# Patient Record
Sex: FEMALE | Race: WHITE | NOT HISPANIC OR LATINO | Employment: OTHER | URBAN - METROPOLITAN AREA
[De-identification: names, ages, dates, MRNs, and addresses within clinical notes are randomized per-mention and may not be internally consistent; named-entity substitution may affect disease eponyms.]

---

## 2017-01-03 ENCOUNTER — APPOINTMENT (OUTPATIENT)
Dept: PHYSICAL THERAPY | Facility: CLINIC | Age: 52
End: 2017-01-03
Payer: MEDICARE

## 2017-01-03 PROCEDURE — 97140 MANUAL THERAPY 1/> REGIONS: CPT

## 2017-01-03 PROCEDURE — 97110 THERAPEUTIC EXERCISES: CPT

## 2017-01-11 ENCOUNTER — APPOINTMENT (OUTPATIENT)
Dept: PHYSICAL THERAPY | Facility: CLINIC | Age: 52
End: 2017-01-11
Payer: MEDICARE

## 2017-01-19 ENCOUNTER — HOSPITAL ENCOUNTER (OUTPATIENT)
Dept: CT IMAGING | Facility: HOSPITAL | Age: 52
Discharge: HOME | End: 2017-01-19
Attending: INTERNAL MEDICINE
Payer: MEDICARE

## 2017-01-19 DIAGNOSIS — C50.919 MALIGNANT NEOPLASM OF FEMALE BREAST (HCC): ICD-10-CM

## 2017-01-19 PROCEDURE — 74177 CT ABD & PELVIS W/CONTRAST: CPT

## 2017-01-19 PROCEDURE — 71260 CT THORAX DX C+: CPT

## 2017-01-19 RX ADMIN — IOHEXOL 100 ML: 350 INJECTION, SOLUTION INTRAVENOUS at 11:30

## 2017-01-20 ENCOUNTER — APPOINTMENT (OUTPATIENT)
Dept: PHYSICAL THERAPY | Facility: CLINIC | Age: 52
End: 2017-01-20
Payer: MEDICARE

## 2017-01-20 PROCEDURE — 97110 THERAPEUTIC EXERCISES: CPT

## 2017-01-20 PROCEDURE — 97140 MANUAL THERAPY 1/> REGIONS: CPT

## 2017-01-24 ENCOUNTER — TRANSCRIBE ORDERS (OUTPATIENT)
Dept: ADMINISTRATIVE | Facility: HOSPITAL | Age: 52
End: 2017-01-24

## 2017-01-24 ENCOUNTER — APPOINTMENT (OUTPATIENT)
Dept: PHYSICAL THERAPY | Facility: CLINIC | Age: 52
End: 2017-01-24
Payer: MEDICARE

## 2017-01-24 DIAGNOSIS — E04.2 NONTOXIC MULTINODULAR GOITER: Primary | ICD-10-CM

## 2017-01-24 PROCEDURE — 97140 MANUAL THERAPY 1/> REGIONS: CPT

## 2017-01-24 PROCEDURE — 97110 THERAPEUTIC EXERCISES: CPT

## 2017-01-30 ENCOUNTER — APPOINTMENT (OUTPATIENT)
Dept: PHYSICAL THERAPY | Facility: CLINIC | Age: 52
End: 2017-01-30
Payer: MEDICARE

## 2017-01-30 PROCEDURE — G8991 OTHER PT/OT GOAL STATUS: HCPCS

## 2017-01-30 PROCEDURE — G8992 OTHER PT/OT  D/C STATUS: HCPCS

## 2017-01-30 PROCEDURE — G8990 OTHER PT/OT CURRENT STATUS: HCPCS

## 2017-01-30 PROCEDURE — 97140 MANUAL THERAPY 1/> REGIONS: CPT

## 2017-01-30 PROCEDURE — 97110 THERAPEUTIC EXERCISES: CPT

## 2017-01-31 ENCOUNTER — GENERIC CONVERSION - ENCOUNTER (OUTPATIENT)
Dept: OTHER | Facility: OTHER | Age: 52
End: 2017-01-31

## 2017-02-01 ENCOUNTER — HOSPITAL ENCOUNTER (OUTPATIENT)
Dept: ULTRASOUND IMAGING | Facility: HOSPITAL | Age: 52
Discharge: HOME | End: 2017-02-01
Attending: INTERNAL MEDICINE
Payer: MEDICARE

## 2017-02-01 DIAGNOSIS — E04.2 NONTOXIC MULTINODULAR GOITER: ICD-10-CM

## 2017-02-01 PROCEDURE — 76536 US EXAM OF HEAD AND NECK: CPT

## 2017-02-05 ENCOUNTER — GENERIC CONVERSION - ENCOUNTER (OUTPATIENT)
Dept: OTHER | Facility: OTHER | Age: 52
End: 2017-02-05

## 2017-02-06 ENCOUNTER — ALLSCRIPTS OFFICE VISIT (OUTPATIENT)
Dept: OTHER | Facility: OTHER | Age: 52
End: 2017-02-06

## 2017-02-14 ENCOUNTER — ALLSCRIPTS OFFICE VISIT (OUTPATIENT)
Dept: OTHER | Facility: OTHER | Age: 52
End: 2017-02-14

## 2017-02-15 ENCOUNTER — GENERIC CONVERSION - ENCOUNTER (OUTPATIENT)
Dept: OTHER | Facility: OTHER | Age: 52
End: 2017-02-15

## 2017-03-23 ENCOUNTER — GENERIC CONVERSION - ENCOUNTER (OUTPATIENT)
Dept: OTHER | Facility: OTHER | Age: 52
End: 2017-03-23

## 2017-05-18 ENCOUNTER — GENERIC CONVERSION - ENCOUNTER (OUTPATIENT)
Dept: OTHER | Facility: OTHER | Age: 52
End: 2017-05-18

## 2017-05-24 ENCOUNTER — ALLSCRIPTS OFFICE VISIT (OUTPATIENT)
Dept: OTHER | Facility: OTHER | Age: 52
End: 2017-05-24

## 2017-05-24 ENCOUNTER — TRANSCRIBE ORDERS (OUTPATIENT)
Dept: ADMINISTRATIVE | Facility: HOSPITAL | Age: 52
End: 2017-05-24

## 2017-05-24 DIAGNOSIS — C50.011 MALIGNANT NEOPLASM INVOLVING BOTH NIPPLE AND AREOLA OF RIGHT BREAST IN FEMALE (HCC): Primary | ICD-10-CM

## 2017-05-25 ENCOUNTER — ALLSCRIPTS OFFICE VISIT (OUTPATIENT)
Dept: OTHER | Facility: OTHER | Age: 52
End: 2017-05-25

## 2017-05-25 LAB
ADDITIONAL INFORMATION (HISTORICAL): NORMAL
ADEQUACY: (HISTORICAL): NORMAL
COMMENT (HISTORICAL): NORMAL
CYTOTECHNOLOGIST: (HISTORICAL): NORMAL
GENERAL CATEGORIZATION (HISTORICAL): NORMAL
INFECTION (HISTORICAL): NORMAL
INTERPRETATION (HISTORICAL): NORMAL
LMP (HISTORICAL): NORMAL
PATHOLOGIST (HISTORICAL): NORMAL
PREV. BX: (HISTORICAL): NORMAL
PREV. PAP (HISTORICAL): NORMAL
REVIEWED BY (HISTORICAL): NORMAL
SOURCE (HISTORICAL): NORMAL
SPECIMEN STATUS REPORT (HISTORICAL): NORMAL

## 2017-06-02 DIAGNOSIS — C50.919 MALIGNANT NEOPLASM OF FEMALE BREAST (HCC): ICD-10-CM

## 2017-06-12 ENCOUNTER — ALLSCRIPTS OFFICE VISIT (OUTPATIENT)
Dept: OTHER | Facility: OTHER | Age: 52
End: 2017-06-12

## 2017-06-23 ENCOUNTER — GENERIC CONVERSION - ENCOUNTER (OUTPATIENT)
Dept: OTHER | Facility: OTHER | Age: 52
End: 2017-06-23

## 2017-08-02 ENCOUNTER — HOSPITAL ENCOUNTER (OUTPATIENT)
Dept: ULTRASOUND IMAGING | Facility: HOSPITAL | Age: 52
Discharge: HOME | End: 2017-08-02
Attending: INTERNAL MEDICINE
Payer: MEDICARE

## 2017-08-02 DIAGNOSIS — E04.2 NONTOXIC MULTINODULAR GOITER: ICD-10-CM

## 2017-08-02 PROCEDURE — 76536 US EXAM OF HEAD AND NECK: CPT

## 2017-08-31 ENCOUNTER — ALLSCRIPTS OFFICE VISIT (OUTPATIENT)
Dept: OTHER | Facility: OTHER | Age: 52
End: 2017-08-31

## 2017-08-31 DIAGNOSIS — E04.2 NONTOXIC MULTINODULAR GOITER: ICD-10-CM

## 2017-10-02 ENCOUNTER — GENERIC CONVERSION - ENCOUNTER (OUTPATIENT)
Dept: OTHER | Facility: OTHER | Age: 52
End: 2017-10-02

## 2017-10-03 ENCOUNTER — HOSPITAL ENCOUNTER (OUTPATIENT)
Dept: CT IMAGING | Facility: HOSPITAL | Age: 52
Discharge: HOME | End: 2017-10-03
Attending: INTERNAL MEDICINE
Payer: MEDICARE

## 2017-10-03 DIAGNOSIS — C50.919 MALIGNANT NEOPLASM OF FEMALE BREAST (HCC): ICD-10-CM

## 2017-10-03 PROCEDURE — 74177 CT ABD & PELVIS W/CONTRAST: CPT

## 2017-10-03 PROCEDURE — 71260 CT THORAX DX C+: CPT

## 2017-10-03 RX ADMIN — IOHEXOL 100 ML: 350 INJECTION, SOLUTION INTRAVENOUS at 09:33

## 2017-10-06 DIAGNOSIS — C50.919 MALIGNANT NEOPLASM OF FEMALE BREAST (HCC): ICD-10-CM

## 2017-10-11 ENCOUNTER — ALLSCRIPTS OFFICE VISIT (OUTPATIENT)
Dept: OTHER | Facility: OTHER | Age: 52
End: 2017-10-11
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
## 2017-10-26 ENCOUNTER — APPOINTMENT (OUTPATIENT)
Dept: PHYSICAL THERAPY | Facility: CLINIC | Age: 52
End: 2017-10-26
Payer: MEDICARE

## 2017-10-26 PROCEDURE — G8978 MOBILITY CURRENT STATUS: HCPCS

## 2017-10-26 PROCEDURE — 97161 PT EVAL LOW COMPLEX 20 MIN: CPT

## 2017-10-26 PROCEDURE — G8979 MOBILITY GOAL STATUS: HCPCS

## 2017-11-02 ENCOUNTER — APPOINTMENT (OUTPATIENT)
Dept: PHYSICAL THERAPY | Facility: CLINIC | Age: 52
End: 2017-11-02
Payer: MEDICARE

## 2017-11-02 PROCEDURE — 97110 THERAPEUTIC EXERCISES: CPT

## 2017-11-02 PROCEDURE — 97140 MANUAL THERAPY 1/> REGIONS: CPT

## 2017-11-07 ENCOUNTER — APPOINTMENT (OUTPATIENT)
Dept: PHYSICAL THERAPY | Facility: CLINIC | Age: 52
End: 2017-11-07
Payer: MEDICARE

## 2017-11-07 PROCEDURE — 97140 MANUAL THERAPY 1/> REGIONS: CPT

## 2017-11-09 ENCOUNTER — APPOINTMENT (OUTPATIENT)
Dept: PHYSICAL THERAPY | Facility: CLINIC | Age: 52
End: 2017-11-09
Payer: MEDICARE

## 2017-11-09 PROCEDURE — 97110 THERAPEUTIC EXERCISES: CPT

## 2017-11-14 ENCOUNTER — APPOINTMENT (OUTPATIENT)
Dept: PHYSICAL THERAPY | Facility: CLINIC | Age: 52
End: 2017-11-14
Payer: MEDICARE

## 2017-11-14 PROCEDURE — 97140 MANUAL THERAPY 1/> REGIONS: CPT

## 2017-11-14 PROCEDURE — 97110 THERAPEUTIC EXERCISES: CPT

## 2017-11-16 ENCOUNTER — APPOINTMENT (OUTPATIENT)
Dept: PHYSICAL THERAPY | Facility: CLINIC | Age: 52
End: 2017-11-16
Payer: MEDICARE

## 2017-11-16 PROCEDURE — 97110 THERAPEUTIC EXERCISES: CPT

## 2017-11-16 PROCEDURE — 97140 MANUAL THERAPY 1/> REGIONS: CPT

## 2017-11-21 ENCOUNTER — HOSPITAL ENCOUNTER (OUTPATIENT)
Dept: MAMMOGRAPHY | Facility: CLINIC | Age: 52
Discharge: HOME | End: 2017-11-21
Payer: MEDICARE

## 2017-11-21 ENCOUNTER — HOSPITAL ENCOUNTER (OUTPATIENT)
Dept: ULTRASOUND IMAGING | Facility: CLINIC | Age: 52
Discharge: HOME | End: 2017-11-21
Payer: MEDICARE

## 2017-11-21 DIAGNOSIS — R92.2 INCONCLUSIVE MAMMOGRAPHY: ICD-10-CM

## 2017-11-21 DIAGNOSIS — C50.919 MALIGNANT NEOPLASM OF FEMALE BREAST (HCC): ICD-10-CM

## 2017-11-21 PROCEDURE — G0279 TOMOSYNTHESIS, MAMMO: HCPCS

## 2017-11-21 PROCEDURE — 76642 ULTRASOUND BREAST LIMITED: CPT

## 2017-11-21 PROCEDURE — G0206 DX MAMMO INCL CAD UNI: HCPCS

## 2017-11-28 ENCOUNTER — GENERIC CONVERSION - ENCOUNTER (OUTPATIENT)
Dept: OTHER | Facility: OTHER | Age: 52
End: 2017-11-28

## 2017-11-28 ENCOUNTER — ALLSCRIPTS OFFICE VISIT (OUTPATIENT)
Dept: OTHER | Facility: OTHER | Age: 52
End: 2017-11-28

## 2017-11-30 ENCOUNTER — GENERIC CONVERSION - ENCOUNTER (OUTPATIENT)
Dept: OTHER | Facility: OTHER | Age: 52
End: 2017-11-30

## 2017-11-30 ENCOUNTER — TRANSCRIBE ORDERS (OUTPATIENT)
Dept: ADMINISTRATIVE | Facility: HOSPITAL | Age: 52
End: 2017-11-30

## 2017-11-30 ENCOUNTER — APPOINTMENT (OUTPATIENT)
Dept: PHYSICAL THERAPY | Facility: CLINIC | Age: 52
End: 2017-11-30
Payer: MEDICARE

## 2017-11-30 DIAGNOSIS — R92.8 ABNORMAL MAMMOGRAM: Primary | ICD-10-CM

## 2017-11-30 DIAGNOSIS — C50.919 MALIGNANT NEOPLASM OF FEMALE BREAST, UNSPECIFIED ESTROGEN RECEPTOR STATUS, UNSPECIFIED LATERALITY, UNSPECIFIED SITE OF BREAST (HCC): ICD-10-CM

## 2017-11-30 PROCEDURE — G8979 MOBILITY GOAL STATUS: HCPCS

## 2017-11-30 PROCEDURE — 97110 THERAPEUTIC EXERCISES: CPT

## 2017-11-30 PROCEDURE — G8980 MOBILITY D/C STATUS: HCPCS

## 2017-12-12 ENCOUNTER — GENERIC CONVERSION - ENCOUNTER (OUTPATIENT)
Dept: FAMILY MEDICINE CLINIC | Facility: HOSPITAL | Age: 52
End: 2017-12-12

## 2018-01-03 ENCOUNTER — GENERIC CONVERSION - ENCOUNTER (OUTPATIENT)
Dept: FAMILY MEDICINE CLINIC | Facility: HOSPITAL | Age: 53
End: 2018-01-03

## 2018-01-11 NOTE — RESULT NOTES
Message   stable      Verified Results  US THYROID [id number] 11:49AM Reza Damon Order Number: [id number]    - Patient Instructions: To schedule this appointment, please contact Central Scheduling at [phone]  Test Name Result Flag Reference   US THYROID (Report)     THYROID ULTRASOUND     INDICATION: Nontoxic multinodular thyroid goiter  COMPARISON: Multiple prior thyroid ultrasound examinations, most recently July 29, 2016 and most remotely September 18 20,007  TECHNIQUE:  Ultrasound of the thyroid was performed with a high frequency linear transducer in transverse and sagittal planes including volumetric imaging sweeps as well as traditional still imaging technique  FINDINGS:   Somewhat heterogeneous echotexture with multiple intermixed nodules as described below  Right gland: 4 8 x 2 0 x 2 2 cm  Heterogeneous isoechoic solid nodule in the mid to lower pole the right thyroid lobe measures 3 3 x 1 9 x 2 1 cm and is not significantly changed from previous examination  This was previously biopsied  Left gland: 6 0 x 1 4 x 1 9 cm  Mid to upper pole isoechoic nodule containing one coarse focus of central calcification measures 1 2 x 0 8 x 0 8 cm in overall size dimensions on the current examination, slightly decreased from 1 6 x 0 7 x 1 0 cm on the most recent prior examination  A   nodule in the anterior mid to upper pole of the left thyroid lobe measuring 1 9 x 0 7 x 1 2 cm is not significant changed in size or sonographic character from the previous examination  A nodule in the medial midportion of the left thyroid lobe,    predominantly solid with some cystic areas measuring 1 2 x 0 8 x 1 0 cm is unchanged in slightly decreased in size but unchanged and sonographic character when compared to previous examination  Finally a dominant nodule in the lower pole the left    thyroid lobe measuring 2 9 x 1 7 x 2 1 cm is stable from previous examination   This nodule is previous biopsy  Isthmus: The isthmus is 0 2 cm in AP dimension  IMPRESSION:      Stable examination demonstrating heterogeneous echotexture with multiple intermixed thyroid nodules, unchanged from previous examination               Workstation performed: [id number]     Signed by:   Eladio Maria MD   2/2/17

## 2018-01-12 VITALS
HEART RATE: 66 BPM | RESPIRATION RATE: 16 BRPM | DIASTOLIC BLOOD PRESSURE: 68 MMHG | HEIGHT: 57 IN | BODY MASS INDEX: 26.54 KG/M2 | TEMPERATURE: 98.7 F | SYSTOLIC BLOOD PRESSURE: 90 MMHG | WEIGHT: 123 LBS

## 2018-01-12 VITALS
RESPIRATION RATE: 16 BRPM | WEIGHT: 124.5 LBS | DIASTOLIC BLOOD PRESSURE: 70 MMHG | HEART RATE: 79 BPM | SYSTOLIC BLOOD PRESSURE: 110 MMHG | OXYGEN SATURATION: 98 % | BODY MASS INDEX: 26.86 KG/M2 | TEMPERATURE: 97.7 F | HEIGHT: 57 IN

## 2018-01-12 VITALS
TEMPERATURE: 98.2 F | HEIGHT: 57 IN | BODY MASS INDEX: 26.54 KG/M2 | SYSTOLIC BLOOD PRESSURE: 122 MMHG | RESPIRATION RATE: 16 BRPM | WEIGHT: 123 LBS | DIASTOLIC BLOOD PRESSURE: 84 MMHG | HEART RATE: 70 BPM | OXYGEN SATURATION: 96 %

## 2018-01-14 VITALS
DIASTOLIC BLOOD PRESSURE: 80 MMHG | BODY MASS INDEX: 26.32 KG/M2 | SYSTOLIC BLOOD PRESSURE: 118 MMHG | WEIGHT: 122 LBS | HEIGHT: 57 IN | HEART RATE: 72 BPM

## 2018-01-14 VITALS
TEMPERATURE: 99 F | BODY MASS INDEX: 26.1 KG/M2 | WEIGHT: 121 LBS | HEART RATE: 71 BPM | DIASTOLIC BLOOD PRESSURE: 84 MMHG | SYSTOLIC BLOOD PRESSURE: 132 MMHG | HEIGHT: 57 IN | OXYGEN SATURATION: 99 % | RESPIRATION RATE: 16 BRPM

## 2018-01-14 VITALS
HEART RATE: 80 BPM | WEIGHT: 127.06 LBS | SYSTOLIC BLOOD PRESSURE: 112 MMHG | DIASTOLIC BLOOD PRESSURE: 84 MMHG | BODY MASS INDEX: 27.41 KG/M2 | HEIGHT: 57 IN

## 2018-01-15 NOTE — RESULT NOTES
Discussion/Summary   stable thyroid nodule - an area on left lobe difficult to differentiate between nodule or area of patchiness - will continue to monitor for now      Verified Results  US THYROID [id number] 10:19AM Aracelis Filter Order Number: [id number]    - Patient Instructions: To schedule this appointment, please contact Central Scheduling at [phone]  Test Name Result Flag Reference   US THYROID (Report)     THYROID ULTRASOUND     INDICATION: Follow-up nodules     COMPARISON: 2/1/2017     TECHNIQUE:  Ultrasound of the thyroid was performed with a high frequency linear transducer in transverse and sagittal planes including volumetric imaging sweeps as well as traditional still imaging technique  FINDINGS:   The thyroid is heterogeneous  Right gland: 1 8 x 1 9 x 4 6 cm  Right midpole nodule measuring 1 8 x 2 2 x 3 3 cm  Solid isoechoic nodule  Unchanged from most recent prior  Note that measurements obtained prior to biopsy on the ultrasound from 11/19/2015 are likely under measured  When measured in a similar    fashion on cine images, this nodule is stable  LOW SUSPICION PATTERN (Estimated risk of malignancy 5-10%)  FNA recommended at >/= 1 5 cm            Left gland: 1 6 x 1 9 x 6 0 cm  Left lower pole nodule measuring 1 6 x 2 1 x 2 9 cm  Solid isoechoic nodule  Unchanged from most recent prior  Note that measurements obtained prior to biopsy on the ultrasound from 11/19/2015 are likely under measured  When measured in a similar    fashion on cine images, this nodule is stable  Left upper pole nodule measuring 0 9 x 0 8 x 1 2 cm  Solid isoechoic nodule  Unchanged from prior  LOW SUSPICION PATTERN (Estimated risk of malignancy 5-10%)  FNA recommended at >/= 1 5 cm     Left midpole nodule measuring 0 7 x 0 9 x 1 3 cm  Partially cystic with eccentric solid nodule  Unchanged from prior  LOW SUSPICION PATTERN (Estimated risk of malignancy 5-10%)   FNA recommended at >/= 1 5 cm     There is an ill-defined focus in the left anterior mid pole measuring 0 8 x 1 2 x 1 8 cm  As it is ill-defined, it is uncertain whether this represents a true nodule or area of heterogeneity  This is stable from at least 7/29/2016, not well seen    previously this  Isthmus: The isthmus is 0 4 cm in AP dimension  IMPRESSION:      Stable bilateral thyroid nodules, the largest of which were previously biopsied  An 1 8 cm ill-defined isoechoic focus in the left anterior midpole may represent heterogeneity versus a true nodule  This is stable from at least 7/29/2016 though    difficult to visualize previous to this               Workstation performed: [id number]     Signed by:   Delfino Pacheco MD   8/7/17
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
## 2018-01-22 VITALS
SYSTOLIC BLOOD PRESSURE: 118 MMHG | BODY MASS INDEX: 26.75 KG/M2 | TEMPERATURE: 99 F | DIASTOLIC BLOOD PRESSURE: 80 MMHG | HEART RATE: 72 BPM | HEIGHT: 57 IN | WEIGHT: 124 LBS

## 2018-01-22 VITALS
RESPIRATION RATE: 14 BRPM | HEART RATE: 72 BPM | WEIGHT: 125 LBS | DIASTOLIC BLOOD PRESSURE: 70 MMHG | SYSTOLIC BLOOD PRESSURE: 138 MMHG | HEIGHT: 57 IN | BODY MASS INDEX: 26.97 KG/M2

## 2018-01-22 VITALS
HEIGHT: 57 IN | TEMPERATURE: 98.8 F | WEIGHT: 123 LBS | DIASTOLIC BLOOD PRESSURE: 80 MMHG | SYSTOLIC BLOOD PRESSURE: 110 MMHG | BODY MASS INDEX: 26.54 KG/M2 | HEART RATE: 70 BPM

## 2018-02-09 DIAGNOSIS — C50.919 MALIGNANT NEOPLASM OF FEMALE BREAST (HCC): ICD-10-CM

## 2018-02-22 ENCOUNTER — HOSPITAL ENCOUNTER (OUTPATIENT)
Dept: NON INVASIVE DIAGNOSTICS | Facility: HOSPITAL | Age: 53
Discharge: HOME | End: 2018-02-22
Payer: MEDICARE

## 2018-02-22 ENCOUNTER — TRANSCRIBE ORDERS (OUTPATIENT)
Dept: ADMINISTRATIVE | Facility: HOSPITAL | Age: 53
End: 2018-02-22

## 2018-02-22 DIAGNOSIS — I51.9 HEART DISEASE: Primary | ICD-10-CM

## 2018-02-22 DIAGNOSIS — I51.9 HEART DISEASE: ICD-10-CM

## 2018-02-22 PROCEDURE — 93005 ELECTROCARDIOGRAM TRACING: CPT

## 2018-02-22 PROCEDURE — 93010 ELECTROCARDIOGRAM REPORT: CPT | Performed by: INTERNAL MEDICINE

## 2018-02-23 LAB
ATRIAL RATE: 62 BPM
P AXIS: 44 DEGREES
PR INTERVAL: 116 MS
QRS AXIS: 38 DEGREES
QRSD INTERVAL: 84 MS
QT INTERVAL: 410 MS
QTC INTERVAL: 416 MS
T WAVE AXIS: 49 DEGREES
VENTRICULAR RATE: 62 BPM

## 2018-02-28 ENCOUNTER — OFFICE VISIT (OUTPATIENT)
Dept: HEMATOLOGY ONCOLOGY | Facility: HOSPITAL | Age: 53
End: 2018-02-28
Payer: MEDICARE

## 2018-02-28 VITALS
SYSTOLIC BLOOD PRESSURE: 130 MMHG | HEIGHT: 56 IN | RESPIRATION RATE: 16 BRPM | BODY MASS INDEX: 26.54 KG/M2 | HEART RATE: 65 BPM | DIASTOLIC BLOOD PRESSURE: 78 MMHG | WEIGHT: 118 LBS | TEMPERATURE: 97.9 F

## 2018-02-28 DIAGNOSIS — Z17.0 MALIGNANT NEOPLASM OF BREAST IN FEMALE, ESTROGEN RECEPTOR POSITIVE, UNSPECIFIED LATERALITY, UNSPECIFIED SITE OF BREAST (HCC): Primary | ICD-10-CM

## 2018-02-28 DIAGNOSIS — C50.919 MALIGNANT NEOPLASM OF BREAST IN FEMALE, ESTROGEN RECEPTOR POSITIVE, UNSPECIFIED LATERALITY, UNSPECIFIED SITE OF BREAST (HCC): Primary | ICD-10-CM

## 2018-02-28 DIAGNOSIS — C50.911 MALIGNANT NEOPLASM OF RIGHT FEMALE BREAST, UNSPECIFIED ESTROGEN RECEPTOR STATUS, UNSPECIFIED SITE OF BREAST (HCC): Primary | ICD-10-CM

## 2018-02-28 PROCEDURE — 99214 OFFICE O/P EST MOD 30 MIN: CPT | Performed by: INTERNAL MEDICINE

## 2018-02-28 RX ORDER — LEVOCETIRIZINE DIHYDROCHLORIDE 5 MG/1
1 TABLET, FILM COATED ORAL DAILY PRN
COMMUNITY
End: 2022-06-05

## 2018-02-28 RX ORDER — ANASTROZOLE 1 MG/1
1 TABLET ORAL DAILY
Qty: 30 TABLET | Refills: 11 | Status: SHIPPED | OUTPATIENT
Start: 2018-02-28 | End: 2019-05-17 | Stop reason: SDUPTHER

## 2018-02-28 RX ORDER — LATANOPROST 50 UG/ML
1 SOLUTION/ DROPS OPHTHALMIC
COMMUNITY

## 2018-02-28 RX ORDER — FERROUS SULFATE 325(65) MG
1 TABLET ORAL 2 TIMES DAILY
COMMUNITY
Start: 2012-01-20 | End: 2019-12-12

## 2018-02-28 NOTE — PROGRESS NOTES
Hematology/Oncology Outpatient Follow- up Note  Napolean Boast 47 y o  female MRN: @ Encounter: [id number]        Date:  2/28/2018    Presenting Complaint/Diagnosis : Multifocal ER positive NE positive HER-2 positive breast cancer  Final pathologic stage IA i e  pathologic T1 C  pathologic N0 grade 2 tumor  All sentinel lymph nodes were negative for malignancy  Previous Hematologic/ Oncologic History:    Patient is status post a mastectomy on the right side  Chemotherapy with TCH  She had 6 cycles  Her treatment started in February of 2014  Then got one year of Herceptin which ended in February of 2015    Current Hematologic/ Oncologic Treatment:    Tamoxifen Which will now be changed to Arimidex  Interval History:      The patient returns for followup visit  She states she's doing well  She is currently on tamoxifen  At her last visit I had ordered bloodwork disease she was menopausal and all her numbers are running within the menopausal range  The patient herself has not had a period for more than a year  As far symptoms are concerned she is at baseline  Denies any nausea denies any vomiting any diarrhea denies any constipation denies abdominal pain and the rest of her 14 point review of systems today was negative  Her most recent CAT scan showed no evidence of metastatic disease or progression  [facility] October 2017  I will repeat that when I see her back in 6 months  Again her tumor marker is very slightly elevated by 1  Where normal is 38 and hers was 39  I do not know if this is significant but we will do imaging in 6 months  Test Results:    Most recent blood work shows a creatinine of 0 55  Estrone was 34  Liver function tests were normal  White count 7 5  Hemoglobin was 13 6 with a platelet count of 855  FSH was 30, LH was 19 8 with an estradiol less than 15  CA-27-29 was 39 normal 38  Imaging: No results found      Labs:   Lab Results   Component Value Date    WBC 11 47 (H) 12/18/2013 HGB 12 1 12/18/2013    HCT 37 5 12/18/2013    MCV 92 12/18/2013     12/18/2013     Lab Results   Component Value Date     (L) 12/18/2013    K 3 5 (L) 12/18/2013     (L) 12/18/2013    CO2 29 12/18/2013    ANIONGAP 5 12/18/2013    BUN 13 12/18/2013    CREATININE 0 59 (L) 12/18/2013    GLUCOSE 127 12/18/2013    CALCIUM 8 1 (L) 12/18/2013             ROS: As stated in the history of present illness otherwise his 14 point review of systems today was negative  Active Problems: There is no problem list on file for this patient  Past Medical History: No past medical history on file  Surgical History: No past surgical history on file  Family History:  No family history on file  Cancer-related family history is not on file  Social History:   Social History     Social History    Marital status: Single     Spouse name: N/A    Number of children: N/A    Years of education: N/A     Occupational History    Not on file  Social History Main Topics    Smoking status: Not on file    Smokeless tobacco: Not on file    Alcohol use Not on file    Drug use: Unknown    Sexual activity: Not on file     Other Topics Concern    Not on file     Social History Narrative    No narrative on file       Current Medications:   Current Outpatient Prescriptions   Medication Sig Dispense Refill    carBAMazepine (TEGretol) 200 mg tablet Take 200 mg by mouth 2 (two) times a day   citalopram (CeleXA) 10 mg tablet Take 10 mg by mouth daily   haloperidol (HALDOL) 1 mg tablet Take 2 mg by mouth daily   haloperidol (HALDOL) 1 mg tablet Take 1 mg by mouth daily   ibuprofen (MOTRIN) 200 mg tablet Take 400 mg by mouth every 6 (six) hours as needed for mild pain   LORazepam (ATIVAN) 0 5 mg tablet Take 0 5 mg by mouth every 6 (six) hours as needed for anxiety   tamoxifen (NOLVADEX) 10 mg tablet Take 10 mg by mouth daily        ziprasidone (GEODON) 60 mg capsule Take 60 mg by mouth 2 (two) times a day with meals  No current facility-administered medications for this visit  Allergies: Allergies   Allergen Reactions    Bactrim [Sulfamethoxazole-Trimethoprim]     Fexofenadine Hives     Reaction Date: 63SPS0104;    Shaheen Woodward Methylphenidate Hyperactivity     Reaction Date: 82IUY8015;     Sulfa Antibiotics     Thioridazine     Trimethoprim     Medroxyprogesterone Rash and Hives     Reaction Date: 18BGA2945;        Physical Exam:    There is no height or weight on file to calculate BSA  Wt Readings from Last 3 Encounters:   11/30/17 56 7 kg (125 lb)   11/28/17 56 2 kg (124 lb)   10/11/17 55 8 kg (123 lb)        Temp Readings from Last 3 Encounters:   11/28/17 99 °F (37 2 °C)   10/11/17 98 2 °F (36 8 °C)   06/12/17 97 7 °F (36 5 °C)        BP Readings from Last 3 Encounters:   11/30/17 138/70   11/28/17 118/80   10/11/17 122/84         Pulse Readings from Last 3 Encounters:   11/30/17 72   11/28/17 72   10/11/17 70       Physical Exam     Constitutional   General appearance: No acute distress, well appearing and well nourished  Eyes   Conjunctiva and lids: No swelling, erythema or discharge  Pupils and irises: Equal, round and reactive to light  Ears, Nose, Mouth, and Throat   External inspection of ears and nose: Normal     Nasal mucosa, septum, and turbinates: Normal without edema or erythema  Oropharynx: Normal with no erythema, edema, exudate or lesions  Pulmonary   Respiratory effort: No increased work of breathing or signs of respiratory distress  Auscultation of lungs: Clear to auscultation  Cardiovascular   Palpation of heart: Normal PMI, no thrills  Auscultation of heart: Normal rate and rhythm, normal S1 and S2, without murmurs  Examination of extremities for edema and/or varicosities: Normal     Carotid pulses: Normal     Abdomen   Abdomen: Non-tender, no masses  Liver and spleen: No hepatomegaly or splenomegaly  Lymphatic   Palpation of lymph nodes in neck: No lymphadenopathy  Musculoskeletal   Gait and station: Normal     Digits and nails: Normal without clubbing or cyanosis  Inspection/palpation of joints, bones, and muscles: Normal     Skin   Skin and subcutaneous tissue: Normal without rashes or lesions  Assessment / Plan: This is a pleasant 60-year-old female with autism  She currently lives in a group home situation but does work  She had a stage I breast cancer which was multifocal and was HER-2 positive  Based on this I recommended chemotherapy  She got 6 cycles of TCH  This was followed by a year of Herceptin  She is now on tamoxifen  Blood work shows she is menopausal  She has not had her period more than a year  I will now put her on Arimidex daily  This will be to complete a total of 5 years  Her tumor marker was slightly elevated at 39 where normal is 38  I will do a CT scan before I see her in 6 months  We will obviously repeat her tumor marker also before I see her  Her Arimidex will be 1 mg daily  Goals and Barriers:  Current Goal:  Prolong Survival from Breast cancer  Barriers: None  Patient's Capacity to Self Care:  Patient able to self care

## 2018-05-09 ENCOUNTER — HOSPITAL ENCOUNTER (OUTPATIENT)
Dept: NON INVASIVE DIAGNOSTICS | Facility: HOSPITAL | Age: 53
Discharge: HOME | End: 2018-05-09
Payer: MEDICARE

## 2018-05-09 ENCOUNTER — TRANSCRIBE ORDERS (OUTPATIENT)
Dept: ADMINISTRATIVE | Facility: HOSPITAL | Age: 53
End: 2018-05-09

## 2018-05-09 DIAGNOSIS — Z79.899 MEDICATION THERAPY CONTINUED: ICD-10-CM

## 2018-05-09 DIAGNOSIS — Z79.899 MEDICATION THERAPY CONTINUED: Primary | ICD-10-CM

## 2018-05-09 LAB
ATRIAL RATE: 69 BPM
P AXIS: 30 DEGREES
PR INTERVAL: 118 MS
QRS AXIS: 20 DEGREES
QRSD INTERVAL: 82 MS
QT INTERVAL: 408 MS
QTC INTERVAL: 437 MS
T WAVE AXIS: 46 DEGREES
VENTRICULAR RATE: 69 BPM

## 2018-05-09 PROCEDURE — 93005 ELECTROCARDIOGRAM TRACING: CPT

## 2018-05-09 PROCEDURE — 93010 ELECTROCARDIOGRAM REPORT: CPT | Performed by: INTERNAL MEDICINE

## 2018-05-15 ENCOUNTER — OFFICE VISIT (OUTPATIENT)
Dept: FAMILY MEDICINE CLINIC | Facility: HOSPITAL | Age: 53
End: 2018-05-15
Payer: MEDICARE

## 2018-05-15 VITALS
HEART RATE: 66 BPM | SYSTOLIC BLOOD PRESSURE: 128 MMHG | WEIGHT: 122 LBS | TEMPERATURE: 98.4 F | HEIGHT: 56 IN | DIASTOLIC BLOOD PRESSURE: 72 MMHG | BODY MASS INDEX: 27.44 KG/M2

## 2018-05-15 DIAGNOSIS — K64.9 HEMORRHOIDS, UNSPECIFIED HEMORRHOID TYPE: ICD-10-CM

## 2018-05-15 DIAGNOSIS — F32.A DEPRESSION, UNSPECIFIED DEPRESSION TYPE: Primary | ICD-10-CM

## 2018-05-15 DIAGNOSIS — D22.5 MELANOCYTIC NEVUS OF TRUNK: Primary | ICD-10-CM

## 2018-05-15 PROBLEM — K92.2 GASTROINTESTINAL HEMORRHAGE: Status: RESOLVED | Noted: 2018-05-15 | Resolved: 2018-05-15

## 2018-05-15 PROBLEM — N60.02 BREAST CYST, LEFT: Status: ACTIVE | Noted: 2018-05-15

## 2018-05-15 PROBLEM — N60.19 FIBROCYSTIC BREAST DISEASE: Status: ACTIVE | Noted: 2018-05-15

## 2018-05-15 PROCEDURE — 99213 OFFICE O/P EST LOW 20 MIN: CPT | Performed by: INTERNAL MEDICINE

## 2018-05-15 RX ORDER — TIMOLOL MALEATE 5 MG/ML
1 SOLUTION OPHTHALMIC DAILY
COMMUNITY
End: 2019-02-25 | Stop reason: SDUPTHER

## 2018-05-15 RX ORDER — CITALOPRAM 20 MG/1
TABLET ORAL
Qty: 30 TABLET | Refills: 0 | Status: SHIPPED | OUTPATIENT
Start: 2018-05-15 | End: 2020-07-30

## 2018-05-15 RX ORDER — SILICONE ADHESIVE 1.5" X 3"
1 SHEET (EA) TOPICAL 2 TIMES DAILY
COMMUNITY

## 2018-05-15 RX ORDER — CHOLECALCIFEROL (VITAMIN D3) 125 MCG
2 CAPSULE ORAL DAILY
COMMUNITY
Start: 2012-07-10 | End: 2018-12-26 | Stop reason: SDUPTHER

## 2018-05-15 RX ORDER — LOPERAMIDE HYDROCHLORIDE 2 MG/1
CAPSULE ORAL
COMMUNITY
Start: 2011-06-02

## 2018-05-15 RX ORDER — CALCIUM CARBONATE/VITAMIN D3 500 MG-10
1 TABLET ORAL 2 TIMES DAILY
COMMUNITY
Start: 2016-11-15 | End: 2022-03-18

## 2018-05-15 RX ORDER — PANTOPRAZOLE SODIUM 40 MG/1
1 TABLET, DELAYED RELEASE ORAL 2 TIMES DAILY
COMMUNITY
End: 2019-07-12 | Stop reason: SDUPTHER

## 2018-05-15 RX ORDER — ONDANSETRON 4 MG/1
TABLET, FILM COATED ORAL AS NEEDED
COMMUNITY

## 2018-05-15 RX ORDER — CALCIUM CARB/VITAMIN D3/VIT K1 500-100-40
TABLET,CHEWABLE ORAL
COMMUNITY

## 2018-05-15 NOTE — PROGRESS NOTES
Assessment/Plan:       Diagnoses and all orders for this visit:    Melanocytic nevus of trunk  Comments:  ABCD's reviewed - difficult to eval lesion as it is scabbed over and slightly inflammed, likely to reoccur again d/t location - would remove d/t this - Derm number given  Orders:  -     Ambulatory referral to Dermatology; Future        Subjective:      Patient ID: Km Brandon is a 47 y o  female  HPI Pt here with [facility] staff and mother    [facility] staff noted a mole at the waist band  She has long fingernails and mom thinks that she must have scratched the mole off  Staff noted some bleeding and irritation at the area and called and made the appt  Mom does not think the mole has changed and states it has been there for some time      Review of Systems   Constitutional: Negative for chills and fever  Skin:        See HPI   Psychiatric/Behavioral:        Staff notes some recent behavior/acting out issues         Objective:    /72   Pulse 66   Temp 98 4 °F (36 9 °C)   Ht 4' 8" (1 422 m)   Wt 55 3 kg (122 lb)   BMI 27 35 kg/m²      Physical Exam   Constitutional: She appears well-developed and well-nourished  No distress  Cardiovascular: Normal rate, regular rhythm and normal heart sounds  No murmur heard  Pulmonary/Chest: Effort normal and breath sounds normal  She has no wheezes  Skin:   R low back with scabbed over oval lesion approx 6 mm   Nursing note and vitals reviewed

## 2018-05-18 PROBLEM — N60.02 BREAST CYST, LEFT: Status: RESOLVED | Noted: 2018-05-15 | Resolved: 2018-05-18

## 2018-05-18 PROBLEM — N60.19 FIBROCYSTIC BREAST DISEASE: Status: RESOLVED | Noted: 2018-05-15 | Resolved: 2018-05-18

## 2018-05-22 ENCOUNTER — TRANSCRIBE ORDERS (OUTPATIENT)
Dept: ADMINISTRATIVE | Facility: HOSPITAL | Age: 53
End: 2018-05-22

## 2018-05-22 ENCOUNTER — HOSPITAL ENCOUNTER (OUTPATIENT)
Dept: MAMMOGRAPHY | Facility: CLINIC | Age: 53
Discharge: HOME | End: 2018-05-22
Payer: MEDICARE

## 2018-05-22 ENCOUNTER — HOSPITAL ENCOUNTER (OUTPATIENT)
Dept: ULTRASOUND IMAGING | Facility: CLINIC | Age: 53
Discharge: HOME | End: 2018-05-22
Payer: MEDICARE

## 2018-05-22 DIAGNOSIS — R92.2 INCONCLUSIVE MAMMOGRAPHY: ICD-10-CM

## 2018-05-22 DIAGNOSIS — R92.8 ABNORMAL MAMMOGRAM: ICD-10-CM

## 2018-05-22 DIAGNOSIS — R92.8 ABNORMAL MAMMOGRAM: Primary | ICD-10-CM

## 2018-05-22 PROCEDURE — 76642 ULTRASOUND BREAST LIMITED: CPT

## 2018-05-22 PROCEDURE — G0279 TOMOSYNTHESIS, MAMMO: HCPCS

## 2018-05-22 PROCEDURE — 77065 DX MAMMO INCL CAD UNI: CPT

## 2018-05-24 DIAGNOSIS — I51.7 LVH (LEFT VENTRICULAR HYPERTROPHY): Primary | ICD-10-CM

## 2018-05-29 ENCOUNTER — OFFICE VISIT (OUTPATIENT)
Dept: FAMILY MEDICINE CLINIC | Facility: HOSPITAL | Age: 53
End: 2018-05-29
Payer: MEDICARE

## 2018-05-29 VITALS
DIASTOLIC BLOOD PRESSURE: 78 MMHG | HEIGHT: 56 IN | TEMPERATURE: 98.9 F | SYSTOLIC BLOOD PRESSURE: 128 MMHG | BODY MASS INDEX: 26.99 KG/M2 | WEIGHT: 120 LBS | HEART RATE: 64 BPM

## 2018-05-29 DIAGNOSIS — Z00.00 MEDICARE ANNUAL WELLNESS VISIT, SUBSEQUENT: Primary | ICD-10-CM

## 2018-05-29 DIAGNOSIS — M85.80 OSTEOPENIA, UNSPECIFIED LOCATION: ICD-10-CM

## 2018-05-29 DIAGNOSIS — Z23 NEED FOR 23-POLYVALENT PNEUMOCOCCAL POLYSACCHARIDE VACCINE: ICD-10-CM

## 2018-05-29 PROCEDURE — G0009 ADMIN PNEUMOCOCCAL VACCINE: HCPCS | Performed by: INTERNAL MEDICINE

## 2018-05-29 PROCEDURE — G0439 PPPS, SUBSEQ VISIT: HCPCS | Performed by: INTERNAL MEDICINE

## 2018-05-29 PROCEDURE — 90732 PPSV23 VACC 2 YRS+ SUBQ/IM: CPT | Performed by: INTERNAL MEDICINE

## 2018-05-29 NOTE — PROGRESS NOTES
HPI:  Destiney Alas is a 47 y o  female here for her Subsequent Wellness Visit  Pt had some vaginal spotting over the weekend  She follows with Dr Tony Dwyer and [facility] staff called GYN and they were told to call back and needs to be sooner then her Nov routine appt  Pts mom noted she did c/o some cramping  Has Echo appt for June 11th    [facility] staff is still going to make appt for Derm    Just had mammo May 2018    Dexa June 2016    [facility] 2016 - every 6 yrs    Patient Active Problem List   Diagnosis    Vitamin D deficiency    Scoliosis    Retinal detachment    Osteopenia    Obsessive compulsive disorder    Multiple thyroid nodules    Mild intellectual disability    Malignant neoplasm of breast ([address] )    Legal blindness [place]    Irregular menstrual cycle    Iron deficiency anemia    Internal hemorrhoids    Impulse control disorder    Glaucoma    Dyslipidemia    Depression    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HCC)    Chronic gingivitis    Cerebral palsy (HCC)    Benign neoplasm of large intestine    Autistic disorder    Anxiety     Past Medical History:   Diagnosis Date    Breast cyst, left     last assessed 12/30/2013    Fibrocystic breast disease     last assessed 06/29/2012    Gastrointestinal hemorrhage     Glaucoma     Ovarian disorder     last assessed 06/29/2012     Past Surgical History:   Procedure Laterality Date    COLONOSCOPY      Description 04/13/2016-5 yrs  Description 4 yr f/u d/t polyp and family history, onset 07/20/2012      ESOPHAGOGASTRODUODENOSCOPY      description-04/13/2016 gastritis with antral nodule    EYE SURGERY      for relief of intraocular pressure    INCISIONAL BREAST BIOPSY      description 2008    MASTECTOMY Left     SENTINEL LYMPH NODE BIOPSY      STRABISMUS SURGERY      description 1973, 66     Family History   Problem Relation Age of Onset    Osteopenia Mother     Heart attack Father 59     Acute MI    Coronary artery disease Father     Thyroid nodules Father     Osteopenia Maternal Grandmother     Prostate cancer Maternal Grandfather     Leukemia Paternal Grandfather     Heart attack Maternal Uncle 48     acute MI, stent    Cancer Maternal Uncle      adenocarcinoma of oral cavity    Colon cancer Maternal Uncle 62    Hyperlipidemia Maternal Uncle     Pancreatic cancer Paternal Uncle     Prostate cancer Paternal Uncle     Colon cancer Family     Coronary artery disease Family     Thyroid disease Family      History   Smoking Status    Never Smoker   Smokeless Tobacco    Not on file     History   Alcohol Use No      History   Drug Use No     /78   Pulse 64   Temp 98 9 °F (37 2 °C)   Ht 4' 8" (1 422 m)   Wt 54 4 kg (120 lb)   BMI 26 90 kg/m²       Current Outpatient Prescriptions   Medication Sig Dispense Refill    anastrozole (ARIMIDEX) 1 mg tablet Take 1 tablet (1 mg total) by mouth daily 30 tablet 11    Calcium Carb-Cholecalciferol (OYSTER SHELL CALCIUM) 500-400 MG-UNIT TABS Take 1 tablet by mouth 2 (two) times a day      carBAMazepine (TEGretol) 200 mg tablet Take 400 mg by mouth 2 (two) times a day       Cholecalciferol (VITAMIN D3) 2000 units TABS Take 2 tablets by mouth daily      citalopram (CeleXA) 20 mg tablet 20 mg + 10 mg for total of 30 mg daily 30 tablet 0    ferrous sulfate 325 (65 Fe) mg tablet Take 1 tablet by mouth Twice daily      haloperidol (HALDOL) 1 mg tablet Take 1 mg by mouth 2 (two) times a day       hydrocortisone (ANUSOL-HC, PROCTOSOL HC,) 2 5 % rectal cream Insert into the rectum 2 (two) times a day 30 g 0    Hypromellose 0 3 % SOLN Apply to eye      ibuprofen (MOTRIN) 200 mg tablet Take 400 mg by mouth every 6 (six) hours as needed for mild pain        latanoprost (XALATAN) 0 005 % ophthalmic solution Apply 1 drop to eye      levocetirizine (XYZAL) 5 MG tablet Take 1 tablet by mouth daily as needed      loperamide (IMODIUM) 2 mg capsule Take by mouth      LORazepam (ATIVAN) 0 5 mg tablet Take 0 5 mg by mouth every 6 (six) hours as needed for anxiety   ondansetron (ZOFRAN) 4 mg tablet Take by mouth      pantoprazole (PROTONIX) 40 mg tablet Take 1 tablet by mouth daily      sodium chloride (SOCHLOR) 5 % hypertonic ophthalmic solution Apply 1 drop to eye every 4 (four) hours      timolol (TIMOPTIC) 0 5 % ophthalmic solution Apply to eye      tolnaftate (TINACTIN) 1 % spray Apply topically      ziprasidone (GEODON) 60 mg capsule Take 60 mg by mouth 2 (two) times a day with meals  No current facility-administered medications for this visit        Allergies   Allergen Reactions    Bactrim [Sulfamethoxazole-Trimethoprim]     Fexofenadine Hives     Reaction Date: [id number];    Lou Miu Methylphenidate Hyperactivity     Reaction Date: [id number];     Sulfa Antibiotics     Thioridazine     Trimethoprim     Medroxyprogesterone Rash and Hives     Reaction Date: [id number];      Immunization History   Administered Date(s) Administered    IPV 1965, 1965, 09/06/1968    Influenza 11/07/2014, 09/29/2015, 09/07/2016, 09/29/2017    Influenza Quadrivalent Preservative Free 3 years and older IM 11/07/2014, 09/29/2015, 09/07/2016    Influenza Quadrivalent, 6-35 Months IM 09/29/2017    Influenza TIV (IM) 09/25/2009, 09/23/2010, 10/02/2012, 09/16/2013    TD (adult) Preservative Free 02/18/2015    Td (adult), adsorbed 04/13/1993, 02/18/2005, 02/18/2015    Tetanus, adsorbed 02/18/2015    Tuberculin Skin Test-PPD Intradermal 08/01/1993, 07/01/1995, 10/01/2007, 08/16/2011, 09/16/2013, 09/15/2015, 09/19/2017       Patient Care Team:  Trinidad Gayle, DO as PCP - General  MD Sharifa Giles MD Emelie Aid, CRNP Alecia Prose, DO    Medicare Screening Tests and Risk Assessments:  AWV Clinical     ISAR:   Previous hospitalizations?:  No       Once in a Lifetime Medicare Screening:   EKG performed?:  Yes Results:  poss LVH   AAA screening performed? (if performed, please add date to Health Maintenance):  No       Medicare Screening Tests and Risk Assessment:   AAA Risk Assessment    None Indicated:  Yes    Osteoporosis Risk Assessment     Female:  Yes   :  Yes    Age over 48:  Yes Low body weight (<127lbs):  No   Tobacco use:  No Alcohol use:  No   HIV Risk Assessment    None indicated:  Yes        Drug and Alcohol Use:   Tobacco use    Cigarettes:  never smoker    Smokeless:  former smokeless tobacco user    Tobacco use duration    Tobacco Cessation Readiness    Alcohol use    Alcohol use:  never    Alcohol Treatment Readiness   Illicit Drug Use    Drug use:  never        Diet & Exercise:   Diet   What is your diet?:  Regular   How many servings a day of the following:   Fruits and Vegetables:  3-4 Meat:  1-2   Whole Grains:  2 Simple Carbs:  1   Dairy:  2 Soda:  1   Coffee:  4 Tea:  1   Exercise    Do you currently exercise?:  yes    Frequency:  occasional    Minutes per day:  33   Times per week:  7     Type of exercise:  walking       Cognitive Impairment Screening:   Depression screening preformed:  Yes Depression screen score:  0   Cognitive Impairment Screening    Do you have difficulty learning or retaining new information?:  Yes Do you have difficulty handling new tasks?:  Yes   Do you have difficulty with reasoning?:  Yes Do you have difficulty with spatial ability and orientation?:  Yes   Do you have difficulty with language?:  No Do you have difficulty with behavior?:  No       Functional Ability/Level of Safety:   Hearing    Hearing difficulties:  Yes Bilateral:  normal   Left:  normal Right:  normal   Hearing aid:  No    Hearing Impairment Assessment    Current Activities    Status:  no driving, limited social activities   Help needed with the folllowing:    Using the phone:  Yes Transportation:  Yes   Shopping:  Yes Preparing Meals:  Yes   Doing Housework:  Yes Doing Laundry:  Yes   Managing Medications:  Yes Managing Money:  Yes   ADL    Fall Risk   Have you fallen in the last 12 months?:  Yes Are you unsteady on your feet?:  Yes   How many times?:  2 Are you taking any medications that may cause fatigue or dizziness?:  Yes    Do you rush to the bathroom potentially risking a fall?:  No   Injury History       Home Safety:   Are there hazards in your environment?:  No   If you fell, would you need help to get back up from the ground?:  Yes Do you have problems or concerns getting in/out of a bed, chair, tub, or toilet?:  No   Do you feel unsteady when walking?:  Yes Is your activity limited by pain?:  No   Do you have handrails and grab-bars in the home?:  Yes Are emergency numbers kept by the phone and regularly updated?:  Yes   Are you and/or family members aware of the dangers of smoking in bed?:  Yes    Do you have working smoke alarms and fire extinguisher?:  Yes    Have you left the stove on unsupervised?:  No    Home Safety Risk Factors   Unfamilar with surroundings:  Yes Uneven floors:  No   Stairs or handrail saftey risk:  Yes Loose rugs:  No   Household clutter:  No Poor household lighting:  No   No grab bars in bathroom:  Yes Further evaluation needed:  No       Advanced Directives:   Advanced Directives    Living Will:  No Durable POA for healthcare:  No   Advanced directive:  No    Patient's End of Life Decisions        Urinary Incontinence:       Glaucoma:            Provider Screening     Preventative Screening/Counseling:   Cardiovascular Screening/Counseling:   (Labs Q5 years, EKG optional one-time)   General:  Screening Current           Diabetes Screening/Counseling:   (2 tests/year if Pre-Diabetes or 1 test/year if no Diabetes)   General:  Screening Current           Colorectal Cancer Screening/Counseling:   (FOBT Q1 yr; Flex Sig Q4 yrs or Q10 yrs after Screening Colonoscopy; Screening Colonoscpy Q2 yrs High Risk or Q10 yrs Low Risk; Barium Enema Q2 yrs High Risk or Q4 yrs Low Risk)   General:  Screening Current           Prostate Cancer Screening/Counseling:   (Annual)          Breast Cancer Screening/Counseling:   (Baseline Age 28 - 43; Annual Age 36+)   General:  Screening Current          Cervical Cancer Screening/Counseling:   (Annual for High Risk or Childbearing Age with Abnormal Pap in Last 3 yrs; Every 2 all others)   General:  Screening Current           Osteoporosis Screening/Counseling:   (Every 2 Yrs if at risk or more if medically necessary)   General:  Screening Current           AAA Screening/Counseling:   (Once per Lifetime with risk factors)    General:  Screening Not Indicated           Glaucoma Screening/Counseling:   (Annual)   General:  Screening Current          HIV Screening/Counseling:   (Voluntary; Once annually for high risk OR 3 times for Pregnancy at diagnosis of IUP; 3rd trimester; and at Labor   General:  Screening Not Indicated           Hepatitis C Screening:   Hepatitis C Counseling Provided: Yes               Immunizations:   Influenza (annual): Influenza UTD This Year   Pneumococcal (Once in a Lifetime):  Pneumococcal Due Today   Hepatitis B Series (medium to high risk patients scheduled series):  Hep B Vaccine Series UTD   Zostavax (Medicare D Coverage, Pt >70 yo):  Vaccine Status Unknown   TD (Non-Medicare Wellness  Visit required): Td Vaccine UTD       Other Preventative Couseling (Non-Medicare Wellness Visit Required):   car/seat belt/driving safety reviewed, sunscreen education provided       Referrals (Non-Medicare Wellness Visit Required):       Medical Equipment/Suppliers:         Review of Systems   Constitutional: Negative for chills and fever  HENT: Negative for congestion  Eyes: Positive for visual disturbance  Followed closely by ophtho for glaucoma   Respiratory: Negative for cough and shortness of breath  Cardiovascular: Negative for chest pain and leg swelling  Gastrointestinal: Positive for constipation  Negative for diarrhea and nausea     Genitourinary: Positive for vaginal bleeding  Negative for dysuria  Musculoskeletal: Negative for back pain and neck pain  Skin: Negative for rash  Neurological: Negative for seizures and syncope  Psychiatric/Behavioral: Negative for behavioral problems  The patient is not nervous/anxious  Visual Acuity Screening    Right eye Left eye Both eyes   Without correction:      With correction: 20/70 20/70 20/70     Physical Exam :  Physical Exam   Constitutional: She appears well-developed and well-nourished  No distress  HENT:   Head: Atraumatic  Right Ear: External ear normal    Left Ear: External ear normal    Mouth/Throat: Oropharynx is clear and moist    Eyes: Conjunctivae are normal  Right eye exhibits no discharge  Left eye exhibits no discharge  Neck: Neck supple  No tracheal deviation present  Cardiovascular: Normal rate, regular rhythm and normal heart sounds  No murmur heard  Pulmonary/Chest: Effort normal and breath sounds normal  She has no wheezes  Abdominal: Soft  There is no tenderness  There is no guarding  Musculoskeletal: She exhibits no edema or deformity  Lymphadenopathy:     She has no cervical adenopathy  Neurological: She is alert  She exhibits normal muscle tone  Skin: Skin is warm and dry  No rash noted  Psychiatric:   Pleasant and cooperative   Nursing note and vitals reviewed  Reviewed Updated [facility] Prior Wellness Visits:   Last Medicare wellness visit information was reviewed, patient interviewed , no change since last AWVyes  Last Medicare wellness visit information was reviewed, patient interviewed and updates made to the record today yes    Assessment and Plan:  1  Medicare annual wellness visit, subsequent     2   Osteopenia, unspecified location  DXA bone density spine hip and pelvis       Health Maintenance Due   Topic Date Due    HIV SCREENING  1966    Depression Screening PHQ-9  1966    COLONOSCOPY  1966    PNEUMOCOCCAL POLYSACCHARIDE VACCINE X 2 AGE 11-64 YEARS IMMUNOCOMPROMISED (1) 04/02/1977    DTaP,Tdap,and Td Vaccines (1 - Tdap) 02/19/2015

## 2018-05-30 ENCOUNTER — OFFICE VISIT (OUTPATIENT)
Dept: SURGICAL ONCOLOGY | Facility: CLINIC | Age: 53
End: 2018-05-30
Payer: MEDICARE

## 2018-05-30 VITALS
HEIGHT: 56 IN | RESPIRATION RATE: 16 BRPM | WEIGHT: 120.5 LBS | SYSTOLIC BLOOD PRESSURE: 124 MMHG | TEMPERATURE: 98.2 F | BODY MASS INDEX: 27.11 KG/M2 | DIASTOLIC BLOOD PRESSURE: 82 MMHG | HEART RATE: 60 BPM

## 2018-05-30 DIAGNOSIS — C50.811 MALIGNANT NEOPLASM OF OVERLAPPING SITES OF RIGHT BREAST IN FEMALE, ESTROGEN RECEPTOR POSITIVE (HCC): Primary | ICD-10-CM

## 2018-05-30 DIAGNOSIS — Z17.0 MALIGNANT NEOPLASM OF OVERLAPPING SITES OF RIGHT BREAST IN FEMALE, ESTROGEN RECEPTOR POSITIVE (HCC): Primary | ICD-10-CM

## 2018-05-30 PROCEDURE — 99214 OFFICE O/P EST MOD 30 MIN: CPT | Performed by: SURGERY

## 2018-05-30 NOTE — PROGRESS NOTES
Surgical Oncology Follow Up       [address] [facility]  [facility] [address]  1965  [id number]  [address] [facility] [facility]  [facility]COL[facility] [phone] [id number]    Chief Complaint   Patient presents with    Breast Cancer     6 month follow up          Assessment & Plan:   Patient presents for a six-month follow-up visit  She is now approximately 4 and half years out from her surgery  She has no complaints referable to her breast   She is having some vaginal bleeding and is following up with her gynecologist for this  She has a normal clinical breast exam on the left side on the right side there is no evidence of local regional recurrence disease  We will see her back in 6 months and then on an annual basis  She was follow-up imaging and blood work with Dr blas  Cancer History:      No history exists  Interval History:   See above    Review of Systems:   Review of Systems   Genitourinary: Positive for vaginal bleeding  All other systems reviewed and are negative        Past Medical History     Patient Active Problem List   Diagnosis    Vitamin D deficiency    Scoliosis    Retinal detachment    Osteopenia    Obsessive compulsive disorder    Multiple thyroid nodules    Mild intellectual disability    Malignant neoplasm of breast ([address] )    Legal blindness [place]    Irregular menstrual cycle    Iron deficiency anemia    Internal hemorrhoids    Impulse control disorder    Glaucoma    Dyslipidemia    Depression    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HCC)    Chronic gingivitis    Cerebral palsy (HCC)    Benign neoplasm of large intestine    Autistic disorder    Anxiety     Past Medical History:   Diagnosis Date    Breast cyst, left     last assessed 12/30/2013    Fibrocystic breast disease     last assessed 06/29/2012    Gastrointestinal hemorrhage     Glaucoma     Ovarian disorder     last assessed 06/29/2012     Past Surgical History:   Procedure Laterality Date    COLONOSCOPY      Description 04/13/2016-5 yrs  Description 4 yr f/u d/t polyp and family history, onset 07/20/2012   ESOPHAGOGASTRODUODENOSCOPY      description-04/13/2016 gastritis with antral nodule    EYE SURGERY      for relief of intraocular pressure    INCISIONAL BREAST BIOPSY      description 2008    MASTECTOMY Left     SENTINEL LYMPH NODE BIOPSY      STRABISMUS SURGERY      description 1973, 66     Family History   Problem Relation Age of Onset    Osteopenia Mother     Heart attack Father 59     Acute MI    Coronary artery disease Father     Thyroid nodules Father     Osteopenia Maternal Grandmother     Prostate cancer Maternal Grandfather     Leukemia Paternal Grandfather     Heart attack Maternal Uncle 48     acute MI, stent    Cancer Maternal Uncle      adenocarcinoma of oral cavity    Colon cancer Maternal Uncle 62    Hyperlipidemia Maternal Uncle     Pancreatic cancer Paternal Uncle     Prostate cancer Paternal Uncle     Colon cancer Family     Coronary artery disease Family     Thyroid disease Family      Social History     Social History    Marital status: Single     Spouse name: N/A    Number of children: N/A    Years of education: N/A     Occupational History    Not on file       Social History Main Topics    Smoking status: Never Smoker    Smokeless tobacco: Never Used    Alcohol use No    Drug use: No    Sexual activity: Not on file     Other Topics Concern    Not on file     Social History Narrative    Person living in a residential institution    Uses safety equipment-seat belts       Current Outpatient Prescriptions:     anastrozole (ARIMIDEX) 1 mg tablet, Take 1 tablet (1 mg total) by mouth daily, Disp: 30 tablet, Rfl: 11    Calcium Carb-Cholecalciferol (OYSTER SHELL CALCIUM) 500-400 MG-UNIT TABS, Take 1 tablet by mouth 2 (two) times a day, Disp: , Rfl:     carBAMazepine (TEGretol) 200 mg tablet, Take 400 mg by mouth 2 (two) times a day , Disp: , Rfl:     Cholecalciferol (VITAMIN D3) 2000 units TABS, Take 2 tablets by mouth daily, Disp: , Rfl:     citalopram (CeleXA) 20 mg tablet, 20 mg + 10 mg for total of 30 mg daily, Disp: 30 tablet, Rfl: 0    ferrous sulfate 325 (65 Fe) mg tablet, Take 1 tablet by mouth Twice daily, Disp: , Rfl:     haloperidol (HALDOL) 1 mg tablet, Take 1 mg by mouth 2 (two) times a day , Disp: , Rfl:     hydrocortisone (ANUSOL-HC, PROCTOSOL HC,) 2 5 % rectal cream, Insert into the rectum 2 (two) times a day, Disp: 30 g, Rfl: 0    Hypromellose 0 3 % SOLN, Apply to eye, Disp: , Rfl:     ibuprofen (MOTRIN) 200 mg tablet, Take 400 mg by mouth every 6 (six) hours as needed for mild pain , Disp: , Rfl:     latanoprost (XALATAN) 0 005 % ophthalmic solution, Apply 1 drop to eye, Disp: , Rfl:     levocetirizine (XYZAL) 5 MG tablet, Take 1 tablet by mouth daily as needed, Disp: , Rfl:     loperamide (IMODIUM) 2 mg capsule, Take by mouth, Disp: , Rfl:     LORazepam (ATIVAN) 0 5 mg tablet, Take 0 5 mg by mouth every 6 (six) hours as needed for anxiety  , Disp: , Rfl:     ondansetron (ZOFRAN) 4 mg tablet, Take by mouth, Disp: , Rfl:     pantoprazole (PROTONIX) 40 mg tablet, Take 1 tablet by mouth daily, Disp: , Rfl:     sodium chloride (SOCHLOR) 5 % hypertonic ophthalmic solution, Apply 1 drop to eye every 4 (four) hours, Disp: , Rfl:     timolol (TIMOPTIC) 0 5 % ophthalmic solution, Apply to eye, Disp: , Rfl:     tolnaftate (TINACTIN) 1 % spray, Apply topically, Disp: , Rfl:     ziprasidone (GEODON) 60 mg capsule, Take 60 mg by mouth 2 (two) times a day with meals  , Disp: , Rfl:   Allergies   Allergen Reactions    Bactrim [Sulfamethoxazole-Trimethoprim]     Methylphenidate Hyperactivity     Reaction Date: [id number];     Sulfa Antibiotics     Thioridazine     Fexofenadine Hives     Reaction Date: [id number];    Cristal Melinda Medrogestone     Medroxyprogesterone Rash and Hives     Reaction Date: [id number];     Trimethoprim        Physical Exam:     Vitals:    05/30/18 1103   BP: 124/82   Pulse: 60   Resp: 16   Temp: 98 2 °F (36 8 °C)     Physical Exam   Constitutional: She is oriented to person, place, and time  She appears well-developed and well-nourished  HENT:   Head: Normocephalic and atraumatic  Mouth/Throat: Oropharynx is clear and moist    Eyes: EOM are normal  Pupils are equal, round, and reactive to light  Neck: Normal range of motion  Neck supple  No JVD present  No tracheal deviation present  No thyromegaly present  Cardiovascular: Normal rate, regular rhythm, normal heart sounds and intact distal pulses  Exam reveals no gallop and no friction rub  No murmur heard  Pulmonary/Chest: Effort normal and breath sounds normal  No respiratory distress  She has no wheezes  She has no rales  Examination of the right mastectomy site shows no evidence of a dominant mass skin changes or axillary adenopathy  Examination of the left breast demonstrates no nipple discharge skin changes dominant masses or axillary adenopathy  Abdominal: Soft  She exhibits no distension and no mass  There is no hepatomegaly  There is no tenderness  There is no rebound and no guarding  Musculoskeletal: Normal range of motion  She exhibits no edema or tenderness  Lymphadenopathy:     She has no cervical adenopathy  Neurological: She is alert and oriented to person, place, and time  No cranial nerve deficit  Skin: Skin is warm and dry  No rash noted  No erythema  Psychiatric: She has a normal mood and affect  Her behavior is normal    Vitals reviewed  Results:   I reviewed her mammogram on the right side I concur with result  Advance Care Planning/Advance Directives:  I discussed the disease status, treatment plans and follow-up with the patient

## 2018-05-31 ENCOUNTER — TRANSCRIBE ORDERS (OUTPATIENT)
Dept: ADMINISTRATIVE | Facility: HOSPITAL | Age: 53
End: 2018-05-31

## 2018-05-31 DIAGNOSIS — N95.0 POSTMENOPAUSAL BLEEDING: Primary | ICD-10-CM

## 2018-06-05 ENCOUNTER — OFFICE VISIT (OUTPATIENT)
Dept: FAMILY MEDICINE CLINIC | Facility: HOSPITAL | Age: 53
End: 2018-06-05
Payer: MEDICARE

## 2018-06-05 VITALS
HEIGHT: 56 IN | SYSTOLIC BLOOD PRESSURE: 120 MMHG | TEMPERATURE: 98.3 F | BODY MASS INDEX: 27.22 KG/M2 | DIASTOLIC BLOOD PRESSURE: 78 MMHG | WEIGHT: 121 LBS | HEART RATE: 70 BPM

## 2018-06-05 DIAGNOSIS — H10.32 ACUTE BACTERIAL CONJUNCTIVITIS OF LEFT EYE: ICD-10-CM

## 2018-06-05 DIAGNOSIS — H10.32 ACUTE BACTERIAL CONJUNCTIVITIS OF LEFT EYE: Primary | ICD-10-CM

## 2018-06-05 DIAGNOSIS — J06.9 UPPER RESPIRATORY TRACT INFECTION, UNSPECIFIED TYPE: Primary | ICD-10-CM

## 2018-06-05 PROCEDURE — 99214 OFFICE O/P EST MOD 30 MIN: CPT | Performed by: INTERNAL MEDICINE

## 2018-06-05 RX ORDER — OFLOXACIN 3 MG/ML
SOLUTION AURICULAR (OTIC)
Qty: 10 ML | Refills: 0 | Status: SHIPPED | OUTPATIENT
Start: 2018-06-05 | End: 2018-11-30

## 2018-06-05 RX ORDER — AMOXICILLIN AND CLAVULANATE POTASSIUM 875; 125 MG/1; MG/1
1 TABLET, FILM COATED ORAL EVERY 12 HOURS SCHEDULED
Qty: 14 TABLET | Refills: 0 | Status: SHIPPED | OUTPATIENT
Start: 2018-06-05 | End: 2018-06-12

## 2018-06-05 RX ORDER — CIPROFLOXACIN HYDROCHLORIDE 3.5 MG/ML
1 SOLUTION/ DROPS TOPICAL
Qty: 5 ML | Refills: 0 | Status: SHIPPED | OUTPATIENT
Start: 2018-06-05 | End: 2018-06-12

## 2018-06-05 NOTE — PROGRESS NOTES
Assessment/Plan:     Diagnoses and all orders for this visit:    Upper respiratory tract infection, unspecified type  Comments:  D/t worsening symptoms and sick contacts will start Augmentin bid x 7 days, con't symptomatic tx with Tylenol and Tussin, call with new/worse symptoms  Orders:  -     amoxicillin-clavulanate (AUGMENTIN) 875-125 mg per tablet; Take 1 tablet by mouth every 12 (twelve) hours for 7 days    Acute bacterial conjunctivitis of left eye  Comments:  Concerning with mult eye gtt and glaucoma but has exposure to pink eye and ongoing URI and exam more c/w acute bacterial conjunctivitis - start abx eye gtt, advised staff to ensure frequent hand washing, may use warm compresses as needed, needs to see Ophtho again with any new/worse symptoms  Orders:  -     ciprofloxacin (CILOXAN) 0 3 % ophthalmic solution; Administer 1 drop into the left eye every 4 (four) hours while awake for 7 days    Other orders  -     Carboxymethylcell-Hypromellose (GENTEAL) 0 25-0 3 % GEL; Apply to eye      [facility] forms filled out and copy made for chart - no workshop x 48 hrs    Subjective:      Patient ID: Terrell Marte is a 47 y o  female  HPI Pt here with [facility] staff with c/o cold symptoms x 1 wk  Symptoms started with congestion and cough  She has had some ST  She seems to have worsened over the weekend  She has had sick contacts at home and workshop  Staff has noted some redness at L eye on Sunday  Pt was taken to [facility] [place] [id number] eye for emergent eval d/t her underlying glaucoma  Pt had her Genteal increased to 7 x's a day  She has had increase in redness to L eye as well as crusting  She is c/o not feeling well  She is eating and drinking well and feels hungry  Staff reports no known F/C/N/V/D  Pt has had sick contacts with house mate with URI and conjunctivitis  Review of Systems   Constitutional: Negative for chills and fever  HENT: Positive for congestion and sore throat      Eyes: Positive for discharge and redness  Respiratory: Positive for cough and wheezing  Negative for shortness of breath  Gastrointestinal: Negative for diarrhea, nausea and vomiting  Genitourinary: Negative for dysuria  Musculoskeletal: Negative for arthralgias and myalgias  Skin: Negative for rash  Neurological: Negative for dizziness and headaches  Hematological: Negative for adenopathy  Psychiatric/Behavioral: Negative for behavioral problems and confusion  Objective:    /78   Pulse 70   Temp 98 3 °F (36 8 °C)   Ht 4' 8" (1 422 m)   Wt 54 9 kg (121 lb)   BMI 27 13 kg/m²      Physical Exam   Constitutional: She appears well-developed and well-nourished  No distress  HENT:   Head: Normocephalic and atraumatic  Right Ear: External ear normal    Left Ear: External ear normal    Mouth/Throat: No oropharyngeal exudate  Eyes: Right eye exhibits no discharge  Left eye exhibits discharge  L conjunctival erythema, crusting to L lashes and some pus L corner of eye, no tenderness to palp of orbit   Cardiovascular: Normal rate, regular rhythm and normal heart sounds  Exam reveals no friction rub  No murmur heard  Pulmonary/Chest: Effort normal and breath sounds normal  No respiratory distress  She has no wheezes  She has no rales  Abdominal: Soft  She exhibits no distension  There is no tenderness  There is no guarding  Musculoskeletal: She exhibits no edema  Lymphadenopathy:     She has no cervical adenopathy  Neurological: She is alert  She exhibits normal muscle tone  Skin: Skin is warm  No rash noted  Psychiatric: Her behavior is normal    Nursing note and vitals reviewed

## 2018-06-06 ENCOUNTER — HOSPITAL ENCOUNTER (OUTPATIENT)
Dept: ULTRASOUND IMAGING | Facility: HOSPITAL | Age: 53
Discharge: HOME | End: 2018-06-06
Payer: MEDICARE

## 2018-06-06 DIAGNOSIS — N95.0 POSTMENOPAUSAL BLEEDING: ICD-10-CM

## 2018-06-06 PROCEDURE — 76856 US EXAM PELVIC COMPLETE: CPT

## 2018-06-11 ENCOUNTER — HOSPITAL ENCOUNTER (OUTPATIENT)
Dept: NON INVASIVE DIAGNOSTICS | Facility: HOSPITAL | Age: 53
Discharge: HOME | End: 2018-06-11
Payer: MEDICARE

## 2018-06-11 DIAGNOSIS — I51.7 LVH (LEFT VENTRICULAR HYPERTROPHY): ICD-10-CM

## 2018-06-11 PROCEDURE — 93306 TTE W/DOPPLER COMPLETE: CPT

## 2018-06-12 PROCEDURE — 93306 TTE W/DOPPLER COMPLETE: CPT | Performed by: INTERNAL MEDICINE

## 2018-07-02 ENCOUNTER — HOSPITAL ENCOUNTER (OUTPATIENT)
Dept: MAMMOGRAPHY | Facility: CLINIC | Age: 53
Discharge: HOME | End: 2018-07-02
Payer: MEDICARE

## 2018-07-02 DIAGNOSIS — M85.80 OSTEOPENIA, UNSPECIFIED LOCATION: ICD-10-CM

## 2018-07-02 PROCEDURE — 77080 DXA BONE DENSITY AXIAL: CPT

## 2018-08-01 DIAGNOSIS — E04.2 NONTOXIC MULTINODULAR GOITER: ICD-10-CM

## 2018-08-03 ENCOUNTER — HOSPITAL ENCOUNTER (OUTPATIENT)
Dept: ULTRASOUND IMAGING | Facility: HOSPITAL | Age: 53
Discharge: HOME | End: 2018-08-03
Attending: INTERNAL MEDICINE
Payer: MEDICARE

## 2018-08-03 DIAGNOSIS — E04.2 NONTOXIC MULTINODULAR GOITER: ICD-10-CM

## 2018-08-03 PROCEDURE — 76536 US EXAM OF HEAD AND NECK: CPT

## 2018-08-06 ENCOUNTER — TELEPHONE (OUTPATIENT)
Dept: ENDOCRINOLOGY | Facility: CLINIC | Age: 53
End: 2018-08-06

## 2018-08-06 NOTE — TELEPHONE ENCOUNTER
----- Message from Cherelle Hugo MD sent at 8/5/2018  8:29 PM EDT -----  Please call the patient regarding her abnormal result   Thyroid nodules stable

## 2018-09-05 ENCOUNTER — HOSPITAL ENCOUNTER (OUTPATIENT)
Dept: CT IMAGING | Facility: HOSPITAL | Age: 53
Discharge: HOME | End: 2018-09-05
Attending: INTERNAL MEDICINE
Payer: MEDICARE

## 2018-09-05 DIAGNOSIS — Z17.0 MALIGNANT NEOPLASM OF BREAST IN FEMALE, ESTROGEN RECEPTOR POSITIVE, UNSPECIFIED LATERALITY, UNSPECIFIED SITE OF BREAST (HCC): ICD-10-CM

## 2018-09-05 DIAGNOSIS — C50.919 MALIGNANT NEOPLASM OF BREAST IN FEMALE, ESTROGEN RECEPTOR POSITIVE, UNSPECIFIED LATERALITY, UNSPECIFIED SITE OF BREAST (HCC): ICD-10-CM

## 2018-09-05 PROCEDURE — 71260 CT THORAX DX C+: CPT

## 2018-09-05 PROCEDURE — 74177 CT ABD & PELVIS W/CONTRAST: CPT

## 2018-09-05 RX ADMIN — IOHEXOL 100 ML: 350 INJECTION, SOLUTION INTRAVENOUS at 10:14

## 2018-09-12 ENCOUNTER — OFFICE VISIT (OUTPATIENT)
Dept: HEMATOLOGY ONCOLOGY | Facility: HOSPITAL | Age: 53
End: 2018-09-12
Payer: MEDICARE

## 2018-09-12 VITALS
TEMPERATURE: 99.1 F | DIASTOLIC BLOOD PRESSURE: 82 MMHG | SYSTOLIC BLOOD PRESSURE: 128 MMHG | BODY MASS INDEX: 26.99 KG/M2 | RESPIRATION RATE: 16 BRPM | OXYGEN SATURATION: 98 % | HEART RATE: 79 BPM | HEIGHT: 56 IN | WEIGHT: 120 LBS

## 2018-09-12 DIAGNOSIS — C50.811 MALIGNANT NEOPLASM OF OVERLAPPING SITES OF RIGHT BREAST IN FEMALE, ESTROGEN RECEPTOR POSITIVE (HCC): ICD-10-CM

## 2018-09-12 DIAGNOSIS — D50.9 IRON DEFICIENCY ANEMIA: Primary | ICD-10-CM

## 2018-09-12 DIAGNOSIS — Z17.0 MALIGNANT NEOPLASM OF OVERLAPPING SITES OF RIGHT BREAST IN FEMALE, ESTROGEN RECEPTOR POSITIVE (HCC): ICD-10-CM

## 2018-09-12 PROCEDURE — 99214 OFFICE O/P EST MOD 30 MIN: CPT | Performed by: INTERNAL MEDICINE

## 2018-09-12 NOTE — PROGRESS NOTES
Hematology/Oncology Outpatient Follow- up Note  Jody Zimmer 47 y o  female MRN: @ Encounter: [id number]        Date:  9/12/2018    Presenting Complaint/Diagnosis : Multifocal ER positive TX positive HER-2 positive breast cancer  Final pathologic stage IA i e  pathologic T1 C  pathologic N0 grade 2 tumor  All sentinel lymph nodes were negative for malignancy  Previous Hematologic/ Oncologic History:    Patient is status post a mastectomy on the right side  Chemotherapy with TCH  She had 6 cycles  Her treatment started in February of 2014  Then got one year of Herceptin which ended in February of 2015  Tamoxifen which was then converted to Arimidex    Current Hematologic/ Oncologic Treatment:     Arimidex    Interval History:    The patient returns for follow-up visit  She is currently on Arimidex  She is doing well  Her tumor markers within normal limits  CT scan doesn't show any evidence of recurrence or metastatic disease  Her iron levels are low and her platelet count is slightly elevated which is probably reactive from iron deficiency  Denies any nausea denies any vomiting  She was on iron pills  These because of GI upset  Otherwise she is at baseline and her 14 point review of systems today was negative  Test Results:    Imaging: Ct Chest Abdomen Pelvis W Contrast    Result Date: 9/7/2018  Narrative: CT CHEST, ABDOMEN AND PELVIS WITH IV CONTRAST INDICATION:   C50 919: Malignant neoplasm of unspecified site of unspecified female breast Z17 0: Estrogen receptor positive status (ER+)  COMPARISON:  CT chest, abdomen and pelvis 10/3/2017  TECHNIQUE: CT examination of the chest, abdomen and pelvis was performed  Reformatted images were created in axial, sagittal, and coronal planes  Radiation dose length product (DLP) for this visit:  277 94 mGy-cm     This examination, like all CT scans performed in the [facility], was performed utilizing techniques to minimize radiation dose exposure, including the use of iterative  reconstruction and automated exposure control  IV Contrast:  100 mL of iohexol (OMNIPAQUE) Enteric Contrast: Enteric contrast was administered  FINDINGS: CHEST LUNGS:  Stable scattered calcified granulomata  Lungs are otherwise clear  No new or suspicious pulmonary nodule  There is no tracheal or endobronchial lesion  PLEURA:  Unremarkable  HEART/GREAT VESSELS:  Unremarkable for patient's age  MEDIASTINUM AND CHRISTY:  Unremarkable  CHEST WALL AND LOWER NECK:  Status post right mastectomy  No axillary adenopathy is noted  Stable enlargement of the thyroid gland  Resolved left breast nodule  ABDOMEN LIVER/BILIARY TREE:  Unremarkable  GALLBLADDER:  No calcified gallstones  No pericholecystic inflammatory change  SPLEEN:  Unremarkable  PANCREAS:  Unremarkable  ADRENAL GLANDS: Unremarkable  KIDNEYS/URETERS:  Stable 11 mm cortical cyst at the anterior mid to lower pole of the left kidney  Kidneys are otherwise unremarkable  STOMACH AND BOWEL:  Few descending and sigmoid colon diverticula are noted without evidence of acute diverticulitis  Stable hiatal hernia  No bowel obstruction  APPENDIX:  A normal appendix was visualized  ABDOMINOPELVIC CAVITY:  Increased free pelvic fluid is noted in the posterior cul-de-sac probably related to ventriculoperitoneal shunt catheter  No mesenteric or peritoneal mass  No free intraperitoneal air  No lymphadenopathy  VESSELS:  Unremarkable for patient's age  PELVIS REPRODUCTIVE ORGANS: Unremarkable  URINARY BLADDER:  Unremarkable  ABDOMINAL WALL/INGUINAL REGIONS:  Ventriculoperitoneal shunt catheter is noted  OSSEOUS STRUCTURES:  No acute fracture or destructive osseous lesion  Stable degenerative changes noted at L2-L3  Impression: No CT evidence of metastatic disease in the chest, abdomen or pelvis  Stable thyromegaly  Resolved left breast nodule   Workstation performed: [id number]       Labs:   Lab Results   Component Value Date WBC 11 47 (H) 12/18/2013    HGB 12 1 12/18/2013    HCT 37 5 12/18/2013    MCV 92 12/18/2013     12/18/2013     Lab Results   Component Value Date     (L) 12/18/2013    K 3 5 (L) 12/18/2013     (L) 12/18/2013    CO2 29 12/18/2013    ANIONGAP 5 12/18/2013    BUN 13 12/18/2013    CREATININE 0 59 (L) 12/18/2013    GLUCOSE 127 12/18/2013    CALCIUM 8 1 (L) 12/18/2013       ROS: As stated in the history of present illness otherwise his 14 point review of systems today was negative  Active Problems:   Patient Active Problem List   Diagnosis    Vitamin D deficiency    Scoliosis    Retinal detachment    Osteopenia    Obsessive compulsive disorder    Multiple thyroid nodules    Mild intellectual disability    Malignant neoplasm of breast ([address] )    Legal blindness [place]    Irregular menstrual cycle    Iron deficiency anemia    Internal hemorrhoids    Impulse control disorder    Glaucoma    Dyslipidemia    Depression    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HCC)    Chronic gingivitis    Cerebral palsy (HCC)    Benign neoplasm of large intestine    Autistic disorder    Anxiety       Past Medical History:   Past Medical History:   Diagnosis Date    Breast cyst, left     last assessed 12/30/2013    Fibrocystic breast disease     last assessed 06/29/2012    Gastrointestinal hemorrhage     Glaucoma     Ovarian disorder     last assessed 06/29/2012       Surgical History:   Past Surgical History:   Procedure Laterality Date    COLONOSCOPY      Description 04/13/2016-5 yrs  Description 4 yr f/u d/t polyp and family history, onset 07/20/2012      ESOPHAGOGASTRODUODENOSCOPY      description-04/13/2016 gastritis with antral nodule    EYE SURGERY      for relief of intraocular pressure    INCISIONAL BREAST BIOPSY      description 2008    MASTECTOMY Left     SENTINEL LYMPH NODE BIOPSY      STRABISMUS SURGERY      description 81, 66       Family History:    Family History   Problem Relation Age of Onset    Osteopenia Mother     Heart attack Father 59        Acute MI    Coronary artery disease Father     Thyroid nodules Father     Osteopenia Maternal Grandmother     Prostate cancer Maternal Grandfather     Leukemia Paternal Grandfather     Heart attack Maternal Uncle 48        acute MI, stent    Cancer Maternal Uncle         adenocarcinoma of oral cavity    Colon cancer Maternal Uncle 62    Hyperlipidemia Maternal Uncle     Pancreatic cancer Paternal Uncle     Prostate cancer Paternal Uncle     Colon cancer Family     Coronary artery disease Family     Thyroid disease Family        Cancer-related family history includes Cancer in her maternal uncle; Colon cancer in her family; Colon cancer (age of onset: 62) in her maternal uncle; Pancreatic cancer in her paternal uncle; Prostate cancer in her maternal grandfather and paternal uncle  Social History:   Social History     Social History    Marital status: Single     Spouse name: N/A    Number of children: N/A    Years of education: N/A     Occupational History    Not on file       Social History Main Topics    Smoking status: Never Smoker    Smokeless tobacco: Never Used    Alcohol use No    Drug use: No    Sexual activity: Not on file     Other Topics Concern    Not on file     Social History Narrative    Person living in a residential institution    Uses safety equipment-seat belts       Current Medications:   Current Outpatient Prescriptions   Medication Sig Dispense Refill    anastrozole (ARIMIDEX) 1 mg tablet Take 1 tablet (1 mg total) by mouth daily 30 tablet 11    Calcium Carb-Cholecalciferol (OYSTER SHELL CALCIUM) 500-400 MG-UNIT TABS Take 1 tablet by mouth 2 (two) times a day      carBAMazepine (TEGretol) 200 mg tablet Take 400 mg by mouth 2 (two) times a day       Carboxymethylcell-Hypromellose (GENTEAL) 0 25-0 3 % GEL Apply to eye      Cholecalciferol (VITAMIN D3) 2000 units TABS Take 2 tablets by mouth daily      citalopram (CeleXA) 20 mg tablet 20 mg + 10 mg for total of 30 mg daily 30 tablet 0    ferrous sulfate 325 (65 Fe) mg tablet Take 1 tablet by mouth Twice daily      haloperidol (HALDOL) 1 mg tablet Take 1 mg by mouth 2 (two) times a day       hydrocortisone (ANUSOL-HC, PROCTOSOL HC,) 2 5 % rectal cream Insert into the rectum 2 (two) times a day 30 g 0    Hypromellose 0 3 % SOLN Apply to eye      ibuprofen (MOTRIN) 200 mg tablet Take 400 mg by mouth every 6 (six) hours as needed for mild pain   latanoprost (XALATAN) 0 005 % ophthalmic solution Apply 1 drop to eye      levocetirizine (XYZAL) 5 MG tablet Take 1 tablet by mouth daily as needed      loperamide (IMODIUM) 2 mg capsule Take by mouth      LORazepam (ATIVAN) 0 5 mg tablet Take 0 5 mg by mouth every 6 (six) hours as needed for anxiety   ofloxacin (FLOXIN) 0 3 % otic solution 1 drop in left eye every 4 hours while awake x 2 days then 1 drop in L eye qid x 5 more days for total of 7 days 10 mL 0    ondansetron (ZOFRAN) 4 mg tablet Take by mouth      pantoprazole (PROTONIX) 40 mg tablet Take 1 tablet by mouth daily      sodium chloride (SOCHLOR) 5 % hypertonic ophthalmic solution Apply 1 drop to eye every 4 (four) hours      timolol (TIMOPTIC) 0 5 % ophthalmic solution Apply to eye      tolnaftate (TINACTIN) 1 % spray Apply topically      ziprasidone (GEODON) 60 mg capsule Take 60 mg by mouth 2 (two) times a day with meals  No current facility-administered medications for this visit  Allergies: Allergies   Allergen Reactions    Bactrim [Sulfamethoxazole-Trimethoprim]     Methylphenidate Hyperactivity     Reaction Date: [id number];     Sulfa Antibiotics     Thioridazine     Other Hives    Fexofenadine Hives     Reaction Date: [id number];    [place] Medroxyprogesterone Rash and Hives     Reaction Date: [id number];     Trimethoprim        Physical Exam:    Body surface area is 1 43 meters squared      Wt Readings from Last 3 Encounters:   09/12/18 54 4 kg (120 lb)   06/05/18 54 9 kg (121 lb)   05/30/18 54 7 kg (120 lb 8 oz)        Temp Readings from Last 3 Encounters:   09/12/18 99 1 °F (37 3 °C) (Tympanic)   06/05/18 98 3 °F (36 8 °C)   05/30/18 98 2 °F (36 8 °C) (Oral)        BP Readings from Last 3 Encounters:   09/12/18 128/82   06/05/18 120/78   05/30/18 124/82         Pulse Readings from Last 3 Encounters:   09/12/18 79   06/05/18 70   05/30/18 60        Physical Exam     Constitutional   General appearance: No acute distress, well appearing and well nourished  Eyes   Conjunctiva and lids: No swelling, erythema or discharge  Pupils and irises: Equal, round and reactive to light  Ears, Nose, Mouth, and Throat   External inspection of ears and nose: Normal     Nasal mucosa, septum, and turbinates: Normal without edema or erythema  Oropharynx: Normal with no erythema, edema, exudate or lesions  Pulmonary   Respiratory effort: No increased work of breathing or signs of respiratory distress  Auscultation of lungs: Clear to auscultation  Cardiovascular   Palpation of heart: Normal PMI, no thrills  Auscultation of heart: Normal rate and rhythm, normal S1 and S2, without murmurs  Examination of extremities for edema and/or varicosities: Normal     Carotid pulses: Normal     Abdomen   Abdomen: Non-tender, no masses  Liver and spleen: No hepatomegaly or splenomegaly  Lymphatic   Palpation of lymph nodes in neck: No lymphadenopathy  Musculoskeletal   Gait and station: Normal     Digits and nails: Normal without clubbing or cyanosis  Inspection/palpation of joints, bones, and muscles: Normal     Skin   Skin and subcutaneous tissue: Normal without rashes or lesions  Neurologic   Does have the sequale of cerebral palsy  Psychiatric   Orientation to person, place, and time: Normal     Mood and affect: Normal         Assessment / Plan: This is a pleasant 52-year-old female with autism   She currently lives in a group home situation but does work  She had a stage I breast cancer which was multifocal and was HER-2 positive  Based on this I recommended chemotherapy  She got 6 cycles of TCH  This was followed by a year of Herceptin  She is now on tamoxifen  Blood work shows she is menopausal  She has not had her period more than a year  I  put her on Arimidex daily  This will be to complete a total of 5 years  Her most recent CT scan is within normal limits  Her tumor marker is normal  Platelets were slightly elevated but he does have iron deficiency so we will give her Venofer 200 mg IV once a week for 6 doses  I'll see her back in 6 months  She will stay on her Arimidex until then  If she has any questions she will call our office  Goals and Barriers:  Current Goal:  Prolong Survival from Breast cancer and iron deficiency  Barriers: None  Patient's Capacity to Self Care:  Patient able to self care  Portions of the record may have been created with voice recognition software   Occasional wrong word or "sound a like" substitutions may have occurred due to the inherent limitations of voice recognition software   Read the chart carefully and recognize, using context, where substitutions have occurred

## 2018-09-13 ENCOUNTER — TRANSCRIBE ORDERS (OUTPATIENT)
Dept: ADMINISTRATIVE | Facility: HOSPITAL | Age: 53
End: 2018-09-13

## 2018-09-13 DIAGNOSIS — Q03.1 DANDY-WALKER SYNDROME (HCC): Primary | ICD-10-CM

## 2018-09-14 ENCOUNTER — HOSPITAL ENCOUNTER (OUTPATIENT)
Dept: CT IMAGING | Facility: CLINIC | Age: 53
Discharge: HOME | End: 2018-09-14
Payer: MEDICARE

## 2018-09-14 ENCOUNTER — TELEPHONE (OUTPATIENT)
Dept: HEMATOLOGY ONCOLOGY | Facility: CLINIC | Age: 53
End: 2018-09-14

## 2018-09-14 DIAGNOSIS — Q03.1 DANDY-WALKER SYNDROME (HCC): ICD-10-CM

## 2018-09-14 PROCEDURE — 70450 CT HEAD/BRAIN W/O DYE: CPT

## 2018-09-14 NOTE — TELEPHONE ENCOUNTER
Pt's caretaker called and stated pt will be getting dental work on 9/17 and will be staying with her mother overnight so will not be able to make the appt with infusion on 9/18/18

## 2018-09-18 ENCOUNTER — HOSPITAL ENCOUNTER (OUTPATIENT)
Dept: INFUSION CENTER | Facility: HOSPITAL | Age: 53
Discharge: HOME | End: 2018-09-18

## 2018-09-23 RX ORDER — SODIUM CHLORIDE 9 MG/ML
40 INJECTION, SOLUTION INTRAVENOUS ONCE
Status: COMPLETED | OUTPATIENT
Start: 2018-09-25 | End: 2018-09-25

## 2018-09-25 ENCOUNTER — HOSPITAL ENCOUNTER (OUTPATIENT)
Dept: INFUSION CENTER | Facility: HOSPITAL | Age: 53
Discharge: HOME | End: 2018-09-25
Payer: MEDICARE

## 2018-09-25 VITALS
SYSTOLIC BLOOD PRESSURE: 136 MMHG | OXYGEN SATURATION: 97 % | HEART RATE: 83 BPM | TEMPERATURE: 97.1 F | RESPIRATION RATE: 18 BRPM | DIASTOLIC BLOOD PRESSURE: 54 MMHG

## 2018-09-25 PROCEDURE — 96365 THER/PROPH/DIAG IV INF INIT: CPT

## 2018-09-25 RX ADMIN — IRON SUCROSE 200 MG: 20 INJECTION, SOLUTION INTRAVENOUS at 12:50

## 2018-09-25 RX ADMIN — SODIUM CHLORIDE 40 ML/HR: 9 INJECTION, SOLUTION INTRAVENOUS at 12:50

## 2018-09-30 RX ORDER — SODIUM CHLORIDE 9 MG/ML
20 INJECTION, SOLUTION INTRAVENOUS CONTINUOUS
Status: DISPENSED | OUTPATIENT
Start: 2018-10-02 | End: 2018-10-03

## 2018-10-02 ENCOUNTER — HOSPITAL ENCOUNTER (OUTPATIENT)
Dept: INFUSION CENTER | Facility: HOSPITAL | Age: 53
Discharge: HOME | End: 2018-10-02
Payer: MEDICARE

## 2018-10-02 VITALS
DIASTOLIC BLOOD PRESSURE: 85 MMHG | HEART RATE: 69 BPM | OXYGEN SATURATION: 98 % | RESPIRATION RATE: 18 BRPM | TEMPERATURE: 99.2 F | SYSTOLIC BLOOD PRESSURE: 143 MMHG

## 2018-10-02 PROCEDURE — 96365 THER/PROPH/DIAG IV INF INIT: CPT

## 2018-10-02 RX ADMIN — IRON SUCROSE 200 MG: 20 INJECTION, SOLUTION INTRAVENOUS at 11:46

## 2018-10-02 RX ADMIN — SODIUM CHLORIDE 20 ML/HR: 9 INJECTION, SOLUTION INTRAVENOUS at 12:47

## 2018-10-11 ENCOUNTER — OFFICE VISIT (OUTPATIENT)
Dept: ENDOCRINOLOGY | Facility: CLINIC | Age: 53
End: 2018-10-11
Payer: MEDICARE

## 2018-10-11 VITALS
WEIGHT: 123.2 LBS | SYSTOLIC BLOOD PRESSURE: 132 MMHG | HEIGHT: 56 IN | DIASTOLIC BLOOD PRESSURE: 82 MMHG | BODY MASS INDEX: 27.71 KG/M2

## 2018-10-11 DIAGNOSIS — E04.2 MULTIPLE THYROID NODULES: Primary | ICD-10-CM

## 2018-10-11 PROCEDURE — 99213 OFFICE O/P EST LOW 20 MIN: CPT | Performed by: NURSE PRACTITIONER

## 2018-10-11 RX ORDER — SODIUM CHLORIDE 9 MG/ML
20 INJECTION, SOLUTION INTRAVENOUS CONTINUOUS
Status: DISCONTINUED | OUTPATIENT
Start: 2018-10-12 | End: 2018-10-15 | Stop reason: HOSPADM

## 2018-10-11 NOTE — PROGRESS NOTES
Established Patient Progress Note       Chief Complaint   Patient presents with    Thyroid Problem        History of Present Illness:     Elvira Rios is a 47 y o  female with a history of autism, breast cancer, and thyroid nodules  She presents to the office today with her mother and   For the the thyroid nodules she had a FNA of her bl nodules performed in January 2016 which were negative for malignancy  Her most recent US showed stable bl thyroid nodules  Her thyroid function test have been normal  She denies neck pain, dysphonia, dysphagia, or dyspnea  She denies symptoms of hypo or hyperthyroidism  Patient Active Problem List   Diagnosis    Vitamin D deficiency    Scoliosis    Retinal detachment    Osteopenia    Obsessive compulsive disorder    Multiple thyroid nodules    Mild intellectual disability    Malignant neoplasm of breast ([address] )    Legal blindness [place]    Irregular menstrual cycle    Iron deficiency anemia    Internal hemorrhoids    Impulse control disorder    Glaucoma    Dyslipidemia    Depression    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HCC)    Chronic gingivitis    Cerebral palsy (HCC)    Benign neoplasm of large intestine    Autistic disorder    Anxiety      Past Medical History:   Diagnosis Date    Anemia     Breast cyst, left     last assessed 12/30/2013    Chronic GERD     Depression     Fibrocystic breast disease     last assessed 06/29/2012    Gastrointestinal hemorrhage     Glaucoma     Glaucoma     Hydrocephalus     Ovarian disorder     last assessed 06/29/2012    Scoliosis       Past Surgical History:   Procedure Laterality Date    COLONOSCOPY      Description 04/13/2016-5 yrs  Description 4 yr f/u d/t polyp and family history, onset 07/20/2012      ESOPHAGOGASTRODUODENOSCOPY      description-04/13/2016 gastritis with antral nodule    EYE SURGERY      for relief of intraocular pressure    INCISIONAL BREAST BIOPSY      description 2008    MASTECTOMY Left     SENTINEL LYMPH NODE BIOPSY      STRABISMUS SURGERY      description 81, 66      Family History   Problem Relation Age of Onset    Osteopenia Mother     Heart attack Father 59        Acute MI    Coronary artery disease Father     Thyroid nodules Father     Osteopenia Maternal Grandmother     Prostate cancer Maternal Grandfather     Leukemia Paternal Grandfather     Heart attack Maternal Uncle 48        acute MI, stent    Cancer Maternal Uncle         adenocarcinoma of oral cavity    Colon cancer Maternal Uncle 62    Hyperlipidemia Maternal Uncle     Pancreatic cancer Paternal Uncle     Prostate cancer Paternal Uncle     Colon cancer Family     Coronary artery disease Family     Thyroid disease Family      Social History   Substance Use Topics    Smoking status: Never Smoker    Smokeless tobacco: Never Used    Alcohol use No     Allergies   Allergen Reactions    Bactrim [Sulfamethoxazole-Trimethoprim]     Methylphenidate Hyperactivity     Reaction Date: [id number];     Sulfa Antibiotics     Thioridazine     Other Hives    Fexofenadine Hives     Reaction Date: [id number];     Medrogestone     Medroxyprogesterone Rash and Hives     Reaction Date: [id number];     Trimethoprim        Current Outpatient Prescriptions:     anastrozole (ARIMIDEX) 1 mg tablet, Take 1 tablet (1 mg total) by mouth daily, Disp: 30 tablet, Rfl: 11    Calcium Carb-Cholecalciferol (OYSTER SHELL CALCIUM) 500-400 MG-UNIT TABS, Take 1 tablet by mouth 2 (two) times a day, Disp: , Rfl:     carBAMazepine (TEGretol) 200 mg tablet, Take 400 mg by mouth 2 (two) times a day , Disp: , Rfl:     Carboxymethylcell-Hypromellose (GENTEAL) 0 25-0 3 % GEL, Apply to eye, Disp: , Rfl:     Cholecalciferol (VITAMIN D3) 2000 units TABS, Take 2 tablets by mouth daily, Disp: , Rfl:     citalopram (CeleXA) 20 mg tablet, 20 mg + 10 mg for total of 30 mg daily, Disp: 30 tablet, Rfl: 0    haloperidol (HALDOL) 1 mg tablet, Take 1 mg by mouth 2 (two) times a day , Disp: , Rfl:     hydrocortisone (ANUSOL-HC, PROCTOSOL HC,) 2 5 % rectal cream, Insert into the rectum 2 (two) times a day, Disp: 30 g, Rfl: 0    Hypromellose 0 3 % SOLN, Apply to eye, Disp: , Rfl:     ibuprofen (MOTRIN) 200 mg tablet, Take 400 mg by mouth every 6 (six) hours as needed for mild pain , Disp: , Rfl:     latanoprost (XALATAN) 0 005 % ophthalmic solution, Apply 1 drop to eye, Disp: , Rfl:     levocetirizine (XYZAL) 5 MG tablet, Take 1 tablet by mouth daily as needed, Disp: , Rfl:     loperamide (IMODIUM) 2 mg capsule, Take by mouth, Disp: , Rfl:     LORazepam (ATIVAN) 0 5 mg tablet, Take 0 5 mg by mouth every 6 (six) hours as needed for anxiety  , Disp: , Rfl:     ofloxacin (FLOXIN) 0 3 % otic solution, 1 drop in left eye every 4 hours while awake x 2 days then 1 drop in L eye qid x 5 more days for total of 7 days, Disp: 10 mL, Rfl: 0    ondansetron (ZOFRAN) 4 mg tablet, Take by mouth, Disp: , Rfl:     pantoprazole (PROTONIX) 40 mg tablet, Take 1 tablet by mouth daily, Disp: , Rfl:     sodium chloride (SOCHLOR) 5 % hypertonic ophthalmic solution, Apply 1 drop to eye every 4 (four) hours, Disp: , Rfl:     timolol (TIMOPTIC) 0 5 % ophthalmic solution, Apply to eye, Disp: , Rfl:     tolnaftate (TINACTIN) 1 % spray, Apply topically, Disp: , Rfl:     ziprasidone (GEODON) 60 mg capsule, Take 60 mg by mouth 2 (two) times a day with meals  , Disp: , Rfl:     ferrous sulfate 325 (65 Fe) mg tablet, Take 1 tablet by mouth Twice daily, Disp: , Rfl:   No current facility-administered medications for this visit       Facility-Administered Medications Ordered in Other Visits:     [START ON 10/12/2018] iron sucrose (VENOFER) 200 mg in sodium chloride 0 9% 100 ml IVPB 200 mg, 200 mg, Intravenous, Once, MD Liset Leal  [START ON 10/12/2018] sodium chloride 0 9 % infusion, 20 mL/hr, Intravenous, Continuous, Allyn Bean MD    Review of Systems   Constitutional: Negative for chills and fever  HENT: Negative for sore throat and trouble swallowing  Eyes: Negative for visual disturbance  Respiratory: Negative for shortness of breath  Cardiovascular: Negative for chest pain and palpitations  Gastrointestinal: Negative for abdominal pain, constipation and diarrhea  Endocrine: Negative for cold intolerance, heat intolerance, polydipsia, polyphagia and polyuria  Genitourinary: Negative for frequency  Musculoskeletal: Negative for arthralgias and myalgias  Skin: Negative for rash  Neurological: Negative for dizziness and tremors  Hematological: Negative for adenopathy  Psychiatric/Behavioral: Negative for sleep disturbance  All other systems reviewed and are negative  Physical Exam:  Body mass index is 27 62 kg/m²  /82   Ht 4' 8" (1 422 m)   Wt 55 9 kg (123 lb 3 2 oz)   BMI 27 62 kg/m²    Wt Readings from Last 3 Encounters:   10/11/18 55 9 kg (123 lb 3 2 oz)   09/12/18 54 4 kg (120 lb)   06/05/18 54 9 kg (121 lb)       Physical Exam   Constitutional: She is oriented to person, place, and time  She appears well-developed and well-nourished  No distress  HENT:   Head: Normocephalic and atraumatic  Eyes: Pupils are equal, round, and reactive to light  Conjunctivae are normal    Neck: Normal range of motion  Neck supple  No thyromegaly present  Cardiovascular: Normal rate, regular rhythm and normal heart sounds  Pulmonary/Chest: Effort normal and breath sounds normal  No respiratory distress  She has no wheezes  She has no rales  Abdominal: Soft  Bowel sounds are normal  She exhibits no distension  There is no tenderness  Musculoskeletal: Normal range of motion  She exhibits no edema  Neurological: She is alert and oriented to person, place, and time  Skin: Skin is warm and dry  Psychiatric: She has a normal mood and affect  Vitals reviewed        Labs:     9/07/18    TSH 1 14    THYROID ULTRASOUND     INDICATION:    E04 2: Nontoxic multinodular goiter      COMPARISON:  Thyroid sonogram 8/2/2017 and 7/29/2016     TECHNIQUE:   Ultrasound of the thyroid was performed with a high frequency linear transducer in transverse and sagittal planes including volumetric imaging sweeps as well as traditional still imaging technique      FINDINGS:  Thyroid parenchyma is diffusely heterogeneous in echotexture with focal nodule(s) as described below      Right lobe:  5 1 x 2 3 x 2 3 cm  Left lobe:  5 7 x 2 2 x 2 cm  Isthmus:  0 3 cm      Nodule #1  RIGHT midgland nodule measuring 2 5 x 2 6 x 2 1 cm and previously measured as 2 2 x 3 3 x 1 8 cm  Given differences in measuring technique, no significant change from prior  COMPOSITION:  2 points, solid or almost completely solid   ECHOGENICITY:  1 point, hyperechoic or isoechoic  SHAPE:  0 points, wider-than-tall  MARGIN: 0 points, ill-defined  ECHOGENIC FOCI:  0 points, none or large comet-tail artifacts  TI-RADS Classification: TR 3 (3 points), Mildly suspicious  FNA if >2 5 cm  Follow if >1 5 cm  This nodule has had a previous benign biopsy  As it is stable, no further sampling recommended at this time  Further surveillance at 2 year intervals could   be considered to confirm continued stability for a period of greater than 5 years        Nodule #2  LEFT upper pole nodule measuring 1 2 x 0 9 x 0 8 cm and previously 1 2 x 0 9 x 0 8 cm  Unchanged from prior  COMPOSITION:  2 points, solid or almost completely solid   ECHOGENICITY:  1 point, hyperechoic or isoechoic  SHAPE:  0 points, wider-than-tall; as evaluated on the transverse image  MARGIN: 0 points, ill-defined  ECHOGENIC FOCI:  3 points, punctate echogenic foci  TI-RADS Classification: TR 3 (3 points), Mildly suspicious  FNA if >2 5 cm  Follow if >1 5 cm        Nodule #3  LEFT lower pole nodule measuring 2 x 2 4 x 1 9 cm and previously 2 1 x 2 9 x 1 6 cm  Given differences in measuring technique, no significant change from prior  COMPOSITION:  2 points, solid or almost completely solid   ECHOGENICITY:  1 point, hyperechoic or isoechoic  SHAPE:  0 points, wider-than-tall  MARGIN: 0 points, ill-defined  ECHOGENIC FOCI:  0 points, none or large comet-tail artifacts  TI-RADS Classification: TR 3 (3 points), Mildly suspicious  FNA if >2 5 cm  Follow if >1 5 cm  This nodule has had a previous benign biopsy  As it is stable, no further sampling recommended at this time  Further surveillance at 2 year intervals could   be considered to confirm continued stability for a period of greater than 5 years              IMPRESSION:     No nodule meets current ACR criteria for requiring biopsy but followup ultrasound is recommended in 2 years  Impression & Plan:    Problem List Items Addressed This Visit     Multiple thyroid nodules - Primary     Stable, repeat US in one tear for continued surveillance  Will continue to monitor thyroid function tests  Relevant Orders    US thyroid    T4, free    TSH, 3rd generation          Orders Placed This Encounter   Procedures    US thyroid     Standing Status:   Future     Standing Expiration Date:   4/11/2020     Scheduling Instructions:      No prep required  Please bring your physician order, insurance cards, a form of photo ID and a list of your medications with you  Arrive 15 minutes prior to your appointment time in order to register  To schedule appointment please call Central Scheduling at [phone]  Order Specific Question:   Reason for Exam:     Answer:   thyroid nodules     Order Specific Question:   Is the patient pregnant? Answer:   Unknown    T4, free     Standing Status:   Future     Number of Occurrences:   1     Standing Expiration Date:   10/11/2019    TSH, 3rd generation     This is a patient instruction: This test is non-fasting  Please drink two glasses of water morning of bloodwork          Standing Status:   Future     Number of Occurrences:   1 Standing Expiration Date:   10/11/2019       Discussed with the patient and all questioned fully answered  She will call me if any problems arise        Follow-up appointment in 1 year     Counseled patient on diagnostic results, prognosis, risk and benefit of treatment options, instruction for management, importance of treatment compliance, Risk  factor reduction and impressions      Karl Chambers [address] [facility] [place]

## 2018-10-11 NOTE — ASSESSMENT & PLAN NOTE
Stable, repeat US in one tear for continued surveillance  Will continue to monitor thyroid function tests

## 2018-10-12 ENCOUNTER — HOSPITAL ENCOUNTER (OUTPATIENT)
Dept: INFUSION CENTER | Facility: HOSPITAL | Age: 53
Discharge: HOME | End: 2018-10-12
Payer: MEDICARE

## 2018-10-12 ENCOUNTER — OFFICE VISIT (OUTPATIENT)
Dept: URGENT CARE | Facility: CLINIC | Age: 53
End: 2018-10-12
Payer: MEDICARE

## 2018-10-12 VITALS
RESPIRATION RATE: 16 BRPM | OXYGEN SATURATION: 96 % | SYSTOLIC BLOOD PRESSURE: 140 MMHG | HEART RATE: 69 BPM | DIASTOLIC BLOOD PRESSURE: 77 MMHG | TEMPERATURE: 97.8 F

## 2018-10-12 VITALS
WEIGHT: 117 LBS | DIASTOLIC BLOOD PRESSURE: 100 MMHG | RESPIRATION RATE: 18 BRPM | OXYGEN SATURATION: 98 % | SYSTOLIC BLOOD PRESSURE: 178 MMHG | TEMPERATURE: 98.3 F | BODY MASS INDEX: 26.23 KG/M2 | HEART RATE: 61 BPM
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
## 2018-10-12 PROCEDURE — G0463 HOSPITAL OUTPT CLINIC VISIT: HCPCS | Performed by: EMERGENCY MEDICINE

## 2018-10-12 PROCEDURE — 96365 THER/PROPH/DIAG IV INF INIT: CPT

## 2018-10-12 PROCEDURE — 99203 OFFICE O/P NEW LOW 30 MIN: CPT | Performed by: EMERGENCY MEDICINE

## 2018-10-12 RX ADMIN — SODIUM CHLORIDE 20 ML/HR: 9 INJECTION, SOLUTION INTRAVENOUS at 13:06

## 2018-10-12 RX ADMIN — IRON SUCROSE 200 MG: 20 INJECTION, SOLUTION INTRAVENOUS at 13:05

## 2018-10-12 NOTE — PROGRESS NOTES
PIV obtained, Venofer infused and tolerated well  PIV then dc'd and pt d/c'd to home via wc accompanied by mother and caretaker
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
## 2018-10-14 RX ORDER — SODIUM CHLORIDE 9 MG/ML
40 INJECTION, SOLUTION INTRAVENOUS ONCE
Status: COMPLETED | OUTPATIENT
Start: 2018-10-16 | End: 2018-10-16

## 2018-10-16 ENCOUNTER — HOSPITAL ENCOUNTER (OUTPATIENT)
Dept: INFUSION CENTER | Facility: HOSPITAL | Age: 53
Discharge: HOME | End: 2018-10-16
Payer: MEDICARE

## 2018-10-16 VITALS
SYSTOLIC BLOOD PRESSURE: 139 MMHG | RESPIRATION RATE: 18 BRPM | HEART RATE: 75 BPM | DIASTOLIC BLOOD PRESSURE: 92 MMHG | OXYGEN SATURATION: 98 %

## 2018-10-16 PROCEDURE — 96365 THER/PROPH/DIAG IV INF INIT: CPT

## 2018-10-16 RX ORDER — BUSPIRONE HYDROCHLORIDE 5 MG/1
10 TABLET ORAL 2 TIMES DAILY
COMMUNITY
End: 2019-06-03

## 2018-10-16 RX ADMIN — IRON SUCROSE 200 MG: 20 INJECTION, SOLUTION INTRAVENOUS at 12:52

## 2018-10-16 RX ADMIN — SODIUM CHLORIDE 40 ML/HR: 9 INJECTION, SOLUTION INTRAVENOUS at 12:52

## 2018-10-22 RX ORDER — SODIUM CHLORIDE 9 MG/ML
40 INJECTION, SOLUTION INTRAVENOUS ONCE
Status: COMPLETED | OUTPATIENT
Start: 2018-10-23 | End: 2018-10-23

## 2018-10-23 ENCOUNTER — HOSPITAL ENCOUNTER (OUTPATIENT)
Dept: INFUSION CENTER | Facility: HOSPITAL | Age: 53
Discharge: HOME | End: 2018-10-23
Payer: MEDICARE

## 2018-10-23 VITALS
RESPIRATION RATE: 18 BRPM | TEMPERATURE: 99.8 F | SYSTOLIC BLOOD PRESSURE: 116 MMHG | DIASTOLIC BLOOD PRESSURE: 71 MMHG | OXYGEN SATURATION: 98 %

## 2018-10-23 PROCEDURE — 96365 THER/PROPH/DIAG IV INF INIT: CPT

## 2018-10-23 RX ADMIN — IRON SUCROSE 200 MG: 20 INJECTION, SOLUTION INTRAVENOUS at 14:24

## 2018-10-23 RX ADMIN — SODIUM CHLORIDE 40 ML/HR: 9 INJECTION, SOLUTION INTRAVENOUS at 14:24

## 2018-10-29 RX ORDER — SODIUM CHLORIDE 9 MG/ML
40 INJECTION, SOLUTION INTRAVENOUS ONCE
Status: COMPLETED | OUTPATIENT
Start: 2018-10-31 | End: 2018-10-31

## 2018-10-31 ENCOUNTER — HOSPITAL ENCOUNTER (OUTPATIENT)
Dept: INFUSION CENTER | Facility: HOSPITAL | Age: 53
Discharge: HOME | End: 2018-10-31
Payer: MEDICARE

## 2018-10-31 VITALS
OXYGEN SATURATION: 96 % | HEART RATE: 68 BPM | TEMPERATURE: 97.8 F | RESPIRATION RATE: 18 BRPM | DIASTOLIC BLOOD PRESSURE: 95 MMHG | SYSTOLIC BLOOD PRESSURE: 146 MMHG

## 2018-10-31 PROCEDURE — 96365 THER/PROPH/DIAG IV INF INIT: CPT

## 2018-10-31 RX ADMIN — IRON SUCROSE 200 MG: 20 INJECTION, SOLUTION INTRAVENOUS at 10:07

## 2018-10-31 RX ADMIN — SODIUM CHLORIDE 40 ML/HR: 9 INJECTION, SOLUTION INTRAVENOUS at 10:07

## 2018-11-28 NOTE — ASSESSMENT & PLAN NOTE
Diagnosis and Stagin: Right breast invasive cancer, 2 sites (T1 C , N0, MX) grade 2 tumor #1 %, NM 60%, HER-2/wilfred negative  #2 %, NM 90%, HER-2 positive Stage IA  Treatment History: 2013: Right breast mastectomy  Chemotherapy with [address]  She had 6 cycles  Her treatment started in 2014  Then got one year of Herceptin which ended in 2015  Current Hematologic/ Oncologic Treatment:    Tamoxifen Which will now be changed to Arimidex

## 2018-11-30 ENCOUNTER — OFFICE VISIT (OUTPATIENT)
Dept: FAMILY MEDICINE CLINIC | Facility: HOSPITAL | Age: 53
End: 2018-11-30
Payer: MEDICARE

## 2018-11-30 VITALS
WEIGHT: 121 LBS | TEMPERATURE: 98.5 F | HEIGHT: 57 IN | HEART RATE: 79 BPM | BODY MASS INDEX: 26.1 KG/M2 | SYSTOLIC BLOOD PRESSURE: 120 MMHG | DIASTOLIC BLOOD PRESSURE: 82 MMHG

## 2018-11-30 DIAGNOSIS — F41.9 ANXIETY: ICD-10-CM

## 2018-11-30 DIAGNOSIS — H40.9 GLAUCOMA OF LEFT EYE, UNSPECIFIED GLAUCOMA TYPE: ICD-10-CM

## 2018-11-30 DIAGNOSIS — F42.9 OBSESSIVE-COMPULSIVE DISORDER, UNSPECIFIED TYPE: ICD-10-CM

## 2018-11-30 DIAGNOSIS — H33.22 LEFT RETINAL DETACHMENT: Primary | ICD-10-CM

## 2018-11-30 DIAGNOSIS — R05.9 COUGH: ICD-10-CM

## 2018-11-30 PROBLEM — G91.9 HYDROCEPHALUS (HCC): Status: RESOLVED | Noted: 2018-11-30 | Resolved: 2018-11-30

## 2018-11-30 PROCEDURE — 99214 OFFICE O/P EST MOD 30 MIN: CPT | Performed by: INTERNAL MEDICINE

## 2018-11-30 RX ORDER — HALOPERIDOL 2 MG/ML
0.5 SOLUTION ORAL 2 TIMES DAILY
COMMUNITY
End: 2019-12-12

## 2018-11-30 RX ORDER — UREA 10 %
5 LOTION (ML) TOPICAL
COMMUNITY

## 2018-11-30 NOTE — PROGRESS NOTES
Assessment/Plan:    Obsessive compulsive disorder  Hand washing a lot - order for hand cream written upon request    Anxiety  Con't to have some anxiety issues and psych has changed meds - med list updated, con't f/u and meds as per psych    Glaucoma  Eye pressure still up and down - con't eye gtt and f/u as per ophtho       Diagnoses and all orders for this visit:    Left retinal detachment    Glaucoma of left eye, unspecified glaucoma type    Anxiety    Obsessive-compulsive disorder, unspecified type    Cough  Comments:  Reassured pt and [facility] staff exam was normal and no fever present, hot tea/fluids encouraged - call with new/worse symptoms or if no improvement    Other orders  -     timolol (BETIMOL) 0 5 % ophthalmic solution; every 24 hours  -     haloperidol (HALDOL) 1 mg/0 5 mL oral concentrated solution; Take 0 5 mL by mouth 2 (two) times a day  -     melatonin 1 mg; Take 1 mg by mouth daily at bedtime      Mammo 5/18    Dexa 7/18 - osteopenia    [place] 2016 - 6 yr follow up    GYN 5/18    BW 9/18    [facility] form filled out    Subjective:      Patient ID: Duke Moreno is a 47 y o  female  HPI Pt here with [facility] staff for f/u appt    Staff requesting order for hand lotion  Pt has been having some issues with anxiety and OCD activities including washing hands a lot  She has been following with psych and has had some meds changed - med list reviewed and updated today  Pt con't to follow with optho regularly for her retinal detachment and glaucoma  She appears to have completely lost all vision in L eye and is still having eye pressures up and down       Mammo 5/18    Dexa 7/18 - osteopenia    [place] 2016 - 6 yr follow up    GYN 5/18    BW 9/18    Review of Systems      Objective:    /82   Pulse 79   Temp 98 5 °F (36 9 °C)   Ht 4' 9" (1 448 m)   Wt 54 9 kg (121 lb)   BMI 26 18 kg/m²      Physical Exam

## 2018-11-30 NOTE — ASSESSMENT & PLAN NOTE
Con't to have some anxiety issues and psych has changed meds - med list updated, con't f/u and meds as per psych

## 2018-12-03 ENCOUNTER — OFFICE VISIT (OUTPATIENT)
Dept: SURGICAL ONCOLOGY | Facility: CLINIC | Age: 53
End: 2018-12-03
Payer: MEDICARE

## 2018-12-03 VITALS
RESPIRATION RATE: 16 BRPM | WEIGHT: 122.5 LBS | HEART RATE: 78 BPM | SYSTOLIC BLOOD PRESSURE: 144 MMHG | BODY MASS INDEX: 26.43 KG/M2 | DIASTOLIC BLOOD PRESSURE: 80 MMHG | HEIGHT: 57 IN | TEMPERATURE: 98.7 F

## 2018-12-03 DIAGNOSIS — C50.811 MALIGNANT NEOPLASM OF OVERLAPPING SITES OF RIGHT BREAST IN FEMALE, ESTROGEN RECEPTOR POSITIVE (HCC): Primary | ICD-10-CM

## 2018-12-03 DIAGNOSIS — Z17.0 MALIGNANT NEOPLASM OF OVERLAPPING SITES OF RIGHT BREAST IN FEMALE, ESTROGEN RECEPTOR POSITIVE (HCC): Primary | ICD-10-CM

## 2018-12-03 DIAGNOSIS — Z12.31 SCREENING MAMMOGRAM, ENCOUNTER FOR: ICD-10-CM

## 2018-12-03 PROCEDURE — 99214 OFFICE O/P EST MOD 30 MIN: CPT | Performed by: SURGERY

## 2018-12-03 RX ORDER — MINERAL OIL AND WHITE PETROLATUM 150; 830 MG/G; MG/G
0.5 OINTMENT OPHTHALMIC
COMMUNITY

## 2018-12-03 RX ORDER — DOCUSATE SODIUM 100 MG/1
100 CAPSULE, LIQUID FILLED ORAL 3 TIMES WEEKLY
COMMUNITY

## 2018-12-03 NOTE — PROGRESS NOTES
Surgical Oncology Follow Up       [address]  [facility] [address]    Toro Aguilar  1965  [id number]  [address] [facility] [facility]  [facility] [place] [id number] [facility] [address] [address]    Chief Complaint   Patient presents with    Breast Cancer     Pt is here for a six month follow up          Assessment & Plan:   Eric Beltran is now 5 years out from a right breast cancer  She has no complaints referable to her mastectomy site  Clinical exam shows no evidence of local regional recurrence disease  We will see her back in 1 year's time  She is due for mammogram in May  Her mammogram and ultrasound of the left breast from last May showed no worrisome findings  Cancer History:      No history exists  Interval History:   See above    Review of Systems:   Review of Systems   All other systems reviewed and are negative        Past Medical History     Patient Active Problem List   Diagnosis    Vitamin D deficiency    Scoliosis    Retinal detachment    Osteopenia    Obsessive compulsive disorder    Multiple thyroid nodules    Mild intellectual disability    Malignant neoplasm of breast ([address] )    Legal blindness [place]    Irregular menstrual cycle    Iron deficiency anemia    Internal hemorrhoids    Impulse control disorder    Glaucoma    Dyslipidemia    Depression    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HCC)    Chronic gingivitis    Cerebral palsy (HCC)    Benign neoplasm of large intestine    Autistic disorder    Anxiety     Past Medical History:   Diagnosis Date    Breast cyst, left     last assessed 12/30/2013    Chronic GERD     Depression     Fibrocystic breast disease     last assessed 06/29/2012    Gastrointestinal hemorrhage     Glaucoma     Hydrocephalus     Ovarian disorder     last assessed 06/29/2012    Scoliosis      Past Surgical History:   Procedure Laterality Date    COLONOSCOPY      Description 04/13/2016-5 yrs  Description 4 yr f/u d/t polyp and family history, onset 07/20/2012   ESOPHAGOGASTRODUODENOSCOPY      description-04/13/2016 gastritis with antral nodule    EYE SURGERY      for relief of intraocular pressure    INCISIONAL BREAST BIOPSY      description 2008    MASTECTOMY Left     SENTINEL LYMPH NODE BIOPSY      STRABISMUS SURGERY      description 1973, 66     Family History   Problem Relation Age of Onset    Osteopenia Mother     Heart attack Father 59        Acute MI    Coronary artery disease Father     Thyroid nodules Father     Osteopenia Maternal Grandmother     Prostate cancer Maternal Grandfather     Leukemia Paternal Grandfather     Heart attack Maternal Uncle 48        acute MI, stent    Cancer Maternal Uncle         adenocarcinoma of oral cavity    Colon cancer Maternal Uncle 62    Hyperlipidemia Maternal Uncle     Pancreatic cancer Paternal Uncle     Prostate cancer Paternal Uncle     Colon cancer Family     Coronary artery disease Family     Thyroid disease Family      Social History     Social History    Marital status: Single     Spouse name: N/A    Number of children: N/A    Years of education: N/A     Occupational History    Not on file       Social History Main Topics    Smoking status: Never Smoker    Smokeless tobacco: Never Used    Alcohol use No    Drug use: No    Sexual activity: Not on file     Other Topics Concern    Not on file     Social History Narrative    Person living in a residential institution    Uses safety equipment-seat belts       Current Outpatient Prescriptions:     anastrozole (ARIMIDEX) 1 mg tablet, Take 1 tablet (1 mg total) by mouth daily, Disp: 30 tablet, Rfl: 11    busPIRone (BUSPAR) 5 mg tablet, Take 10 mg by mouth 2 (two) times a day , Disp: , Rfl:     calcium-vitamin D (OSCAL) 250-125 MG-UNIT per tablet, Take 1 tablet by mouth 2 (two) times a day, Disp: , Rfl:     carBAMazepine (TEGretol) 200 mg tablet, Take 400 mg by mouth 2 (two) times a day , Disp: , Rfl:     Cholecalciferol (VITAMIN D3) 2000 units TABS, Take 2 tablets by mouth daily, Disp: , Rfl:     citalopram (CeleXA) 20 mg tablet, 20 mg + 10 mg for total of 30 mg daily, Disp: 30 tablet, Rfl: 0    Dentifrices (SENSODYNE PRONAMEL DT), Apply to teeth, Disp: , Rfl:     docusate sodium (COLACE) 100 mg capsule, Take 100 mg by mouth 2 (two) times a day, Disp: , Rfl:     ferrous sulfate 325 (65 Fe) mg tablet, Take 1 tablet by mouth Twice daily, Disp: , Rfl:     haloperidol (HALDOL) 1 mg tablet, Take 1 mg by mouth 2 (two) times a day , Disp: , Rfl:     haloperidol (HALDOL) 1 mg/0 5 mL oral concentrated solution, Take 0 5 mL by mouth 2 (two) times a day, Disp: , Rfl:     ibuprofen (MOTRIN) 200 mg tablet, Take 400 mg by mouth every 6 (six) hours as needed for mild pain , Disp: , Rfl:     latanoprost (XALATAN) 0 005 % ophthalmic solution, Apply 1 drop to eye, Disp: , Rfl:     levocetirizine (XYZAL) 5 MG tablet, Take 1 tablet by mouth daily as needed, Disp: , Rfl:     loperamide (IMODIUM) 2 mg capsule, Take by mouth, Disp: , Rfl:     LORazepam (ATIVAN) 0 5 mg tablet, Take 1 mg by mouth every 6 (six) hours as needed for anxiety  , Disp: , Rfl:     melatonin 1 mg, Take 1 mg by mouth daily at bedtime, Disp: , Rfl:     mineral oil-white petrolatum (LUBRIFRESH P M ) ophthalmic ointment, 0 5 inches, Disp: , Rfl:     ondansetron (ZOFRAN) 4 mg tablet, Take by mouth, Disp: , Rfl:     pantoprazole (PROTONIX) 40 mg tablet, Take 1 tablet by mouth daily, Disp: , Rfl:     sodium chloride (SOCHLOR) 5 % hypertonic ophthalmic solution, Apply 1 drop to eye every 4 (four) hours, Disp: , Rfl:     sodium fluoride 0 275 (0 125 F) MG/DROP solution, Take 275 mcg by mouth daily, Disp: , Rfl:     timolol (BETIMOL) 0 5 % ophthalmic solution, every 24 hours, Disp: , Rfl:     timolol (TIMOPTIC) 0 5 % ophthalmic solution, Apply to eye, Disp: , Rfl:    tolnaftate (TINACTIN) 1 % spray, Apply topically, Disp: , Rfl:     ziprasidone (GEODON) 40 mg capsule, Take 60 mg by mouth 2 (two) times a day with meals  , Disp: , Rfl:     Calcium Carb-Cholecalciferol (OYSTER SHELL CALCIUM) 500-400 MG-UNIT TABS, Take 1 tablet by mouth 2 (two) times a day, Disp: , Rfl:     Carboxymethylcell-Hypromellose (GENTEAL) 0 25-0 3 % GEL, Apply to eye, Disp: , Rfl:     hydrocortisone (ANUSOL-HC, PROCTOSOL HC,) 2 5 % rectal cream, Insert into the rectum 2 (two) times a day (Patient not taking: Reported on 12/3/2018 ), Disp: 30 g, Rfl: 0    Hypromellose 0 3 % SOLN, Apply to eye, Disp: , Rfl:   Allergies   Allergen Reactions    Bactrim [Sulfamethoxazole-Trimethoprim]     Methylphenidate Hyperactivity     Reaction Date: [id number];     Sulfa Antibiotics     Thioridazine     Fexofenadine Hives     Reaction Date: [id number];     Medrogestone     Medroxyprogesterone Rash and Hives     Reaction Date: [id number];     Other Hives    Trimethoprim        Physical Exam:     Vitals:    12/03/18 1100   BP: 144/80   Pulse: 78   Resp: 16   Temp: 98 7 °F (37 1 °C)     Physical Exam   Constitutional: She is oriented to person, place, and time  She appears well-developed and well-nourished  HENT:   Head: Normocephalic and atraumatic  Mouth/Throat: Oropharynx is clear and moist    Eyes: Pupils are equal, round, and reactive to light  EOM are normal    Neck: Normal range of motion  Neck supple  No JVD present  No tracheal deviation present  No thyromegaly present  Cardiovascular: Normal rate, regular rhythm, normal heart sounds and intact distal pulses  Exam reveals no gallop and no friction rub  No murmur heard  Pulmonary/Chest: Effort normal and breath sounds normal  No respiratory distress  She has no wheezes  She has no rales  Examination of the right mastectomy site shows no worrisome skin findings dominant masses or axillary adenopathy      Examination of the left breast demonstrates no nipple discharge skin changes dominant masses or axillary adenopathy  Abdominal: Soft  She exhibits no distension and no mass  There is no hepatomegaly  There is no tenderness  There is no rebound and no guarding  Musculoskeletal: Normal range of motion  She exhibits no edema or tenderness  Lymphadenopathy:     She has no cervical adenopathy  Neurological: She is alert and oriented to person, place, and time  No cranial nerve deficit  Skin: Skin is warm and dry  No rash noted  No erythema  Psychiatric: She has a normal mood and affect  Her behavior is normal    Vitals reviewed  Results:   Mammogram/ultrasound from May of the left breast showed no worrisome findings, B2      Advance Care Planning/Advance Directives:  I discussed the disease status, treatment plans and follow-up with the patient

## 2018-12-03 NOTE — LETTER
December 3, 2018     [facility], [address]  [address] [facility]  [address] [facility] [address]    Patient: Festus Guzman   YOB: 1965   Date of Visit: 12/3/2018       Dear Dr Faizan Steinberg: Thank you for referring Festus Guzman to me for evaluation  Below are my notes for this consultation  If you have questions, please do not hesitate to call me  I look forward to following your patient along with you  Sincerely,        Celeste Stanley MD        CC: No Recipients  Celeste Stanley MD  12/3/2018 11:42 AM  Sign at close encounter     Surgical Oncology Follow Up       [address] [place] [address] [address] [facility]  1965  [id number]  [address] [facility],[address]  [facility] [address] [address]    Chief Complaint   Patient presents with    Breast Cancer     Pt is here for a six month follow up          Assessment & Plan:   Alycia Garcia is now 5 years out from a right breast cancer  She has no complaints referable to her mastectomy site  Clinical exam shows no evidence of local regional recurrence disease  We will see her back in 1 year's time  She is due for mammogram in May  Her mammogram and ultrasound of the left breast from last May showed no worrisome findings  Cancer History:      No history exists  Interval History:   See above    Review of Systems:   Review of Systems   All other systems reviewed and are negative        Past Medical History     Patient Active Problem List   Diagnosis    Vitamin D deficiency    Scoliosis    Retinal detachment    Osteopenia    Obsessive compulsive disorder    Multiple thyroid nodules    Mild intellectual disability    Malignant neoplasm of breast ([address] )    Legal blindness [place]    Irregular menstrual cycle    Iron deficiency anemia    Internal hemorrhoids    Impulse control disorder    Glaucoma    Dyslipidemia    Depression    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HCC)    Chronic gingivitis    Cerebral palsy (HCC)    Benign neoplasm of large intestine    Autistic disorder    Anxiety     Past Medical History:   Diagnosis Date    Breast cyst, left     last assessed 12/30/2013    Chronic GERD     Depression     Fibrocystic breast disease     last assessed 06/29/2012    Gastrointestinal hemorrhage     Glaucoma     Hydrocephalus     Ovarian disorder     last assessed 06/29/2012    Scoliosis      Past Surgical History:   Procedure Laterality Date    COLONOSCOPY      Description 04/13/2016-5 yrs  Description 4 yr f/u d/t polyp and family history, onset 07/20/2012   ESOPHAGOGASTRODUODENOSCOPY      description-04/13/2016 gastritis with antral nodule    EYE SURGERY      for relief of intraocular pressure    INCISIONAL BREAST BIOPSY      description 2008    MASTECTOMY Left     SENTINEL LYMPH NODE BIOPSY      STRABISMUS SURGERY      description 1973, 66     Family History   Problem Relation Age of Onset    Osteopenia Mother     Heart attack Father 59        Acute MI    Coronary artery disease Father     Thyroid nodules Father     Osteopenia Maternal Grandmother     Prostate cancer Maternal Grandfather     Leukemia Paternal Grandfather     Heart attack Maternal Uncle 48        acute MI, stent    Cancer Maternal Uncle         adenocarcinoma of oral cavity    Colon cancer Maternal Uncle 62    Hyperlipidemia Maternal Uncle     Pancreatic cancer Paternal Uncle     Prostate cancer Paternal Uncle     Colon cancer Family     Coronary artery disease Family     Thyroid disease Family      Social History     Social History    Marital status: Single     Spouse name: N/A    Number of children: N/A    Years of education: N/A     Occupational History    Not on file       Social History Main Topics    Smoking status: Never Smoker    Smokeless tobacco: Never Used    Alcohol use No    Drug use: No    Sexual activity: Not on file     Other Topics Concern    Not on file     Social History Narrative    Person living in a residential institution    Uses safety equipment-seat belts       Current Outpatient Prescriptions:     anastrozole (ARIMIDEX) 1 mg tablet, Take 1 tablet (1 mg total) by mouth daily, Disp: 30 tablet, Rfl: 11    busPIRone (BUSPAR) 5 mg tablet, Take 10 mg by mouth 2 (two) times a day , Disp: , Rfl:     calcium-vitamin D (OSCAL) 250-125 MG-UNIT per tablet, Take 1 tablet by mouth 2 (two) times a day, Disp: , Rfl:     carBAMazepine (TEGretol) 200 mg tablet, Take 400 mg by mouth 2 (two) times a day , Disp: , Rfl:     Cholecalciferol (VITAMIN D3) 2000 units TABS, Take 2 tablets by mouth daily, Disp: , Rfl:     citalopram (CeleXA) 20 mg tablet, 20 mg + 10 mg for total of 30 mg daily, Disp: 30 tablet, Rfl: 0    Dentifrices (SENSODYNE PRONAMEL DT), Apply to teeth, Disp: , Rfl:     docusate sodium (COLACE) 100 mg capsule, Take 100 mg by mouth 2 (two) times a day, Disp: , Rfl:     ferrous sulfate 325 (65 Fe) mg tablet, Take 1 tablet by mouth Twice daily, Disp: , Rfl:     haloperidol (HALDOL) 1 mg tablet, Take 1 mg by mouth 2 (two) times a day , Disp: , Rfl:     haloperidol (HALDOL) 1 mg/0 5 mL oral concentrated solution, Take 0 5 mL by mouth 2 (two) times a day, Disp: , Rfl:     ibuprofen (MOTRIN) 200 mg tablet, Take 400 mg by mouth every 6 (six) hours as needed for mild pain , Disp: , Rfl:     latanoprost (XALATAN) 0 005 % ophthalmic solution, Apply 1 drop to eye, Disp: , Rfl:     levocetirizine (XYZAL) 5 MG tablet, Take 1 tablet by mouth daily as needed, Disp: , Rfl:     loperamide (IMODIUM) 2 mg capsule, Take by mouth, Disp: , Rfl:     LORazepam (ATIVAN) 0 5 mg tablet, Take 1 mg by mouth every 6 (six) hours as needed for anxiety  , Disp: , Rfl:     melatonin 1 mg, Take 1 mg by mouth daily at bedtime, Disp: , Rfl:     mineral oil-white petrolatum (LUBRIFRESH P M ) ophthalmic ointment, 0 5 inches, Disp: , Rfl:     ondansetron (ZOFRAN) 4 mg tablet, Take by mouth, Disp: , Rfl:     pantoprazole (PROTONIX) 40 mg tablet, Take 1 tablet by mouth daily, Disp: , Rfl:     sodium chloride (SOCHLOR) 5 % hypertonic ophthalmic solution, Apply 1 drop to eye every 4 (four) hours, Disp: , Rfl:     sodium fluoride 0 275 (0 125 F) MG/DROP solution, Take 275 mcg by mouth daily, Disp: , Rfl:     timolol (BETIMOL) 0 5 % ophthalmic solution, every 24 hours, Disp: , Rfl:     timolol (TIMOPTIC) 0 5 % ophthalmic solution, Apply to eye, Disp: , Rfl:     tolnaftate (TINACTIN) 1 % spray, Apply topically, Disp: , Rfl:     ziprasidone (GEODON) 40 mg capsule, Take 60 mg by mouth 2 (two) times a day with meals  , Disp: , Rfl:     Calcium Carb-Cholecalciferol (OYSTER SHELL CALCIUM) 500-400 MG-UNIT TABS, Take 1 tablet by mouth 2 (two) times a day, Disp: , Rfl:     Carboxymethylcell-Hypromellose (GENTEAL) 0 25-0 3 % GEL, Apply to eye, Disp: , Rfl:     hydrocortisone (ANUSOL-HC, PROCTOSOL HC,) 2 5 % rectal cream, Insert into the rectum 2 (two) times a day (Patient not taking: Reported on 12/3/2018 ), Disp: 30 g, Rfl: 0    Hypromellose 0 3 % SOLN, Apply to eye, Disp: , Rfl:   Allergies   Allergen Reactions    Bactrim [Sulfamethoxazole-Trimethoprim]     Methylphenidate Hyperactivity     Reaction Date: [id number];     Sulfa Antibiotics     Thioridazine     Fexofenadine Hives     Reaction Date: [id number];     Medrogestone     Medroxyprogesterone Rash and Hives     Reaction Date: [id number];     Other Hives    Trimethoprim        Physical Exam:     Vitals:    12/03/18 1100   BP: 144/80   Pulse: 78   Resp: 16   Temp: 98 7 °F (37 1 °C)     Physical Exam   Constitutional: She is oriented to person, place, and time  She appears well-developed and well-nourished  HENT:   Head: Normocephalic and atraumatic  Mouth/Throat: Oropharynx is clear and moist    Eyes: Pupils are equal, round, and reactive to light   EOM are normal    Neck: Normal range of motion  Neck supple  No JVD present  No tracheal deviation present  No thyromegaly present  Cardiovascular: Normal rate, regular rhythm, normal heart sounds and intact distal pulses  Exam reveals no gallop and no friction rub  No murmur heard  Pulmonary/Chest: Effort normal and breath sounds normal  No respiratory distress  She has no wheezes  She has no rales  Examination of the right mastectomy site shows no worrisome skin findings dominant masses or axillary adenopathy  Examination of the left breast demonstrates no nipple discharge skin changes dominant masses or axillary adenopathy  Abdominal: Soft  She exhibits no distension and no mass  There is no hepatomegaly  There is no tenderness  There is no rebound and no guarding  Musculoskeletal: Normal range of motion  She exhibits no edema or tenderness  Lymphadenopathy:     She has no cervical adenopathy  Neurological: She is alert and oriented to person, place, and time  No cranial nerve deficit  Skin: Skin is warm and dry  No rash noted  No erythema  Psychiatric: She has a normal mood and affect  Her behavior is normal    Vitals reviewed  Results:   Mammogram/ultrasound from May of the left breast showed no worrisome findings, B2      Advance Care Planning/Advance Directives:  I discussed the disease status, treatment plans and follow-up with the patient

## 2018-12-26 DIAGNOSIS — E55.9 VITAMIN D DEFICIENCY: Primary | ICD-10-CM

## 2018-12-26 RX ORDER — CHOLECALCIFEROL (VITAMIN D3) 50 MCG
TABLET ORAL
Qty: 62 TABLET | Refills: 0 | Status: SHIPPED | OUTPATIENT
Start: 2018-12-26

## 2019-01-15 ENCOUNTER — TELEPHONE (OUTPATIENT)
Dept: FAMILY MEDICINE CLINIC | Facility: HOSPITAL | Age: 54
End: 2019-01-15

## 2019-01-23 ENCOUNTER — TELEPHONE (OUTPATIENT)
Dept: HEMATOLOGY ONCOLOGY | Facility: HOSPITAL | Age: 54
End: 2019-01-23

## 2019-01-23 NOTE — TELEPHONE ENCOUNTER
Called and LMOM  Pt is scheduled to see Dr Jayla Adame on 3/25 but Dr Jayla Adame will not be in that day  Please advise there's a full day opened on 3/18 that pt can be scheduled on

## 2019-02-01 ENCOUNTER — OFFICE VISIT (OUTPATIENT)
Dept: FAMILY MEDICINE CLINIC | Facility: HOSPITAL | Age: 54
End: 2019-02-01
Payer: MEDICARE

## 2019-02-01 VITALS
BODY MASS INDEX: 25.46 KG/M2 | SYSTOLIC BLOOD PRESSURE: 128 MMHG | TEMPERATURE: 98.1 F | HEIGHT: 57 IN | HEART RATE: 71 BPM | WEIGHT: 118 LBS | DIASTOLIC BLOOD PRESSURE: 70 MMHG

## 2019-02-01 DIAGNOSIS — H54.8 LEGAL BLINDNESS USA: ICD-10-CM

## 2019-02-01 DIAGNOSIS — G80.9 CEREBRAL PALSY, UNSPECIFIED TYPE (HCC): ICD-10-CM

## 2019-02-01 DIAGNOSIS — R26.2 AMBULATORY DYSFUNCTION: Primary | ICD-10-CM

## 2019-02-01 PROCEDURE — 99214 OFFICE O/P EST MOD 30 MIN: CPT | Performed by: INTERNAL MEDICINE

## 2019-02-01 NOTE — PROGRESS NOTES
Assessment/Plan:    Legal blindness [place]  Con't drops and f/u as per ophtho    Cerebral palsy ([address] )  Poss contributing her gait issues, PT as noted below       Diagnoses and all orders for this visit:    Ambulatory dysfunction  Comments:  Inc frequency of falls recently - thought to be d/t dec vision with legal blindness L eye and cataract R eye - order for PT written as pt has had benefit with it recently  Orders:  -     Ambulatory referral to Physical Therapy; Future    Legal blindness [place]  -     Ambulatory referral to Physical Therapy; Future    Cerebral palsy, unspecified type [facility])  -     Ambulatory referral to Physical Therapy; Future      [facility] forms filled out and copy made for chart    [place] 2016 - 5 yrs     Mammo 5/18    Dexa 7/18 - osteopenia    BW 9/18    PAP 1/3/19    Subjective:      Patient ID: Gerry Becerril is a 47 y o  female  HPI Pt here with [facility] staff for request for PT again  She con't to follow with [facility] PT for frequent falls  Falls were down steps and in bathroom and are thought to be d/t con't vision issues  Pt also notes her mom does not have hand rails at home like they do at her group home  She has been considered legally blind in R eye and has a cataract in her R eye  She has not fallen at [facility] recently but has been doing PT the past few weeks for once a week  She has had benefit the past few week with the PT  [place] 2016 - 5 yrs     Mammo 5/18    Dexa 7/18 - osteopenia    BW 9/18    PAP 1/3/19    Review of Systems   Constitutional: Negative for chills and fever  HENT: Negative for congestion and sore throat  Eyes: Positive for visual disturbance  Negative for pain  Respiratory: Negative for cough and shortness of breath  Cardiovascular: Negative for chest pain and palpitations  Gastrointestinal: Negative for diarrhea, nausea and vomiting  Genitourinary: Negative for difficulty urinating and dysuria  Musculoskeletal: Positive for gait problem   Negative for back pain and neck pain  Skin: Negative for rash and wound  Neurological: Negative for seizures and headaches  Hematological: Negative for adenopathy  Psychiatric/Behavioral: Negative for behavioral problems and confusion  Objective:    /70   Pulse 71   Temp 98 1 °F (36 7 °C)   Ht 4' 9" (1 448 m)   Wt 53 5 kg (118 lb)   BMI 25 53 kg/m²      Physical Exam   Constitutional: She appears well-developed and well-nourished  No distress  HENT:   Head: Normocephalic and atraumatic  Eyes: Conjunctivae are normal  Right eye exhibits no discharge  Left eye exhibits no discharge  Neck: Neck supple  No tracheal deviation present  Cardiovascular: Normal rate, regular rhythm and normal heart sounds  Exam reveals no friction rub  No murmur heard  Pulmonary/Chest: Effort normal and breath sounds normal  No respiratory distress  She has no wheezes  She has no rales  Abdominal: Soft  She exhibits no distension  There is no tenderness  There is no guarding  Musculoskeletal: She exhibits no edema  Neurological: She is alert  She exhibits normal muscle tone  Skin: Skin is warm and dry  No rash noted  Psychiatric:   Pleasant and cooperative   Nursing note and vitals reviewed

## 2019-02-12 ENCOUNTER — TRANSCRIBE ORDERS (OUTPATIENT)
Dept: ADMINISTRATIVE | Facility: HOSPITAL | Age: 54
End: 2019-02-12

## 2019-02-12 ENCOUNTER — TELEPHONE (OUTPATIENT)
Dept: FAMILY MEDICINE CLINIC | Facility: HOSPITAL | Age: 54
End: 2019-02-12

## 2019-02-12 DIAGNOSIS — N83.209 CYST OF OVARY, UNSPECIFIED LATERALITY: ICD-10-CM

## 2019-02-12 DIAGNOSIS — C50.919 MALIGNANT NEOPLASM OF FEMALE BREAST, UNSPECIFIED ESTROGEN RECEPTOR STATUS, UNSPECIFIED LATERALITY, UNSPECIFIED SITE OF BREAST (HCC): Primary | ICD-10-CM

## 2019-02-12 DIAGNOSIS — G80.9 CEREBRAL PALSY, UNSPECIFIED TYPE (HCC): Primary | ICD-10-CM

## 2019-02-12 DIAGNOSIS — F63.9 IMPULSE CONTROL DISORDER: ICD-10-CM

## 2019-02-12 NOTE — TELEPHONE ENCOUNTER
Arlyce Simmonds PT seeing her is requesting an OT eval order so she can be evaluated for a Weighted Vest  [phone]

## 2019-02-21 ENCOUNTER — OFFICE VISIT (OUTPATIENT)
Dept: URGENT CARE | Facility: CLINIC | Age: 54
End: 2019-02-21
Payer: MEDICARE

## 2019-02-21 VITALS
OXYGEN SATURATION: 97 % | DIASTOLIC BLOOD PRESSURE: 90 MMHG | BODY MASS INDEX: 25.61 KG/M2 | WEIGHT: 122 LBS | HEIGHT: 58 IN | HEART RATE: 73 BPM | TEMPERATURE: 99.2 F | RESPIRATION RATE: 16 BRPM | SYSTOLIC BLOOD PRESSURE: 120 MMHG

## 2019-02-21 DIAGNOSIS — S30.0XXA CONTUSION OF LOWER BACK, INITIAL ENCOUNTER: Primary | ICD-10-CM

## 2019-02-21 DIAGNOSIS — M54.50 ACUTE BILATERAL LOW BACK PAIN WITHOUT SCIATICA: ICD-10-CM

## 2019-02-21 PROCEDURE — 99213 OFFICE O/P EST LOW 20 MIN: CPT | Performed by: NURSE PRACTITIONER

## 2019-02-21 PROCEDURE — G0463 HOSPITAL OUTPT CLINIC VISIT: HCPCS | Performed by: NURSE PRACTITIONER

## 2019-02-21 RX ORDER — CALAMINE
LOTION (ML) TOPICAL EVERY 4 HOURS PRN
COMMUNITY

## 2019-02-21 RX ORDER — DIPHENHYDRAMINE HCL 25 MG
25 TABLET ORAL
COMMUNITY

## 2019-02-21 RX ORDER — ACETAMINOPHEN 500 MG
500 TABLET ORAL AS NEEDED
COMMUNITY

## 2019-02-21 NOTE — PATIENT INSTRUCTIONS
Continue as needed medications Tylenol Motrin for back pain  Sedentary duties while at work  Patient and care take her bedside aware to continue physical therapy and inform them possible use of cane or walker if needed  Due to physical therapy working on patient's ongoing altered gait and balance disorder do be informed of fall  Follow up with pcp as needed    Pt ambulated out of the office with no pain or discomfort and with caretakers   Pt aware and understands treatment plan   No xray done today,

## 2019-02-21 NOTE — PROGRESS NOTES
NAME: Raul Mina is a 47 y o  female  : 1965    MRN: [id number]      Assessment and Plan   Contusion of lower back, initial encounter [S30  0XXA]  1  Contusion of lower back, initial encounter     2  Acute bilateral low back pain without sciatica         Dina Hahn was seen today for back pain  Diagnoses and all orders for this visit:    Contusion of lower back, initial encounter    Acute bilateral low back pain without sciatica        Patient Instructions   Patient Instructions   Continue as needed medications Tylenol Motrin for back pain  Sedentary duties while at work  Patient and care take her bedside aware to continue physical therapy and inform them possible use of cane or walker if needed  Due to physical therapy working on patient's ongoing altered gait and balance disorder do be informed of fall  Follow up with pcp as needed    Pt ambulated out of the office with no pain or discomfort and with caretakers  Pt aware and understands treatment plan   No xray done today,     Proceed to ER if symptoms worsen  Chief Complaint     Chief Complaint   Patient presents with    Back Pain     lower back, shoulders, neck; pt fell in bathroom this am - unwitnessed -  pt reports tripping over a "bunched up carpet"         History of Present Illness     Pt here today and accompanied by Obdulia Looney at bedside  Pt was in bathroom and tripped over a rug and fell in the bath room  She denies hitting her head  She fell backwards and hit the floor on the lower back and feels bruised  She fell and hit the tile floor  She has fallen in the past as well and currently seeing a PT and working on her balance and gait to help with the increased amt of falls  She was given tylenol after the fall for pain  Review of Systems   Review of Systems   Musculoskeletal: Positive for back pain (low back)           Current Medications       Current Outpatient Medications:     acetaminophen (TYLENOL) 500 mg tablet, Take 500 mg by mouth every 6 (six) hours as needed for mild pain, Disp: , Rfl:     anastrozole (ARIMIDEX) 1 mg tablet, Take 1 tablet (1 mg total) by mouth daily, Disp: 30 tablet, Rfl: 11    calamine lotion, Apply topically every 4 (four) hours as needed for itching, Disp: , Rfl:     calcium-vitamin D (OSCAL) 250-125 MG-UNIT per tablet, Take 1 tablet by mouth 2 (two) times a day, Disp: , Rfl:     carBAMazepine (TEGretol) 200 mg tablet, Take 400 mg by mouth 2 (two) times a day , Disp: , Rfl:     Cholecalciferol (VITAMIN D) 2000 units tablet, TAKE TWO TABLETS BY MOUTH (4000IU) EVERY MORNING FOR VITAMIN DIFICIENCY, Disp: 62 tablet, Rfl: 0    citalopram (CeleXA) 20 mg tablet, 20 mg + 10 mg for total of 30 mg daily, Disp: 30 tablet, Rfl: 0    Dentifrices (SENSODYNE PRONAMEL DT), Apply to teeth, Disp: , Rfl:     diphenhydrAMINE (BENADRYL) 25 mg tablet, Take 25 mg by mouth daily at bedtime as needed for itching (for insomnia and allergy ** DO NOT TAKE WITH XYZAL **), Disp: , Rfl:     docusate sodium (COLACE) 100 mg capsule, Take 100 mg by mouth 2 (two) times a day, Disp: , Rfl:     haloperidol (HALDOL) 1 mg/0 5 mL oral concentrated solution, Take 0 5 mL by mouth 2 (two) times a day, Disp: , Rfl:     hydrocortisone (ANUSOL-HC, PROCTOSOL HC,) 2 5 % rectal cream, Insert into the rectum 2 (two) times a day, Disp: 30 g, Rfl: 0    ibuprofen (MOTRIN) 200 mg tablet, Take 400 mg by mouth every 6 (six) hours as needed for mild pain , Disp: , Rfl:     latanoprost (XALATAN) 0 005 % ophthalmic solution, Apply 1 drop to eye, Disp: , Rfl:     levocetirizine (XYZAL) 5 MG tablet, Take 1 tablet by mouth daily as needed, Disp: , Rfl:     LORazepam (ATIVAN) 0 5 mg tablet, Take 1 mg by mouth every 6 (six) hours as needed for anxiety  , Disp: , Rfl:     melatonin 1 mg, Take 1 mg by mouth daily at bedtime, Disp: , Rfl:     mineral oil-white petrolatum (LUBRIFRESH P M ) ophthalmic ointment, 0 5 inches, Disp: , Rfl:     ondansetron (ZOFRAN) 4 mg tablet, Take by mouth, Disp: , Rfl:     pantoprazole (PROTONIX) 40 mg tablet, Take 1 tablet by mouth daily, Disp: , Rfl:     sodium chloride (SOCHLOR) 5 % hypertonic ophthalmic solution, Apply 1 drop to eye 2 (two) times a day , Disp: , Rfl:     timolol (TIMOPTIC-XE) 0 5 % ophthalmic gel-forming, Administer 1 drop into the left eye daily, Disp: , Rfl:     tolnaftate (TINACTIN) 1 % spray, Apply topically, Disp: , Rfl:     ziprasidone (GEODON) 40 mg capsule, Take 40 mg by mouth 2 (two) times a day with meals , Disp: , Rfl:     busPIRone (BUSPAR) 5 mg tablet, Take 10 mg by mouth 2 (two) times a day , Disp: , Rfl:     Calcium Carb-Cholecalciferol (OYSTER SHELL CALCIUM) 500-400 MG-UNIT TABS, Take 1 tablet by mouth 2 (two) times a day, Disp: , Rfl:     Carboxymethylcell-Hypromellose (GENTEAL) 0 25-0 3 % GEL, Apply to eye, Disp: , Rfl:     ferrous sulfate 325 (65 Fe) mg tablet, Take 1 tablet by mouth Twice daily, Disp: , Rfl:     Hypromellose 0 3 % SOLN, Apply to eye, Disp: , Rfl:     loperamide (IMODIUM) 2 mg capsule, Take by mouth, Disp: , Rfl:     sodium fluoride 0 275 (0 125 F) MG/DROP solution, Take 275 mcg by mouth daily, Disp: , Rfl:     timolol (BETIMOL) 0 5 % ophthalmic solution, every 24 hours, Disp: , Rfl:     Current Allergies     Allergies as of 02/21/2019 - Reviewed 02/21/2019   Allergen Reaction Noted    Bactrim [sulfamethoxazole-trimethoprim]  01/13/2016    Methylphenidate Hyperactivity 04/21/2012    Sulfa antibiotics  01/13/2016    Thioridazine  01/13/2016    Fexofenadine Hives 04/21/2012    Medrogestone  01/13/2016    Medroxyprogesterone Rash and Hives 04/21/2012    Other Hives 05/04/2018    Trimethoprim  01/13/2016              Past Medical History:   Diagnosis Date    Breast cyst, left     last assessed 12/30/2013    Chronic GERD     Depression     Fibrocystic breast disease     last assessed 06/29/2012    Gastrointestinal hemorrhage     Glaucoma     Hydrocephalus     Ovarian disorder     last assessed 06/29/2012    Scoliosis        Past Surgical History:   Procedure Laterality Date    COLONOSCOPY      Description 04/13/2016-5 yrs  Description 4 yr f/u d/t polyp and family history, onset 07/20/2012   ESOPHAGOGASTRODUODENOSCOPY      description-04/13/2016 gastritis with antral nodule    EYE SURGERY      for relief of intraocular pressure    INCISIONAL BREAST BIOPSY      description 2008    MASTECTOMY Left     SENTINEL LYMPH NODE BIOPSY      STRABISMUS SURGERY      description 1973, 66       Family History   Problem Relation Age of Onset    Osteopenia Mother     Heart attack Father 59        Acute MI    Coronary artery disease Father     Thyroid nodules Father     Osteopenia Maternal Grandmother     Prostate cancer Maternal Grandfather     Leukemia Paternal Grandfather     Heart attack Maternal Uncle 48        acute MI, stent    Cancer Maternal Uncle         adenocarcinoma of oral cavity    Colon cancer Maternal Uncle 62    Hyperlipidemia Maternal Uncle     Pancreatic cancer Paternal Uncle     Prostate cancer Paternal Uncle     Colon cancer Family     Coronary artery disease Family     Thyroid disease Family          Medications have been verified  The following portions of the patient's history were reviewed and updated as appropriate: allergies, current medications, past family history, past medical history, past social history, past surgical history and problem list     Objective   /90   Pulse 73   Temp 99 2 °F (37 3 °C) (Tympanic)   Resp 16   Ht 4' 9 5" (1 461 m)   Wt 55 3 kg (122 lb)   SpO2 97%   BMI 25 94 kg/m²      Physical Exam     Physical Exam   Musculoskeletal:        Lumbar back: She exhibits tenderness and pain  She exhibits normal range of motion, no swelling, no edema, no laceration, no spasm and normal pulse          Back:    Pt is able to stand and walk with no pain, pt has no pain getting up from sitting to standing position   Skin: Skin is warm         JESSE Mosley

## 2019-02-21 NOTE — LETTER
February 21, 2019     Patient: Napolean Boast   YOB: 1965   Date of Visit: 2/21/2019       To Whom It May Concern: It is my medical opinion that Napolean Boast may return to work on 02/21/2019,   If you have any questions or concerns, please don't hesitate to call           Sincerely,        JESSE Nieto    CC: No Recipients

## 2019-02-25 RX ORDER — TIMOLOL MALEATE 5 MG/ML
1 SOLUTION OPHTHALMIC DAILY
COMMUNITY

## 2019-02-26 ENCOUNTER — TELEPHONE (OUTPATIENT)
Dept: FAMILY MEDICINE CLINIC | Facility: HOSPITAL | Age: 54
End: 2019-02-26

## 2019-02-26 NOTE — TELEPHONE ENCOUNTER
Just an FYI that she will be trying a weighted vest to help with her behaviors   An order will be faxed over to sign

## 2019-03-15 ENCOUNTER — TELEPHONE (OUTPATIENT)
Dept: FAMILY MEDICINE CLINIC | Facility: HOSPITAL | Age: 54
End: 2019-03-15

## 2019-03-15 NOTE — TELEPHONE ENCOUNTER
Part A Discharged, finished today: occupational therapy  She will be with Germán Hurtado - outpatient behaviors  There will be faxes to confirm this

## 2019-03-21 ENCOUNTER — TELEPHONE (OUTPATIENT)
Dept: HEMATOLOGY ONCOLOGY | Facility: CLINIC | Age: 54
End: 2019-03-21

## 2019-03-21 NOTE — TELEPHONE ENCOUNTER
Called pt and LMOM for pt to see Missy on Wednesday 3/27 instead on Dr Azeb Storey on 3/25 because Dr Azeb Storey will be rounding and will not be available

## 2019-04-05 ENCOUNTER — HOSPITAL ENCOUNTER (OUTPATIENT)
Dept: ULTRASOUND IMAGING | Facility: HOSPITAL | Age: 54
Discharge: HOME | End: 2019-04-05
Payer: MEDICARE

## 2019-04-05 DIAGNOSIS — N83.209 CYST OF OVARY, UNSPECIFIED LATERALITY: ICD-10-CM

## 2019-04-05 DIAGNOSIS — C50.919 MALIGNANT NEOPLASM OF FEMALE BREAST, UNSPECIFIED ESTROGEN RECEPTOR STATUS, UNSPECIFIED LATERALITY, UNSPECIFIED SITE OF BREAST (HCC): ICD-10-CM

## 2019-04-05 PROCEDURE — 76856 US EXAM PELVIC COMPLETE: CPT

## 2019-04-29 ENCOUNTER — OFFICE VISIT (OUTPATIENT)
Dept: HEMATOLOGY ONCOLOGY | Facility: HOSPITAL | Age: 54
End: 2019-04-29
Payer: MEDICARE

## 2019-04-29 VITALS
TEMPERATURE: 97.6 F | SYSTOLIC BLOOD PRESSURE: 142 MMHG | RESPIRATION RATE: 16 BRPM | WEIGHT: 117 LBS | DIASTOLIC BLOOD PRESSURE: 88 MMHG | BODY MASS INDEX: 24.56 KG/M2 | HEIGHT: 58 IN

## 2019-04-29 DIAGNOSIS — C50.911 MALIGNANT NEOPLASM OF RIGHT FEMALE BREAST, UNSPECIFIED ESTROGEN RECEPTOR STATUS, UNSPECIFIED SITE OF BREAST (HCC): Primary | ICD-10-CM

## 2019-04-29 DIAGNOSIS — F70 MILD INTELLECTUAL DISABILITY: ICD-10-CM

## 2019-04-29 DIAGNOSIS — Z79.899 OTHER LONG TERM (CURRENT) DRUG THERAPY: ICD-10-CM

## 2019-04-29 DIAGNOSIS — G80.9 CEREBRAL PALSY, UNSPECIFIED TYPE (HCC): ICD-10-CM

## 2019-04-29 DIAGNOSIS — F84.0 AUTISTIC DISORDER: ICD-10-CM

## 2019-04-29 DIAGNOSIS — D50.9 IRON DEFICIENCY ANEMIA, UNSPECIFIED IRON DEFICIENCY ANEMIA TYPE: ICD-10-CM

## 2019-04-29 PROCEDURE — 99214 OFFICE O/P EST MOD 30 MIN: CPT | Performed by: PHYSICIAN ASSISTANT

## 2019-04-30 PROBLEM — Z79.899 OTHER LONG TERM (CURRENT) DRUG THERAPY: Status: ACTIVE | Noted: 2019-04-30

## 2019-05-08 ENCOUNTER — OFFICE VISIT (OUTPATIENT)
Dept: GASTROENTEROLOGY | Facility: CLINIC | Age: 54
End: 2019-05-08
Payer: MEDICARE

## 2019-05-08 VITALS
SYSTOLIC BLOOD PRESSURE: 120 MMHG | WEIGHT: 116 LBS | DIASTOLIC BLOOD PRESSURE: 84 MMHG | HEART RATE: 69 BPM | HEIGHT: 57 IN | BODY MASS INDEX: 25.03 KG/M2

## 2019-05-08 DIAGNOSIS — R13.19 ESOPHAGEAL DYSPHAGIA: ICD-10-CM

## 2019-05-08 DIAGNOSIS — K21.9 GASTROESOPHAGEAL REFLUX DISEASE, ESOPHAGITIS PRESENCE NOT SPECIFIED: ICD-10-CM

## 2019-05-08 DIAGNOSIS — D12.4 BENIGN NEOPLASM OF DESCENDING COLON: ICD-10-CM

## 2019-05-08 PROCEDURE — 99214 OFFICE O/P EST MOD 30 MIN: CPT | Performed by: INTERNAL MEDICINE

## 2019-05-16 PROCEDURE — 88305 TISSUE EXAM BY PATHOLOGIST: CPT | Performed by: PATHOLOGY

## 2019-05-16 PROCEDURE — 88313 SPECIAL STAINS GROUP 2: CPT | Performed by: PATHOLOGY

## 2019-05-17 ENCOUNTER — TELEPHONE (OUTPATIENT)
Dept: GASTROENTEROLOGY | Facility: CLINIC | Age: 54
End: 2019-05-17

## 2019-05-17 ENCOUNTER — LAB REQUISITION (OUTPATIENT)
Dept: LAB | Facility: HOSPITAL | Age: 54
End: 2019-05-17
Payer: MEDICARE

## 2019-05-17 DIAGNOSIS — K21.9 GASTRO-ESOPHAGEAL REFLUX DISEASE WITHOUT ESOPHAGITIS: ICD-10-CM

## 2019-05-17 DIAGNOSIS — D13.1 BENIGN NEOPLASM OF STOMACH: ICD-10-CM

## 2019-05-17 DIAGNOSIS — C50.911 MALIGNANT NEOPLASM OF RIGHT FEMALE BREAST, UNSPECIFIED ESTROGEN RECEPTOR STATUS, UNSPECIFIED SITE OF BREAST (HCC): ICD-10-CM

## 2019-05-17 DIAGNOSIS — R13.19 ESOPHAGEAL DYSPHAGIA: ICD-10-CM

## 2019-05-17 DIAGNOSIS — K44.9 DIAPHRAGMATIC HERNIA WITHOUT OBSTRUCTION OR GANGRENE: ICD-10-CM

## 2019-05-17 DIAGNOSIS — K21.9 GASTROESOPHAGEAL REFLUX DISEASE, ESOPHAGITIS PRESENCE NOT SPECIFIED: Primary | ICD-10-CM

## 2019-05-17 DIAGNOSIS — R13.19 OTHER DYSPHAGIA: ICD-10-CM

## 2019-05-17 RX ORDER — RANITIDINE 300 MG/1
300 TABLET ORAL
Qty: 30 TABLET | Refills: 1 | Status: CANCELLED | OUTPATIENT
Start: 2019-05-17

## 2019-05-17 RX ORDER — PANTOPRAZOLE SODIUM 40 MG/1
TABLET, DELAYED RELEASE ORAL
Qty: 60 TABLET | Refills: 1 | Status: CANCELLED | OUTPATIENT
Start: 2019-05-17

## 2019-05-17 RX ORDER — SUCRALFATE 1 G/1
TABLET ORAL
Qty: 60 TABLET | Refills: 1 | Status: CANCELLED | OUTPATIENT
Start: 2019-05-17

## 2019-05-17 RX ORDER — ANASTROZOLE 1 MG/1
1 TABLET ORAL DAILY
Qty: 30 TABLET | Refills: 11 | Status: SHIPPED | OUTPATIENT
Start: 2019-05-17 | End: 2020-12-04

## 2019-05-28 ENCOUNTER — HOSPITAL ENCOUNTER (OUTPATIENT)
Dept: MAMMOGRAPHY | Facility: CLINIC | Age: 54
Discharge: HOME | End: 2019-05-28
Payer: MEDICARE

## 2019-05-28 ENCOUNTER — TRANSCRIBE ORDERS (OUTPATIENT)
Dept: ADMINISTRATIVE | Facility: HOSPITAL | Age: 54
End: 2019-05-28

## 2019-05-28 VITALS — BODY MASS INDEX: 25.03 KG/M2 | HEIGHT: 57 IN | WEIGHT: 116 LBS

## 2019-05-28 DIAGNOSIS — R92.8 ABNORMAL MAMMOGRAM: ICD-10-CM

## 2019-05-28 DIAGNOSIS — Z12.31 SCREENING MAMMOGRAM, ENCOUNTER FOR: Primary | ICD-10-CM

## 2019-05-28 PROCEDURE — G0279 TOMOSYNTHESIS, MAMMO: HCPCS

## 2019-05-28 PROCEDURE — 77065 DX MAMMO INCL CAD UNI: CPT

## 2019-05-31 ENCOUNTER — HOSPITAL ENCOUNTER (OUTPATIENT)
Dept: NON INVASIVE DIAGNOSTICS | Facility: HOSPITAL | Age: 54
Discharge: HOME | End: 2019-05-31
Payer: MEDICARE

## 2019-05-31 ENCOUNTER — TRANSCRIBE ORDERS (OUTPATIENT)
Dept: ADMINISTRATIVE | Facility: HOSPITAL | Age: 54
End: 2019-05-31

## 2019-05-31 DIAGNOSIS — F63.9 IMPULSE CONTROL DISORDER: Primary | ICD-10-CM

## 2019-05-31 DIAGNOSIS — F63.9 IMPULSE CONTROL DISORDER: ICD-10-CM

## 2019-05-31 PROCEDURE — 93005 ELECTROCARDIOGRAM TRACING: CPT

## 2019-06-01 LAB
ATRIAL RATE: 66 BPM
P AXIS: 27 DEGREES
PR INTERVAL: 124 MS
QRS AXIS: 37 DEGREES
QRSD INTERVAL: 84 MS
QT INTERVAL: 396 MS
QTC INTERVAL: 415 MS
T WAVE AXIS: 55 DEGREES
VENTRICULAR RATE: 66 BPM

## 2019-06-01 PROCEDURE — 93010 ELECTROCARDIOGRAM REPORT: CPT | Performed by: INTERNAL MEDICINE

## 2019-06-03 ENCOUNTER — OFFICE VISIT (OUTPATIENT)
Dept: FAMILY MEDICINE CLINIC | Facility: HOSPITAL | Age: 54
End: 2019-06-03
Payer: MEDICARE

## 2019-06-03 VITALS
BODY MASS INDEX: 25.03 KG/M2 | SYSTOLIC BLOOD PRESSURE: 128 MMHG | DIASTOLIC BLOOD PRESSURE: 78 MMHG | HEIGHT: 57 IN | TEMPERATURE: 99.1 F | HEART RATE: 83 BPM | WEIGHT: 116 LBS

## 2019-06-03 DIAGNOSIS — F42.9 OBSESSIVE-COMPULSIVE DISORDER, UNSPECIFIED TYPE: ICD-10-CM

## 2019-06-03 DIAGNOSIS — Q03.1 DANDY-WALKER SYNDROME (HCC): ICD-10-CM

## 2019-06-03 DIAGNOSIS — E66.3 OVERWEIGHT (BMI 25.0-29.9): ICD-10-CM

## 2019-06-03 DIAGNOSIS — Z00.00 MEDICARE ANNUAL WELLNESS VISIT, SUBSEQUENT: Primary | ICD-10-CM

## 2019-06-03 DIAGNOSIS — R73.9 HYPERGLYCEMIA: ICD-10-CM

## 2019-06-03 PROCEDURE — G0439 PPPS, SUBSEQ VISIT: HCPCS | Performed by: INTERNAL MEDICINE

## 2019-06-03 RX ORDER — SUCRALFATE 1 G/1
1 TABLET ORAL
COMMUNITY
End: 2019-07-12 | Stop reason: SDUPTHER

## 2019-06-03 RX ORDER — RANITIDINE 300 MG/1
300 TABLET ORAL
COMMUNITY
End: 2020-07-30

## 2019-06-03 RX ORDER — CARBAMAZEPINE 200 MG/1
TABLET ORAL
Start: 2019-06-03 | End: 2021-04-30

## 2019-06-20 ENCOUNTER — OFFICE VISIT (OUTPATIENT)
Dept: GASTROENTEROLOGY | Facility: CLINIC | Age: 54
End: 2019-06-20
Payer: MEDICARE

## 2019-06-20 VITALS
SYSTOLIC BLOOD PRESSURE: 132 MMHG | DIASTOLIC BLOOD PRESSURE: 88 MMHG | HEART RATE: 64 BPM | BODY MASS INDEX: 24.59 KG/M2 | HEIGHT: 57 IN | WEIGHT: 114 LBS

## 2019-06-20 DIAGNOSIS — K21.9 CHRONIC GERD: ICD-10-CM

## 2019-06-20 DIAGNOSIS — D12.4 BENIGN NEOPLASM OF DESCENDING COLON: Primary | ICD-10-CM

## 2019-06-20 PROBLEM — Z12.12 SCREENING FOR COLORECTAL CANCER: Status: ACTIVE | Noted: 2019-06-20

## 2019-06-20 PROBLEM — Z12.11 SCREENING FOR COLORECTAL CANCER: Status: ACTIVE | Noted: 2019-06-20

## 2019-06-20 PROCEDURE — 99213 OFFICE O/P EST LOW 20 MIN: CPT | Performed by: NURSE PRACTITIONER

## 2019-07-12 DIAGNOSIS — K21.9 GASTROESOPHAGEAL REFLUX DISEASE, ESOPHAGITIS PRESENCE NOT SPECIFIED: Primary | ICD-10-CM

## 2019-07-12 RX ORDER — SUCRALFATE 1 G/1
TABLET ORAL
Qty: 62 TABLET | Refills: 0 | Status: SHIPPED | OUTPATIENT
Start: 2019-07-12 | End: 2019-12-12

## 2019-07-12 RX ORDER — PANTOPRAZOLE SODIUM 40 MG/1
TABLET, DELAYED RELEASE ORAL
Qty: 31 TABLET | Refills: 0 | Status: SHIPPED | OUTPATIENT
Start: 2019-07-12 | End: 2019-07-18 | Stop reason: SDUPTHER

## 2019-07-12 RX ORDER — RANITIDINE 300 MG/1
TABLET ORAL
Qty: 31 TABLET | Refills: 0 | Status: SHIPPED | OUTPATIENT
Start: 2019-07-12 | End: 2019-12-12

## 2019-07-18 DIAGNOSIS — K21.9 GASTROESOPHAGEAL REFLUX DISEASE, ESOPHAGITIS PRESENCE NOT SPECIFIED: ICD-10-CM

## 2019-07-18 RX ORDER — PANTOPRAZOLE SODIUM 40 MG/1
40 TABLET, DELAYED RELEASE ORAL 2 TIMES DAILY
Qty: 60 TABLET | Refills: 5 | Status: SHIPPED | OUTPATIENT
Start: 2019-07-18 | End: 2020-01-28 | Stop reason: SDUPTHER

## 2019-09-04 ENCOUNTER — OFFICE VISIT (OUTPATIENT)
Dept: HEMATOLOGY ONCOLOGY | Facility: HOSPITAL | Age: 54
End: 2019-09-04
Payer: MEDICARE

## 2019-09-04 VITALS
HEIGHT: 56 IN | HEART RATE: 63 BPM | WEIGHT: 108 LBS | DIASTOLIC BLOOD PRESSURE: 78 MMHG | BODY MASS INDEX: 24.3 KG/M2 | OXYGEN SATURATION: 95 % | TEMPERATURE: 96.5 F | RESPIRATION RATE: 16 BRPM | SYSTOLIC BLOOD PRESSURE: 124 MMHG

## 2019-09-04 DIAGNOSIS — D50.9 IRON DEFICIENCY ANEMIA, UNSPECIFIED IRON DEFICIENCY ANEMIA TYPE: ICD-10-CM

## 2019-09-04 DIAGNOSIS — C50.911 MALIGNANT NEOPLASM OF RIGHT FEMALE BREAST, UNSPECIFIED ESTROGEN RECEPTOR STATUS, UNSPECIFIED SITE OF BREAST (HCC): Primary | ICD-10-CM

## 2019-09-04 PROCEDURE — 99214 OFFICE O/P EST MOD 30 MIN: CPT | Performed by: NURSE PRACTITIONER

## 2019-09-04 RX ORDER — SODIUM CHLORIDE 9 MG/ML
20 INJECTION, SOLUTION INTRAVENOUS ONCE
Status: CANCELLED | OUTPATIENT
Start: 2019-09-11

## 2019-09-04 NOTE — PROGRESS NOTES
Hematology/Oncology Outpatient Follow- up Note  Km Brandon 49 y o  female MRN: @ Encounter: [id number]        Date:  9/4/2019    Presenting Complaint/Diagnosis : Multifocal ER positive CA positive HER-2 positive breast cancer  Final pathologic stage IA i e  pathologic T1 C  pathologic N0 grade 2 tumor  All sentinel lymph nodes were negative for malignancy    HPI:  Juilana Coley is a 49year old single  female, who lives in a group home, and is autistic  She has stage I breast cancer, which is multifocal and is HER-2 (+)  She had 6 cycles of TCH chemotherapy  This was followed by 1 year of Herceptin  She then proceeded to Tamoxifen and with blood work indicating she was menopausal, and without a period for 1 year, she switched to Arimidex daily  She is scheduled to have a total of 5 years  Previous Hematologic/ Oncologic History:    Patient is status post a mastectomy on the right side  Chemotherapy with TCH  She had 6 cycles  Her treatment started in February of 2014  Then got one year of Herceptin which ended in February of 2015  Tamoxifen started in 2015 which was then converted to Arimidex in 2018    Current Hematologic/ Oncologic Treatment:    Arimidex 1 mg Daily  Venofer 200mg IV weekly x 6 doses    Interval History:    The patient returns for follow-up accompanied by her mother and her nurse  She is currently on Arimidex and tolerating it well  Denies any hot flashes and joint pains  She does complain of GERD and occasional constipation that she is following with Gastroenterology for  She does also admit to slightly increasing fatigue  Denies chest pain, shortness of breath, nausea, vomiting, diarrhea, fevers/infections  CA 27 29 is 26  She is slightly anemic with a hemoglobin of 11 5, MCV 82 3, iron saturation 6%, ferritin 7  Test Results:    Imaging: No results found      Labs:   Lab Results   Component Value Date    WBC 11 47 (H) 12/18/2013    HGB 12 1 12/18/2013    HCT 37 5 12/18/2013    MCV 92 12/18/2013     12/18/2013     Lab Results   Component Value Date     (L) 12/18/2013    K 3 5 (L) 12/18/2013     (L) 12/18/2013    CO2 29 12/18/2013    ANIONGAP 5 12/18/2013    BUN 13 12/18/2013    CREATININE 0 59 (L) 12/18/2013    GLUCOSE 127 12/18/2013    CALCIUM 8 1 (L) 12/18/2013         No results found for: SPEP, UPEP    No results found for: PSA    No results found for: CEA    No results found for:     No results found for: AFP    No results found for: IRON, TIBC, FERRITIN    No results found for: [id number]      ROS: As stated in the history of present illness otherwise his 14 point review of systems today was negative  Active Problems:   Patient Active Problem List   Diagnosis    Vitamin D deficiency    Scoliosis    Retinal detachment    Osteopenia    Obsessive compulsive disorder    Multiple thyroid nodules    Mild intellectual disability    Malignant neoplasm of breast ([address] )    Legal blindness [place]    Irregular menstrual cycle    Iron deficiency anemia    Internal hemorrhoids    Impulse control disorder    Glaucoma    Dyslipidemia    Depression    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HCC)    Chronic gingivitis    Cerebral palsy (HCC)    Benign neoplasm of large intestine    Autistic disorder    Anxiety    Other long term (current) drug therapy    Hyperglycemia    Screening for colorectal cancer    Chronic GERD       Past Medical History:   Past Medical History:   Diagnosis Date    Breast cyst, left     last assessed 12/30/2013    Chronic GERD     Depression     Fibrocystic breast disease     last assessed 06/29/2012    Gastrointestinal hemorrhage     Glaucoma     History of chemotherapy     Hydrocephalus     Ovarian disorder     last assessed 06/29/2012    Scoliosis        Surgical History:   Past Surgical History:   Procedure Laterality Date    BREAST BIOPSY Right     COLONOSCOPY      Description 04/13/2016-5 yrs    Description 4 yr f/u d/t polyp and family history, onset 07/20/2012   ESOPHAGOGASTRODUODENOSCOPY      description-04/13/2016 gastritis with antral nodule    EYE SURGERY      for relief of intraocular pressure    INCISIONAL BREAST BIOPSY      description 2008    MASTECTOMY Left     SENTINEL LYMPH NODE BIOPSY      STRABISMUS SURGERY      description 80, 66       Family History:    Family History   Problem Relation Age of Onset    Osteopenia Mother     Heart attack Father 59        Acute MI    Coronary artery disease Father     Thyroid nodules Father     Osteopenia Maternal Grandmother     Prostate cancer Maternal Grandfather     Leukemia Paternal Grandfather     Heart attack Maternal Uncle 48        acute MI, stent    Cancer Maternal Uncle         adenocarcinoma of oral cavity    Colon cancer Maternal Uncle 62    Hyperlipidemia Maternal Uncle     Pancreatic cancer Paternal Uncle     Prostate cancer Paternal Uncle     Colon cancer Family     Coronary artery disease Family     Thyroid disease Family        Cancer-related family history includes Cancer in her maternal uncle; Colon cancer in her family; Colon cancer (age of onset: 62) in her maternal uncle; Pancreatic cancer in her paternal uncle; Prostate cancer in her maternal grandfather and paternal uncle      Social History:   Social History     Socioeconomic History    Marital status: Single     Spouse name: Not on file    Number of children: Not on file    Years of education: Not on file    Highest education level: Not on file   Occupational History    Not on file   Social Needs    Financial resource strain: Not on file    Food insecurity:     Worry: Not on file     Inability: Not on file    Transportation needs:     Medical: Not on file     Non-medical: Not on file   Tobacco Use    Smoking status: Never Smoker    Smokeless tobacco: Never Used   Substance and Sexual Activity    Alcohol use: No    Drug use: No    Sexual activity: Not on file Lifestyle    Physical activity:     Days per week: Not on file     Minutes per session: Not on file    Stress: Not on file   Relationships    Social connections:     Talks on phone: Not on file     Gets together: Not on file     Attends Mu-ism service: Not on file     Active member of club or organization: Not on file     Attends meetings of clubs or organizations: Not on file     Relationship status: Not on file    Intimate partner violence:     Fear of current or ex partner: Not on file     Emotionally abused: Not on file     Physically abused: Not on file     Forced sexual activity: Not on file   Other Topics Concern    Not on file   Social History Narrative    Person living in a residential institution    Uses safety equipment-seat belts       Current Medications:   Current Outpatient Medications   Medication Sig Dispense Refill    acetaminophen (TYLENOL) 500 mg tablet Take 500 mg by mouth every 6 (six) hours as needed for mild pain      anastrozole (ARIMIDEX) 1 mg tablet Take 1 tablet (1 mg total) by mouth daily 30 tablet 11    calamine lotion Apply topically every 4 (four) hours as needed for itching      Calcium Carb-Cholecalciferol (OYSTER SHELL CALCIUM) 500-400 MG-UNIT TABS Take 1 tablet by mouth 2 (two) times a day      calcium-vitamin D (OSCAL) 250-125 MG-UNIT per tablet Take 1 tablet by mouth 2 (two) times a day      carBAMazepine (TEGretol) 200 mg tablet 2 tab PO q am and 1 tab PO qhs      Carboxymethylcell-Hypromellose (GENTEAL) 0 25-0 3 % GEL Apply to eye      Cholecalciferol (VITAMIN D) 2000 units tablet TAKE TWO TABLETS BY MOUTH (4000IU) EVERY MORNING FOR VITAMIN DIFICIENCY 62 tablet 0    citalopram (CeleXA) 20 mg tablet 20 mg + 10 mg for total of 30 mg daily 30 tablet 0    Dentifrices (SENSODYNE PRONAMEL DT) Apply to teeth      diphenhydrAMINE (BENADRYL) 25 mg tablet Take 25 mg by mouth daily at bedtime as needed for itching (for insomnia and allergy ** DO NOT TAKE WITH XYZAL **)      docusate sodium (COLACE) 100 mg capsule Take 100 mg by mouth 2 (two) times a day      ferrous sulfate 325 (65 Fe) mg tablet Take 1 tablet by mouth Twice daily      haloperidol (HALDOL) 1 mg/0 5 mL oral concentrated solution Take 0 5 mL by mouth 2 (two) times a day      hydrocortisone (ANUSOL-HC, PROCTOSOL HC,) 2 5 % rectal cream Insert into the rectum 2 (two) times a day 30 g 0    Hypromellose 0 3 % SOLN Apply to eye      ibuprofen (MOTRIN) 200 mg tablet Take 400 mg by mouth every 6 (six) hours as needed for mild pain        latanoprost (XALATAN) 0 005 % ophthalmic solution Apply 1 drop to eye      levocetirizine (XYZAL) 5 MG tablet Take 1 tablet by mouth daily as needed      loperamide (IMODIUM) 2 mg capsule Take by mouth      LORazepam (ATIVAN) 0 5 mg tablet Take 1 mg by mouth every 6 (six) hours as needed for anxiety        melatonin 1 mg Take 1 mg by mouth daily at bedtime      mineral oil-white petrolatum (LUBRIFRESH P M ) ophthalmic ointment 0 5 inches      ondansetron (ZOFRAN) 4 mg tablet Take by mouth      pantoprazole (PROTONIX) 40 mg tablet Take 1 tablet (40 mg total) by mouth 2 (two) times a day 60 tablet 5    ranitidine (ZANTAC) 300 MG tablet Take 300 mg by mouth daily at bedtime      ranitidine (ZANTAC) 300 MG tablet TAKE ONE TABLET BY MOUTH AT BEDTIME (ESOPHAGITIS) 31 tablet 0    sodium chloride (SOCHLOR) 5 % hypertonic ophthalmic solution Apply 1 drop to eye 2 (two) times a day       sodium fluoride 0 275 (0 125 F) MG/DROP solution Take 275 mcg by mouth daily      sucralfate (CARAFATE) 1 g tablet DISSOLVE 1 TABLET IN 10ML OF WARM WATER AND TAKE BY MOUTH 30MIN TO 1 HOUR BEFORE BREAKFAST AND SUPPER (MORNING/EVENING) (ESOPHAGITIS) 62 tablet 0    timolol (BETIMOL) 0 5 % ophthalmic solution every 24 hours      timolol (TIMOPTIC-XE) 0 5 % ophthalmic gel-forming Administer 1 drop into the left eye daily      tolnaftate (TINACTIN) 1 % spray Apply topically      ziprasidone (GEODON) 20 mg capsule Take 20 mg by mouth 2 (two) times a day with meals        No current facility-administered medications for this visit  Allergies: Allergies   Allergen Reactions    Bactrim [Sulfamethoxazole-Trimethoprim]     Methylphenidate Hyperactivity     Reaction Date: [id number];     Sulfa Antibiotics     Thioridazine     Amphetamines     Chlorpromazine     Fexofenadine Hives     Reaction Date: [id number];    Rolene Wever Medroxyprogesterone Rash and Hives     Reaction Date: [id number];     Other Hives    Trimethoprim        Physical Exam:    Body surface area is 1 37 meters squared  Wt Readings from Last 3 Encounters:   09/04/19 49 kg (108 lb)   06/20/19 51 7 kg (114 lb)   06/03/19 52 6 kg (116 lb)        Temp Readings from Last 3 Encounters:   09/04/19 (!) 96 5 °F (35 8 °C) (Tympanic)   06/03/19 99 1 °F (37 3 °C)   04/29/19 97 6 °F (36 4 °C) (Tympanic)        BP Readings from Last 3 Encounters:   09/04/19 124/78   06/20/19 132/88   06/03/19 128/78         Pulse Readings from Last 3 Encounters:   09/04/19 63   06/20/19 64   06/03/19 83     @LASTSAO2(3)@      Physical Exam     Constitutional   General appearance: No acute distress, well appearing and well nourished  Eyes   Conjunctiva and lids: No swelling, erythema or discharge  Pupils and irises: Equal, round and reactive to light  Ears, Nose, Mouth, and Throat   External inspection of ears and nose: Normal     Nasal mucosa, septum, and turbinates: Normal without edema or erythema  Oropharynx: Normal with no erythema, edema, exudate or lesions  Pulmonary   Respiratory effort: No increased work of breathing or signs of respiratory distress  Auscultation of lungs: Clear to auscultation  Cardiovascular   Palpation of heart: Normal PMI, no thrills  Auscultation of heart: Normal rate and rhythm, normal S1 and S2, without murmurs      Examination of extremities for edema and/or varicosities: Normal     Carotid pulses: Normal     Abdomen   Abdomen: Non-tender, no masses  Liver and spleen: No hepatomegaly or splenomegaly  Lymphatic   Palpation of lymph nodes in neck: No lymphadenopathy  Musculoskeletal   Gait and station: Normal     Digits and nails: Normal without clubbing or cyanosis  Inspection/palpation of joints, bones, and muscles: Normal     Skin   Skin and subcutaneous tissue: Normal without rashes or lesions  Neurologic   Cranial nerves: Cranial nerves 2-12 intact  Sensation: No sensory loss  Psychiatric   Orientation to person, place, and time: Normal     Mood and affect: Normal         Assessment / Plan:    The patient is a 62-year-old autistic female with a history of stage IA ER/PA positive, HER2 positive breast cancer  She initially underwent chemotherapy with [address] followed by 1 year of Herceptin  This is completed in February 2015  She was then started on tamoxifen and took this until 2018 when she was switched to Arimidex  She remains on Arimidex 1 mg p o  Daily and will continue this until 2022  The goal is for her to be on combination of tamoxifen and Arimidex for a total of 7 years  She does showed have iron deficiency anemia so I will give her 6 doses of Venofer 200 mg IV weekly  We will see her back in 3-4 months with blood work to make sure that her hemoglobin has improved  She has received Venofer in the past so we may want to consider maintenance Venofer if necessary  Her caregiver does state that she does not tolerate oral Venofer due to GI upset  We will plan to see her back in 3-4 months after she has completed her Venofer infusions  She will continue Arimidex 1 mg p o  Daily for her breast cancer  I will order blood work to be completed prior to her follow-up  The patient, her mother, her and her caregiver are in agreement with the plan  Goals and Barriers:  Current Goal:  Prolong Survival from breast cancer and iron deficiency anemia  Barriers: None  Patient's Capacity to Self Care:  Patient able to self care  Portions of the record may have been created with voice recognition software   Occasional wrong word or "sound a like" substitutions may have occurred due to the inherent limitations of voice recognition software   Read the chart carefully and recognize, using context, where substitutions have occurred

## 2019-09-10 ENCOUNTER — HOSPITAL ENCOUNTER (OUTPATIENT)
Dept: ULTRASOUND IMAGING | Facility: HOSPITAL | Age: 54
Discharge: HOME | End: 2019-09-10
Payer: MEDICARE

## 2019-09-10 DIAGNOSIS — E04.2 MULTIPLE THYROID NODULES: ICD-10-CM

## 2019-09-10 PROCEDURE — 76536 US EXAM OF HEAD AND NECK: CPT

## 2019-09-11 ENCOUNTER — HOSPITAL ENCOUNTER (OUTPATIENT)
Dept: INFUSION CENTER | Facility: HOSPITAL | Age: 54
Discharge: HOME | End: 2019-09-11
Payer: MEDICARE

## 2019-09-11 DIAGNOSIS — D50.9 IRON DEFICIENCY ANEMIA, UNSPECIFIED IRON DEFICIENCY ANEMIA TYPE: Primary | ICD-10-CM

## 2019-09-11 PROCEDURE — 96365 THER/PROPH/DIAG IV INF INIT: CPT

## 2019-09-11 RX ORDER — SODIUM CHLORIDE 9 MG/ML
20 INJECTION, SOLUTION INTRAVENOUS ONCE
Status: CANCELLED | OUTPATIENT
Start: 2019-09-18

## 2019-09-11 RX ORDER — SODIUM CHLORIDE 9 MG/ML
20 INJECTION, SOLUTION INTRAVENOUS ONCE
Status: COMPLETED | OUTPATIENT
Start: 2019-09-11 | End: 2019-09-11

## 2019-09-11 RX ADMIN — SODIUM CHLORIDE 20 ML/HR: 9 INJECTION, SOLUTION INTRAVENOUS at 14:25

## 2019-09-11 RX ADMIN — IRON SUCROSE 200 MG: 20 INJECTION, SOLUTION INTRAVENOUS at 14:25

## 2019-09-11 NOTE — PLAN OF CARE
Problem: Potential for Falls  Goal: Patient will remain free of falls  Description  INTERVENTIONS:  - Assess patient frequently for physical needs  -  Identify cognitive and physical deficits and behaviors that affect risk of falls    -  [place] fall precautions as indicated by assessment   - Educate patient/family on patient safety including physical limitations  - Instruct patient to call for assistance with activity based on assessment  - Modify environment to reduce risk of injury  - Consider OT/PT consult to assist with strengthening/mobility  Outcome: Progressing

## 2019-09-16 ENCOUNTER — CLINICAL SUPPORT (OUTPATIENT)
Dept: FAMILY MEDICINE CLINIC | Facility: HOSPITAL | Age: 54
End: 2019-09-16
Payer: MEDICARE

## 2019-09-16 DIAGNOSIS — Z11.1 ENCOUNTER FOR PPD TEST: Primary | ICD-10-CM

## 2019-09-16 PROCEDURE — 86580 TB INTRADERMAL TEST: CPT | Performed by: INTERNAL MEDICINE

## 2019-09-18 ENCOUNTER — HOSPITAL ENCOUNTER (OUTPATIENT)
Dept: INFUSION CENTER | Facility: HOSPITAL | Age: 54
Discharge: HOME | End: 2019-09-18
Payer: MEDICARE

## 2019-09-18 ENCOUNTER — CLINICAL SUPPORT (OUTPATIENT)
Dept: FAMILY MEDICINE CLINIC | Facility: HOSPITAL | Age: 54
End: 2019-09-18

## 2019-09-18 VITALS
SYSTOLIC BLOOD PRESSURE: 128 MMHG | OXYGEN SATURATION: 97 % | RESPIRATION RATE: 16 BRPM | TEMPERATURE: 98.8 F | HEART RATE: 79 BPM | DIASTOLIC BLOOD PRESSURE: 65 MMHG

## 2019-09-18 DIAGNOSIS — D50.9 IRON DEFICIENCY ANEMIA, UNSPECIFIED IRON DEFICIENCY ANEMIA TYPE: Primary | ICD-10-CM

## 2019-09-18 DIAGNOSIS — Z11.1 ENCOUNTER FOR PPD SKIN TEST READING: Primary | ICD-10-CM

## 2019-09-18 LAB
INDURATION: 0 MM
TB SKIN TEST: NEGATIVE

## 2019-09-18 PROCEDURE — 96365 THER/PROPH/DIAG IV INF INIT: CPT

## 2019-09-18 PROCEDURE — 99024 POSTOP FOLLOW-UP VISIT: CPT

## 2019-09-18 RX ORDER — SODIUM CHLORIDE 9 MG/ML
20 INJECTION, SOLUTION INTRAVENOUS ONCE
Status: CANCELLED | OUTPATIENT
Start: 2019-09-25

## 2019-09-18 RX ORDER — SODIUM CHLORIDE 9 MG/ML
20 INJECTION, SOLUTION INTRAVENOUS ONCE
Status: COMPLETED | OUTPATIENT
Start: 2019-09-18 | End: 2019-09-18

## 2019-09-18 RX ADMIN — SODIUM CHLORIDE 20 ML/HR: 9 INJECTION, SOLUTION INTRAVENOUS at 14:48

## 2019-09-18 RX ADMIN — IRON SUCROSE 200 MG: 20 INJECTION, SOLUTION INTRAVENOUS at 14:48

## 2019-09-18 NOTE — PROGRESS NOTES
Patient tolerated venofer treatment today with no adverse reactions  Pt educated to notify MD with s/s of reactions including SOB, Rash, and hives Patient gave verbal understanding  Patient then d/cd home ambulatory with steady gait

## 2019-09-19 ENCOUNTER — TELEPHONE (OUTPATIENT)
Dept: ENDOCRINOLOGY | Facility: CLINIC | Age: 54
End: 2019-09-19

## 2019-09-19 NOTE — TELEPHONE ENCOUNTER
----- Message from [place] sent at 9/17/2019  8:45 AM EDT -----  Please call the patient regarding her abnormal result  Nodules stable  Larger nodules have been biopsied previously and were benign   Repeat US in one year

## 2019-09-25 ENCOUNTER — HOSPITAL ENCOUNTER (OUTPATIENT)
Dept: INFUSION CENTER | Facility: HOSPITAL | Age: 54
Discharge: HOME | End: 2019-09-25
Payer: MEDICARE

## 2019-09-25 VITALS
RESPIRATION RATE: 18 BRPM | DIASTOLIC BLOOD PRESSURE: 71 MMHG | TEMPERATURE: 99 F | HEART RATE: 86 BPM | SYSTOLIC BLOOD PRESSURE: 119 MMHG | OXYGEN SATURATION: 96 %

## 2019-09-25 DIAGNOSIS — D50.9 IRON DEFICIENCY ANEMIA, UNSPECIFIED IRON DEFICIENCY ANEMIA TYPE: Primary | ICD-10-CM

## 2019-09-25 PROCEDURE — 96365 THER/PROPH/DIAG IV INF INIT: CPT

## 2019-09-25 RX ORDER — SODIUM CHLORIDE 9 MG/ML
20 INJECTION, SOLUTION INTRAVENOUS ONCE
Status: COMPLETED | OUTPATIENT
Start: 2019-09-25 | End: 2019-09-25

## 2019-09-25 RX ORDER — SODIUM CHLORIDE 9 MG/ML
20 INJECTION, SOLUTION INTRAVENOUS ONCE
Status: CANCELLED | OUTPATIENT
Start: 2019-10-02

## 2019-09-25 RX ADMIN — SODIUM CHLORIDE 20 ML/HR: 9 INJECTION, SOLUTION INTRAVENOUS at 14:31

## 2019-09-25 RX ADMIN — IRON SUCROSE 200 MG: 20 INJECTION, SOLUTION INTRAVENOUS at 14:31

## 2019-09-25 NOTE — PLAN OF CARE
Problem: Potential for Falls  Goal: Patient will remain free of falls  Description  INTERVENTIONS:  - Assess patient frequently for physical needs  -  Identify cognitive and physical deficits and behaviors that affect risk of falls  -  [place] fall precautions as indicated by assessment   - Educate patient/family on patient safety including physical limitations  - Instruct patient to call for assistance with activity based on assessment  - Modify environment to reduce risk of injury  - Consider OT/PT consult to assist with strengthening/mobility  Outcome: Progressing     Problem: Potential for Falls  Goal: Patient will remain free of falls  Description  INTERVENTIONS:  - Assess patient frequently for physical needs  -  Identify cognitive and physical deficits and behaviors that affect risk of falls  -  [place] fall precautions as indicated by assessment   - Educate patient/family on patient safety including physical limitations  - Instruct patient to call for assistance with activity based on assessment  - Modify environment to reduce risk of injury  - Consider OT/PT consult to assist with strengthening/mobility  Outcome: Progressing     Problem: Knowledge Deficit  Goal: Patient/family/caregiver demonstrates understanding of disease process, treatment plan, medications, and discharge instructions  Description  Complete learning assessment and assess knowledge base    Interventions:  - Provide teaching at level of understanding  - Provide teaching via preferred learning methods  Outcome: Progressing

## 2019-09-25 NOTE — PROGRESS NOTES
Pt tolerated Venofer infusion well  PIV dc'd , Pt then d/c'd to home via wc accompanied by mother and caretaker

## 2019-10-02 ENCOUNTER — HOSPITAL ENCOUNTER (OUTPATIENT)
Dept: INFUSION CENTER | Facility: HOSPITAL | Age: 54
Discharge: HOME | End: 2019-10-02
Payer: MEDICARE

## 2019-10-02 VITALS
SYSTOLIC BLOOD PRESSURE: 121 MMHG | RESPIRATION RATE: 16 BRPM | HEART RATE: 91 BPM | TEMPERATURE: 97.9 F | DIASTOLIC BLOOD PRESSURE: 76 MMHG

## 2019-10-02 DIAGNOSIS — D50.9 IRON DEFICIENCY ANEMIA, UNSPECIFIED IRON DEFICIENCY ANEMIA TYPE: Primary | ICD-10-CM

## 2019-10-02 PROCEDURE — 96365 THER/PROPH/DIAG IV INF INIT: CPT

## 2019-10-02 RX ORDER — SODIUM CHLORIDE 9 MG/ML
20 INJECTION, SOLUTION INTRAVENOUS ONCE
Status: COMPLETED | OUTPATIENT
Start: 2019-10-02 | End: 2019-10-02

## 2019-10-02 RX ORDER — SODIUM CHLORIDE 9 MG/ML
20 INJECTION, SOLUTION INTRAVENOUS ONCE
Status: CANCELLED | OUTPATIENT
Start: 2019-10-09

## 2019-10-02 RX ADMIN — IRON SUCROSE 200 MG: 20 INJECTION, SOLUTION INTRAVENOUS at 14:33

## 2019-10-02 RX ADMIN — SODIUM CHLORIDE 20 ML/HR: 9 INJECTION, SOLUTION INTRAVENOUS at 14:27

## 2019-10-02 NOTE — PROGRESS NOTES
Pt arrived amb for Venofer infusion  Familiar with procedure  PIV inserted Lt forearm  NSS to kvo, Venofer infusing

## 2019-10-09 ENCOUNTER — HOSPITAL ENCOUNTER (OUTPATIENT)
Dept: INFUSION CENTER | Facility: HOSPITAL | Age: 54
Discharge: HOME | End: 2019-10-09
Payer: MEDICARE

## 2019-10-09 VITALS
HEART RATE: 84 BPM | TEMPERATURE: 97.2 F | OXYGEN SATURATION: 100 % | SYSTOLIC BLOOD PRESSURE: 130 MMHG | DIASTOLIC BLOOD PRESSURE: 77 MMHG | RESPIRATION RATE: 16 BRPM

## 2019-10-09 DIAGNOSIS — D50.9 IRON DEFICIENCY ANEMIA, UNSPECIFIED IRON DEFICIENCY ANEMIA TYPE: Primary | ICD-10-CM

## 2019-10-09 PROCEDURE — 96365 THER/PROPH/DIAG IV INF INIT: CPT

## 2019-10-09 RX ORDER — SODIUM CHLORIDE 9 MG/ML
20 INJECTION, SOLUTION INTRAVENOUS ONCE
Status: COMPLETED | OUTPATIENT
Start: 2019-10-09 | End: 2019-10-09

## 2019-10-09 RX ORDER — SODIUM CHLORIDE 9 MG/ML
20 INJECTION, SOLUTION INTRAVENOUS ONCE
Status: CANCELLED | OUTPATIENT
Start: 2019-10-16

## 2019-10-09 RX ADMIN — SODIUM CHLORIDE 20 ML/HR: 9 INJECTION, SOLUTION INTRAVENOUS at 14:31

## 2019-10-09 RX ADMIN — IRON SUCROSE 200 MG: 20 INJECTION, SOLUTION INTRAVENOUS at 14:33

## 2019-10-11 ENCOUNTER — OFFICE VISIT (OUTPATIENT)
Dept: ENDOCRINOLOGY | Facility: CLINIC | Age: 54
End: 2019-10-11
Payer: MEDICARE

## 2019-10-11 VITALS
HEIGHT: 58 IN | SYSTOLIC BLOOD PRESSURE: 126 MMHG | WEIGHT: 111.8 LBS | BODY MASS INDEX: 23.47 KG/M2 | HEART RATE: 76 BPM | DIASTOLIC BLOOD PRESSURE: 80 MMHG

## 2019-10-11 DIAGNOSIS — E04.2 MULTIPLE THYROID NODULES: Primary | ICD-10-CM

## 2019-10-11 PROCEDURE — 99213 OFFICE O/P EST LOW 20 MIN: CPT | Performed by: INTERNAL MEDICINE

## 2019-10-11 NOTE — PROGRESS NOTES
Ronnell Benjamin 49 y o  female MRN: [id number]    Encounter: [id number]      Assessment/Plan     Problem List Items Addressed This Visit        Endocrine    Multiple thyroid nodules - Primary     Thyroid nodules stable - will continue to monitor and repeat ultrasound and thyroid function tests in 1 year          Relevant Orders    US thyroid    TSH, 3rd generation Lab Collect    T4, free Lab Collect        CC:   Thyroid nodules     History of Present Illness     HPI:53 y/o female with history of autism , thyroid nodules here for f/u   She underwent FNAB B/L thyroid nodules in 2016 which were negative for malignancy - she denies any obstructive symptoms , No FH of thyroid cancer , no h/o RT to neck   No symptoms of hypo or hyperthyroidism   Review of Systems   Constitutional: Negative for activity change, appetite change, fatigue and unexpected weight change  Eyes: Negative for visual disturbance  Respiratory: Negative for cough and shortness of breath  Cardiovascular: Negative for palpitations and leg swelling  Gastrointestinal: Negative for constipation, diarrhea, nausea and vomiting  Endocrine: Negative for polydipsia and polyuria  Musculoskeletal: Negative for gait problem  Skin: Negative for rash and wound  Neurological: Negative for weakness  Psychiatric/Behavioral: Negative for confusion  The patient is not nervous/anxious  All other systems reviewed and are negative  Historical Information   Past Medical History:   Diagnosis Date    Breast cyst, left     last assessed 12/30/2013    Chronic GERD     Depression     Fibrocystic breast disease     last assessed 06/29/2012    Gastrointestinal hemorrhage     Glaucoma     History of chemotherapy     Hydrocephalus     Ovarian disorder     last assessed 06/29/2012    Scoliosis      Past Surgical History:   Procedure Laterality Date    BREAST BIOPSY Right     COLONOSCOPY      Description 04/13/2016-5 yrs    Description 4 yr f/u d/t polyp and family history, onset 07/20/2012      ESOPHAGOGASTRODUODENOSCOPY      description-04/13/2016 gastritis with antral nodule    EYE SURGERY      for relief of intraocular pressure    INCISIONAL BREAST BIOPSY      description 2008    MASTECTOMY Left     SENTINEL LYMPH NODE BIOPSY      STRABISMUS SURGERY      description 80, 66     Social History   Social History     Substance and Sexual Activity   Alcohol Use No     Social History     Substance and Sexual Activity   Drug Use No     Social History     Tobacco Use   Smoking Status Never Smoker   Smokeless Tobacco Never Used     Family History:   Family History   Problem Relation Age of Onset    Osteopenia Mother     Heart attack Father 59        Acute MI    Coronary artery disease Father     Thyroid nodules Father     Osteopenia Maternal Grandmother     Prostate cancer Maternal Grandfather     Leukemia Paternal Grandfather     Heart attack Maternal Uncle 48        acute MI, stent    Cancer Maternal Uncle         adenocarcinoma of oral cavity    Colon cancer Maternal Uncle 62    Hyperlipidemia Maternal Uncle     Pancreatic cancer Paternal Uncle     Prostate cancer Paternal Uncle     Colon cancer Family     Coronary artery disease Family     Thyroid disease Family        Meds/Allergies   Current Outpatient Medications   Medication Sig Dispense Refill    acetaminophen (TYLENOL) 500 mg tablet Take 500 mg by mouth every 6 (six) hours as needed for mild pain      anastrozole (ARIMIDEX) 1 mg tablet Take 1 tablet (1 mg total) by mouth daily 30 tablet 11    calamine lotion Apply topically every 4 (four) hours as needed for itching      Calcium Carb-Cholecalciferol (OYSTER SHELL CALCIUM) 500-400 MG-UNIT TABS Take 1 tablet by mouth 2 (two) times a day      carBAMazepine (TEGretol) 200 mg tablet 2 tab PO q am and 1 tab PO qhs      Carboxymethylcell-Hypromellose (GENTEAL) 0 25-0 3 % GEL Apply to eye      Cholecalciferol (VITAMIN D) 2000 units tablet TAKE TWO TABLETS BY MOUTH (4000IU) EVERY MORNING FOR VITAMIN DIFICIENCY 62 tablet 0    citalopram (CeleXA) 20 mg tablet 20 mg + 10 mg for total of 30 mg daily 30 tablet 0    Dentifrices (SENSODYNE PRONAMEL DT) Apply to teeth      diphenhydrAMINE (BENADRYL) 25 mg tablet Take 25 mg by mouth daily at bedtime as needed for itching (for insomnia and allergy ** DO NOT TAKE WITH XYZAL **)      haloperidol (HALDOL) 1 mg/0 5 mL oral concentrated solution Take 0 5 mL by mouth 2 (two) times a day      Hypromellose 0 3 % SOLN Apply to eye      ibuprofen (MOTRIN) 200 mg tablet Take 400 mg by mouth every 6 (six) hours as needed for mild pain        latanoprost (XALATAN) 0 005 % ophthalmic solution Apply 1 drop to eye      levocetirizine (XYZAL) 5 MG tablet Take 1 tablet by mouth daily as needed      LORazepam (ATIVAN) 0 5 mg tablet Take 1 mg by mouth every 6 (six) hours as needed for anxiety        melatonin 1 mg Take 1 mg by mouth daily at bedtime      mineral oil-white petrolatum (LUBRIFRESH P M ) ophthalmic ointment 0 5 inches      ondansetron (ZOFRAN) 4 mg tablet Take by mouth      pantoprazole (PROTONIX) 40 mg tablet Take 1 tablet (40 mg total) by mouth 2 (two) times a day 60 tablet 5    sodium chloride (SOCHLOR) 5 % hypertonic ophthalmic solution Apply 1 drop to eye 2 (two) times a day       timolol (BETIMOL) 0 5 % ophthalmic solution every 24 hours      timolol (TIMOPTIC-XE) 0 5 % ophthalmic gel-forming Administer 1 drop into the left eye daily      tolnaftate (TINACTIN) 1 % spray Apply topically      ziprasidone (GEODON) 20 mg capsule Take 20 mg by mouth 2 (two) times a day with meals       docusate sodium (COLACE) 100 mg capsule Take 100 mg by mouth 2 (two) times a day      ferrous sulfate 325 (65 Fe) mg tablet Take 1 tablet by mouth Twice daily      hydrocortisone (ANUSOL-HC, PROCTOSOL HC,) 2 5 % rectal cream Insert into the rectum 2 (two) times a day (Patient not taking: Reported on 10/11/2019) 30 g 0    loperamide (IMODIUM) 2 mg capsule Take by mouth      ranitidine (ZANTAC) 300 MG tablet Take 300 mg by mouth daily at bedtime      ranitidine (ZANTAC) 300 MG tablet TAKE ONE TABLET BY MOUTH AT BEDTIME (ESOPHAGITIS) (Patient not taking: Reported on 10/11/2019) 31 tablet 0    sodium fluoride 0 275 (0 125 F) MG/DROP solution Take 275 mcg by mouth daily      sucralfate (CARAFATE) 1 g tablet DISSOLVE 1 TABLET IN 10ML OF WARM WATER AND TAKE BY MOUTH 30MIN TO 1 HOUR BEFORE BREAKFAST AND SUPPER (MORNING/EVENING) (ESOPHAGITIS) (Patient not taking: Reported on 10/11/2019) 62 tablet 0     No current facility-administered medications for this visit  Allergies   Allergen Reactions    Bactrim [Sulfamethoxazole-Trimethoprim]     Methylphenidate Hyperactivity     Reaction Date: [id number];     Sulfa Antibiotics     Thioridazine     Amphetamines     Chlorpromazine     Fexofenadine Hives     Reaction Date: [id number];    Arango Eng Medroxyprogesterone Rash and Hives     Reaction Date: [id number];     Other Hives    Trimethoprim        Objective   Vitals: Blood pressure 126/80, pulse 76, height 4' 9 5" (1 461 m), weight 50 7 kg (111 lb 12 8 oz), not currently breastfeeding  Physical Exam   Constitutional: She is oriented to person, place, and time  She appears well-developed and well-nourished  No distress  HENT:   Head: Normocephalic and atraumatic  Eyes: EOM are normal  No scleral icterus  Neck: Normal range of motion  Neck supple  Thyromegaly present  Cardiovascular: Normal rate, regular rhythm and normal heart sounds  No murmur heard  Pulmonary/Chest: Effort normal and breath sounds normal  No respiratory distress  Abdominal: Soft  Bowel sounds are normal  There is no tenderness  Musculoskeletal: Normal range of motion  She exhibits no edema or deformity  Lymphadenopathy:     She has no cervical adenopathy     Neurological: She is alert and oriented to person, place, and time  Skin: Skin is warm and dry  Psychiatric: She has a normal mood and affect  Her behavior is normal  Judgment and thought content normal        The history was obtained from the review of the chart, patient and family  Lab Results:    august 2019- tsh 0 76     Imaging Studies:   Results for orders placed during the hospital encounter of 09/10/19   US thyroid    Impression Multiple thyroid nodules, stable compared to the 8/3/2018 ultrasound, and with the 2 largest nodules having previous non-malignant biopsy results as above  Based on 2015 KIRA guidelines, additional followup ultrasound should be performed in 12 to 24   months  Reference: ACR Thyroid Imaging, Reporting and Data System (TI-RADS): White Paper of the [employer]  J AM Mariam Radiol 4727;[id number]  (additional recommendations based on American Thyroid Association 2015 guidelines )      Workstation performed: [id number]         I have personally reviewed pertinent reports  Portions of the record may have been created with voice recognition software  Occasional wrong word or "sound a like" substitutions may have occurred due to the inherent limitations of voice recognition software  Read the chart carefully and recognize, using context, where substitutions have occurred

## 2019-10-14 NOTE — ASSESSMENT & PLAN NOTE
Thyroid nodules stable - will continue to monitor and repeat ultrasound and thyroid function tests in 1 year

## 2019-10-16 ENCOUNTER — HOSPITAL ENCOUNTER (OUTPATIENT)
Dept: INFUSION CENTER | Facility: HOSPITAL | Age: 54
Discharge: HOME | End: 2019-10-16
Payer: MEDICARE

## 2019-10-16 VITALS
DIASTOLIC BLOOD PRESSURE: 67 MMHG | OXYGEN SATURATION: 97 % | SYSTOLIC BLOOD PRESSURE: 116 MMHG | HEART RATE: 80 BPM | RESPIRATION RATE: 18 BRPM | TEMPERATURE: 98.7 F

## 2019-10-16 DIAGNOSIS — D50.9 IRON DEFICIENCY ANEMIA, UNSPECIFIED IRON DEFICIENCY ANEMIA TYPE: Primary | ICD-10-CM

## 2019-10-16 PROCEDURE — 96365 THER/PROPH/DIAG IV INF INIT: CPT

## 2019-10-16 RX ORDER — SODIUM CHLORIDE 9 MG/ML
20 INJECTION, SOLUTION INTRAVENOUS ONCE
Status: CANCELLED | OUTPATIENT
Start: 2019-10-16

## 2019-10-16 RX ORDER — SODIUM CHLORIDE 9 MG/ML
20 INJECTION, SOLUTION INTRAVENOUS ONCE
Status: COMPLETED | OUTPATIENT
Start: 2019-10-16 | End: 2019-10-16

## 2019-10-16 RX ADMIN — SODIUM CHLORIDE 20 ML/HR: 9 INJECTION, SOLUTION INTRAVENOUS at 14:27

## 2019-10-16 RX ADMIN — IRON SUCROSE 200 MG: 20 INJECTION, SOLUTION INTRAVENOUS at 14:28

## 2019-10-16 NOTE — PLAN OF CARE
Problem: Potential for Falls  Goal: Patient will remain free of falls  Description  INTERVENTIONS:  - Assess patient frequently for physical needs  -  Identify cognitive and physical deficits and behaviors that affect risk of falls  -  [place] fall precautions as indicated by assessment   - Educate patient/family on patient safety including physical limitations  - Instruct patient to call for assistance with activity based on assessment  - Modify environment to reduce risk of injury  - Consider OT/PT consult to assist with strengthening/mobility  Outcome: Progressing     Problem: Knowledge Deficit  Goal: Patient/family/caregiver demonstrates understanding of disease process, treatment plan, medications, and discharge instructions  Description  Complete learning assessment and assess knowledge base    Interventions:  - Provide teaching at level of understanding  - Provide teaching via preferred learning methods  Outcome: Progressing

## 2019-11-29 PROBLEM — Z79.811 USE OF ANASTROZOLE (ARIMIDEX): Status: ACTIVE | Noted: 2019-11-29

## 2019-12-04 ENCOUNTER — TELEPHONE (OUTPATIENT)
Dept: SURGICAL ONCOLOGY | Facility: CLINIC | Age: 54
End: 2019-12-04

## 2019-12-04 ENCOUNTER — OFFICE VISIT (OUTPATIENT)
Dept: SURGICAL ONCOLOGY | Facility: CLINIC | Age: 54
End: 2019-12-04
Payer: MEDICARE

## 2019-12-04 VITALS
RESPIRATION RATE: 16 BRPM | TEMPERATURE: 98.7 F | BODY MASS INDEX: 22.88 KG/M2 | DIASTOLIC BLOOD PRESSURE: 80 MMHG | HEIGHT: 58 IN | SYSTOLIC BLOOD PRESSURE: 140 MMHG | WEIGHT: 109 LBS | HEART RATE: 83 BPM

## 2019-12-04 DIAGNOSIS — Z79.811 USE OF ANASTROZOLE (ARIMIDEX): ICD-10-CM

## 2019-12-04 DIAGNOSIS — C50.811 MALIGNANT NEOPLASM OF OVERLAPPING SITES OF RIGHT BREAST IN FEMALE, ESTROGEN RECEPTOR POSITIVE (HCC): Primary | ICD-10-CM

## 2019-12-04 DIAGNOSIS — Z17.0 MALIGNANT NEOPLASM OF OVERLAPPING SITES OF RIGHT BREAST IN FEMALE, ESTROGEN RECEPTOR POSITIVE (HCC): Primary | ICD-10-CM

## 2019-12-04 PROCEDURE — 99214 OFFICE O/P EST MOD 30 MIN: CPT | Performed by: SURGERY

## 2019-12-04 NOTE — PROGRESS NOTES
Surgical Oncology Follow Up       [address]  [facility]  [address] [facility] [address]    Edu Go  1965  [id number]  [address]  [facility]  [address] [address]    Chief Complaint   Patient presents with    Breast Cancer     Pt is here for 1 year f/u           Assessment & Plan:   Lance Pizano presents today for 1 year follow-up  She has no complaints referable to her breast   She remains on her Arimidex  Her mammogram from May showed no worrisome findings  Her next mammogram is already scheduled  Clinical examination shows no evidence of local regional or distant recurrence disease  We will see her back in 1 year's time  She is agreeable to see our advanced practitioner at that time  The patient's mother is also interested in the patient being genetically tested to better inform the patient's sisters  Will have her see our Genetics team     Cancer History:        Malignant neoplasm of overlapping sites of right breast in female, estrogen receptor positive ([facility] [address] )    2013 Surgery     Right breast mastectomy   Invasive breast carcinoma  Grade 2  2 foci:    IN 60/90  HER2 0/3+  Lymph nodes negative  Stage IA  Dr Temi Young      2/2014 - 2/2015 Chemotherapy     TCH x 6 cycles  1 year of Herceptin  Dr Chelita Gabriel      2/2015 -  Hormone Therapy     Tamoxifen daily until 2018  Arimidex 1 mg daily since 2018  Dr Chelita Gabriel           Interval History:   See Assessment & Plan    Review of Systems:   Review of Systems   All other systems reviewed and are negative        Past Medical History     Patient Active Problem List   Diagnosis    Vitamin D deficiency    Scoliosis    Retinal detachment    Osteopenia    Obsessive compulsive disorder    Multiple thyroid nodules    Mild intellectual disability    Malignant neoplasm of overlapping sites of right breast in female, estrogen receptor positive [facility])    Legal blindness [place]    Irregular menstrual cycle    Iron deficiency anemia    Internal hemorrhoids    Impulse control disorder    Glaucoma    Dyslipidemia    Depression    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HCC)    Chronic gingivitis    Cerebral palsy (HCC)    Benign neoplasm of large intestine    Autistic disorder    Anxiety    Other long term (current) drug therapy    Hyperglycemia    Screening for colorectal cancer    Chronic GERD    Iron deficiency anemia, unspecified    Use of anastrozole (Arimidex)     Past Medical History:   Diagnosis Date    Breast cyst, left     last assessed 12/30/2013    Chronic GERD     Depression     Fibrocystic breast disease     last assessed 06/29/2012    Gastrointestinal hemorrhage     Glaucoma     History of chemotherapy     Hydrocephalus (HCC)     Ovarian disorder     last assessed 06/29/2012    Scoliosis      Past Surgical History:   Procedure Laterality Date    BREAST BIOPSY Right     COLONOSCOPY      Description 04/13/2016-5 yrs  Description 4 yr f/u d/t polyp and family history, onset 07/20/2012      ESOPHAGOGASTRODUODENOSCOPY      description-04/13/2016 gastritis with antral nodule    EYE SURGERY      for relief of intraocular pressure    INCISIONAL BREAST BIOPSY      description 2008    MASTECTOMY Left     SENTINEL LYMPH NODE BIOPSY      STRABISMUS SURGERY      description 1973, 66     Family History   Problem Relation Age of Onset    Osteopenia Mother     Heart attack Father 59        Acute MI    Coronary artery disease Father     Thyroid nodules Father     Osteopenia Maternal Grandmother     Prostate cancer Maternal Grandfather     Leukemia Paternal Grandfather     Heart attack Maternal Uncle 48        acute MI, stent    Cancer Maternal Uncle         adenocarcinoma of oral cavity    Colon cancer Maternal Uncle 62    Hyperlipidemia Maternal Uncle     Pancreatic cancer Paternal Uncle     Prostate cancer Paternal Uncle     Colon cancer Family     Coronary artery disease Family     Thyroid disease Family      Social History     Socioeconomic History    Marital status: Single     Spouse name: Not on file    Number of children: Not on file    Years of education: Not on file    Highest education level: Not on file   Occupational History    Not on file   Social Needs    Financial resource strain: Not on file    Food insecurity:     Worry: Not on file     Inability: Not on file    Transportation needs:     Medical: Not on file     Non-medical: Not on file   Tobacco Use    Smoking status: Never Smoker    Smokeless tobacco: Never Used   Substance and Sexual Activity    Alcohol use: No    Drug use: No    Sexual activity: Not on file   Lifestyle    Physical activity:     Days per week: Not on file     Minutes per session: Not on file    Stress: Not on file   Relationships    Social connections:     Talks on phone: Not on file     Gets together: Not on file     Attends [employer] service: Not on file     Active member of club or organization: Not on file     Attends meetings of clubs or organizations: Not on file     Relationship status: Not on file    Intimate partner violence:     Fear of current or ex partner: Not on file     Emotionally abused: Not on file     Physically abused: Not on file     Forced sexual activity: Not on file   Other Topics Concern    Not on file   Social History Narrative    Person living in a residential institution    Uses safety equipment-seat belts       Current Outpatient Medications:     acetaminophen (TYLENOL) 500 mg tablet, Take 500 mg by mouth every 6 (six) hours as needed for mild pain, Disp: , Rfl:     anastrozole (ARIMIDEX) 1 mg tablet, Take 1 tablet (1 mg total) by mouth daily, Disp: 30 tablet, Rfl: 11    calamine lotion, Apply topically every 4 (four) hours as needed for itching, Disp: , Rfl:     Calcium Carb-Cholecalciferol (OYSTER SHELL CALCIUM) 500-400 MG-UNIT TABS, Take 1 tablet by mouth 2 (two) times a day, Disp: , Rfl:     carBAMazepine (TEGretol) 200 mg tablet, 2 tab PO q am and 1 tab PO qhs, Disp: , Rfl:     Carboxymethylcell-Hypromellose (GENTEAL) 0 25-0 3 % GEL, Apply to eye, Disp: , Rfl:     Cholecalciferol (VITAMIN D) 2000 units tablet, TAKE TWO TABLETS BY MOUTH (4000IU) EVERY MORNING FOR VITAMIN DIFICIENCY, Disp: 62 tablet, Rfl: 0    citalopram (CeleXA) 20 mg tablet, 20 mg + 10 mg for total of 30 mg daily, Disp: 30 tablet, Rfl: 0    Dentifrices (SENSODYNE PRONAMEL DT), Apply to teeth, Disp: , Rfl:     diphenhydrAMINE (BENADRYL) 25 mg tablet, Take 25 mg by mouth daily at bedtime as needed for itching (for insomnia and allergy ** DO NOT TAKE WITH XYZAL **), Disp: , Rfl:     docusate sodium (COLACE) 100 mg capsule, Take 100 mg by mouth 2 (two) times a day, Disp: , Rfl:     haloperidol (HALDOL) 1 mg/0 5 mL oral concentrated solution, Take 0 5 mL by mouth 2 (two) times a day, Disp: , Rfl:     hydrocortisone (ANUSOL-HC, PROCTOSOL HC,) 2 5 % rectal cream, Insert into the rectum 2 (two) times a day (Patient taking differently: Insert into the rectum once ), Disp: 30 g, Rfl: 0    Hypromellose 0 3 % SOLN, Apply to eye, Disp: , Rfl:     ibuprofen (MOTRIN) 200 mg tablet, Take 400 mg by mouth every 6 (six) hours as needed for mild pain , Disp: , Rfl:     latanoprost (XALATAN) 0 005 % ophthalmic solution, Apply 1 drop to eye, Disp: , Rfl:     levocetirizine (XYZAL) 5 MG tablet, Take 1 tablet by mouth daily as needed, Disp: , Rfl:     loperamide (IMODIUM) 2 mg capsule, Take by mouth, Disp: , Rfl:     LORazepam (ATIVAN) 0 5 mg tablet, Take 1 mg by mouth every 6 (six) hours as needed for anxiety  , Disp: , Rfl:     melatonin 1 mg, Take 1 mg by mouth daily at bedtime, Disp: , Rfl:     mineral oil-white petrolatum (LUBRIFRESH P M ) ophthalmic ointment, 0 5 inches, Disp: , Rfl:     ondansetron (ZOFRAN) 4 mg tablet, Take by mouth, Disp: , Rfl:     pantoprazole (PROTONIX) 40 mg tablet, Take 1 tablet (40 mg total) by mouth 2 (two) times a day, Disp: 60 tablet, Rfl: 5    sodium chloride (SOCHLOR) 5 % hypertonic ophthalmic solution, Apply 1 drop to eye 2 (two) times a day , Disp: , Rfl:     sodium fluoride 0 275 (0 125 F) MG/DROP solution, Take 275 mcg by mouth daily, Disp: , Rfl:     timolol (BETIMOL) 0 5 % ophthalmic solution, every 24 hours, Disp: , Rfl:     timolol (TIMOPTIC-XE) 0 5 % ophthalmic gel-forming, Administer 1 drop into the left eye daily, Disp: , Rfl:     tolnaftate (TINACTIN) 1 % spray, Apply topically, Disp: , Rfl:     ferrous sulfate 325 (65 Fe) mg tablet, Take 1 tablet by mouth Twice daily, Disp: , Rfl:     ranitidine (ZANTAC) 300 MG tablet, Take 300 mg by mouth daily at bedtime, Disp: , Rfl:     ranitidine (ZANTAC) 300 MG tablet, TAKE ONE TABLET BY MOUTH AT BEDTIME (ESOPHAGITIS) (Patient not taking: Reported on 10/11/2019), Disp: 31 tablet, Rfl: 0    sucralfate (CARAFATE) 1 g tablet, DISSOLVE 1 TABLET IN 10ML OF WARM WATER AND TAKE BY MOUTH 30MIN TO 1 HOUR BEFORE BREAKFAST AND SUPPER (MORNING/EVENING) (ESOPHAGITIS) (Patient not taking: Reported on 10/11/2019), Disp: 62 tablet, Rfl: 0    ziprasidone (GEODON) 20 mg capsule, Take 20 mg by mouth 2 (two) times a day with meals , Disp: , Rfl:   Allergies   Allergen Reactions    Bactrim [Sulfamethoxazole-Trimethoprim]     Methylphenidate Hyperactivity     Reaction Date: [id number];     Sulfa Antibiotics     Thioridazine     Amphetamines     Chlorpromazine     Fexofenadine Hives     Reaction Date: [id number];     Medrogestone     Medroxyprogesterone Rash and Hives     Reaction Date: [id number];     Other Hives    Trimethoprim        Physical Exam:     Vitals:    12/04/19 1117   BP: 140/80   Pulse: 83   Resp: 16   Temp: 98 7 °F (37 1 °C)     Physical Exam   Constitutional: She is oriented to person, place, and time  She appears well-developed and well-nourished  HENT:   Head: Normocephalic and atraumatic  Mouth/Throat: Oropharynx is clear and moist    Eyes: Pupils are equal, round, and reactive to light  EOM are normal    Neck: Normal range of motion  Neck supple  No JVD present  No tracheal deviation present  No thyromegaly present  Cardiovascular: Normal rate, regular rhythm, normal heart sounds and intact distal pulses  Exam reveals no gallop and no friction rub  No murmur heard  Pulmonary/Chest: Effort normal and breath sounds normal  No respiratory distress  She has no wheezes  She has no rales  Examination of the right mastectomy site shows no worrisome skin findings no dominant masses no axillary adenopathy  Examination of the left breast in both the sitting and supine position demonstrate no skin changes nipple discharge dominant masses or axillary adenopathy  Abdominal: Soft  She exhibits no distension and no mass  There is no hepatomegaly  There is no tenderness  There is no rebound and no guarding  Musculoskeletal: Normal range of motion  She exhibits no edema or tenderness  Lymphadenopathy:     She has no cervical adenopathy  Neurological: She is alert and oriented to person, place, and time  No cranial nerve deficit  Skin: Skin is warm and dry  No rash noted  No erythema  Psychiatric: She has a normal mood and affect  Her behavior is normal    Vitals reviewed  Results & Discussion:   Patient is free of any clinical or radiographic evidence of local recurrence or distant disease  We will see her back in 1 year's time  Advance Care Planning/Advance Directives:  I discussed the disease status, treatment plans and follow-up with the patient

## 2019-12-05 NOTE — TELEPHONE ENCOUNTER
All from [address] at [facility] stating they received message about scheduling genetic testing but they are verifying benefits with patients insurance first  Will call back to advise,

## 2019-12-12 ENCOUNTER — OFFICE VISIT (OUTPATIENT)
Dept: FAMILY MEDICINE CLINIC | Facility: HOSPITAL | Age: 54
End: 2019-12-12
Payer: MEDICARE

## 2019-12-12 VITALS
TEMPERATURE: 98 F | WEIGHT: 108 LBS | HEIGHT: 58 IN | DIASTOLIC BLOOD PRESSURE: 68 MMHG | SYSTOLIC BLOOD PRESSURE: 120 MMHG | HEART RATE: 67 BPM | BODY MASS INDEX: 22.67 KG/M2

## 2019-12-12 DIAGNOSIS — E04.2 MULTIPLE THYROID NODULES: ICD-10-CM

## 2019-12-12 DIAGNOSIS — Z17.0 MALIGNANT NEOPLASM OF OVERLAPPING SITES OF RIGHT BREAST IN FEMALE, ESTROGEN RECEPTOR POSITIVE (HCC): Primary | ICD-10-CM

## 2019-12-12 DIAGNOSIS — F42.9 OBSESSIVE-COMPULSIVE DISORDER, UNSPECIFIED TYPE: ICD-10-CM

## 2019-12-12 DIAGNOSIS — C50.811 MALIGNANT NEOPLASM OF OVERLAPPING SITES OF RIGHT BREAST IN FEMALE, ESTROGEN RECEPTOR POSITIVE (HCC): Primary | ICD-10-CM

## 2019-12-12 DIAGNOSIS — D50.9 IRON DEFICIENCY ANEMIA, UNSPECIFIED IRON DEFICIENCY ANEMIA TYPE: ICD-10-CM

## 2019-12-12 PROCEDURE — 99214 OFFICE O/P EST MOD 30 MIN: CPT | Performed by: INTERNAL MEDICINE

## 2019-12-12 RX ORDER — HALOPERIDOL 2 MG/1
2 TABLET ORAL 2 TIMES DAILY
COMMUNITY

## 2019-12-12 NOTE — ASSESSMENT & PLAN NOTE
Having increase in behavioral issues and having some auditory and visual hallucinations, is having meds adjusted by Dr Nancy Malloy, is monitoring increased movements with Neuro as well

## 2019-12-12 NOTE — ASSESSMENT & PLAN NOTE
Con't to have regular f/u with Dr Rona Cabrera, she is UTD on mammo and has it ordered for 2020 already, was told to con't Arimidex and f/u with Onc surgery annualy

## 2019-12-12 NOTE — ASSESSMENT & PLAN NOTE
Finished Veofer infusions, has order for BW and f/u with Heme/Onc in 3 mos, con't labs and iron supplements as per Heme/Onc

## 2019-12-16 ENCOUNTER — OFFICE VISIT (OUTPATIENT)
Dept: GASTROENTEROLOGY | Facility: CLINIC | Age: 54
End: 2019-12-16
Payer: MEDICARE

## 2019-12-16 VITALS
HEIGHT: 58 IN | WEIGHT: 109 LBS | DIASTOLIC BLOOD PRESSURE: 84 MMHG | HEART RATE: 92 BPM | BODY MASS INDEX: 22.88 KG/M2 | SYSTOLIC BLOOD PRESSURE: 120 MMHG

## 2019-12-16 DIAGNOSIS — D50.8 OTHER IRON DEFICIENCY ANEMIA: ICD-10-CM

## 2019-12-16 DIAGNOSIS — Z12.12 SCREENING FOR COLORECTAL CANCER: ICD-10-CM

## 2019-12-16 DIAGNOSIS — K21.9 CHRONIC GERD: Primary | ICD-10-CM

## 2019-12-16 DIAGNOSIS — Z12.11 SCREENING FOR COLORECTAL CANCER: ICD-10-CM

## 2019-12-16 PROCEDURE — 99214 OFFICE O/P EST MOD 30 MIN: CPT | Performed by: NURSE PRACTITIONER

## 2019-12-16 NOTE — PROGRESS NOTES
[address] [facility] - Outpatient Follow-up Note  Darleen Yoo 49 y o  female MRN: [id number]  Encounter: [id number]    ASSESSMENT AND PLAN:      1  Chronic GERD  Longstanding GERD that seems well controlled on Protonix 40 mg twice a day  A recent upper endoscopy performed in May of this year, demonstrated a medium-sized hiatal hernia, grade C esophagitis and a single benign appearing nonbleeding nodule in the stomach  Biopsies negative for malignancy, dysplasia or H pylori  The patient's mother and caretaker are accompanying her today's office visit and would like her anti-reflux diet a little more liberal as she enjoys many foods that are on the diet  Additionally, her mother and staff tell me that she becomes agitated if she is too restricted with foods on the list     - continue Protonix 40 mg twice a day  - try to limit coffee consumption, tomato based foods and chocolate  Avoid Motrin and Tylenol preferred      2  Other iron deficiency anemia  Mild, iron deficiency anemia  Completed 6 rounds of IV event fair  Awaiting recent CBC and iron panel  No overt GI blood loss noted  A recent upper endoscopy and colonoscopy performed in 2016 negative for any GI bleeding source  - await results of CBC and iron panel    3  Screening for colorectal cancer  Increased risk with a history of adenomatous colon polyps  Last colonoscopy in April of 2016 was a negative examination for recurrent adenomatous colon polyp  A 5 year recall advised  - will arrange for a surveillance colonoscopy to be performed in April of 2021      Followup Appointment:  1 year  ______________________________________________________________________    Chief Complaint   Patient presents with    Follow-up     HPI:  Returns to the office accompanied by her mother and caretaker as she resides in a group home setting  She underwent an upper endoscopy in May of this year  Reflux symptoms have improved    Very infrequent nausea without vomiting  She is on Protonix twice a day  She lost a few lbs but this has been intentional   She enjoys eating, however, she was just recently placed on anti-reflux diet and is frustrated due to the many restrictions  She enjoys coffee daily and occasional tomato based foods in addition to occasional ingestion of chocolate  She becomes agitated if she is forbade these foods  No melena, rectal bleeding or change in bowel habits  Historical Information   Past Medical History:   Diagnosis Date    Breast cyst, left     last assessed 12/30/2013    Chronic GERD     Depression     Fibrocystic breast disease     last assessed 06/29/2012    Gastrointestinal hemorrhage     Glaucoma     History of chemotherapy     Hydrocephalus (HCC)     Ovarian disorder     last assessed 06/29/2012    Scoliosis      Past Surgical History:   Procedure Laterality Date    BREAST BIOPSY Right     COLONOSCOPY      Description 04/13/2016-5 yrs  Description 4 yr f/u d/t polyp and family history, onset 07/20/2012   ESOPHAGOGASTRODUODENOSCOPY      description-04/13/2016 gastritis with antral nodule    EYE SURGERY      for relief of intraocular pressure    INCISIONAL BREAST BIOPSY      description 2008    MASTECTOMY Left     SENTINEL LYMPH NODE BIOPSY      STRABISMUS SURGERY      description 1973, 66    UPPER GASTROINTESTINAL ENDOSCOPY  05/16/2019    Grade C erosive esophagitis    7 mm nodule in the stomach     Social History     Substance and Sexual Activity   Alcohol Use No     Social History     Substance and Sexual Activity   Drug Use No     Social History     Tobacco Use   Smoking Status Never Smoker   Smokeless Tobacco Never Used     Family History   Problem Relation Age of Onset    Osteopenia Mother     Heart attack Father 59        Acute MI    Coronary artery disease Father     Thyroid nodules Father     Osteopenia Maternal Grandmother     Prostate cancer Maternal Grandfather     Leukemia Paternal Grandfather     Heart attack Maternal Uncle 48        acute MI, stent    Cancer Maternal Uncle         adenocarcinoma of oral cavity    Colon cancer Maternal Uncle 62    Hyperlipidemia Maternal Uncle     Pancreatic cancer Paternal Uncle     Prostate cancer Paternal Uncle     Colon cancer Family     Coronary artery disease Family     Thyroid disease Family          Current Outpatient Medications:     acetaminophen (TYLENOL) 500 mg tablet    anastrozole (ARIMIDEX) 1 mg tablet    calamine lotion    Calcium Carb-Cholecalciferol (OYSTER SHELL CALCIUM) 500-400 MG-UNIT TABS    carBAMazepine (TEGretol) 200 mg tablet    Carboxymethylcell-Hypromellose (GENTEAL) 0 25-0 3 % GEL    Cholecalciferol (VITAMIN D) 2000 units tablet    citalopram (CeleXA) 20 mg tablet    Dentifrices (SENSODYNE PRONAMEL DT)    diphenhydrAMINE (BENADRYL) 25 mg tablet    docusate sodium (COLACE) 100 mg capsule    haloperidol (HALDOL) 1 mg tablet    hydrocortisone (ANUSOL-HC, PROCTOSOL HC,) 2 5 % rectal cream    Hypromellose 0 3 % SOLN    ibuprofen (MOTRIN) 200 mg tablet    latanoprost (XALATAN) 0 005 % ophthalmic solution    levocetirizine (XYZAL) 5 MG tablet    loperamide (IMODIUM) 2 mg capsule    LORazepam (ATIVAN) 0 5 mg tablet    melatonin 1 mg    mineral oil-white petrolatum (LUBRIFRESH P M ) ophthalmic ointment    ondansetron (ZOFRAN) 4 mg tablet    pantoprazole (PROTONIX) 40 mg tablet    ranitidine (ZANTAC) 300 MG tablet    sodium chloride (SOCHLOR) 5 % hypertonic ophthalmic solution    sodium fluoride 0 275 (0 125 F) MG/DROP solution    timolol (BETIMOL) 0 5 % ophthalmic solution    timolol (TIMOPTIC-XE) 0 5 % ophthalmic gel-forming    tolnaftate (TINACTIN) 1 % spray    ziprasidone (GEODON) 20 mg capsule  Allergies   Allergen Reactions    Bactrim [Sulfamethoxazole-Trimethoprim]     Methylphenidate Hyperactivity     Reaction Date: [id number];     Sulfa Antibiotics     Thioridazine     Amphetamines     Chlorpromazine     Fexofenadine Hives     Reaction Date: [id number];    Argrhea Kras Medroxyprogesterone Rash and Hives     Reaction Date: [id number];     Other Hives    Trimethoprim      Reviewed medications and allergies and updated as indicated    PHYSICAL EXAM:    Blood pressure 120/84, pulse 92, height 4' 9 5" (1 461 m), weight 49 4 kg (109 lb), not currently breastfeeding  Body mass index is 23 18 kg/m²  General Appearance: NAD, cooperative, alert  Eyes: Anicteric,   ENT:  Normocephalic, atraumatic, normal mucosa  Neck:  Supple, symmetrical, trachea midline  Resp:  Clear to auscultation bilaterally; no rales, rhonchi or wheezing; respirations unlabored   CV:  S1 S2, Regular rate and rhythm; no murmur, rub, or gallop  GI:  Soft, non-tender, non-distended; normal bowel sounds; no masses, no organomegaly   Rectal: Deferred  Musculoskeletal: No cyanosis, clubbing or edema  Normal ROM  Skin:  No jaundice, rashes, or lesions   Heme/Lymph: No palpable cervical lymphadenopathy  Psych: Normal affect, good eye contact  Neuro: No gross deficits, AAOx3    Lab Results:   Lab Results   Component Value Date    WBC 11 47 (H) 12/18/2013    HGB 12 1 12/18/2013    HCT 37 5 12/18/2013    MCV 92 12/18/2013     12/18/2013     Lab Results   Component Value Date     (L) 12/18/2013    K 3 5 (L) 12/18/2013     (L) 12/18/2013    CO2 29 12/18/2013    ANIONGAP 5 12/18/2013    BUN 13 12/18/2013    CREATININE 0 59 (L) 12/18/2013    GLUCOSE 127 12/18/2013    CALCIUM 8 1 (L) 12/18/2013     No results found for: IRON, TIBC, FERRITIN  No results found for: LIPASE    Radiology Results:   No results found

## 2019-12-16 NOTE — PATIENT INSTRUCTIONS
Continue Protonix 40 mg twice a day  Try to limit coffee consumption to a goal of 1-2 a day    Try to limit tomatoes/tomato sauce to once a week, try to limit chocolate to once a week  Check CBC and iron panel this month  Try to avoid Motrin and use Tylenol more often

## 2019-12-20 PROBLEM — Z83.719 FAMILY HISTORY OF COLONIC POLYPS: Status: ACTIVE | Noted: 2019-12-20

## 2019-12-20 PROBLEM — Z83.71 FAMILY HISTORY OF COLONIC POLYPS: Status: ACTIVE | Noted: 2019-12-20

## 2020-01-02 DIAGNOSIS — C50.811 MALIGNANT NEOPLASM OF OVERLAPPING SITES OF RIGHT BREAST IN FEMALE, ESTROGEN RECEPTOR POSITIVE (HCC): Primary | ICD-10-CM

## 2020-01-02 DIAGNOSIS — Z17.0 MALIGNANT NEOPLASM OF OVERLAPPING SITES OF RIGHT BREAST IN FEMALE, ESTROGEN RECEPTOR POSITIVE (HCC): Primary | ICD-10-CM

## 2020-01-06 ENCOUNTER — OFFICE VISIT (OUTPATIENT)
Dept: HEMATOLOGY ONCOLOGY | Facility: HOSPITAL | Age: 55
End: 2020-01-06
Payer: MEDICARE

## 2020-01-06 VITALS
SYSTOLIC BLOOD PRESSURE: 130 MMHG | DIASTOLIC BLOOD PRESSURE: 68 MMHG | RESPIRATION RATE: 16 BRPM | OXYGEN SATURATION: 96 % | TEMPERATURE: 98.3 F | WEIGHT: 110 LBS | HEART RATE: 71 BPM | HEIGHT: 58 IN | BODY MASS INDEX: 23.09 KG/M2

## 2020-01-06 DIAGNOSIS — C50.811 MALIGNANT NEOPLASM OF OVERLAPPING SITES OF RIGHT BREAST IN FEMALE, ESTROGEN RECEPTOR POSITIVE (HCC): ICD-10-CM

## 2020-01-06 DIAGNOSIS — Z17.0 MALIGNANT NEOPLASM OF OVERLAPPING SITES OF RIGHT BREAST IN FEMALE, ESTROGEN RECEPTOR POSITIVE (HCC): ICD-10-CM

## 2020-01-06 DIAGNOSIS — C50.911 MALIGNANT NEOPLASM OF RIGHT FEMALE BREAST, UNSPECIFIED ESTROGEN RECEPTOR STATUS, UNSPECIFIED SITE OF BREAST (HCC): Primary | ICD-10-CM

## 2020-01-06 PROCEDURE — 99214 OFFICE O/P EST MOD 30 MIN: CPT | Performed by: INTERNAL MEDICINE

## 2020-01-06 NOTE — PROGRESS NOTES
Hematology/Oncology Outpatient Follow- up Note  Nannette Francomb 49 y o  female MRN: @ Encounter: [id number]        Date:  1/6/2020    Presenting Complaint/Diagnosis : Multifocal ER positive MN positive HER-2 positive breast cancer  Final pathologic stage IA i e  pathologic T1 C  pathologic N0 grade 2 tumor  All sentinel lymph nodes were negative for malignancy    HPI:    Luis Enrique Jacobo is a 49year old single  female, who lives in a group home, and is autistic  Nathaniel Pelletier has stage I breast cancer, which is multifocal and is HER-2 (+)   She had 6 cycles of TCH chemotherapy   This was followed by 1 year of Herceptin   She then proceeded to Tamoxifen and with blood work indicating she was menopausal, and without a period for 1 year, she switched to Arimidex daily  Nathaniel Pelletier is scheduled to have a total of 5 years  Previous Hematologic/ Oncologic History:       Malignant neoplasm of overlapping sites of right breast in female, estrogen receptor positive ([facility] [address] )    2013 Surgery     Right breast mastectomy   Invasive breast carcinoma  Grade 2  2 foci:    MN 60/90  HER2 0/3+  Lymph nodes negative  Stage IA  Dr Amna Esparza      2/2014 - 2/2015 Chemotherapy     TCH x 6 cycles  1 year of Herceptin  Dr Isauro José      2/2015 -  Hormone Therapy     Tamoxifen daily until 2018  Arimidex 1 mg daily since 2018  Dr Isauro José       Patient is status post a mastectomy on the right side  Chemotherapy with TCH  She had 6 cycles  Her treatment started in February of 2014  Then got one year of Herceptin which ended in February of 2015  Tamoxifen started in 2015 which was then converted to Arimidex in 2018  Venofer 200mg IV weekly x 6 doses    Current Hematologic/ Oncologic Treatment:    Arimidex 1 mg Daily  Interval History:    The patient returns for follow-up visit  She has her mother along with her caregivers with her  Her tumor marker is very slightly elevated at 40 normal being 38  The rest of her blood work is within acceptable limits  Hemoglobin is improved  She herself is at baseline  Denies any nausea denies any vomiting or diarrhea  Has lost some weight  MMA and iron studies are within acceptable limits  The rest of her 14 point review of systems today was negative  Test Results:    Imaging: No results found  Labs:   Lab Results   Component Value Date    WBC 11 47 (H) 12/18/2013    HGB 12 1 12/18/2013    HCT 37 5 12/18/2013    MCV 92 12/18/2013     12/18/2013     Lab Results   Component Value Date     (L) 12/18/2013    K 3 5 (L) 12/18/2013     (L) 12/18/2013    CO2 29 12/18/2013    ANIONGAP 5 12/18/2013    BUN 13 12/18/2013    CREATININE 0 59 (L) 12/18/2013    GLUCOSE 127 12/18/2013    CALCIUM 8 1 (L) 12/18/2013     ROS: As stated in the history of present illness otherwise his 14 point review of systems today was negative        Active Problems:   Patient Active Problem List   Diagnosis    Vitamin D deficiency    Scoliosis    Retinal detachment    Osteopenia    Obsessive compulsive disorder    Multiple thyroid nodules    Mild intellectual disability    Malignant neoplasm of overlapping sites of right breast in female, estrogen receptor positive ([facility] [address] )    Legal blindness [place]    Irregular menstrual cycle    Internal hemorrhoids    Impulse control disorder    Glaucoma    Dyslipidemia    Depression    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HCC)    Chronic gingivitis    Cerebral palsy (HCC)    Benign neoplasm of large intestine    Autistic disorder    Anxiety    Other long term (current) drug therapy    Hyperglycemia    Screening for colorectal cancer    Chronic GERD    Iron deficiency anemia, unspecified    Use of anastrozole (Arimidex)    Family history of colonic polyps       Past Medical History:   Past Medical History:   Diagnosis Date    Breast cyst, left     last assessed 12/30/2013    Chronic GERD     Depression     Fibrocystic breast disease     last assessed 06/29/2012    Gastrointestinal hemorrhage     Glaucoma     History of chemotherapy     Hydrocephalus (HCC)     Ovarian disorder     last assessed 06/29/2012    Scoliosis        Surgical History:   Past Surgical History:   Procedure Laterality Date    BREAST BIOPSY Right     COLONOSCOPY      Description 04/13/2016-5 yrs  Description 4 yr f/u d/t polyp and family history, onset 07/20/2012   ESOPHAGOGASTRODUODENOSCOPY      description-04/13/2016 gastritis with antral nodule    EYE SURGERY      for relief of intraocular pressure    INCISIONAL BREAST BIOPSY      description 2008    MASTECTOMY Left     SENTINEL LYMPH NODE BIOPSY      STRABISMUS SURGERY      description 1973, 66    UPPER GASTROINTESTINAL ENDOSCOPY  05/16/2019    Grade C erosive esophagitis  7 mm nodule in the stomach       Family History:    Family History   Problem Relation Age of Onset    Osteopenia Mother     Heart attack Father 59        Acute MI    Coronary artery disease Father     Thyroid nodules Father     Osteopenia Maternal Grandmother     Prostate cancer Maternal Grandfather     Leukemia Paternal Grandfather     Heart attack Maternal Uncle 48        acute MI, stent    Cancer Maternal Uncle         adenocarcinoma of oral cavity    Colon cancer Maternal Uncle 62    Hyperlipidemia Maternal Uncle     Pancreatic cancer Paternal Uncle     Prostate cancer Paternal Uncle     Colon cancer Family     Coronary artery disease Family     Thyroid disease Family        Cancer-related family history includes Cancer in her maternal uncle; Colon cancer in her family; Colon cancer (age of onset: 62) in her maternal uncle; Pancreatic cancer in her paternal uncle; Prostate cancer in her maternal grandfather and paternal uncle      Social History:   Social History     Socioeconomic History    Marital status: Single     Spouse name: Not on file    Number of children: Not on file    Years of education: Not on file    Highest education level: Not on file Occupational History    Not on file   Social Needs    Financial resource strain: Not on file    Food insecurity:     Worry: Not on file     Inability: Not on file    Transportation needs:     Medical: Not on file     Non-medical: Not on file   Tobacco Use    Smoking status: Never Smoker    Smokeless tobacco: Never Used   Substance and Sexual Activity    Alcohol use: No    Drug use: No    Sexual activity: Not on file   Lifestyle    Physical activity:     Days per week: Not on file     Minutes per session: Not on file    Stress: Not on file   Relationships    Social connections:     Talks on phone: Not on file     Gets together: Not on file     Attends Synagogue service: Not on file     Active member of club or organization: Not on file     Attends meetings of clubs or organizations: Not on file     Relationship status: Not on file    Intimate partner violence:     Fear of current or ex partner: Not on file     Emotionally abused: Not on file     Physically abused: Not on file     Forced sexual activity: Not on file   Other Topics Concern    Not on file   Social History Narrative    Person living in a residential institution    Uses safety equipment-seat belts       Current Medications:   Current Outpatient Medications   Medication Sig Dispense Refill    acetaminophen (TYLENOL) 500 mg tablet Take 500 mg by mouth every 6 (six) hours as needed for mild pain      anastrozole (ARIMIDEX) 1 mg tablet Take 1 tablet (1 mg total) by mouth daily 30 tablet 11    calamine lotion Apply topically every 4 (four) hours as needed for itching      Calcium Carb-Cholecalciferol (OYSTER SHELL CALCIUM) 500-400 MG-UNIT TABS Take 1 tablet by mouth 2 (two) times a day      carBAMazepine (TEGretol) 200 mg tablet 2 tab PO q am and 1 tab PO qhs      Carboxymethylcell-Hypromellose (GENTEAL) 0 25-0 3 % GEL Apply to eye      Cholecalciferol (VITAMIN D) 2000 units tablet TAKE TWO TABLETS BY MOUTH (4000IU) EVERY MORNING FOR VITAMIN DIFICIENCY 62 tablet 0    citalopram (CeleXA) 20 mg tablet 20 mg + 10 mg for total of 30 mg daily 30 tablet 0    Dentifrices (SENSODYNE PRONAMEL DT) Apply to teeth      diphenhydrAMINE (BENADRYL) 25 mg tablet Take 25 mg by mouth daily at bedtime as needed for itching (for insomnia and allergy ** DO NOT TAKE WITH XYZAL **)      docusate sodium (COLACE) 100 mg capsule Take 100 mg by mouth 2 (two) times a day      haloperidol (HALDOL) 1 mg tablet Take 1 mg by mouth daily      hydrocortisone (ANUSOL-HC, PROCTOSOL HC,) 2 5 % rectal cream Insert into the rectum 2 (two) times a day (Patient taking differently: Insert into the rectum once ) 30 g 0    Hypromellose 0 3 % SOLN Apply to eye      ibuprofen (MOTRIN) 200 mg tablet Take 400 mg by mouth every 6 (six) hours as needed for mild pain        latanoprost (XALATAN) 0 005 % ophthalmic solution Apply 1 drop to eye      levocetirizine (XYZAL) 5 MG tablet Take 1 tablet by mouth daily as needed      loperamide (IMODIUM) 2 mg capsule Take by mouth      LORazepam (ATIVAN) 0 5 mg tablet Take 1 mg by mouth every 6 (six) hours as needed for anxiety        melatonin 1 mg Take 1 mg by mouth daily at bedtime      mineral oil-white petrolatum (LUBRIFRESH P M ) ophthalmic ointment 0 5 inches      ondansetron (ZOFRAN) 4 mg tablet Take by mouth      pantoprazole (PROTONIX) 40 mg tablet Take 1 tablet (40 mg total) by mouth 2 (two) times a day 60 tablet 5    ranitidine (ZANTAC) 300 MG tablet Take 300 mg by mouth daily at bedtime      sodium chloride (SOCHLOR) 5 % hypertonic ophthalmic solution Apply 1 drop to eye 2 (two) times a day       sodium fluoride 0 275 (0 125 F) MG/DROP solution Take 275 mcg by mouth daily      timolol (BETIMOL) 0 5 % ophthalmic solution every 24 hours      timolol (TIMOPTIC-XE) 0 5 % ophthalmic gel-forming Administer 1 drop into the left eye daily      tolnaftate (TINACTIN) 1 % spray Apply topically      ziprasidone (GEODON) 20 mg capsule Take 20 mg by mouth 2 (two) times a day with meals        No current facility-administered medications for this visit  Allergies: Allergies   Allergen Reactions    Bactrim [Sulfamethoxazole-Trimethoprim]     Methylphenidate Hyperactivity     Reaction Date: [id number];     Sulfa Antibiotics     Thioridazine     Amphetamines     Chlorpromazine     Fexofenadine Hives     Reaction Date: [id number];    Mireya Force Medroxyprogesterone Rash and Hives     Reaction Date: [id number];     Other Hives    Trimethoprim        Physical Exam:    Body surface area is 1 4 meters squared  Wt Readings from Last 3 Encounters:   01/06/20 49 9 kg (110 lb)   12/16/19 49 4 kg (109 lb)   12/12/19 49 kg (108 lb)        Temp Readings from Last 3 Encounters:   01/06/20 98 3 °F (36 8 °C) (Tympanic)   12/12/19 98 °F (36 7 °C)   12/04/19 98 7 °F (37 1 °C) (Oral)        BP Readings from Last 3 Encounters:   01/06/20 130/68   12/16/19 120/84   12/12/19 120/68         Pulse Readings from Last 3 Encounters:   01/06/20 71   12/16/19 92   12/12/19 67        Physical Exam     Constitutional   General appearance: No acute distress, well appearing and well nourished  Eyes   Conjunctiva and lids: No swelling, erythema or discharge  Pupils and irises: Equal, round and reactive to light  Ears, Nose, Mouth, and Throat   External inspection of ears and nose: Normal     Nasal mucosa, septum, and turbinates: Normal without edema or erythema  Oropharynx: Normal with no erythema, edema, exudate or lesions  Pulmonary   Respiratory effort: No increased work of breathing or signs of respiratory distress  Auscultation of lungs: Clear to auscultation  Cardiovascular   Palpation of heart: Normal PMI, no thrills  Auscultation of heart: Normal rate and rhythm, normal S1 and S2, without murmurs      Examination of extremities for edema and/or varicosities: Normal     Carotid pulses: Normal     Abdomen   Abdomen: Non-tender, no masses  Liver and spleen: No hepatomegaly or splenomegaly  Lymphatic   Palpation of lymph nodes in neck: No lymphadenopathy  Musculoskeletal   Gait and station: Normal     Digits and nails: Normal without clubbing or cyanosis  Inspection/palpation of joints, bones, and muscles: Normal     Skin   Skin and subcutaneous tissue: Normal without rashes or lesions  Neurologic   Cranial nerves: Cranial nerves 2-12 intact  Sensation: No sensory loss  Psychiatric   Orientation to person, place, and time: Normal     Mood and affect: Normal         Assessment / Plan:      The patient is a 52-year-old autistic female with a history of stage IA ER/MO positive, HER2 positive breast cancer  She initially underwent chemotherapy with [address] followed by 1 year of Herceptin  This is completed in February 2015  She was then started on tamoxifen and took this until 2018 when she was switched to Arimidex  She remains on Arimidex 1 mg p o  Daily and will continue this until 2022  The goal is for her to be on combination of tamoxifen and Arimidex for a total of 7-10 years  Attending years would be based on the fact that she did have HER-2 positive disease  Again this is optional  At this point based on her slightly elevated tumor marker we will do another CAT scan in 2 months which will be around 6 months from her last scan  I'll see her back with the results  We will try to finish up 10 years of her combined aromatase inhibitor and tamoxifen therapy  If not possible the minimum would be 7-7-1/2 years  The patient and her mother agree  I'll see her back in 2 months  Goals and Barriers:  Current Goal:  Prolong Survival from Breast cancer  Barriers: None  Patient's Capacity to Self Care:  Patient able to self care      Portions of the record may have been created with voice recognition software   Occasional wrong word or "sound a like" substitutions may have occurred due to the inherent limitations of voice recognition software   Read the chart carefully and recognize, using context, where substitutions have occurred

## 2020-01-07 ENCOUNTER — TELEPHONE (OUTPATIENT)
Dept: SURGICAL ONCOLOGY | Facility: CLINIC | Age: 55
End: 2020-01-07

## 2020-01-07 NOTE — TELEPHONE ENCOUNTER
Genetics New Patient Intake Form   Patient Details:     Lori Anderson    1965    [id number]    Background Information:       Who is calling to schedule? If not self, relationship to patient? [facility]   Referring Provider mickey   Is this a personal or family history? personal   If personal history, what age were you at diagnosis? 49   What is the type of tumor? breast   Did the patient schedule an appointment? Yes   Date and Provider Name  [facility]    Scheduling Information:   [facility] [place]:  [facility]   Are there any dates/time the patient cannot be seen? no   Miscellaneous: n/a   After completing the above information, please route to [address] [employer]

## 2020-01-28 DIAGNOSIS — K21.9 GASTROESOPHAGEAL REFLUX DISEASE, ESOPHAGITIS PRESENCE NOT SPECIFIED: ICD-10-CM

## 2020-01-28 RX ORDER — PANTOPRAZOLE SODIUM 40 MG/1
40 TABLET, DELAYED RELEASE ORAL 2 TIMES DAILY
Qty: 60 TABLET | Refills: 5 | Status: SHIPPED | OUTPATIENT
Start: 2020-01-28 | End: 2020-08-17 | Stop reason: SDUPTHER

## 2020-02-03 ENCOUNTER — TELEPHONE (OUTPATIENT)
Dept: FAMILY MEDICINE CLINIC | Facility: HOSPITAL | Age: 55
End: 2020-02-03

## 2020-02-03 DIAGNOSIS — Z20.828 EXPOSURE TO INFLUENZA: Primary | ICD-10-CM

## 2020-02-03 RX ORDER — OSELTAMIVIR PHOSPHATE 75 MG/1
75 CAPSULE ORAL DAILY
Qty: 14 CAPSULE | Refills: 0 | Status: SHIPPED | OUTPATIENT
Start: 2020-02-03 | End: 2020-02-17

## 2020-02-03 NOTE — TELEPHONE ENCOUNTER
Patient was exposed to flu at group home and workshop     Patient is not symptomatic at this time      Jenifer Salazar is asking if tamiflu can be sent to the pharm?    pcb at [phone]

## 2020-02-21 ENCOUNTER — TELEPHONE (OUTPATIENT)
Dept: HEMATOLOGY ONCOLOGY | Facility: CLINIC | Age: 55
End: 2020-02-21

## 2020-02-21 NOTE — TELEPHONE ENCOUNTER
Kevan Goodman from [facility] called to cancel the patient 2/26 consult with Andrey Us  She stated that they will call back newt week to reschedule

## 2020-03-16 ENCOUNTER — TELEPHONE (OUTPATIENT)
Dept: HEMATOLOGY ONCOLOGY | Facility: MEDICAL CENTER | Age: 55
End: 2020-03-16

## 2020-03-16 ENCOUNTER — TELEPHONE (OUTPATIENT)
Dept: HEMATOLOGY ONCOLOGY | Facility: CLINIC | Age: 55
End: 2020-03-16

## 2020-03-16 NOTE — TELEPHONE ENCOUNTER
Vinay Hall the patient nurse called in stated that she is going to be cancelling  the scan for Wednesday 03/18/2020 and stated she wanted to cancel the follow up with Dr Ira Russell on 03/25/2020, I advise that 1st she would have to cancel the scan and call us back to reschedule   A good call back number for Vinay Hall is [phone]

## 2020-03-16 NOTE — TELEPHONE ENCOUNTER
Betina Sommer from [employer] called to cancel patient's appt on 3-25-20 @ 1:40 with Dr Brooks Sommer will call back at a later time to reschedule

## 2020-03-23 NOTE — TELEPHONE ENCOUNTER
Marilou Welch called back she cancelled patient's CT and appointment with Dr Richard Gayle  She will fax her labs that patient had done 3/9/20 to the [facility] office  Marilou Welch will call to reschedule CT scan and then call back to reschedule follow up appointment for after CT scan

## 2020-06-11 ENCOUNTER — TELEPHONE (OUTPATIENT)
Dept: HEMATOLOGY ONCOLOGY | Facility: CLINIC | Age: 55
End: 2020-06-11

## 2020-06-12 ENCOUNTER — TELEPHONE (OUTPATIENT)
Dept: HEMATOLOGY ONCOLOGY | Facility: CLINIC | Age: 55
End: 2020-06-12

## 2020-06-15 ENCOUNTER — HOSPITAL ENCOUNTER (OUTPATIENT)
Dept: CT IMAGING | Facility: HOSPITAL | Age: 55
Discharge: HOME | End: 2020-06-15
Attending: INTERNAL MEDICINE
Payer: MEDICARE

## 2020-06-15 DIAGNOSIS — C50.811 MALIGNANT NEOPLASM OF OVERLAPPING SITES OF RIGHT BREAST IN FEMALE, ESTROGEN RECEPTOR POSITIVE (HCC): ICD-10-CM

## 2020-06-15 DIAGNOSIS — Z17.0 MALIGNANT NEOPLASM OF OVERLAPPING SITES OF RIGHT BREAST IN FEMALE, ESTROGEN RECEPTOR POSITIVE (HCC): ICD-10-CM

## 2020-06-15 DIAGNOSIS — C50.911 MALIGNANT NEOPLASM OF RIGHT FEMALE BREAST, UNSPECIFIED ESTROGEN RECEPTOR STATUS, UNSPECIFIED SITE OF BREAST (HCC): ICD-10-CM

## 2020-06-15 PROCEDURE — 71260 CT THORAX DX C+: CPT

## 2020-06-15 PROCEDURE — 74177 CT ABD & PELVIS W/CONTRAST: CPT

## 2020-06-15 RX ADMIN — IOHEXOL 100 ML: 350 INJECTION, SOLUTION INTRAVENOUS at 10:17

## 2020-06-24 ENCOUNTER — TELEPHONE (OUTPATIENT)
Dept: HEMATOLOGY ONCOLOGY | Facility: CLINIC | Age: 55
End: 2020-06-24

## 2020-07-02 ENCOUNTER — TELEPHONE (OUTPATIENT)
Dept: HEMATOLOGY ONCOLOGY | Facility: CLINIC | Age: 55
End: 2020-07-02

## 2020-07-06 ENCOUNTER — OFFICE VISIT (OUTPATIENT)
Dept: HEMATOLOGY ONCOLOGY | Facility: HOSPITAL | Age: 55
End: 2020-07-06
Payer: MEDICARE

## 2020-07-06 VITALS
HEIGHT: 58 IN | BODY MASS INDEX: 21.62 KG/M2 | HEART RATE: 88 BPM | SYSTOLIC BLOOD PRESSURE: 118 MMHG | DIASTOLIC BLOOD PRESSURE: 76 MMHG | OXYGEN SATURATION: 98 % | WEIGHT: 103 LBS | TEMPERATURE: 98.2 F | RESPIRATION RATE: 16 BRPM

## 2020-07-06 DIAGNOSIS — C50.911 MALIGNANT NEOPLASM OF RIGHT FEMALE BREAST, UNSPECIFIED ESTROGEN RECEPTOR STATUS, UNSPECIFIED SITE OF BREAST (HCC): Primary | ICD-10-CM

## 2020-07-06 DIAGNOSIS — D50.9 IRON DEFICIENCY ANEMIA, UNSPECIFIED IRON DEFICIENCY ANEMIA TYPE: ICD-10-CM

## 2020-07-06 PROCEDURE — 1036F TOBACCO NON-USER: CPT | Performed by: INTERNAL MEDICINE

## 2020-07-06 PROCEDURE — 99214 OFFICE O/P EST MOD 30 MIN: CPT | Performed by: INTERNAL MEDICINE

## 2020-07-06 PROCEDURE — 3008F BODY MASS INDEX DOCD: CPT | Performed by: INTERNAL MEDICINE

## 2020-07-06 RX ORDER — HYDROCORTISONE 25 MG/G
CREAM TOPICAL
COMMUNITY
Start: 2020-06-19

## 2020-07-06 RX ORDER — IBUPROFEN 200 MG
CAPSULE ORAL
COMMUNITY
Start: 2020-06-19

## 2020-07-06 RX ORDER — DIPHENHYDRAMINE HYDROCHLORIDE 25 MG/1
25 CAPSULE ORAL
COMMUNITY
Start: 2020-05-11

## 2020-07-06 RX ORDER — MEMANTINE HYDROCHLORIDE 10 MG/1
TABLET ORAL
COMMUNITY
Start: 2020-06-23 | End: 2020-09-22

## 2020-07-06 NOTE — PROGRESS NOTES
Hematology/Oncology Outpatient Follow- up Note  Festus Guzman 48 y o  female MRN: @ Encounter: [id number]        Date:  7/6/2020    Presenting Complaint/Diagnosis : Multifocal ER positive MI positive HER-2 positive breast cancer  Final pathologic stage IA i e  pathologic T1 C  pathologic N0 grade 2 tumor  All sentinel lymph nodes were negative for malignancy    HPI:    Miladis Farmer is a 49year old single  female, who lives in a group home, and is autistic  Yasmin Carr has stage I breast cancer, which is multifocal and is HER-2 (+)   She had 6 cycles of TCH chemotherapy   This was followed by 1 year of Herceptin   She then proceeded to Tamoxifen and with blood work indicating she was menopausal, and without a period for 1 year, she switched to Arimidex daily  Yasmin Carr is scheduled to have a total of 5 years  Previous Hematologic/ Oncologic History:       Malignant neoplasm of overlapping sites of right breast in female, estrogen receptor positive ([facility] [address] )    2013 Surgery     Right breast mastectomy   Invasive breast carcinoma  Grade 2  2 foci:    MI 60/90  HER2 0/3+  Lymph nodes negative  Stage IA  Dr Zandra Cooks      2/2014 - 2/2015 Chemotherapy     TCH x 6 cycles  1 year of Herceptin  Dr Anastasia Severe      2/2015 -  Hormone Therapy     Tamoxifen daily until 2018  Arimidex 1 mg daily since 2018  Dr Anastasia Severe       Patient is status post a mastectomy on the right side  Chemotherapy with TCH  She had 6 cycles  Her treatment started in February of 2014  Then got one year of Herceptin which ended in February of 2015  Tamoxifen started in 2015 which was then converted to Arimidex in 2018  Venofer 200mg IV weekly x 6 doses    Current Hematologic/ Oncologic Treatment:    Arimidex 1 mg Daily  Interval History:    The patient returns for follow-up visit  Her caregiver states she is doing reasonably well  She is more agitated than usual and her psychiatric medications have been adjusted  The patient denies any nausea or vomiting  Has been more fatigued and according to the caregiver has lost some weight  CT scan of the chest abdomen pelvis does not show any evidence of malignancy  CA 07/20/2029 was slightly elevated at 40 with a creatinine that was normal at 0 54  Hemoglobin was normal at 14 3 with a white count of 6 3 and a platelet count of 139  The rest of her 14 point review of systems today was negative  Test Results:    Imaging: Ct Chest Abdomen Pelvis W Contrast    Result Date: 6/16/2020  Narrative: CT CHEST, ABDOMEN AND PELVIS WITH IV CONTRAST INDICATION:   C50 911: Malignant neoplasm of unspecified site of right female breast C50 811: Malignant neoplasm of overlapping sites of right female breast Z17 0: Estrogen receptor positive status (ER+)  COMPARISON:  CT chest/7/pelvis 9/5/2018  TECHNIQUE: CT examination of the chest, abdomen and pelvis was performed  Reformatted images were created in axial, sagittal, and coronal planes  Radiation dose length product (DLP) for this visit:  366 47 mGy-cm   This examination, like all CT scans performed in the [facility], was performed utilizing techniques to minimize radiation dose exposure, including the use of iterative  reconstruction and automated exposure control  IV Contrast:  100 mL of iohexol (OMNIPAQUE) Enteric Contrast: Enteric contrast was administered  FINDINGS: CHEST LUNGS:  Stable scattered calcified granulomata  Lungs are otherwise clear  No new or suspicious pulmonary nodule  No endotracheal or endobronchial lesion  PLEURA:  Unremarkable  HEART/GREAT VESSELS:  Unremarkable for patient's age  MEDIASTINUM AND CHRISTY:  Unremarkable  CHEST WALL AND LOWER NECK:  Status post right mastectomy  No axillary adenopathy is noted  Stable enlargement of the thyroid gland  ABDOMEN LIVER/BILIARY TREE:  Unremarkable  GALLBLADDER:  No calcified gallstones  No pericholecystic inflammatory change  SPLEEN:  Unremarkable  PANCREAS:  Unremarkable   ADRENAL GLANDS: Unremarkable  KIDNEYS/URETERS:  Stable 11 mm cortical cyst at the anterior mid to lower pole of the left kidney  Kidneys are otherwise unremarkable  STOMACH AND BOWEL:  Few descending and sigmoid colon diverticula are noted without evidence of acute diverticulitis  Stable moderate hiatal hernia  No bowel obstruction  APPENDIX:  A normal appendix was visualized  ABDOMINOPELVIC CAVITY:  Stable free pelvic fluid is noted in the posterior cul-de-sac probably related to ventriculoperitoneal shunt catheter  No mesenteric or peritoneal mass  No free intraperitoneal air  No lymphadenopathy  VESSELS:  Unremarkable for patient's age  PELVIS REPRODUCTIVE ORGANS: Unremarkable  URINARY BLADDER:  Unremarkable  ABDOMINAL WALL/INGUINAL REGIONS:  Ventriculoperitoneal shunt catheter is noted  OSSEOUS STRUCTURES:  No acute fracture or destructive osseous lesion  Stable degenerative changes noted at L2-L3  Impression: No CT evidence of metastatic disease in the chest, abdomen or pelvis  Stable thyromegaly  Free fluid in the pelvis likely from ventriculoperitoneal shunt  Workstation performed: [id number]       Labs:   Lab Results   Component Value Date    WBC 11 47 (H) 12/18/2013    HGB 12 1 12/18/2013    HCT 37 5 12/18/2013    MCV 92 12/18/2013     12/18/2013     Lab Results   Component Value Date     (L) 12/18/2013    K 3 5 (L) 12/18/2013     (L) 12/18/2013    CO2 29 12/18/2013    ANIONGAP 5 12/18/2013    BUN 13 12/18/2013    CREATININE 0 59 (L) 12/18/2013    GLUCOSE 127 12/18/2013    CALCIUM 8 1 (L) 12/18/2013     ROS: As stated in the history of present illness otherwise his 14 point review of systems today was negative        Active Problems:   Patient Active Problem List   Diagnosis    Vitamin D deficiency    Scoliosis    Retinal detachment    Osteopenia    Obsessive compulsive disorder    Multiple thyroid nodules    Mild intellectual disability    Malignant neoplasm of overlapping sites of right breast in female, estrogen receptor positive ([facility] [address] )   [facility]    Irregular menstrual cycle    Internal hemorrhoids    Impulse control disorder    Glaucoma    Dyslipidemia    Depression    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HCC)    Chronic gingivitis    Cerebral palsy (HCC)    Benign neoplasm of large intestine    Autistic disorder    Anxiety    Other long term (current) drug therapy    Hyperglycemia    Screening for colorectal cancer    Chronic GERD    Iron deficiency anemia, unspecified    Use of anastrozole (Arimidex)    Family history of colonic polyps       Past Medical History:   Past Medical History:   Diagnosis Date    Breast cyst, left     last assessed 12/30/2013    Chronic GERD     Depression     Fibrocystic breast disease     last assessed 06/29/2012    Gastrointestinal hemorrhage     Glaucoma     History of chemotherapy     Hydrocephalus (HCC)     Ovarian disorder     last assessed 06/29/2012    Scoliosis        Surgical History:   Past Surgical History:   Procedure Laterality Date    BREAST BIOPSY Right     COLONOSCOPY      Description 04/13/2016-5 yrs  Description 4 yr f/u d/t polyp and family history, onset 07/20/2012   ESOPHAGOGASTRODUODENOSCOPY      description-04/13/2016 gastritis with antral nodule    EYE SURGERY      for relief of intraocular pressure    INCISIONAL BREAST BIOPSY      description 2008    MASTECTOMY Left     SENTINEL LYMPH NODE BIOPSY      STRABISMUS SURGERY      description 1973, 66    UPPER GASTROINTESTINAL ENDOSCOPY  05/16/2019    Grade C erosive esophagitis    7 mm nodule in the stomach       Family History:    Family History   Problem Relation Age of Onset    Osteopenia Mother     Heart attack Father 59        Acute MI    Coronary artery disease Father     Thyroid nodules Father     Osteopenia Maternal Grandmother     Prostate cancer Maternal Grandfather     Leukemia Paternal Grandfather     Heart attack Maternal Uncle 48        acute MI, stent    Cancer Maternal Uncle         adenocarcinoma of oral cavity    Colon cancer Maternal Uncle 62    Hyperlipidemia Maternal Uncle     Pancreatic cancer Paternal Uncle     Prostate cancer Paternal Uncle     Colon cancer Family     Coronary artery disease Family     Thyroid disease Family        Cancer-related family history includes Cancer in her maternal uncle; Colon cancer in her family; Colon cancer (age of onset: 62) in her maternal uncle; Pancreatic cancer in her paternal uncle; Prostate cancer in her maternal grandfather and paternal uncle      Social History:   Social History     Socioeconomic History    Marital status: Single     Spouse name: Not on file    Number of children: Not on file    Years of education: Not on file    Highest education level: Not on file   Occupational History    Not on file   Social Needs    Financial resource strain: Not on file    Food insecurity:     Worry: Not on file     Inability: Not on file    Transportation needs:     Medical: Not on file     Non-medical: Not on file   Tobacco Use    Smoking status: Never Smoker    Smokeless tobacco: Never Used   Substance and Sexual Activity    Alcohol use: No    Drug use: No    Sexual activity: Not on file   Lifestyle    Physical activity:     Days per week: Not on file     Minutes per session: Not on file    Stress: Not on file   Relationships    Social connections:     Talks on phone: Not on file     Gets together: Not on file     Attends [employer] service: Not on file     Active member of club or organization: Not on file     Attends meetings of clubs or organizations: Not on file     Relationship status: Not on file    Intimate partner violence:     Fear of current or ex partner: Not on file     Emotionally abused: Not on file     Physically abused: Not on file     Forced sexual activity: Not on file   Other Topics Concern    Not on file   Social History Narrative    Person living in a residential institution    Uses safety equipment-seat belts       Current Medications:   Current Outpatient Medications   Medication Sig Dispense Refill    acetaminophen (TYLENOL) 500 mg tablet Take 500 mg by mouth every 6 (six) hours as needed for mild pain      anastrozole (ARIMIDEX) 1 mg tablet Take 1 tablet (1 mg total) by mouth daily 30 tablet 11    BANOPHEN 25 MG capsule       calamine lotion Apply topically every 4 (four) hours as needed for itching      Calcium Carb-Cholecalciferol (OYSTER SHELL CALCIUM) 500-400 MG-UNIT TABS Take 1 tablet by mouth 2 (two) times a day      carBAMazepine (TEGretol) 200 mg tablet 2 tab PO q am and 1 tab PO qhs      Carboxymethylcell-Hypromellose (GENTEAL) 0 25-0 3 % GEL Apply to eye      Cholecalciferol (VITAMIN D) 2000 units tablet TAKE TWO TABLETS BY MOUTH (4000IU) EVERY MORNING FOR VITAMIN DIFICIENCY 62 tablet 0    citalopram (CeleXA) 20 mg tablet 20 mg + 10 mg for total of 30 mg daily 30 tablet 0    Dentifrices (SENSODYNE PRONAMEL DT) Apply to teeth      diphenhydrAMINE (BENADRYL) 25 mg tablet Take 25 mg by mouth daily at bedtime as needed for itching (for insomnia and allergy ** DO NOT TAKE WITH XYZAL **)      docusate sodium (COLACE) 100 mg capsule Take 100 mg by mouth 2 (two) times a day      haloperidol (HALDOL) 1 mg tablet Take 1 mg by mouth daily      hydrocortisone (ANUSOL-HC, PROCTOSOL HC,) 2 5 % rectal cream Insert into the rectum 2 (two) times a day (Patient taking differently: Insert into the rectum once ) 30 g 0    Hypromellose 0 3 % SOLN Apply to eye      ibuprofen (MOTRIN) 200 mg tablet Take 400 mg by mouth every 6 (six) hours as needed for mild pain        latanoprost (XALATAN) 0 005 % ophthalmic solution Apply 1 drop to eye      levocetirizine (XYZAL) 5 MG tablet Take 1 tablet by mouth daily as needed      loperamide (IMODIUM) 2 mg capsule Take by mouth      LORazepam (ATIVAN) 0 5 mg tablet Take 1 mg by mouth every 6 (six) hours as needed for anxiety        melatonin 1 mg Take 1 mg by mouth daily at bedtime      memantine (NAMENDA) 10 mg tablet       mineral oil-white petrolatum (LUBRIFRESH P M ) ophthalmic ointment 0 5 inches      neomycin-polymyxin b-bacitracin (NEOSPORIN) 3 5-400-5,000       ondansetron (ZOFRAN) 4 mg tablet Take by mouth      pantoprazole (PROTONIX) 40 mg tablet Take 1 tablet (40 mg total) by mouth 2 (two) times a day 60 tablet 5    PROCTOSOL HC 2 5 % rectal cream       ranitidine (ZANTAC) 300 MG tablet Take 300 mg by mouth daily at bedtime      sodium chloride (SOCHLOR) 5 % hypertonic ophthalmic solution Apply 1 drop to eye 2 (two) times a day       sodium fluoride 0 275 (0 125 F) MG/DROP solution Take 275 mcg by mouth daily      timolol (BETIMOL) 0 5 % ophthalmic solution every 24 hours      timolol (TIMOPTIC-XE) 0 5 % ophthalmic gel-forming Administer 1 drop into the left eye daily      tolnaftate (TINACTIN) 1 % spray Apply topically      ziprasidone (GEODON) 20 mg capsule Take 20 mg by mouth 2 (two) times a day with meals        No current facility-administered medications for this visit  Allergies: Allergies   Allergen Reactions    Bactrim [Sulfamethoxazole-Trimethoprim]     Methylphenidate Hyperactivity     Reaction Date: [id number];     Sulfa Antibiotics     Thioridazine     Amphetamines     Chlorpromazine     Fexofenadine Hives     Reaction Date: [id number];    [place] [employer] Medroxyprogesterone Rash and Hives     Reaction Date: [id number];     Other Hives    Trimethoprim        Physical Exam:    Body surface area is 1 36 meters squared      Wt Readings from Last 3 Encounters:   07/06/20 46 7 kg (103 lb)   01/06/20 49 9 kg (110 lb)   12/16/19 49 4 kg (109 lb)        Temp Readings from Last 3 Encounters:   07/06/20 98 2 °F (36 8 °C) (Tympanic)   01/06/20 98 3 °F (36 8 °C) (Tympanic)   12/12/19 98 °F (36 7 °C)        BP Readings from Last 3 Encounters:   07/06/20 118/76   01/06/20 130/68 12/16/19 120/84         Pulse Readings from Last 3 Encounters:   07/06/20 88   01/06/20 71   12/16/19 92        Physical Exam     Constitutional   General appearance: No acute distress, well appearing and well nourished  Eyes   Conjunctiva and lids: No swelling, erythema or discharge  Pupils and irises: Equal, round and reactive to light  Ears, Nose, Mouth, and Throat   External inspection of ears and nose: Normal     Nasal mucosa, septum, and turbinates: Normal without edema or erythema  Oropharynx: Normal with no erythema, edema, exudate or lesions  Pulmonary   Respiratory effort: No increased work of breathing or signs of respiratory distress  Auscultation of lungs: Clear to auscultation  Cardiovascular   Palpation of heart: Normal PMI, no thrills  Auscultation of heart: Normal rate and rhythm, normal S1 and S2, without murmurs  Examination of extremities for edema and/or varicosities: Normal     Carotid pulses: Normal     Abdomen   Abdomen: Non-tender, no masses  Liver and spleen: No hepatomegaly or splenomegaly  Lymphatic   Palpation of lymph nodes in neck: No lymphadenopathy  Musculoskeletal   Gait and station: Normal     Digits and nails: Normal without clubbing or cyanosis  Inspection/palpation of joints, bones, and muscles: Normal     Skin   Skin and subcutaneous tissue: Normal without rashes or lesions  Neurologic   Cranial nerves: Cranial nerves 2-12 intact  Sensation: No sensory loss  Psychiatric   Orientation to person, place, and time: Normal     Mood and affect: Normal         Assessment / Plan:      The patient is a 20-year-old autistic female with a history of stage IA ER/MS positive, HER2 positive breast cancer   She initially underwent chemotherapy with [address] followed by 1 year of Herceptin   This is completed in February 2015   She was then started on tamoxifen and took this until 2018 when she was switched to Arimidex  Cari Peoples remains on Arimidex 1 mg p o  Daily and will continue this until 2022  The goal is for her to be on combination of tamoxifen and Arimidex for a total of 7-10 years  The recommendation for 10 years would be based on the fact that she did have HER-2 positive disease  Again this is optional   Her most recent imaging showed no evidence of metastatic disease  We will try to finish up 10 years of her combined aromatase inhibitor and tamoxifen therapy  If not possible the minimum would be 7-7-1/2 years  The patient and her mother agree  I'll see her back in 6 months  Goals and Barriers:  Current Goal:  Prolong Survival from breast cancer   Barriers: None  Patient's Capacity to Self Care:  Patient  able to self care  Portions of the record may have been created with voice recognition software   Occasional wrong word or "sound a like" substitutions may have occurred due to the inherent limitations of voice recognition software   Read the chart carefully and recognize, using context, where substitutions have occurred

## 2020-07-08 ENCOUNTER — OFFICE VISIT (OUTPATIENT)
Dept: LAB | Facility: HOSPITAL | Age: 55
End: 2020-07-08
Payer: MEDICARE

## 2020-07-08 ENCOUNTER — TRANSCRIBE ORDERS (OUTPATIENT)
Dept: ADMINISTRATIVE | Facility: HOSPITAL | Age: 55
End: 2020-07-08

## 2020-07-08 DIAGNOSIS — Z79.899 ENCOUNTER FOR LONG-TERM (CURRENT) USE OF OTHER MEDICATIONS: Primary | ICD-10-CM

## 2020-07-08 DIAGNOSIS — Z79.899 ENCOUNTER FOR LONG-TERM (CURRENT) USE OF OTHER MEDICATIONS: ICD-10-CM

## 2020-07-08 LAB
ATRIAL RATE: 69 BPM
P AXIS: 34 DEGREES
PR INTERVAL: 118 MS
QRS AXIS: 18 DEGREES
QRSD INTERVAL: 74 MS
QT INTERVAL: 376 MS
QTC INTERVAL: 402 MS
T WAVE AXIS: 47 DEGREES
VENTRICULAR RATE: 69 BPM

## 2020-07-08 PROCEDURE — 93005 ELECTROCARDIOGRAM TRACING: CPT

## 2020-07-08 PROCEDURE — 93010 ELECTROCARDIOGRAM REPORT: CPT | Performed by: INTERNAL MEDICINE

## 2020-07-17 ENCOUNTER — HOSPITAL ENCOUNTER (OUTPATIENT)
Dept: MAMMOGRAPHY | Facility: CLINIC | Age: 55
Discharge: HOME | End: 2020-07-17
Payer: MEDICARE

## 2020-07-17 VITALS — TEMPERATURE: 97.5 F | WEIGHT: 227.74 LBS | HEIGHT: 55 IN | BODY MASS INDEX: 52.7 KG/M2

## 2020-07-17 DIAGNOSIS — Z12.31 SCREENING MAMMOGRAM, ENCOUNTER FOR: ICD-10-CM

## 2020-07-17 PROCEDURE — 77063 BREAST TOMOSYNTHESIS BI: CPT

## 2020-07-17 PROCEDURE — 77067 SCR MAMMO BI INCL CAD: CPT

## 2020-07-30 ENCOUNTER — OFFICE VISIT (OUTPATIENT)
Dept: FAMILY MEDICINE CLINIC | Facility: HOSPITAL | Age: 55
End: 2020-07-30
Payer: MEDICARE

## 2020-07-30 VITALS
HEART RATE: 98 BPM | WEIGHT: 105 LBS | SYSTOLIC BLOOD PRESSURE: 118 MMHG | HEIGHT: 58 IN | DIASTOLIC BLOOD PRESSURE: 72 MMHG | BODY MASS INDEX: 22.04 KG/M2 | TEMPERATURE: 98.8 F

## 2020-07-30 DIAGNOSIS — T42.1X1D: ICD-10-CM

## 2020-07-30 DIAGNOSIS — R41.82 ALTERED MENTAL STATUS, UNSPECIFIED ALTERED MENTAL STATUS TYPE: Primary | ICD-10-CM

## 2020-07-30 DIAGNOSIS — K21.9 CHRONIC GERD: ICD-10-CM

## 2020-07-30 DIAGNOSIS — F42.9 OBSESSIVE-COMPULSIVE DISORDER, UNSPECIFIED TYPE: ICD-10-CM

## 2020-07-30 DIAGNOSIS — F41.9 ANXIETY: ICD-10-CM

## 2020-07-30 PROCEDURE — 1036F TOBACCO NON-USER: CPT | Performed by: INTERNAL MEDICINE

## 2020-07-30 PROCEDURE — 3008F BODY MASS INDEX DOCD: CPT | Performed by: INTERNAL MEDICINE

## 2020-07-30 PROCEDURE — 99214 OFFICE O/P EST MOD 30 MIN: CPT | Performed by: INTERNAL MEDICINE

## 2020-07-30 NOTE — ASSESSMENT & PLAN NOTE
Having a lot of agitation and anxiety and acting out - meds being adjusted by psych - con't f/u and meds as directed

## 2020-07-30 NOTE — PROGRESS NOTES
Assessment/Plan:    Obsessive compulsive disorder  Having a lot of agitation and anxiety and acting out - meds being adjusted by psych - con't f/u and meds as directed    Anxiety  Very anxious and acting out = con't meds and f/u as per psych    Chronic GERD  C/o GERD symptoms, staff notes she seems to do this to try and go home - on PPI bid, no longer on H2 blocker, con't meds and f/u as per GI       Diagnoses and all orders for this visit:    Altered mental status, unspecified altered mental status type  Comments:  Felt to be d/t tegretol toxicity, dose decreaesd, levels therapeutic on recent labs, UA neg, con't meds and f/u as per Psych and Neuro    Accidental carbamazepine poisoning, subsequent encounter  Comments:  Dose decreaesd to previous amt, con't  meds and f/u as per Psych    Obsessive-compulsive disorder, unspecified type    Anxiety    Chronic GERD          Subjective:      Patient ID: Nehemias Garcia is a 48 y o  female  HPI Pt here for hospital follow up  Pt was [facility] to [facility] from 7/18/20 to 7/20/20  Records were reviewed by myself in detail and events are summarized below  Pt presented to [facility] on 7/18/20 with increased lethargy and stumbling gait  Had had tegretol increased 3 days PTA d/t increase in agitation/yelling out  In ED /96 and O2 sat 95% on RA  H/H 10 6/33 6, K 3 4, Ca 8 1, Tot prot 5 3, COVID was neg  TFT's were normal   Carbamazpeine was elevated at 18 5  ECG showed no acute ischemic changes  CT head showed atrophic changes L posterior parietal and occipital areas with poss old infarct  Shunts present  Pt was admitted to Tegretol toxicity and change in MS  UA was done and was neg  Tegretol was placed back to previous level  Pt improved and was back to baseline  She was discharged home on 7/20/20  Med list reviewed  Pt here today with [facility] staff  Upset and agitated today  She has been having issues with sleep   Her Melatonin has been increased and her Haldol was restarted  She finally seemed to sleep better last night  Staff reports she is eating and drinking well  Wgt up 3 lbs from Dec 2019  She frequently has c/o reflux issues  She is taking her Protonix bid  She is no longer on a H2 blocker  Review of Systems   Unable to perform ROS: Psychiatric disorder   Constitutional: Negative for chills, fever and unexpected weight change  HENT: Negative for congestion and sore throat  Eyes: Positive for visual disturbance  Respiratory: Negative for cough and shortness of breath  Gastrointestinal: Negative for abdominal pain, diarrhea and nausea  Genitourinary: Negative for difficulty urinating and dysuria  Skin: Negative for rash and wound  Neurological: Negative for dizziness and headaches  Hematological: Negative for adenopathy  Psychiatric/Behavioral: Positive for behavioral problems  Negative for confusion  The patient is nervous/anxious  Objective:    /72   Pulse 98   Temp 98 8 °F (37 1 °C)   Ht 4' 9 5" (1 461 m)   Wt 47 6 kg (105 lb)   BMI 22 33 kg/m²      Physical Exam   Constitutional: She appears well-developed  agitated   HENT:   Head: Normocephalic and atraumatic  Eyes: Conjunctivae are normal  Right eye exhibits no discharge  Left eye exhibits no discharge  Neck: Neck supple  No tracheal deviation present  Cardiovascular: Normal rate, regular rhythm and normal heart sounds  Exam reveals no friction rub  No murmur heard  Pulmonary/Chest: Effort normal and breath sounds normal  No respiratory distress  She has no wheezes  She has no rales  Abdominal: Soft  She exhibits no distension  There is no tenderness  There is no rebound and no guarding  Musculoskeletal: She exhibits no edema  Wheelchair for appt   Lymphadenopathy:     She has no cervical adenopathy  Neurological: She is alert  She exhibits normal muscle tone  Skin: Skin is warm  No rash noted     Psychiatric:   Agitated and upset   Nursing note and vitals reviewed

## 2020-07-30 NOTE — ASSESSMENT & PLAN NOTE
C/o GERD symptoms, staff notes she seems to do this to try and go home - on PPI bid, no longer on H2 blocker, con't meds and f/u as per GI

## 2020-08-17 DIAGNOSIS — K21.9 GASTROESOPHAGEAL REFLUX DISEASE, ESOPHAGITIS PRESENCE NOT SPECIFIED: ICD-10-CM

## 2020-08-17 RX ORDER — PANTOPRAZOLE SODIUM 40 MG/1
40 TABLET, DELAYED RELEASE ORAL 2 TIMES DAILY
Qty: 60 TABLET | Refills: 5 | Status: SHIPPED | OUTPATIENT
Start: 2020-08-17 | End: 2021-02-12

## 2020-08-27 ENCOUNTER — OFFICE VISIT (OUTPATIENT)
Dept: FAMILY MEDICINE CLINIC | Facility: HOSPITAL | Age: 55
End: 2020-08-27
Payer: MEDICARE

## 2020-08-27 VITALS
HEART RATE: 80 BPM | HEIGHT: 58 IN | SYSTOLIC BLOOD PRESSURE: 118 MMHG | OXYGEN SATURATION: 99 % | BODY MASS INDEX: 22.08 KG/M2 | TEMPERATURE: 99.4 F | WEIGHT: 105.2 LBS | DIASTOLIC BLOOD PRESSURE: 78 MMHG

## 2020-08-27 DIAGNOSIS — Z00.00 MEDICARE ANNUAL WELLNESS VISIT, SUBSEQUENT: Primary | ICD-10-CM

## 2020-08-27 DIAGNOSIS — G80.9 CEREBRAL PALSY, UNSPECIFIED TYPE (HCC): ICD-10-CM

## 2020-08-27 DIAGNOSIS — Q03.1 DANDY-WALKER SYNDROME (HCC): ICD-10-CM

## 2020-08-27 DIAGNOSIS — M85.80 OSTEOPENIA, UNSPECIFIED LOCATION: ICD-10-CM

## 2020-08-27 DIAGNOSIS — M25.512 ACUTE PAIN OF LEFT SHOULDER: ICD-10-CM

## 2020-08-27 PROCEDURE — 3008F BODY MASS INDEX DOCD: CPT | Performed by: INTERNAL MEDICINE

## 2020-08-27 PROCEDURE — G0439 PPPS, SUBSEQ VISIT: HCPCS | Performed by: INTERNAL MEDICINE

## 2020-08-27 PROCEDURE — 1036F TOBACCO NON-USER: CPT | Performed by: INTERNAL MEDICINE

## 2020-08-27 NOTE — PATIENT INSTRUCTIONS
Medicare Preventive Visit Patient Instructions  Thank you for completing your Welcome to Medicare Visit or Medicare Annual Wellness Visit today  Your next wellness visit will be due in one year (8/27/2021)  The screening/preventive services that you may require over the next 5-10 years are detailed below  Some tests may not apply to you based off risk factors and/or age  Screening tests ordered at today's visit but not completed yet may show as past due  Also, please note that scanned in results may not display below  Preventive Screenings:  Service Recommendations Previous Testing/Comments   Colorectal Cancer Screening  * Colonoscopy    * Fecal Occult Blood Test (FOBT)/Fecal Immunochemical Test (FIT)  * Fecal DNA/Cologuard Test  * Flexible Sigmoidoscopy Age: 54-65 years old   Colonoscopy: every 10 years (may be performed more frequently if at higher risk)  OR  FOBT/FIT: every 1 year  OR  Cologuard: every 3 years  OR  Sigmoidoscopy: every 5 years  Screening may be recommended earlier than age 48 if at higher risk for colorectal cancer  Also, an individualized decision between you and your healthcare provider will decide whether screening between the ages of 74-80 would be appropriate  Colonoscopy: 04/13/2016  FOBT/FIT: Not on file  Cologuard: Not on file  Sigmoidoscopy: Not on file    Screening Current     Breast Cancer Screening Age: 37 years old  Frequency: every 1-2 years  Not required if history of left and right mastectomy Mammogram: 07/17/2020    History Breast Cancer   Cervical Cancer Screening Between the ages of 21-29, pap smear recommended once every 3 years  Between the ages of 33-67, can perform pap smear with HPV co-testing every 5 years     Recommendations may differ for women with a history of total hysterectomy, cervical cancer, or abnormal pap smears in past  Pap Smear: 01/03/2019    Screening Current   Hepatitis C Screening Once for adults born between 1945 and 1965  More frequently in patients at high risk for Hepatitis C Hep C Antibody: Not on file       Diabetes Screening 1-2 times per year if you're at risk for diabetes or have pre-diabetes Fasting glucose: No results in last 5 years   A1C: No results in last 5 years       Cholesterol Screening Once every 5 years if you don't have a lipid disorder  May order more often based on risk factors  Lipid panel: Not on file         Other Preventive Screenings Covered by Medicare:  1  Abdominal Aortic Aneurysm (AAA) Screening: covered once if your at risk  You're considered to be at risk if you have a family history of AAA  2  Lung Cancer Screening: covers low dose CT scan once per year if you meet all of the following conditions: (1) Age 50-69; (2) No signs or symptoms of lung cancer; (3) Current smoker or have quit smoking within the last 15 years; (4) You have a tobacco smoking history of at least 30 pack years (packs per day multiplied by number of years you smoked); (5) You get a written order from a healthcare provider  3  Glaucoma Screening: covered annually if you're considered high risk: (1) You have diabetes OR (2) Family history of glaucoma OR (3)  aged 48 and older OR (3)  American aged 72 and older  3  Osteoporosis Screening: covered every 2 years if you meet one of the following conditions: (1) You're estrogen deficient and at risk for osteoporosis based off medical history and other findings; (2) Have a vertebral abnormality; (3) On glucocorticoid therapy for more than 3 months; (4) Have primary hyperparathyroidism; (5) On osteoporosis medications and need to assess response to drug therapy  · Last bone density test (DXA Scan): 07/02/2018  5  HIV Screening: covered annually if you're between the age of 12-76  Also covered annually if you are younger than 13 and older than 72 with risk factors for HIV infection  For pregnant patients, it is covered up to 3 times per pregnancy      Immunizations:  Immunization Recommendations   Influenza Vaccine Annual influenza vaccination during flu season is recommended for all persons aged >= 6 months who do not have contraindications   Pneumococcal Vaccine (Prevnar and Pneumovax)  * Prevnar = PCV13  * Pneumovax = PPSV23   Adults 25-60 years old: 1-3 doses may be recommended based on certain risk factors  Adults 72 years old: Prevnar (PCV13) vaccine recommended followed by Pneumovax (PPSV23) vaccine  If already received PPSV23 since turning 65, then PCV13 recommended at least one year after PPSV23 dose  Hepatitis B Vaccine 3 dose series if at intermediate or high risk (ex: diabetes, end stage renal disease, liver disease)   Tetanus (Td) Vaccine - COST NOT COVERED BY MEDICARE PART B Following completion of primary series, a booster dose should be given every 10 years to maintain immunity against tetanus  Td may also be given as tetanus wound prophylaxis  Tdap Vaccine - COST NOT COVERED BY MEDICARE PART B Recommended at least once for all adults  For pregnant patients, recommended with each pregnancy  Shingles Vaccine (Shingrix) - COST NOT COVERED BY MEDICARE PART B  2 shot series recommended in those aged 48 and above     Health Maintenance Due:      Topic Date Due    MAMMOGRAM  07/17/2021    Cervical Cancer Screening  01/03/2024     Immunizations Due:      Topic Date Due    DTaP,Tdap,and Td Vaccines (1 - Tdap) 04/02/1987    Influenza Vaccine  07/01/2020     Advance Directives   What are advance directives? Advance directives are legal documents that state your wishes and plans for medical care  These plans are made ahead of time in case you lose your ability to make decisions for yourself  Advance directives can apply to any medical decision, such as the treatments you want, and if you want to donate organs  What are the types of advance directives? There are many types of advance directives, and each state has rules about how to use them   You may choose a combination of any of the following:  · Living will: This is a written record of the treatment you want  You can also choose which treatments you do not want, which to limit, and which to stop at a certain time  This includes surgery, medicine, IV fluid, and tube feedings  · Durable power of  for healthcare [facility]): This is a written record that states who you want to make healthcare choices for you when you are unable to make them for yourself  This person, called a proxy, is usually a family member or a friend  You may choose more than 1 proxy  · Do not resuscitate (DNR) order:  A DNR order is used in case your heart stops beating or you stop breathing  It is a request not to have certain forms of treatment, such as CPR  A DNR order may be included in other types of advance directives  · Medical directive: This covers the care that you want if you are in a coma, near death, or unable to make decisions for yourself  You can list the treatments you want for each condition  Treatment may include pain medicine, surgery, blood transfusions, dialysis, IV or tube feedings, and a ventilator (breathing machine)  · Values history: This document has questions about your views, beliefs, and how you feel and think about life  This information can help others choose the care that you would choose  Why are advance directives important? An advance directive helps you control your care  Although spoken wishes may be used, it is better to have your wishes written down  Spoken wishes can be misunderstood, or not followed  Treatments may be given even if you do not want them  An advance directive may make it easier for your family to make difficult choices about your care  © Copyright [employer] 2018 Information is for End User's use only and may not be sold, redistributed or otherwise used for commercial purposes   All illustrations and images included in CareNotes® are the copyrighted property of [employer]  or [facility] Preventive Visit Patient Instructions  Thank you for completing your Welcome to Medicare Visit or Medicare Annual Wellness Visit today  Your next wellness visit will be due in one year (8/27/2021)  The screening/preventive services that you may require over the next 5-10 years are detailed below  Some tests may not apply to you based off risk factors and/or age  Screening tests ordered at today's visit but not completed yet may show as past due  Also, please note that scanned in results may not display below  Preventive Screenings:  Service Recommendations Previous Testing/Comments   Colorectal Cancer Screening  * Colonoscopy    * Fecal Occult Blood Test (FOBT)/Fecal Immunochemical Test (FIT)  * Fecal DNA/Cologuard Test  * Flexible Sigmoidoscopy Age: 54-65 years old   Colonoscopy: every 10 years (may be performed more frequently if at higher risk)  OR  FOBT/FIT: every 1 year  OR  Cologuard: every 3 years  OR  Sigmoidoscopy: every 5 years  Screening may be recommended earlier than age 48 if at higher risk for colorectal cancer  Also, an individualized decision between you and your healthcare provider will decide whether screening between the ages of 74-80 would be appropriate  Colonoscopy: 04/13/2016  FOBT/FIT: Not on file  Cologuard: Not on file  Sigmoidoscopy: Not on file    Screening Current     Breast Cancer Screening Age: 37 years old  Frequency: every 1-2 years  Not required if history of left and right mastectomy Mammogram: 07/17/2020    History Breast Cancer   Cervical Cancer Screening Between the ages of 21-29, pap smear recommended once every 3 years  Between the ages of 33-67, can perform pap smear with HPV co-testing every 5 years     Recommendations may differ for women with a history of total hysterectomy, cervical cancer, or abnormal pap smears in past  Pap Smear: 01/03/2019    Screening Current   Hepatitis C Screening Once for adults born between 1945 and 1965  More frequently in patients at high risk for Hepatitis C Hep C Antibody: Not on file       Diabetes Screening 1-2 times per year if you're at risk for diabetes or have pre-diabetes Fasting glucose: No results in last 5 years   A1C: No results in last 5 years       Cholesterol Screening Once every 5 years if you don't have a lipid disorder  May order more often based on risk factors  Lipid panel: Not on file         Other Preventive Screenings Covered by Medicare:  6  Abdominal Aortic Aneurysm (AAA) Screening: covered once if your at risk  You're considered to be at risk if you have a family history of AAA  7  Lung Cancer Screening: covers low dose CT scan once per year if you meet all of the following conditions: (1) Age 50-69; (2) No signs or symptoms of lung cancer; (3) Current smoker or have quit smoking within the last 15 years; (4) You have a tobacco smoking history of at least 30 pack years (packs per day multiplied by number of years you smoked); (5) You get a written order from a healthcare provider  8  Glaucoma Screening: covered annually if you're considered high risk: (1) You have diabetes OR (2) Family history of glaucoma OR (3)  aged 48 and older OR (3)  American aged 72 and older  5  Osteoporosis Screening: covered every 2 years if you meet one of the following conditions: (1) You're estrogen deficient and at risk for osteoporosis based off medical history and other findings; (2) Have a vertebral abnormality; (3) On glucocorticoid therapy for more than 3 months; (4) Have primary hyperparathyroidism; (5) On osteoporosis medications and need to assess response to drug therapy  · Last bone density test (DXA Scan): 07/02/2018  10  HIV Screening: covered annually if you're between the age of 12-76  Also covered annually if you are younger than 13 and older than 72 with risk factors for HIV infection   For pregnant patients, it is covered up to 3 times per pregnancy  Immunizations:  Immunization Recommendations   Influenza Vaccine Annual influenza vaccination during flu season is recommended for all persons aged >= 6 months who do not have contraindications   Pneumococcal Vaccine (Prevnar and Pneumovax)  * Prevnar = PCV13  * Pneumovax = PPSV23   Adults 25-60 years old: 1-3 doses may be recommended based on certain risk factors  Adults 72 years old: Prevnar (PCV13) vaccine recommended followed by Pneumovax (PPSV23) vaccine  If already received PPSV23 since turning 65, then PCV13 recommended at least one year after PPSV23 dose  Hepatitis B Vaccine 3 dose series if at intermediate or high risk (ex: diabetes, end stage renal disease, liver disease)   Tetanus (Td) Vaccine - COST NOT COVERED BY MEDICARE PART B Following completion of primary series, a booster dose should be given every 10 years to maintain immunity against tetanus  Td may also be given as tetanus wound prophylaxis  Tdap Vaccine - COST NOT COVERED BY MEDICARE PART B Recommended at least once for all adults  For pregnant patients, recommended with each pregnancy  Shingles Vaccine (Shingrix) - COST NOT COVERED BY MEDICARE PART B  2 shot series recommended in those aged 48 and above     Health Maintenance Due:      Topic Date Due    MAMMOGRAM  07/17/2021    Cervical Cancer Screening  01/03/2024     Immunizations Due:      Topic Date Due    DTaP,Tdap,and Td Vaccines (1 - Tdap) 04/02/1987    Influenza Vaccine  07/01/2020     Advance Directives   What are advance directives? Advance directives are legal documents that state your wishes and plans for medical care  These plans are made ahead of time in case you lose your ability to make decisions for yourself  Advance directives can apply to any medical decision, such as the treatments you want, and if you want to donate organs  What are the types of advance directives?   There are many types of advance directives, and each state has rules about how to use them  You may choose a combination of any of the following:  · Living will: This is a written record of the treatment you want  You can also choose which treatments you do not want, which to limit, and which to stop at a certain time  This includes surgery, medicine, IV fluid, and tube feedings  · Durable power of  for healthcare [facility]): This is a written record that states who you want to make healthcare choices for you when you are unable to make them for yourself  This person, called a proxy, is usually a family member or a friend  You may choose more than 1 proxy  · Do not resuscitate (DNR) order:  A DNR order is used in case your heart stops beating or you stop breathing  It is a request not to have certain forms of treatment, such as CPR  A DNR order may be included in other types of advance directives  · Medical directive: This covers the care that you want if you are in a coma, near death, or unable to make decisions for yourself  You can list the treatments you want for each condition  Treatment may include pain medicine, surgery, blood transfusions, dialysis, IV or tube feedings, and a ventilator (breathing machine)  · Values history: This document has questions about your views, beliefs, and how you feel and think about life  This information can help others choose the care that you would choose  Why are advance directives important? An advance directive helps you control your care  Although spoken wishes may be used, it is better to have your wishes written down  Spoken wishes can be misunderstood, or not followed  Treatments may be given even if you do not want them  An advance directive may make it easier for your family to make difficult choices about your care  © Copyright [employer] 2018 Information is for End User's use only and may not be sold, redistributed or otherwise used for commercial purposes   All illustrations and images included in CareNotes® are the copyrighted property of [employer]  or [address] [place], [place] [address]

## 2020-08-27 NOTE — PROGRESS NOTES
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[address] )     Neurologically stable, con't f/u as per Neuro         Cerebral palsy ([facility] [address] )     Doing well, ambulating with assistance and only uses wheelchair for long distances, call with falls            Musculoskeletal and Integument    Osteopenia     Dexa due - order given, con't Ca/VIt D and encouraged to keep active         Relevant Orders    DXA bone density spine hip and pelvis      Other Visit Diagnoses     Medicare annual wellness visit, subsequent    -  Primary    Acute pain of left shoulder        Reassuringly has no significant exertional symptoms and more suggestive of MS etiology then cardiac, will try trial of PT, advised staff to call with any CV symptoms or concerns    Relevant Orders    Ambulatory referral to Physical Therapy           Preventive health issues were discussed with patient, and age appropriate screening tests were ordered as noted in patient's After Visit Summary  Personalized health advice and appropriate referrals for health education or preventive services given if needed, as noted in patient's After Visit Summary       History of Present Illness:     Patient presents for Medicare Annual Wellness visit    Patient Care Team:  Meera Georges DO as PCP - General  Kuldeep Jurado MD as PCP - Endocrinology (Endocrinology)  Anoop Gary MD as Surgeon (Surgical Oncology)  Meera Georges DO (Internal Medicine)  JESSE Leung (Family Medicine)  Bela Gonzales (Obstetrics and Gynecology)  Rehan Drake MD (Neurology)  Toby Headley MD (Ophthalmology)  III Christa Oconnell DPM (Podiatry)  Bertis Cowden, MD as Medical Oncologist (Oncology)     Problem List:     Patient Active Problem List   Diagnosis    Vitamin D deficiency    Scoliosis    Retinal detachment    Osteopenia    Obsessive compulsive disorder    Multiple thyroid nodules    Mild intellectual disability  Malignant neoplasm of overlapping sites of right breast in female, estrogen receptor positive ([facility] [address] )   [facility]    Irregular menstrual cycle    Internal hemorrhoids    Impulse control disorder    Glaucoma    Dyslipidemia    Depression    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HCC)    Chronic gingivitis    Cerebral palsy (HCC)    Benign neoplasm of large intestine    Autistic disorder    Anxiety    Other long term (current) drug therapy    Hyperglycemia    Screening for colorectal cancer    Chronic GERD    Iron deficiency anemia, unspecified    Use of anastrozole (Arimidex)    Family history of colonic polyps      Past Medical and Surgical History:     Past Medical History:   Diagnosis Date    Breast cyst, left     last assessed 12/30/2013    Chronic GERD     Depression     Fibrocystic breast disease     last assessed 06/29/2012    Gastrointestinal hemorrhage     Glaucoma     History of chemotherapy     Hydrocephalus (HCC)     Ovarian disorder     last assessed 06/29/2012    Scoliosis      Past Surgical History:   Procedure Laterality Date    BREAST BIOPSY Right     COLONOSCOPY      Description 04/13/2016-5 yrs  Description 4 yr f/u d/t polyp and family history, onset 07/20/2012   ESOPHAGOGASTRODUODENOSCOPY      description-04/13/2016 gastritis with antral nodule    EYE SURGERY      for relief of intraocular pressure    INCISIONAL BREAST BIOPSY      description 2008    MASTECTOMY Left     SENTINEL LYMPH NODE BIOPSY      STRABISMUS SURGERY      description 1973, 66    UPPER GASTROINTESTINAL ENDOSCOPY  05/16/2019    Grade C erosive esophagitis    7 mm nodule in the stomach      Family History:     Family History   Problem Relation Age of Onset    Osteopenia Mother     Heart attack Father 59        Acute MI    Coronary artery disease Father     Thyroid nodules Father     Osteopenia Maternal Grandmother     Prostate cancer Maternal Grandfather     Leukemia Paternal Grandfather     Heart attack Maternal Uncle 48        acute MI, stent    Cancer Maternal Uncle         adenocarcinoma of oral cavity    Colon cancer Maternal Uncle 62    Hyperlipidemia Maternal Uncle     Pancreatic cancer Paternal Uncle     Prostate cancer Paternal Uncle     Colon cancer Family     Coronary artery disease Family     Thyroid disease Family       Social History:     E-Cigarette/Vaping    E-Cigarette Use Never User      E-Cigarette/Vaping Substances    Nicotine No     Flavoring No      Social History     Socioeconomic History    Marital status: Single     Spouse name: None    Number of children: None    Years of education: None    Highest education level: None   Occupational History    None   Social Needs    Financial resource strain: None    Food insecurity     Worry: None     Inability: None    Transportation needs     Medical: None     Non-medical: None   Tobacco Use    Smoking status: Never Smoker    Smokeless tobacco: Never Used   Substance and Sexual Activity    Alcohol use: No    Drug use: No    Sexual activity: None   Lifestyle    Physical activity     Days per week: None     Minutes per session: None    Stress: None   Relationships    Social connections     Talks on phone: None     Gets together: None     Attends [employer] service: None     Active member of club or organization: None     Attends meetings of clubs or organizations: None     Relationship status: None    Intimate partner violence     Fear of current or ex partner: None     Emotionally abused: None     Physically abused: None     Forced sexual activity: None   Other Topics Concern    None   Social History Narrative    Person living in a residential institution    Uses safety equipment-seat belts      Medications and Allergies:     Current Outpatient Medications   Medication Sig Dispense Refill    melatonin 1 mg Take 5 mg by mouth daily at bedtime       acetaminophen (TYLENOL) 500 mg tablet Take 500 mg by mouth every 6 (six) hours as needed for mild pain      anastrozole (ARIMIDEX) 1 mg tablet Take 1 tablet (1 mg total) by mouth daily 30 tablet 11    BANOPHEN 25 MG capsule       calamine lotion Apply topically every 4 (four) hours as needed for itching      Calcium Carb-Cholecalciferol (OYSTER SHELL CALCIUM) 500-400 MG-UNIT TABS Take 1 tablet by mouth 2 (two) times a day      carBAMazepine (TEGretol) 200 mg tablet 2 tab PO q am and 1 tab PO qhs      Carboxymethylcell-Hypromellose (GENTEAL) 0 25-0 3 % GEL Apply to eye      Cholecalciferol (VITAMIN D) 2000 units tablet TAKE TWO TABLETS BY MOUTH (4000IU) EVERY MORNING FOR VITAMIN DIFICIENCY 62 tablet 0    Dentifrices (SENSODYNE PRONAMEL DT) Apply to teeth      diphenhydrAMINE (BENADRYL) 25 mg tablet Take 25 mg by mouth daily at bedtime as needed for itching (for insomnia and allergy ** DO NOT TAKE WITH XYZAL **)      docusate sodium (COLACE) 100 mg capsule Take 100 mg by mouth 2 (two) times a day      haloperidol (HALDOL) 1 mg tablet Take 2 mg by mouth 2 (two) times a day      hydrocortisone (ANUSOL-HC, PROCTOSOL HC,) 2 5 % rectal cream Insert into the rectum 2 (two) times a day (Patient taking differently: Insert into the rectum once ) 30 g 0    Hypromellose 0 3 % SOLN Apply to eye      ibuprofen (MOTRIN) 200 mg tablet Take 400 mg by mouth every 6 (six) hours as needed for mild pain        latanoprost (XALATAN) 0 005 % ophthalmic solution Apply 1 drop to eye      levocetirizine (XYZAL) 5 MG tablet Take 1 tablet by mouth daily as needed      loperamide (IMODIUM) 2 mg capsule Take by mouth      LORazepam (ATIVAN) 0 5 mg tablet Take 1 mg by mouth 2 (two) times a day       memantine (NAMENDA) 10 mg tablet       mineral oil-white petrolatum (LUBRIFRESH P M ) ophthalmic ointment 0 5 inches      neomycin-polymyxin b-bacitracin (NEOSPORIN) 3 5-400-5,000       ondansetron (ZOFRAN) 4 mg tablet Take by mouth      pantoprazole (PROTONIX) 40 mg tablet Take 1 tablet (40 mg total) by mouth 2 (two) times a day 60 tablet 5    PROCTOSOL HC 2 5 % rectal cream       sodium chloride (SOCHLOR) 5 % hypertonic ophthalmic solution Apply 1 drop to eye 2 (two) times a day       sodium fluoride 0 275 (0 125 F) MG/DROP solution Take 275 mcg by mouth daily      timolol (TIMOPTIC-XE) 0 5 % ophthalmic gel-forming Administer 1 drop into the left eye daily      tolnaftate (TINACTIN) 1 % spray Apply topically       No current facility-administered medications for this visit        Allergies   Allergen Reactions    Bactrim [Sulfamethoxazole-Trimethoprim]     Methylphenidate Hyperactivity     Reaction Date: [id number];     Sulfa Antibiotics     Thioridazine     Amphetamines     Chlorpromazine     Fexofenadine Hives     Reaction Date: [id number];    [place] Medroxyprogesterone Rash and Hives     Reaction Date: [id number];     Other Hives    Trimethoprim       Immunizations:     Immunization History   Administered Date(s) Administered    INFLUENZA 11/07/2014, 09/29/2015, 09/07/2016, 09/29/2017, 10/15/2018, 10/25/2019    IPV 1965, 1965, 09/06/1968    Influenza Quadrivalent Preservative Free 3 years and older IM 11/07/2014, 09/29/2015, 09/07/2016    Influenza Quadrivalent, 6-35 Months IM 09/29/2017    Influenza TIV (IM) 09/25/2009, 09/23/2010, 10/02/2012, 09/16/2013    Influenza, injectable, quadrivalent, preservative free 0 5 mL 10/15/2018, 10/25/2019    Pneumococcal Polysaccharide PPV23 09/17/2015, 05/29/2018    TD (adult) Preservative Free 02/18/2015    Td (adult), adsorbed 04/13/1993, 02/18/2005, 02/18/2015    Tetanus, adsorbed 02/18/2015    Tuberculin Skin Test-PPD Intradermal 08/01/1993, 07/01/1995, 10/01/2007, 08/16/2011, 09/16/2013, 09/15/2015, 09/19/2017, 09/16/2019      Health Maintenance:         Topic Date Due    MAMMOGRAM  07/17/2021    Cervical Cancer Screening  01/03/2024         Topic Date Due    DTaP,Tdap,and Td Vaccines (1 - Tdap) 04/02/1987    Influenza Vaccine  07/01/2020      Medicare Health Risk Assessment:       Review of Systems   Unable to perform ROS: Psychiatric disorder   Constitutional: Negative for chills, fever and unexpected weight change  HENT: Negative for congestion and sore throat  Eyes: Positive for visual disturbance  Negative for pain  Respiratory: Negative for cough and shortness of breath  Cardiovascular: Negative for chest pain, palpitations and leg swelling  Gastrointestinal: Positive for abdominal pain  Negative for blood in stool, constipation, diarrhea, nausea and vomiting  Genitourinary: Negative for difficulty urinating and dysuria  Musculoskeletal: Positive for arthralgias  Negative for back pain and neck pain  Skin: Negative for rash and wound  Neurological: Negative for dizziness, seizures and headaches  Hematological: Negative for adenopathy  Psychiatric/Behavioral: Positive for behavioral problems  Negative for confusion  /78   Pulse 80   Temp 99 4 °F (37 4 °C)   Ht 4' 9 5" (1 461 m)   Wt 47 7 kg (105 lb 3 2 oz)   SpO2 99%   BMI 22 37 kg/m²      Physical Exam   Constitutional: She appears well-developed and well-nourished  No distress  HENT:   Head: Normocephalic and atraumatic  Right Ear: External ear normal    Left Ear: External ear normal    Eyes: Conjunctivae are normal  Right eye exhibits no discharge  Left eye exhibits no discharge  Neck: Neck supple  No tracheal deviation present  Cardiovascular: Normal rate, regular rhythm and normal heart sounds  No murmur heard  Neg carotid bruits B/L   Pulmonary/Chest: Effort normal and breath sounds normal  No respiratory distress  She has no wheezes  She has no rales  Abdominal: Soft  Bowel sounds are normal  There is no abdominal tenderness  There is no guarding  Musculoskeletal: Normal range of motion  General: No deformity        Comments: L shoulder: no pain with palp but has pain with PROM in all ranges   Lymphadenopathy:     She has no cervical adenopathy  Neurological: She is alert  She exhibits normal muscle tone  Skin: Skin is warm and dry  No rash noted  Psychiatric:   Sleepy at times and closes eyes but pleasant and cooperative   Nursing note and vitals reviewed  Dina Hahn is here for her Subsequent Wellness visit  Last Medicare Wellness visit information reviewed, patient interviewed and updates made to the record today  Health Risk Assessment:   Patient rates overall health as very good  Patient feels that their physical health rating is slightly better  Eyesight was rated as slightly worse  Hearing was rated as same  Patient feels that their emotional and mental health rating is slightly worse  Pain experienced in the last 7 days has been a lot  Patient's pain rating has been 10/10  Patient states that she has experienced no weight loss or gain in last 6 months  Stomach pain but is eating better and seemingly more back to baseline  Con't to follow with Psych for worsening mood issues, has had Melatonin increased to 5 mg, is going to have Tegretol increased very gradually (recent toxicity) as per psych,     Depression Screening:   PHQ-2 Score: 6  PHQ-9 Score: 24      Fall Risk Screening: In the past year, patient has experienced: history of falling in past year    Number of falls: 2 or more  Injured during fall?: No    Feels unsteady when standing or walking?: Yes    Worried about falling?: Yes      Urinary Incontinence Screening:   Patient has not leaked urine accidently in the last six months  Home Safety:  Patient has trouble with stairs inside or outside of their home  Patient has working smoke alarms and has working carbon monoxide detector  Home safety hazards include: none  Nutrition:   Current diet is Regular  Medications:   Patient is currently taking over-the-counter supplements   OTC medications include: see medication list  Patient is not able to manage medications  Activities of Daily Living (ADLs)/Instrumental Activities of Daily Living (IADLs):   Walk and transfer into and out of bed and chair?: Yes  Dress and groom yourself?: Yes    Bathe or shower yourself?: No    Feed yourself? Yes  Do your laundry/housekeeping?: Yes  Manage your money, pay your bills and track your expenses?: No  Make your own meals?: No    Do your own shopping?: No    Previous Hospitalizations:   Any hospitalizations or ED visits within the last 12 months?: Yes    How many hospitalizations have you had in the last year?: 1-2    Hospitalization Comments: 1 hospitalization    Advance Care Planning:   Living will: No    Durable POA for healthcare: No    Advanced directive: No      Cognitive Screening:   Provider or family/friend/caregiver concerned regarding cognition?: No    PREVENTIVE SCREENINGS      Cardiovascular Screening:    General: Risks and Benefits Discussed and Screening Current      Diabetes Screening:     General: Risks and Benefits Discussed and Screening Current      Colorectal Cancer Screening:     General: Screening Current      Breast Cancer Screening:     General: History Breast Cancer, Risks and Benefits Discussed and Screening Current      Cervical Cancer Screening:    General: Screening Current and Risks and Benefits Discussed      Osteoporosis Screening:    General: Risks and Benefits Discussed and Screening Current      Abdominal Aortic Aneurysm (AAA) Screening:        General: Risks and Benefits Discussed and Screening Not Indicated      Lung Cancer Screening:     General: Screening Not Indicated and Risks and Benefits Discussed      Hepatitis C Screening:    General: Risks and Benefits Discussed    Other Counseling Topics:   Sunscreen and regular weightbearing exercise         Mino Black, DO

## 2020-09-04 DIAGNOSIS — D50.8 OTHER IRON DEFICIENCY ANEMIA: Primary | ICD-10-CM

## 2020-09-04 RX ORDER — FERROUS SULFATE TAB EC 324 MG (65 MG FE EQUIVALENT) 324 (65 FE) MG
324 TABLET DELAYED RESPONSE ORAL
Qty: 30 TABLET | Refills: 5 | Status: SHIPPED | OUTPATIENT
Start: 2020-09-04 | End: 2022-03-18

## 2020-09-04 RX ORDER — ASCORBIC ACID 500 MG
TABLET ORAL
Qty: 30 TABLET | Refills: 2 | Status: SHIPPED | OUTPATIENT
Start: 2020-09-04 | End: 2022-03-18

## 2020-09-08 ENCOUNTER — TELEPHONE (OUTPATIENT)
Dept: GASTROENTEROLOGY | Facility: CLINIC | Age: 55
End: 2020-09-08

## 2020-09-08 ENCOUNTER — OFFICE VISIT (OUTPATIENT)
Dept: GASTROENTEROLOGY | Facility: CLINIC | Age: 55
End: 2020-09-08
Payer: MEDICARE

## 2020-09-08 VITALS
TEMPERATURE: 97.8 F | WEIGHT: 107 LBS | HEART RATE: 95 BPM | SYSTOLIC BLOOD PRESSURE: 112 MMHG | BODY MASS INDEX: 22.46 KG/M2 | HEIGHT: 58 IN | DIASTOLIC BLOOD PRESSURE: 88 MMHG

## 2020-09-08 DIAGNOSIS — Z86.010 HISTORY OF COLON POLYPS: ICD-10-CM

## 2020-09-08 DIAGNOSIS — K59.09 OTHER CONSTIPATION: ICD-10-CM

## 2020-09-08 DIAGNOSIS — D50.8 OTHER IRON DEFICIENCY ANEMIA: ICD-10-CM

## 2020-09-08 DIAGNOSIS — K21.9 CHRONIC GERD: Primary | ICD-10-CM

## 2020-09-08 PROCEDURE — 99214 OFFICE O/P EST MOD 30 MIN: CPT | Performed by: INTERNAL MEDICINE

## 2020-09-08 NOTE — TELEPHONE ENCOUNTER
Pt was ordered combo and a 2 m f u caregiver will cb to schedule procedure and will enter recall for 2 m f u    ce

## 2020-09-08 NOTE — PROGRESS NOTES
[id number] - Outpatient Follow-up Note  Darnell Bernal 48 y o  female MRN: [id number]  Encounter: [id number]    ASSESSMENT AND PLAN:      1  Chronic GERD  Clinically stable    2  Other iron deficiency anemia  History of this never adequately explained  Given the ongoing iron deficiency I would recommend upper and lower endoscopy  We will biopsy her for celiac at the same time  3  Other constipation  Worse  The caretaker describes rectal prolapse  If this is true she might have a rectal ulcer which is associated with prolapse this would actually explain many of her symptoms  Plan colonoscopy    4  History of colon polyps  Aden[place] 2016 colonoscopy now for symptoms      Followup Appointment:  2 months  ______________________________________________________________________    Chief Complaint   Patient presents with    Hemorrhoids     possible banding     HPI:  Having trouble with straining over the last few months  Most of the history is provided by her caretaker who accompanies her on today's visit  She says when to [place] is strains to move her bowels sometimes parts of her rectum Will, now  She complains of pain and this has to be manually reduced  No abdominal pain  No significant upper tract symptoms  She continues to be on iron either orally or intravenous for ongoing iron deficiency although she was not anemic in August her iron numbers were quite low  Historical Information   Past Medical History:   Diagnosis Date    Breast cyst, left     last assessed 12/30/2013    Chronic GERD     Depression     Fibrocystic breast disease     last assessed 06/29/2012    Gastrointestinal hemorrhage     Glaucoma     History of chemotherapy     Hydrocephalus (HCC)     Ovarian disorder     last assessed 06/29/2012    Scoliosis      Past Surgical History:   Procedure Laterality Date    BREAST BIOPSY Right     COLONOSCOPY      Description 04/13/2016-5 yrs    Description 4 yr f/u d/t polyp and family history, onset 07/20/2012   ESOPHAGOGASTRODUODENOSCOPY      description-04/13/2016 gastritis with antral nodule    EYE SURGERY      for relief of intraocular pressure    INCISIONAL BREAST BIOPSY      description 2008    MASTECTOMY Left     SENTINEL LYMPH NODE BIOPSY      STRABISMUS SURGERY      description 1973, 66    UPPER GASTROINTESTINAL ENDOSCOPY  05/16/2019    Grade C erosive esophagitis    7 mm nodule in the stomach     Social History     Substance and Sexual Activity   Alcohol Use No     Social History     Substance and Sexual Activity   Drug Use No     Social History     Tobacco Use   Smoking Status Never Smoker   Smokeless Tobacco Never Used     Family History   Problem Relation Age of Onset    Osteopenia Mother     Heart attack Father 59        Acute MI    Coronary artery disease Father     Thyroid nodules Father     Osteopenia Maternal Grandmother     Prostate cancer Maternal Grandfather     Leukemia Paternal Grandfather     Heart attack Maternal Uncle 48        acute MI, stent    Cancer Maternal Uncle         adenocarcinoma of oral cavity    Colon cancer Maternal Uncle 62    Hyperlipidemia Maternal Uncle     Pancreatic cancer Paternal Uncle     Prostate cancer Paternal Uncle     Colon cancer Family     Coronary artery disease Family     Thyroid disease Family          Current Outpatient Medications:     acetaminophen (TYLENOL) 500 mg tablet    anastrozole (ARIMIDEX) 1 mg tablet    ascorbic acid (VITAMIN C) 500 mg tablet    BANOPHEN 25 MG capsule    calamine lotion    Calcium Carb-Cholecalciferol (OYSTER SHELL CALCIUM) 500-400 MG-UNIT TABS    carBAMazepine (TEGretol) 200 mg tablet    Carboxymethylcell-Hypromellose (GENTEAL) 0 25-0 3 % GEL    Cholecalciferol (VITAMIN D) 2000 units tablet    Dentifrices (SENSODYNE PRONAMEL DT)    diphenhydrAMINE (BENADRYL) 25 mg tablet    docusate sodium (COLACE) 100 mg capsule    ferrous sulfate 324 (65 Fe) mg    haloperidol (HALDOL) 1 mg tablet    hydrocortisone (ANUSOL-HC, PROCTOSOL HC,) 2 5 % rectal cream    Hypromellose 0 3 % SOLN    ibuprofen (MOTRIN) 200 mg tablet    latanoprost (XALATAN) 0 005 % ophthalmic solution    levocetirizine (XYZAL) 5 MG tablet    loperamide (IMODIUM) 2 mg capsule    LORazepam (ATIVAN) 0 5 mg tablet    melatonin 1 mg    memantine (NAMENDA) 10 mg tablet    mineral oil-white petrolatum (LUBRIFRESH P M ) ophthalmic ointment    neomycin-polymyxin b-bacitracin (NEOSPORIN) 3 5-400-5,000    ondansetron (ZOFRAN) 4 mg tablet    pantoprazole (PROTONIX) 40 mg tablet    PROCTOSOL HC 2 5 % rectal cream    sodium chloride (SOCHLOR) 5 % hypertonic ophthalmic solution    sodium fluoride 0 275 (0 125 F) MG/DROP solution    timolol (TIMOPTIC-XE) 0 5 % ophthalmic gel-forming    tolnaftate (TINACTIN) 1 % spray  Allergies   Allergen Reactions    Bactrim [Sulfamethoxazole-Trimethoprim]     Methylphenidate Hyperactivity     Reaction Date: [id number];     Sulfa Antibiotics     Thioridazine     Amphetamines     Chlorpromazine     Fexofenadine Hives     Reaction Date: [id number];     Medrogestone     Medroxyprogesterone Rash and Hives     Reaction Date: [id number];     Other Hives    Trimethoprim      Reviewed medications and allergies and updated as indicated    PHYSICAL EXAM:    Blood pressure 112/88, pulse 95, temperature 97 8 °F (36 6 °C), height 4' 9 5" (1 461 m), weight 48 5 kg (107 lb), not currently breastfeeding  Body mass index is 22 75 kg/m²  General Appearance: NAD, cooperative, alert  Eyes: Anicteric, PERRLA, EOMI  ENT:  Normocephalic, atraumatic, normal mucosa  Neck:  Supple, symmetrical, trachea midline  Resp:  Clear to auscultation bilaterally; no rales, rhonchi or wheezing; respirations unlabored   CV:  S1 S2, Regular rate and rhythm; no murmur, rub, or gallop    GI:  Soft, non-tender, non-distended; normal bowel sounds; no masses, no organomegaly   Rectal:  No obvious prolapse or masses on a limited exam   Limited due to patient cooperation and discomfort  Musculoskeletal: No cyanosis, clubbing or edema  Normal ROM  Skin:  No jaundice, rashes, or lesions   Heme/Lymph: No palpable cervical lymphadenopathy  Psych: Normal affect, good eye contact  Neuro: No gross deficits, AAOx3    Lab Results:   Lab Results   Component Value Date    WBC 11 47 (H) 12/18/2013    HGB 12 1 12/18/2013    HCT 37 5 12/18/2013    MCV 92 12/18/2013     12/18/2013     Lab Results   Component Value Date     (L) 12/18/2013    K 3 5 (L) 12/18/2013     (L) 12/18/2013    CO2 29 12/18/2013    ANIONGAP 5 12/18/2013    BUN 13 12/18/2013    CREATININE 0 59 (L) 12/18/2013    GLUCOSE 127 12/18/2013    CALCIUM 8 1 (L) 12/18/2013     No results found for: IRON, TIBC, FERRITIN  No results found for: LIPASE    Radiology Results:   No results found

## 2020-09-22 DIAGNOSIS — F90.9 HYPERKINETIC DISORDER: Primary | ICD-10-CM

## 2020-09-22 RX ORDER — MEMANTINE HYDROCHLORIDE 10 MG/1
TABLET ORAL
Qty: 60 TABLET | Refills: 5 | Status: SHIPPED | OUTPATIENT
Start: 2020-09-22 | End: 2020-09-23 | Stop reason: SDUPTHER

## 2020-09-23 DIAGNOSIS — F90.9 HYPERKINETIC DISORDER: ICD-10-CM

## 2020-09-23 RX ORDER — MEMANTINE HYDROCHLORIDE 10 MG/1
10 TABLET ORAL 2 TIMES DAILY
Qty: 60 TABLET | Refills: 5 | Status: SHIPPED | OUTPATIENT
Start: 2020-09-23

## 2020-09-29 ENCOUNTER — TELEPHONE (OUTPATIENT)
Dept: FAMILY MEDICINE CLINIC | Facility: HOSPITAL | Age: 55
End: 2020-09-29

## 2020-09-29 NOTE — TELEPHONE ENCOUNTER
[place] wanted to let you know that the home Lambert Sender is in is currently under quarantine due to a positive COVID  Once quarantine is lifted she will go out to see her

## 2020-09-29 NOTE — TELEPHONE ENCOUNTER
Calling to notify us that she is not able to see pt in the home due to current Covid restrictions  She would like a call back

## 2020-10-06 ENCOUNTER — TELEMEDICINE (OUTPATIENT)
Dept: GASTROENTEROLOGY | Facility: CLINIC | Age: 55
End: 2020-10-06

## 2020-10-06 VITALS — HEIGHT: 58 IN | BODY MASS INDEX: 22.88 KG/M2 | WEIGHT: 109 LBS

## 2020-10-06 DIAGNOSIS — D64.9 ANEMIA, UNSPECIFIED TYPE: Primary | ICD-10-CM

## 2020-10-06 RX ORDER — SODIUM, POTASSIUM,MAG SULFATES 17.5-3.13G
SOLUTION, RECONSTITUTED, ORAL ORAL
Qty: 2 BOTTLE | Refills: 0 | Status: SHIPPED | OUTPATIENT
Start: 2020-10-06 | End: 2021-02-05

## 2020-10-09 ENCOUNTER — APPOINTMENT (EMERGENCY)
Dept: CT IMAGING | Facility: HOSPITAL | Age: 55
End: 2020-10-09
Payer: MEDICARE

## 2020-10-09 ENCOUNTER — TELEPHONE (OUTPATIENT)
Dept: FAMILY MEDICINE CLINIC | Facility: HOSPITAL | Age: 55
End: 2020-10-09

## 2020-10-09 ENCOUNTER — HOSPITAL ENCOUNTER (EMERGENCY)
Facility: HOSPITAL | Age: 55
Discharge: HOME | End: 2020-10-09
Attending: EMERGENCY MEDICINE | Admitting: EMERGENCY MEDICINE
Payer: MEDICARE

## 2020-10-09 VITALS
WEIGHT: 108.91 LBS | SYSTOLIC BLOOD PRESSURE: 166 MMHG | RESPIRATION RATE: 18 BRPM | OXYGEN SATURATION: 99 % | HEART RATE: 92 BPM | DIASTOLIC BLOOD PRESSURE: 77 MMHG | TEMPERATURE: 98.7 F | HEIGHT: 57 IN | BODY MASS INDEX: 23.5 KG/M2

## 2020-10-09 DIAGNOSIS — D64.9 ANEMIA: Primary | ICD-10-CM

## 2020-10-09 DIAGNOSIS — K57.90 DIVERTICULOSIS: ICD-10-CM

## 2020-10-09 DIAGNOSIS — K44.9 HIATAL HERNIA: ICD-10-CM

## 2020-10-09 DIAGNOSIS — N32.89 DISTENDED BLADDER: ICD-10-CM

## 2020-10-09 DIAGNOSIS — R19.5 LARGE STOOL: ICD-10-CM

## 2020-10-09 LAB
ABO GROUP BLD: NORMAL
ABO GROUP BLD: NORMAL
ANION GAP SERPL CALCULATED.3IONS-SCNC: 6 MMOL/L (ref 4–13)
APTT PPP: 23 SECONDS (ref 23–37)
BASOPHILS # BLD AUTO: 0.08 THOUSANDS/ΜL (ref 0–0.1)
BASOPHILS NFR BLD AUTO: 1 % (ref 0–1)
BILIRUB UR QL STRIP: NEGATIVE
BLD GP AB SCN SERPL QL: NEGATIVE
BUN SERPL-MCNC: 8 MG/DL (ref 5–25)
CALCIUM SERPL-MCNC: 8.9 MG/DL (ref 8.3–10.1)
CHLORIDE SERPL-SCNC: 99 MMOL/L (ref 100–108)
CLARITY UR: CLEAR
CO2 SERPL-SCNC: 30 MMOL/L (ref 21–32)
COLOR UR: YELLOW
CREAT SERPL-MCNC: 0.42 MG/DL (ref 0.6–1.3)
EOSINOPHIL # BLD AUTO: 0.15 THOUSAND/ΜL (ref 0–0.61)
EOSINOPHIL NFR BLD AUTO: 2 % (ref 0–6)
ERYTHROCYTE [DISTWIDTH] IN BLOOD BY AUTOMATED COUNT: 16.8 % (ref 11.6–15.1)
EXT SARS-COV-2: NOT DETECTED
GFR SERPL CREATININE-BSD FRML MDRD: 117 ML/MIN/1.73SQ M
GLUCOSE SERPL-MCNC: 100 MG/DL (ref 65–140)
GLUCOSE UR STRIP-MCNC: NEGATIVE MG/DL
HCT VFR BLD AUTO: 24.5 % (ref 34.8–46.1)
HGB BLD-MCNC: 6.8 G/DL (ref 11.5–15.4)
HGB UR QL STRIP.AUTO: NEGATIVE
IMM GRANULOCYTES # BLD AUTO: 0.02 THOUSAND/UL (ref 0–0.2)
IMM GRANULOCYTES NFR BLD AUTO: 0 % (ref 0–2)
INR PPP: 1 (ref 0.84–1.19)
KETONES UR STRIP-MCNC: NEGATIVE MG/DL
LEUKOCYTE ESTERASE UR QL STRIP: NEGATIVE
LYMPHOCYTES # BLD AUTO: 1.59 THOUSANDS/ΜL (ref 0.6–4.47)
LYMPHOCYTES NFR BLD AUTO: 23 % (ref 14–44)
MCH RBC QN AUTO: 20.9 PG (ref 26.8–34.3)
MCHC RBC AUTO-ENTMCNC: 27.8 G/DL (ref 31.4–37.4)
MCV RBC AUTO: 75 FL (ref 82–98)
MONOCYTES # BLD AUTO: 0.93 THOUSAND/ΜL (ref 0.17–1.22)
MONOCYTES NFR BLD AUTO: 14 % (ref 4–12)
NEUTROPHILS # BLD AUTO: 4.05 THOUSANDS/ΜL (ref 1.85–7.62)
NEUTS SEG NFR BLD AUTO: 60 % (ref 43–75)
NITRITE UR QL STRIP: NEGATIVE
NRBC BLD AUTO-RTO: 0 /100 WBCS
PH UR STRIP.AUTO: 7 [PH]
PLATELET # BLD AUTO: 593 THOUSANDS/UL (ref 149–390)
PMV BLD AUTO: 9.8 FL (ref 8.9–12.7)
POTASSIUM SERPL-SCNC: 4 MMOL/L (ref 3.5–5.3)
PROT UR STRIP-MCNC: NEGATIVE MG/DL
PROTHROMBIN TIME: 13.2 SECONDS (ref 11.6–14.5)
RBC # BLD AUTO: 3.26 MILLION/UL (ref 3.81–5.12)
RH BLD: POSITIVE
RH BLD: POSITIVE
SODIUM SERPL-SCNC: 135 MMOL/L (ref 136–145)
SP GR UR STRIP.AUTO: <=1.005 (ref 1–1.03)
SPECIMEN EXPIRATION DATE: NORMAL
UROBILINOGEN UR QL STRIP.AUTO: 0.2 E.U./DL
WBC # BLD AUTO: 6.82 THOUSAND/UL (ref 4.31–10.16)

## 2020-10-09 PROCEDURE — 74177 CT ABD & PELVIS W/CONTRAST: CPT

## 2020-10-09 PROCEDURE — 86850 RBC ANTIBODY SCREEN: CPT | Performed by: EMERGENCY MEDICINE

## 2020-10-09 PROCEDURE — 36415 COLL VENOUS BLD VENIPUNCTURE: CPT | Performed by: EMERGENCY MEDICINE

## 2020-10-09 PROCEDURE — 85730 THROMBOPLASTIN TIME PARTIAL: CPT | Performed by: EMERGENCY MEDICINE

## 2020-10-09 PROCEDURE — G1004 CDSM NDSC: HCPCS

## 2020-10-09 PROCEDURE — 85610 PROTHROMBIN TIME: CPT | Performed by: EMERGENCY MEDICINE

## 2020-10-09 PROCEDURE — 86900 BLOOD TYPING SEROLOGIC ABO: CPT | Performed by: EMERGENCY MEDICINE

## 2020-10-09 PROCEDURE — 86901 BLOOD TYPING SEROLOGIC RH(D): CPT | Performed by: EMERGENCY MEDICINE

## 2020-10-09 PROCEDURE — P9016 RBC LEUKOCYTES REDUCED: HCPCS

## 2020-10-09 PROCEDURE — 36430 TRANSFUSION BLD/BLD COMPNT: CPT

## 2020-10-09 PROCEDURE — 81003 URINALYSIS AUTO W/O SCOPE: CPT | Performed by: EMERGENCY MEDICINE

## 2020-10-09 PROCEDURE — 80048 BASIC METABOLIC PNL TOTAL CA: CPT | Performed by: EMERGENCY MEDICINE

## 2020-10-09 PROCEDURE — 85025 COMPLETE CBC W/AUTO DIFF WBC: CPT | Performed by: EMERGENCY MEDICINE

## 2020-10-09 PROCEDURE — 99285 EMERGENCY DEPT VISIT HI MDM: CPT | Performed by: EMERGENCY MEDICINE

## 2020-10-09 PROCEDURE — 86920 COMPATIBILITY TEST SPIN: CPT

## 2020-10-09 PROCEDURE — 99284 EMERGENCY DEPT VISIT MOD MDM: CPT

## 2020-10-09 RX ADMIN — IOHEXOL 100 ML: 350 INJECTION, SOLUTION INTRAVENOUS at 13:08

## 2020-10-10 LAB
ABO GROUP BLD BPU: NORMAL
BPU ID: NORMAL
CROSSMATCH: NORMAL
UNIT DISPENSE STATUS: NORMAL
UNIT PRODUCT CODE: NORMAL
UNIT RH: NORMAL

## 2020-10-13 ENCOUNTER — TELEPHONE (OUTPATIENT)
Dept: GASTROENTEROLOGY | Facility: CLINIC | Age: 55
End: 2020-10-13

## 2020-10-13 ENCOUNTER — TELEPHONE (OUTPATIENT)
Dept: HEMATOLOGY ONCOLOGY | Facility: CLINIC | Age: 55
End: 2020-10-13

## 2020-10-16 ENCOUNTER — HOSPITAL ENCOUNTER (OUTPATIENT)
Dept: BONE DENSITY | Facility: CLINIC | Age: 55
Discharge: HOME | End: 2020-10-16
Payer: MEDICARE

## 2020-10-16 DIAGNOSIS — M85.80 OSTEOPENIA, UNSPECIFIED LOCATION: ICD-10-CM

## 2020-10-16 PROCEDURE — 77080 DXA BONE DENSITY AXIAL: CPT

## 2020-10-23 ENCOUNTER — OFFICE VISIT (OUTPATIENT)
Dept: HEMATOLOGY ONCOLOGY | Facility: HOSPITAL | Age: 55
End: 2020-10-23
Payer: MEDICARE

## 2020-10-23 ENCOUNTER — TELEPHONE (OUTPATIENT)
Dept: HEMATOLOGY ONCOLOGY | Facility: CLINIC | Age: 55
End: 2020-10-23

## 2020-10-23 VITALS
TEMPERATURE: 98.5 F | DIASTOLIC BLOOD PRESSURE: 74 MMHG | HEIGHT: 58 IN | WEIGHT: 108 LBS | BODY MASS INDEX: 22.67 KG/M2 | OXYGEN SATURATION: 97 % | HEART RATE: 85 BPM | RESPIRATION RATE: 16 BRPM | SYSTOLIC BLOOD PRESSURE: 118 MMHG

## 2020-10-23 DIAGNOSIS — Z17.0 MALIGNANT NEOPLASM OF OVERLAPPING SITES OF RIGHT BREAST IN FEMALE, ESTROGEN RECEPTOR POSITIVE (HCC): Primary | ICD-10-CM

## 2020-10-23 DIAGNOSIS — D50.8 OTHER IRON DEFICIENCY ANEMIA: ICD-10-CM

## 2020-10-23 DIAGNOSIS — C50.811 MALIGNANT NEOPLASM OF OVERLAPPING SITES OF RIGHT BREAST IN FEMALE, ESTROGEN RECEPTOR POSITIVE (HCC): Primary | ICD-10-CM

## 2020-10-23 PROCEDURE — 99215 OFFICE O/P EST HI 40 MIN: CPT | Performed by: NURSE PRACTITIONER

## 2020-10-23 RX ORDER — SODIUM CHLORIDE 9 MG/ML
20 INJECTION, SOLUTION INTRAVENOUS ONCE
Status: CANCELLED | OUTPATIENT
Start: 2020-10-27

## 2020-10-23 RX ORDER — LITHIUM CARBONATE 300 MG/1
600 CAPSULE ORAL 2 TIMES DAILY
COMMUNITY
Start: 2020-10-22

## 2020-10-27 ENCOUNTER — HOSPITAL ENCOUNTER (OUTPATIENT)
Dept: INFUSION CENTER | Facility: HOSPITAL | Age: 55
Discharge: HOME | End: 2020-10-27
Attending: INTERNAL MEDICINE
Payer: MEDICARE

## 2020-10-27 VITALS
RESPIRATION RATE: 16 BRPM | HEART RATE: 90 BPM | SYSTOLIC BLOOD PRESSURE: 124 MMHG | DIASTOLIC BLOOD PRESSURE: 64 MMHG | TEMPERATURE: 98 F | OXYGEN SATURATION: 98 %

## 2020-10-27 DIAGNOSIS — D50.9 IRON DEFICIENCY ANEMIA, UNSPECIFIED IRON DEFICIENCY ANEMIA TYPE: Primary | ICD-10-CM

## 2020-10-27 PROCEDURE — 96365 THER/PROPH/DIAG IV INF INIT: CPT

## 2020-10-27 RX ORDER — SODIUM CHLORIDE 9 MG/ML
20 INJECTION, SOLUTION INTRAVENOUS ONCE
Status: CANCELLED | OUTPATIENT
Start: 2020-11-03

## 2020-10-27 RX ORDER — SODIUM CHLORIDE 9 MG/ML
20 INJECTION, SOLUTION INTRAVENOUS ONCE
Status: COMPLETED | OUTPATIENT
Start: 2020-10-27 | End: 2020-10-27

## 2020-10-27 RX ADMIN — IRON SUCROSE 200 MG: 20 INJECTION, SOLUTION INTRAVENOUS at 08:59

## 2020-10-27 RX ADMIN — SODIUM CHLORIDE 20 ML/HR: 9 INJECTION, SOLUTION INTRAVENOUS at 08:59

## 2020-11-03 ENCOUNTER — HOSPITAL ENCOUNTER (OUTPATIENT)
Dept: INFUSION CENTER | Facility: HOSPITAL | Age: 55
Discharge: HOME | End: 2020-11-03
Attending: INTERNAL MEDICINE
Payer: MEDICARE

## 2020-11-03 VITALS
TEMPERATURE: 97.8 F | HEART RATE: 91 BPM | DIASTOLIC BLOOD PRESSURE: 78 MMHG | SYSTOLIC BLOOD PRESSURE: 148 MMHG | RESPIRATION RATE: 18 BRPM

## 2020-11-03 DIAGNOSIS — D50.9 IRON DEFICIENCY ANEMIA, UNSPECIFIED IRON DEFICIENCY ANEMIA TYPE: Primary | ICD-10-CM

## 2020-11-03 PROCEDURE — 96365 THER/PROPH/DIAG IV INF INIT: CPT

## 2020-11-03 RX ORDER — SODIUM CHLORIDE 9 MG/ML
20 INJECTION, SOLUTION INTRAVENOUS ONCE
Status: COMPLETED | OUTPATIENT
Start: 2020-11-03 | End: 2020-11-03

## 2020-11-03 RX ORDER — SODIUM CHLORIDE 9 MG/ML
20 INJECTION, SOLUTION INTRAVENOUS ONCE
Status: CANCELLED | OUTPATIENT
Start: 2020-11-10

## 2020-11-03 RX ADMIN — IRON SUCROSE 200 MG: 20 INJECTION, SOLUTION INTRAVENOUS at 13:56

## 2020-11-03 RX ADMIN — SODIUM CHLORIDE 20 ML/HR: 9 INJECTION, SOLUTION INTRAVENOUS at 13:56

## 2020-11-05 ENCOUNTER — TELEPHONE (OUTPATIENT)
Dept: ENDOCRINOLOGY | Facility: CLINIC | Age: 55
End: 2020-11-05

## 2020-11-05 DIAGNOSIS — E04.2 MULTIPLE THYROID NODULES: Primary | ICD-10-CM

## 2020-11-06 ENCOUNTER — CLINICAL SUPPORT (OUTPATIENT)
Dept: GASTROENTEROLOGY | Facility: CLINIC | Age: 55
End: 2020-11-06
Payer: MEDICARE

## 2020-11-06 DIAGNOSIS — D50.8 OTHER IRON DEFICIENCY ANEMIA: ICD-10-CM

## 2020-11-06 PROCEDURE — 91110 GI TRC IMG INTRAL ESOPH-ILE: CPT | Performed by: INTERNAL MEDICINE

## 2020-11-09 ENCOUNTER — OFFICE VISIT (OUTPATIENT)
Dept: GASTROENTEROLOGY | Facility: CLINIC | Age: 55
End: 2020-11-09
Payer: MEDICARE

## 2020-11-09 VITALS
TEMPERATURE: 98.2 F | BODY MASS INDEX: 22.17 KG/M2 | DIASTOLIC BLOOD PRESSURE: 80 MMHG | WEIGHT: 105.6 LBS | SYSTOLIC BLOOD PRESSURE: 126 MMHG | HEIGHT: 58 IN | HEART RATE: 83 BPM

## 2020-11-09 DIAGNOSIS — Z86.010 HISTORY OF COLON POLYPS: ICD-10-CM

## 2020-11-09 DIAGNOSIS — D50.8 OTHER IRON DEFICIENCY ANEMIA: Primary | ICD-10-CM

## 2020-11-09 DIAGNOSIS — K21.9 CHRONIC GERD: ICD-10-CM

## 2020-11-09 DIAGNOSIS — K59.09 OTHER CONSTIPATION: ICD-10-CM

## 2020-11-09 PROCEDURE — 99214 OFFICE O/P EST MOD 30 MIN: CPT | Performed by: INTERNAL MEDICINE

## 2020-11-10 ENCOUNTER — TELEPHONE (OUTPATIENT)
Dept: GASTROENTEROLOGY | Facility: CLINIC | Age: 55
End: 2020-11-10

## 2020-11-10 ENCOUNTER — HOSPITAL ENCOUNTER (OUTPATIENT)
Dept: INFUSION CENTER | Facility: HOSPITAL | Age: 55
Discharge: HOME | End: 2020-11-10
Attending: INTERNAL MEDICINE
Payer: MEDICARE

## 2020-11-10 VITALS
OXYGEN SATURATION: 98 % | SYSTOLIC BLOOD PRESSURE: 131 MMHG | HEART RATE: 79 BPM | RESPIRATION RATE: 18 BRPM | TEMPERATURE: 98.4 F | DIASTOLIC BLOOD PRESSURE: 61 MMHG

## 2020-11-10 DIAGNOSIS — D50.9 IRON DEFICIENCY ANEMIA, UNSPECIFIED IRON DEFICIENCY ANEMIA TYPE: Primary | ICD-10-CM

## 2020-11-10 PROCEDURE — 96365 THER/PROPH/DIAG IV INF INIT: CPT

## 2020-11-10 RX ORDER — CITALOPRAM 10 MG/1
10 TABLET ORAL DAILY
COMMUNITY

## 2020-11-10 RX ORDER — SODIUM CHLORIDE 9 MG/ML
20 INJECTION, SOLUTION INTRAVENOUS ONCE
Status: COMPLETED | OUTPATIENT
Start: 2020-11-10 | End: 2020-11-10

## 2020-11-10 RX ORDER — SODIUM CHLORIDE 9 MG/ML
20 INJECTION, SOLUTION INTRAVENOUS ONCE
Status: CANCELLED | OUTPATIENT
Start: 2020-11-17

## 2020-11-10 RX ADMIN — SODIUM CHLORIDE 20 ML/HR: 9 INJECTION, SOLUTION INTRAVENOUS at 08:45

## 2020-11-10 RX ADMIN — IRON SUCROSE 200 MG: 20 INJECTION, SOLUTION INTRAVENOUS at 08:45

## 2020-11-17 ENCOUNTER — HOSPITAL ENCOUNTER (OUTPATIENT)
Dept: INFUSION CENTER | Facility: HOSPITAL | Age: 55
Discharge: HOME | End: 2020-11-17
Attending: INTERNAL MEDICINE
Payer: MEDICARE

## 2020-11-17 ENCOUNTER — HOSPITAL ENCOUNTER (OUTPATIENT)
Dept: ULTRASOUND IMAGING | Facility: HOSPITAL | Age: 55
Discharge: HOME | End: 2020-11-17
Attending: INTERNAL MEDICINE
Payer: MEDICARE

## 2020-11-17 VITALS
DIASTOLIC BLOOD PRESSURE: 74 MMHG | RESPIRATION RATE: 18 BRPM | OXYGEN SATURATION: 96 % | SYSTOLIC BLOOD PRESSURE: 132 MMHG

## 2020-11-17 DIAGNOSIS — E04.2 MULTIPLE THYROID NODULES: ICD-10-CM

## 2020-11-17 DIAGNOSIS — D50.9 IRON DEFICIENCY ANEMIA, UNSPECIFIED IRON DEFICIENCY ANEMIA TYPE: Primary | ICD-10-CM

## 2020-11-17 PROCEDURE — 96365 THER/PROPH/DIAG IV INF INIT: CPT

## 2020-11-17 PROCEDURE — 76536 US EXAM OF HEAD AND NECK: CPT

## 2020-11-17 RX ORDER — SODIUM CHLORIDE 9 MG/ML
20 INJECTION, SOLUTION INTRAVENOUS ONCE
Status: CANCELLED | OUTPATIENT
Start: 2020-11-24

## 2020-11-17 RX ORDER — SODIUM CHLORIDE 9 MG/ML
20 INJECTION, SOLUTION INTRAVENOUS ONCE
Status: COMPLETED | OUTPATIENT
Start: 2020-11-17 | End: 2020-11-17

## 2020-11-17 RX ADMIN — SODIUM CHLORIDE 20 ML/HR: 9 INJECTION, SOLUTION INTRAVENOUS at 14:02

## 2020-11-17 RX ADMIN — IRON SUCROSE 200 MG: 20 INJECTION, SOLUTION INTRAVENOUS at 14:02

## 2020-11-18 ENCOUNTER — OFFICE VISIT (OUTPATIENT)
Dept: ENDOCRINOLOGY | Facility: CLINIC | Age: 55
End: 2020-11-18
Payer: MEDICARE

## 2020-11-18 VITALS
BODY MASS INDEX: 23.74 KG/M2 | HEART RATE: 88 BPM | TEMPERATURE: 98.2 F | SYSTOLIC BLOOD PRESSURE: 122 MMHG | DIASTOLIC BLOOD PRESSURE: 80 MMHG | WEIGHT: 113.13 LBS | HEIGHT: 58 IN

## 2020-11-18 DIAGNOSIS — E04.2 MULTIPLE THYROID NODULES: ICD-10-CM

## 2020-11-18 DIAGNOSIS — M81.0 OSTEOPOROSIS, UNSPECIFIED OSTEOPOROSIS TYPE, UNSPECIFIED PATHOLOGICAL FRACTURE PRESENCE: Primary | ICD-10-CM

## 2020-11-18 DIAGNOSIS — R26.2 AMBULATORY DYSFUNCTION: ICD-10-CM

## 2020-11-18 DIAGNOSIS — E55.9 VITAMIN D DEFICIENCY: ICD-10-CM

## 2020-11-18 PROCEDURE — 99214 OFFICE O/P EST MOD 30 MIN: CPT | Performed by: INTERNAL MEDICINE

## 2020-11-18 RX ORDER — PREDNISOLONE ACETATE 10 MG/ML
1 SUSPENSION/ DROPS OPHTHALMIC EVERY MORNING
COMMUNITY
End: 2022-03-18

## 2020-11-18 RX ORDER — POLYETHYLENE GLYCOL 3350 17 G/17G
17 POWDER, FOR SOLUTION ORAL DAILY PRN
COMMUNITY

## 2020-11-18 RX ORDER — TALC/CELLULOS/CHLOROXY/ALDIOXA
POWDER (GRAM) TOPICAL ONCE AS NEEDED
COMMUNITY

## 2020-11-18 RX ORDER — OFLOXACIN 3 MG/ML
10 SOLUTION AURICULAR (OTIC) 4 TIMES DAILY
COMMUNITY
End: 2022-03-18

## 2020-11-18 RX ORDER — PETROLATUM,WHITE
OINTMENT IN PACKET (GRAM) TOPICAL ONCE
COMMUNITY

## 2020-11-18 RX ORDER — BROMFENAC SODIUM 0.7 MG/ML
SOLUTION/ DROPS OPHTHALMIC
COMMUNITY

## 2020-11-19 ENCOUNTER — TELEPHONE (OUTPATIENT)
Dept: ENDOCRINOLOGY | Facility: CLINIC | Age: 55
End: 2020-11-19

## 2020-11-19 DIAGNOSIS — E04.2 MULTIPLE THYROID NODULES: Primary | ICD-10-CM

## 2020-11-24 ENCOUNTER — HOSPITAL ENCOUNTER (OUTPATIENT)
Dept: INFUSION CENTER | Facility: HOSPITAL | Age: 55
Discharge: HOME | End: 2020-11-24
Attending: INTERNAL MEDICINE
Payer: MEDICARE

## 2020-11-24 VITALS
SYSTOLIC BLOOD PRESSURE: 120 MMHG | TEMPERATURE: 97.4 F | RESPIRATION RATE: 18 BRPM | HEART RATE: 89 BPM | OXYGEN SATURATION: 96 % | DIASTOLIC BLOOD PRESSURE: 75 MMHG

## 2020-11-24 DIAGNOSIS — D50.9 IRON DEFICIENCY ANEMIA, UNSPECIFIED IRON DEFICIENCY ANEMIA TYPE: Primary | ICD-10-CM

## 2020-11-24 PROCEDURE — 96365 THER/PROPH/DIAG IV INF INIT: CPT

## 2020-11-24 RX ORDER — SODIUM CHLORIDE 9 MG/ML
20 INJECTION, SOLUTION INTRAVENOUS ONCE
Status: COMPLETED | OUTPATIENT
Start: 2020-11-24 | End: 2020-11-24

## 2020-11-24 RX ORDER — SODIUM CHLORIDE 9 MG/ML
20 INJECTION, SOLUTION INTRAVENOUS ONCE
Status: CANCELLED | OUTPATIENT
Start: 2020-12-01

## 2020-11-24 RX ADMIN — SODIUM CHLORIDE 20 ML/HR: 9 INJECTION, SOLUTION INTRAVENOUS at 09:09

## 2020-11-24 RX ADMIN — IRON SUCROSE 200 MG: 20 INJECTION, SOLUTION INTRAVENOUS at 09:09

## 2020-12-01 ENCOUNTER — HOSPITAL ENCOUNTER (OUTPATIENT)
Dept: INFUSION CENTER | Facility: HOSPITAL | Age: 55
End: 2020-12-01
Attending: INTERNAL MEDICINE

## 2020-12-04 ENCOUNTER — TELEPHONE (OUTPATIENT)
Dept: FAMILY MEDICINE CLINIC | Facility: HOSPITAL | Age: 55
End: 2020-12-04

## 2020-12-04 DIAGNOSIS — C50.911 MALIGNANT NEOPLASM OF RIGHT FEMALE BREAST, UNSPECIFIED ESTROGEN RECEPTOR STATUS, UNSPECIFIED SITE OF BREAST (HCC): ICD-10-CM

## 2020-12-04 RX ORDER — ANASTROZOLE 1 MG/1
1 TABLET ORAL
Qty: 90 TABLET | Refills: 3 | Status: SHIPPED | OUTPATIENT
Start: 2020-12-04 | End: 2021-12-15

## 2020-12-07 ENCOUNTER — HOSPITAL ENCOUNTER (OUTPATIENT)
Dept: INFUSION CENTER | Facility: HOSPITAL | Age: 55
Discharge: HOME | End: 2020-12-07
Attending: INTERNAL MEDICINE
Payer: MEDICARE

## 2020-12-07 VITALS
SYSTOLIC BLOOD PRESSURE: 108 MMHG | RESPIRATION RATE: 18 BRPM | HEART RATE: 89 BPM | OXYGEN SATURATION: 97 % | DIASTOLIC BLOOD PRESSURE: 79 MMHG

## 2020-12-07 DIAGNOSIS — D50.9 IRON DEFICIENCY ANEMIA, UNSPECIFIED IRON DEFICIENCY ANEMIA TYPE: Primary | ICD-10-CM

## 2020-12-07 PROCEDURE — 96365 THER/PROPH/DIAG IV INF INIT: CPT

## 2020-12-07 RX ORDER — SODIUM CHLORIDE 9 MG/ML
20 INJECTION, SOLUTION INTRAVENOUS ONCE
Status: CANCELLED | OUTPATIENT
Start: 2020-12-15

## 2020-12-07 RX ORDER — SODIUM CHLORIDE 9 MG/ML
20 INJECTION, SOLUTION INTRAVENOUS ONCE
Status: COMPLETED | OUTPATIENT
Start: 2020-12-07 | End: 2020-12-07

## 2020-12-07 RX ADMIN — IRON SUCROSE 200 MG: 20 INJECTION, SOLUTION INTRAVENOUS at 08:57

## 2020-12-07 RX ADMIN — SODIUM CHLORIDE 20 ML/HR: 9 INJECTION, SOLUTION INTRAVENOUS at 08:57

## 2020-12-15 ENCOUNTER — HOSPITAL ENCOUNTER (OUTPATIENT)
Dept: INFUSION CENTER | Facility: HOSPITAL | Age: 55
Discharge: HOME | End: 2020-12-15
Attending: INTERNAL MEDICINE
Payer: MEDICARE

## 2020-12-15 VITALS
TEMPERATURE: 97.8 F | HEART RATE: 96 BPM | RESPIRATION RATE: 16 BRPM | SYSTOLIC BLOOD PRESSURE: 124 MMHG | DIASTOLIC BLOOD PRESSURE: 83 MMHG | OXYGEN SATURATION: 98 %

## 2020-12-15 DIAGNOSIS — D50.9 IRON DEFICIENCY ANEMIA, UNSPECIFIED IRON DEFICIENCY ANEMIA TYPE: Primary | ICD-10-CM

## 2020-12-15 PROCEDURE — 96365 THER/PROPH/DIAG IV INF INIT: CPT

## 2020-12-15 RX ORDER — SODIUM CHLORIDE 9 MG/ML
20 INJECTION, SOLUTION INTRAVENOUS ONCE
Status: COMPLETED | OUTPATIENT
Start: 2020-12-15 | End: 2020-12-15

## 2020-12-15 RX ORDER — SODIUM CHLORIDE 9 MG/ML
20 INJECTION, SOLUTION INTRAVENOUS ONCE
Status: CANCELLED | OUTPATIENT
Start: 2020-12-22

## 2020-12-15 RX ADMIN — SODIUM CHLORIDE 20 ML/HR: 9 INJECTION, SOLUTION INTRAVENOUS at 08:54

## 2020-12-15 RX ADMIN — IRON SUCROSE 200 MG: 20 INJECTION, SOLUTION INTRAVENOUS at 08:54

## 2020-12-18 ENCOUNTER — TELEPHONE (OUTPATIENT)
Dept: HEMATOLOGY ONCOLOGY | Facility: HOSPITAL | Age: 55
End: 2020-12-18

## 2020-12-22 ENCOUNTER — HOSPITAL ENCOUNTER (OUTPATIENT)
Dept: INFUSION CENTER | Facility: HOSPITAL | Age: 55
Discharge: HOME | End: 2020-12-22
Attending: INTERNAL MEDICINE
Payer: MEDICARE

## 2020-12-22 VITALS
TEMPERATURE: 98.1 F | RESPIRATION RATE: 16 BRPM | DIASTOLIC BLOOD PRESSURE: 75 MMHG | OXYGEN SATURATION: 96 % | SYSTOLIC BLOOD PRESSURE: 134 MMHG | HEART RATE: 80 BPM

## 2020-12-22 DIAGNOSIS — D50.9 IRON DEFICIENCY ANEMIA, UNSPECIFIED IRON DEFICIENCY ANEMIA TYPE: Primary | ICD-10-CM

## 2020-12-22 PROCEDURE — 96365 THER/PROPH/DIAG IV INF INIT: CPT

## 2020-12-22 RX ORDER — SODIUM CHLORIDE 9 MG/ML
20 INJECTION, SOLUTION INTRAVENOUS ONCE
Status: CANCELLED | OUTPATIENT
Start: 2020-12-22

## 2020-12-22 RX ORDER — SODIUM CHLORIDE 9 MG/ML
20 INJECTION, SOLUTION INTRAVENOUS ONCE
Status: COMPLETED | OUTPATIENT
Start: 2020-12-22 | End: 2020-12-22

## 2020-12-22 RX ADMIN — SODIUM CHLORIDE 20 ML/HR: 9 INJECTION, SOLUTION INTRAVENOUS at 14:40

## 2020-12-22 RX ADMIN — IRON SUCROSE 200 MG: 20 INJECTION, SOLUTION INTRAVENOUS at 14:40

## 2020-12-23 ENCOUNTER — TELEPHONE (OUTPATIENT)
Dept: FAMILY MEDICINE CLINIC | Facility: HOSPITAL | Age: 55
End: 2020-12-23

## 2021-01-05 ENCOUNTER — TELEPHONE (OUTPATIENT)
Dept: HEMATOLOGY ONCOLOGY | Facility: CLINIC | Age: 56
End: 2021-01-05

## 2021-01-05 NOTE — TELEPHONE ENCOUNTER
Inbound Calls to Reschedule/Cancel Appointments   Patient Details:  Jessica Putnam  1965  [id number]     Who is calling? Shaggy Mejía from [address] [place] [address]   Date of original appointment 1/20/21    Visit Type Follow up    Who is the Provider and Location? Dr Rolando Mina    Is the patient on active treatment? Chemo? Radiation? Follow    The patient would like to cancel, reschedule or make into a virtual appointment? no   Reason for rescheduling or cancelation? Facility not allowing any outside appt     Requesting appt via [facility]  [phone] Beth Graham    Next Steps:    [facility]: After completing the above information, please route to the appropriate Care Team for review  Care Team: Please review and reach out to the patient if you have any changes

## 2021-01-06 NOTE — TELEPHONE ENCOUNTER
Himanshu Palomino is returning a call to [facility]     [phone]

## 2021-01-06 NOTE — TELEPHONE ENCOUNTER
I called Maria T Castro back and left a message to call the office back to confirm that [employer] Lester is the platform for the patient requested

## 2021-01-19 ENCOUNTER — TELEPHONE (OUTPATIENT)
Dept: HEMATOLOGY ONCOLOGY | Facility: CLINIC | Age: 56
End: 2021-01-19

## 2021-01-19 NOTE — TELEPHONE ENCOUNTER
I called and left a message regarding tomorrow's virtual visit  It was originally scheduled as a Facetime but it was changed to Doximity  Instructions were left

## 2021-01-20 ENCOUNTER — TELEMEDICINE (OUTPATIENT)
Dept: HEMATOLOGY ONCOLOGY | Facility: HOSPITAL | Age: 56
End: 2021-01-20
Payer: MEDICARE

## 2021-01-20 DIAGNOSIS — D50.9 IRON DEFICIENCY ANEMIA, UNSPECIFIED IRON DEFICIENCY ANEMIA TYPE: ICD-10-CM

## 2021-01-20 DIAGNOSIS — C50.911 MALIGNANT NEOPLASM OF RIGHT FEMALE BREAST, UNSPECIFIED ESTROGEN RECEPTOR STATUS, UNSPECIFIED SITE OF BREAST (HCC): Primary | ICD-10-CM

## 2021-01-20 PROCEDURE — 99214 OFFICE O/P EST MOD 30 MIN: CPT | Performed by: INTERNAL MEDICINE

## 2021-01-20 NOTE — PROGRESS NOTES
Virtual Regular Visit      Assessment/Plan:    The patient is a 52-year-old autistic female with a history of stage IA ER/OH positive, HER2 positive breast cancer   She initially underwent chemotherapy with [facility] followed by 1 year of Herceptin   This is completed in February 2015  She was then started on tamoxifen and took this until 2018 when she was switched to Arimidex  Becca Ayala remains on Arimidex 1 mg p o  Daily and will continue this until 2022  The goal is for her to be on combination of tamoxifen and Arimidex for a total of 7-10 years  The recommendation for 10 years would be based on the fact that she did have HER-2 positive disease  We will try to finish up 10 years of her combined aromatase inhibitor and tamoxifen therapy  If not possible the minimum would be 7-7-1/2 years  Her blood work as improved on the iron  I will see her back in 6 months  Problem List Items Addressed This Visit        Other    Iron deficiency anemia, unspecified    Relevant Orders    CBC and differential    Comprehensive metabolic panel    Ferritin    Iron    Iron Saturation %    Methylmalonic acid, serum    TIBC    Cancer antigen 27 29      Other Visit Diagnoses     Malignant neoplasm of right female breast, unspecified estrogen receptor status, unspecified site of breast ([facility] [address] )    -  Primary    Relevant Orders    CBC and differential    Comprehensive metabolic panel    Ferritin    Iron    Iron Saturation %    Methylmalonic acid, serum    TIBC    Cancer antigen 27 29               Reason for visit is HER2 positive breast cancer     Encounter provider Odette Birch MD    Provider located [address] [place] [address]      Recent Visits  No visits were found meeting these conditions     Showing recent visits within past 7 days and meeting all other requirements     Future Appointments  No visits were found meeting these conditions  Showing future appointments within next 150 days and meeting all other requirements        The patient was identified by name and date of birth  Haja Santos was informed that this is a telemedicine visit and that the visit is being conducted through [facility] and patient was informed that this is a secure, HIPAA-compliant platform  She agrees to proceed     My office door was closed  No one else was in the room  She acknowledged consent and understanding of privacy and security of the video platform  The patient has agreed to participate and understands they can discontinue the visit at any time  Patient is aware this is a billable service  Interval history    The patient was called for video visit  She states she is doing well  Her nurse confirms this  She is currently on lithium and has been doing better on this in terms of her agitation  Hemoglobin has improved after she received Venofer  Denies any nausea denies any vomiting denies any diarrhea  She does have osteoporosis and has been recommended Prolia or Reclast by her primary care physician and endocrinologist   Her mother has been debating whether to start her on this or not  I encouraged the nurse today to talk to her mother and tell her I would approve of her starting 1 of these medications as she does have osteoporosis  The nurse states she will discuss this with to the patient's mother who is her caregiver/power of   I emphasized I would have started these medications also based on the fact that she has an aromatase inhibitor and does have osteoporosis  The rest of her 14 point review of systems today was negative      Oncology History   Malignant neoplasm of overlapping sites of right breast in female, estrogen receptor positive ([facility] [address] )   2013 Surgery    Right breast mastectomy   Invasive breast carcinoma  Grade 2  2 foci:    RI 60/90  HER2 0/3+  Lymph nodes negative  Stage IA  Dr Jones Share     2/2014 - 2/2015 Chemotherapy    TCH x 6 cycles  1 year of Herceptin  Dr Christine Cuello     2/2015 -  Hormone Therapy    Tamoxifen daily until 2018  Arimidex 1 mg daily since 2018  Dr Christine Cuello         Past Medical History:   Diagnosis Date    Autism     Breast [facility])     Breast cyst, left     last assessed 12/30/2013    Cerebral palsy ([address] )     Chronic GERD     Depression     Fibrocystic breast disease     last assessed 06/29/2012    Gastrointestinal hemorrhage     Glaucoma     History of chemotherapy     Hydrocephalus (HCC)     Ovarian disorder     last assessed 06/29/2012    Scoliosis     Scoliosis        Past Surgical History:   Procedure Laterality Date    BREAST BIOPSY Right     COLONOSCOPY      Description 04/13/2016-5 yrs  Description 4 yr f/u d/t polyp and family history, onset 07/20/2012   COLONOSCOPY      ESOPHAGOGASTRODUODENOSCOPY      description-04/13/2016 gastritis with antral nodule    EYE SURGERY      for relief of intraocular pressure    INCISIONAL BREAST BIOPSY      description 2008    MASTECTOMY Left     SENTINEL LYMPH NODE BIOPSY      STRABISMUS SURGERY      description 1973, 66    UPPER GASTROINTESTINAL ENDOSCOPY  05/16/2019    Grade C erosive esophagitis    7 mm nodule in the stomach       Current Outpatient Medications   Medication Sig Dispense Refill    acetaminophen (TYLENOL) 500 mg tablet Take 500 mg by mouth every 6 (six) hours as needed for mild pain      anastrozole (ARIMIDEX) 1 mg tablet Take 1 tablet (1 mg total) by mouth daily at bedtime 90 tablet 3    ascorbic acid (VITAMIN C) 500 mg tablet 1 tab PO q am with iron supplement 30 tablet 2    BANOPHEN 25 MG capsule Take 25 mg by mouth daily at bedtime as needed       bromfenac sodium (Prolensa) 0 07 % SOLN Apply to eye 1 DROP IN RIGHT EYE IN THE MORNING      calamine lotion Apply topically every 4 (four) hours as needed for itching      Calcium Carb-Cholecalciferol (OYSTER SHELL CALCIUM) 500-400 MG-UNIT TABS Take 1 tablet by mouth 2 (two) times a day      Calcium Carbonate-Vitamin D (OYSCO 500/D PO) Take by mouth daily      carBAMazepine (TEGretol) 200 mg tablet 2 tab PO q am and 1 tab PO qhs (Patient taking differently: Take 200 mg by mouth 2 (two) times a day )      Carboxymethylcell-Hypromellose (GENTEAL) 0 25-0 3 % GEL Apply to eye      Cholecalciferol (VITAMIN D) 2000 units tablet TAKE TWO TABLETS BY MOUTH (4000IU) EVERY MORNING FOR VITAMIN DIFICIENCY 62 tablet 0    citalopram (CeleXA) 10 mg tablet Take 10 mg by mouth daily 30 mg total      Dentifrices (SENSODYNE PRONAMEL DT) Apply to teeth      diphenhydrAMINE (BENADRYL) 25 mg tablet Take 25 mg by mouth daily at bedtime as needed for itching (for insomnia and allergy ** DO NOT TAKE WITH XYZAL **)      docusate sodium (COLACE) 100 mg capsule Take 100 mg by mouth 3 (three) times a week       ferrous sulfate 324 (65 Fe) mg Take 1 tablet (324 mg total) by mouth daily before breakfast 30 tablet 5    guaiFENesin (SILTUSSIN SA PO) Take by mouth Take 2 teaspoonful (10 ml) PRN 6 hrs for cough      haloperidol (HALDOL) 2 mg tablet Take 2 mg by mouth 2 (two) times a day 1 tab am and 2 tabs qhs      hydrocortisone (ANUSOL-HC, PROCTOSOL HC,) 2 5 % rectal cream Insert into the rectum 2 (two) times a day (Patient taking differently: Insert into the rectum once ) 30 g 0    Hypromellose 0 3 % SOLN Apply to eye      ibuprofen (MOTRIN) 200 mg tablet Take 400 mg by mouth every 6 (six) hours as needed for mild pain        latanoprost (XALATAN) 0 005 % ophthalmic solution Apply 1 drop to eye      levocetirizine (XYZAL) 5 MG tablet Take 1 tablet by mouth daily as needed      lithium carbonate 300 mg capsule Take 300 mg by mouth 3 (three) times a day with meals       loperamide (IMODIUM) 2 mg capsule Take by mouth      LORazepam (ATIVAN) 0 5 mg tablet Take 1 mg by mouth 2 (two) times a day       melatonin 1 mg Take 5 mg by mouth daily at bedtime       memantine (NAMENDA) 10 mg tablet Take 1 tablet (10 mg total) by mouth 2 (two) times a day 60 tablet 5    mineral oil-white petrolatum (LUBRIFRESH P M ) ophthalmic ointment 0 5 inches      Na Sulfate-K Sulfate-Mg Sulf (Suprep Bowel Prep Kit) 17 5-3 13-1 6 GM/177ML SOLN As directed (Patient not taking: Reported on 11/9/2020) 2 Bottle 0    neomycin-polymyxin b-bacitracin (NEOSPORIN) 3 5-400-5,000       NON FORMULARY ACT FLUORIDE DENTAL RINSE   RINSE WITH 1 TBSP (15ML)2X A DAY      NON FORMULARY TOLNAFTATE SPRAY 1% SPRAY  SPRAY BETWEEN TOES      ofloxacin (FLOXIN) 0 3 % otic solution Administer 10 drops to the right ear 4 (four) times a day      ondansetron (ZOFRAN) 4 mg tablet Take by mouth      pantoprazole (PROTONIX) 40 mg tablet Take 1 tablet (40 mg total) by mouth 2 (two) times a day 60 tablet 5    polyethylene glycol (MIRALAX) 17 g packet Take 17 g by mouth daily as needed      prednisoLONE acetate (prednisoLONE Acetate P-F) 1 % ophthalmic suspension Administer 1 drop to the right eye every morning      PROCTOSOL HC 2 5 % rectal cream       sodium chloride (SOCHLOR) 5 % hypertonic ophthalmic solution Apply 1 drop to eye 2 (two) times a day       sodium fluoride 0 275 (0 125 F) MG/DROP solution Take 275 mcg by mouth daily      Sunscreens (SUNBLOCK LOTION SPF30 EX) Apply topically daily as needed      talc (Zeasorb) Apply topically once as needed for irritation Sprinkle powder on affected area of skin to help with excess moisture up to 2 times a day as needed    timolol (TIMOPTIC-XE) 0 5 % ophthalmic gel-forming Administer 1 drop into the left eye daily      tolnaftate (TINACTIN) 1 % spray Apply topically      white petrolatum (Vaseline) Apply topically once Apply to hand every day prn       No current facility-administered medications for this visit           Allergies   Allergen Reactions    Bactrim [Sulfamethoxazole-Trimethoprim]     Methylphenidate Hyperactivity     Reaction Date: [id number];     Sulfa Antibiotics     Thioridazine     Amphetamines     Chlorpromazine     Fexofenadine Hives     Reaction Date: [id number];    Norbert Mcarthur Medroxyprogesterone Rash and Hives     Reaction Date: [id number];     Other Hives     Mellaril, thorazine    Trimethoprim        Review of Systems   All other systems reviewed and are negative  Video Exam    There were no vitals filed for this visit  The patient appeared at baseline  No masses were reported  She team to be without any physical findings apart from her baseline  The nurse that accompanied her at the nursing facility for the visit agreed with this  This was a negative physical exam apart from her autism and resulting mental status and psychiatric baseline  I spent 25 minutes with patient today in which greater than 50% of the time was spent in counseling/coordination of care regarding Breast cancer      VIRTUAL VISIT DISCLAIMER    Dalia Manzo acknowledges that she has consented to an online visit or consultation  She understands that the online visit is based solely on information provided by her, and that, in the absence of a face-to-face physical evaluation by the physician, the diagnosis she receives is both limited and provisional in terms of accuracy and completeness  This is not intended to replace a full medical face-to-face evaluation by the physician  Dalia Manzo understands and accepts these terms

## 2021-02-05 ENCOUNTER — TELEPHONE (OUTPATIENT)
Dept: FAMILY MEDICINE CLINIC | Facility: HOSPITAL | Age: 56
End: 2021-02-05

## 2021-02-05 DIAGNOSIS — M81.0 OSTEOPOROSIS, UNSPECIFIED OSTEOPOROSIS TYPE, UNSPECIFIED PATHOLOGICAL FRACTURE PRESENCE: Primary | ICD-10-CM

## 2021-02-05 RX ORDER — ALENDRONATE SODIUM 70 MG/1
70 TABLET ORAL
Qty: 4 TABLET | Refills: 11 | Status: SHIPPED | OUTPATIENT
Start: 2021-02-05 | End: 2022-03-18

## 2021-02-05 NOTE — TELEPHONE ENCOUNTER
Shaggy Mejía is aware and states the hematologist suggested not starting til 1 month after second covid shot    That is scheduled for 2/17

## 2021-02-05 NOTE — TELEPHONE ENCOUNTER
Fosamax 70 mg 1 tab PO once weekly sent to [facility], needs to take with full glass of water and stay upright for an hour after taking the medication

## 2021-02-05 NOTE — TELEPHONE ENCOUNTER
Asking if Dr Kai Maynard could recommend an Osteoporosis medication for the patient  She is willing to set up a virtual appt if needed to discuss   PCB

## 2021-02-11 DIAGNOSIS — K21.9 GASTROESOPHAGEAL REFLUX DISEASE: ICD-10-CM

## 2021-02-12 ENCOUNTER — TELEPHONE (OUTPATIENT)
Dept: GASTROENTEROLOGY | Facility: CLINIC | Age: 56
End: 2021-02-12

## 2021-02-12 RX ORDER — PANTOPRAZOLE SODIUM 40 MG/1
TABLET, DELAYED RELEASE ORAL
Qty: 60 TABLET | Refills: 5 | Status: SHIPPED | OUTPATIENT
Start: 2021-02-12 | End: 2021-08-17

## 2021-02-12 NOTE — TELEPHONE ENCOUNTER
Violet Galan, nurse at [facility] states Pt had a lot of tests done and they got most results; are looking for results of a hemoccult test done at home the end of Dec; was ordered for [facility] but she isnt sure where test was done  CB# [phone]  Can fax to [phone]  Fax'd fecal globin results

## 2021-04-16 ENCOUNTER — TELEPHONE (OUTPATIENT)
Dept: ENDOCRINOLOGY | Facility: CLINIC | Age: 56
End: 2021-04-16

## 2021-04-30 ENCOUNTER — OFFICE VISIT (OUTPATIENT)
Dept: FAMILY MEDICINE CLINIC | Facility: HOSPITAL | Age: 56
End: 2021-04-30
Payer: MEDICARE

## 2021-04-30 VITALS
HEIGHT: 58 IN | DIASTOLIC BLOOD PRESSURE: 68 MMHG | SYSTOLIC BLOOD PRESSURE: 106 MMHG | HEART RATE: 86 BPM | WEIGHT: 126 LBS | BODY MASS INDEX: 26.45 KG/M2 | TEMPERATURE: 98.2 F

## 2021-04-30 DIAGNOSIS — E04.2 MULTIPLE THYROID NODULES: ICD-10-CM

## 2021-04-30 DIAGNOSIS — R26.2 AMBULATORY DYSFUNCTION: ICD-10-CM

## 2021-04-30 DIAGNOSIS — C50.811 MALIGNANT NEOPLASM OF OVERLAPPING SITES OF RIGHT BREAST IN FEMALE, ESTROGEN RECEPTOR POSITIVE (HCC): ICD-10-CM

## 2021-04-30 DIAGNOSIS — G91.9 HYDROCEPHALUS, UNSPECIFIED TYPE (HCC): ICD-10-CM

## 2021-04-30 DIAGNOSIS — F42.9 OBSESSIVE-COMPULSIVE DISORDER, UNSPECIFIED TYPE: ICD-10-CM

## 2021-04-30 DIAGNOSIS — Z17.0 MALIGNANT NEOPLASM OF OVERLAPPING SITES OF RIGHT BREAST IN FEMALE, ESTROGEN RECEPTOR POSITIVE (HCC): ICD-10-CM

## 2021-04-30 DIAGNOSIS — M81.0 OSTEOPOROSIS, UNSPECIFIED OSTEOPOROSIS TYPE, UNSPECIFIED PATHOLOGICAL FRACTURE PRESENCE: ICD-10-CM

## 2021-04-30 DIAGNOSIS — G80.9 CEREBRAL PALSY, UNSPECIFIED TYPE (HCC): ICD-10-CM

## 2021-04-30 DIAGNOSIS — Q03.1 DANDY-WALKER SYNDROME (HCC): Primary | ICD-10-CM

## 2021-04-30 DIAGNOSIS — E66.3 OVERWEIGHT (BMI 25.0-29.9): ICD-10-CM

## 2021-04-30 PROCEDURE — 99214 OFFICE O/P EST MOD 30 MIN: CPT | Performed by: INTERNAL MEDICINE

## 2021-04-30 RX ORDER — KETOROLAC TROMETHAMINE 5 MG/ML
SOLUTION OPHTHALMIC
COMMUNITY
Start: 2021-04-15

## 2021-04-30 RX ORDER — CARBAMAZEPINE 200 MG/1
200 TABLET ORAL 2 TIMES DAILY
Start: 2021-04-30

## 2021-04-30 RX ORDER — OFLOXACIN 3 MG/ML
SOLUTION/ DROPS OPHTHALMIC
COMMUNITY
Start: 2021-04-15

## 2021-04-30 NOTE — PATIENT INSTRUCTIONS
Weight Management   AMBULATORY CARE:   Why it is important to manage your weight:  Being overweight increases your risk of health conditions such as heart disease, high blood pressure, type 2 diabetes, and certain types of cancer  It can also increase your risk for osteoarthritis, sleep apnea, and other respiratory problems  Aim for a slow, steady weight loss  Even a small amount of weight loss can lower your risk of health problems  How to lose weight safely:  A safe and healthy way to lose weight is to eat fewer calories and get regular exercise  · You can lose up about 1 pound a week by decreasing the number of calories you eat by 500 calories each day  You can decrease calories by eating smaller portion sizes or by cutting out high-calorie foods  Read labels to find out how many calories are in the foods you eat  · You can also burn calories with exercise such as walking, swimming, or biking  You will be more likely to keep weight off if you make these changes part of your lifestyle  Exercise at least 30 minutes per day on most days of the week  You can also fit in more physical activity by taking the stairs instead of the elevator or parking farther away from stores  Ask your healthcare provider about the best exercise plan for you  Healthy meal plan for weight management:  A healthy meal plan includes a variety of foods, contains fewer calories, and helps you stay healthy  A healthy meal plan includes the following:     · Eat whole-grain foods more often  A healthy meal plan should contain fiber  Fiber is the part of grains, fruits, and vegetables that is not broken down by your body  Whole-grain foods are healthy and provide extra fiber in your diet  Some examples of whole-grain foods are whole-wheat breads and pastas, oatmeal, brown rice, and bulgur  · Eat a variety of vegetables every day  Include dark, leafy greens such as spinach, kale, royce greens, and mustard greens   Eat yellow and orange vegetables such as carrots, sweet potatoes, and winter squash  · Eat a variety of fruits every day  Choose fresh or canned fruit (canned in its own juice or light syrup) instead of juice  Fruit juice has very little or no fiber  · Eat low-fat dairy foods  Drink fat-free (skim) milk or 1% milk  Eat fat-free yogurt and low-fat cottage cheese  Try low-fat cheeses such as mozzarella and other reduced-fat cheeses  · Choose meat and other protein foods that are low in fat  Choose beans or other legumes such as split peas or lentils  Choose fish, skinless poultry (chicken or turkey), or lean cuts of red meat (beef or pork)  Before you cook meat or poultry, cut off any visible fat  · Use less fat and oil  Try baking foods instead of frying them  Add less fat, such as margarine, sour cream, regular salad dressing and mayonnaise to foods  Eat fewer high-fat foods  Some examples of high-fat foods include french fries, doughnuts, ice cream, and cakes  · Eat fewer sweets  Limit foods and drinks that are high in sugar  This includes candy, cookies, regular soda, and sweetened drinks  Ways to decrease calories:   · Eat smaller portions  ? Use a small plate with smaller servings  ? Do not eat second helpings  ? When you eat at a restaurant, ask for a box and place half of your meal in the box before you eat  ? Share an entrée with someone else  · Replace high-calorie snacks with healthy, low-calorie snacks  ? Choose fresh fruit, vegetables, fat-free rice cakes, or air-popped popcorn instead of potato chips, nuts, or chocolate  ? Choose water or calorie-free drinks instead of soda or sweetened drinks  · Do not shop for groceries when you are hungry  You may be more likely to make unhealthy food choices  Take a grocery list of healthy foods and shop after you have eaten  · Eat regular meals  Do not skip meals  Skipping meals can lead to overeating later in the day   This can make it harder for you to lose weight  Eat a healthy snack in place of a meal if you do not have time to eat a regular meal  Talk with a dietitian to help you create a meal plan and schedule that is right for you  Other things to consider as you try to lose weight:   · Be aware of situations that may give you the urge to overeat, such as eating while watching television  Find ways to avoid these situations  For example, read a book, go for a walk, or do crafts  · Meet with a weight loss support group or friends who are also trying to lose weight  This may help you stay motivated to continue working on your weight loss goals  © Copyright [address] Information is for End User's use only and may not be sold, redistributed or otherwise used for commercial purposes  All illustrations and images included in CareNotes® are the copyrighted property of A D A M , Inc  or [facility] [place]   The above information is an  only  It is not intended as medical advice for individual conditions or treatments  Talk to your doctor, nurse or pharmacist before following any medical regimen to see if it is safe and effective for you  Heart Healthy Diet   AMBULATORY CARE:   A heart healthy diet  is an eating plan low in unhealthy fats and sodium (salt)  The plan is high in healthy fats and fiber  A heart healthy diet helps improve your cholesterol levels and lowers your risk for heart disease and stroke  A dietitian will teach you how to read and understand food labels  Heart healthy diet guidelines to follow:   · Choose foods that contain healthy fats  ? Unsaturated fats  include monounsaturated and polyunsaturated fats  Unsaturated fat is found in foods such as soybean, canola, olive, corn, and safflower oils  It is also found in soft tub margarine that is made with liquid vegetable oil  ? Omega-3 fat  is found in certain fish, such as salmon, tuna, and trout, and in walnuts and flaxseed  Eat fish high in omega-3 fats at least 2 times a week  · Get 20 to 30 grams of fiber each day  Fruits, vegetables, whole-grain foods, and legumes (cooked beans) are good sources of fiber  · Limit or do not have unhealthy fats  ? Cholesterol  is found in animal foods, such as eggs and lobster, and in dairy products made from whole milk  Limit cholesterol to less than 200 mg each day  ? Saturated fat  is found in meats, such as metz and hamburger  It is also found in chicken or turkey skin, whole milk, and butter  Limit saturated fat to less than 7% of your total daily calories  ? Trans fat  is found in packaged foods, such as potato chips and cookies  It is also in hard margarine, some fried foods, and shortening  Do not eat foods that contain trans fats  · Limit sodium as directed  You may be told to limit sodium to 2,000 to 2,300 mg each day  Choose low-sodium or no-salt-added foods  Add little or no salt to food you prepare  Use herbs and spices in place of salt  Include the following in your heart healthy plan:  Ask your dietitian or healthcare provider how many servings to have from each of the following food groups:  · Grains:      ? Whole-wheat breads, cereals, and pastas, and brown rice    ? Low-fat, low-sodium crackers and chips    · Vegetables:      ? Broccoli, green beans, green peas, and spinach    ? Collards, kale, and lima beans    ? Carrots, sweet potatoes, tomatoes, and peppers    ? Canned vegetables with no salt added    · Fruits:      ? Bananas, peaches, pears, and pineapple    ? Grapes, raisins, and dates    ? Oranges, tangerines, grapefruit, orange juice, and grapefruit juice    ? Apricots, mangoes, melons, and papaya    ? Raspberries and strawberries    ? Canned fruit with no added sugar    · Low-fat dairy:      ? Nonfat (skim) milk, 1% milk, and low-fat almond, cashew, or soy milks fortified with calcium    ?  Low-fat cheese, regular or frozen yogurt, and cottage cheese    · Meats and proteins:      ? Lean cuts of beef and pork (loin, leg, round), skinless chicken and turkey    ? Legumes, soy products, egg whites, or nuts    Limit or do not include the following in your heart healthy plan:   · Unhealthy fats and oils:      ? Whole or 2% milk, cream cheese, sour cream, or cheese    ? High-fat cuts of beef (T-bone steaks, ribs), chicken or turkey with skin, and organ meats such as liver    ? Butter, stick margarine, shortening, and cooking oils such as coconut or palm oil    · Foods and liquids high in sodium:      ? Packaged foods, such as frozen dinners, cookies, macaroni and cheese, and cereals with more than 300 mg of sodium per serving    ? Vegetables with added sodium, such as instant potatoes, vegetables with added sauces, or regular canned vegetables    ? Cured or smoked meats, such as hot dogs, metz, and sausage    ? High-sodium ketchup, barbecue sauce, salad dressing, pickles, olives, soy sauce, or miso    · Foods and liquids high in sugar:      ? Candy, cake, cookies, pies, or doughnuts    ? Soft drinks (soda), sports drinks, or sweetened tea    ? Canned or dry mixes for cakes, soups, sauces, or gravies    Other healthy heart guidelines:   · Do not smoke  Nicotine and other chemicals in cigarettes and cigars can cause lung and heart damage  Ask your healthcare provider for information if you currently smoke and need help to quit  E-cigarettes or smokeless tobacco still contain nicotine  Talk to your healthcare provider before you use these products  · Limit or do not drink alcohol as directed  Alcohol can damage your heart and raise your blood pressure  Your healthcare provider may give you specific daily and weekly limits  The general recommended limit is 1 drink a day for women 21 or older and for men 72 or older  Do not have more than 3 drinks in a day or 7 in a week  The recommended limit is 2 drinks a day for men 24to 59years of age   Do not have more than 4 drinks in a day or 14 in a week  A drink of alcohol is 12 ounces of beer, 5 ounces of wine, or 1½ ounces of liquor  · Exercise regularly  Exercise can help you maintain a healthy weight and improve your blood pressure and cholesterol levels  Regular exercise can also decrease your risk for heart problems  Ask your healthcare provider about the best exercise plan for you  Do not start an exercise program without asking your healthcare provider  Follow up with your doctor or cardiologist as directed:  Write down your questions so you remember to ask them during your visits  © Copyright [facility] [address] Information is for End User's use only and may not be sold, redistributed or otherwise used for commercial purposes  All illustrations and images included in CareNotes® are the copyrighted property of [employer]  or [facility] [place]   The above information is an  only  It is not intended as medical advice for individual conditions or treatments  Talk to your doctor, nurse or pharmacist before following any medical regimen to see if it is safe and effective for you

## 2021-05-03 ENCOUNTER — TELEPHONE (OUTPATIENT)
Dept: ADMINISTRATIVE | Facility: OTHER | Age: 56
End: 2021-05-03

## 2021-05-03 NOTE — TELEPHONE ENCOUNTER
Upon review of the In Basket request we were able to locate, review, and update the patient chart as requested for CRC: Colonoscopy  Any additional questions or concerns should be emailed to the Practice Liaisons via [email or web address] email, please do not reply via In Basket      Thank you  Cassie Quesada

## 2021-05-03 NOTE — TELEPHONE ENCOUNTER
----- Message from Emeka Brandon MA sent at 4/30/2021  4:11 PM EDT -----  Regarding: Colon cancer screening  04/30/21 4:11 PM    Hello, our patient Kieran Lacy has had CRC: Colonoscopy completed/performed  Please assist in updating the patient chart by pulling the document from the Media Tab  The date of service is 10/19/20       Thank you,  JOSELYN Martin MD

## 2021-05-13 ENCOUNTER — TELEPHONE (OUTPATIENT)
Dept: HEMATOLOGY ONCOLOGY | Facility: HOSPITAL | Age: 56
End: 2021-05-13

## 2021-05-13 NOTE — TELEPHONE ENCOUNTER
Called the Kaylen Tucker RN who is the nurse caring for the patient  Advised Catrachita that the patient's appointment must be rescheduled due toe Dr Genaro Roy schedule changes  Nikhil requested a date after 7/26/2021 when she would be able to accompany the patient  Patient has been rescheduled to Friday 7/30/2021 @ 11:00 AM in the [facility] office  Catrachita verbalized understanding and agreement

## 2021-05-19 ENCOUNTER — TRANSCRIBE ORDERS (OUTPATIENT)
Dept: ADMINISTRATIVE | Facility: HOSPITAL | Age: 56
End: 2021-05-19

## 2021-05-19 ENCOUNTER — OFFICE VISIT (OUTPATIENT)
Dept: LAB | Facility: HOSPITAL | Age: 56
End: 2021-05-19
Payer: MEDICARE

## 2021-05-19 DIAGNOSIS — H25.11 NUCLEAR SCLEROTIC CATARACT OF RIGHT EYE: Primary | ICD-10-CM

## 2021-05-19 DIAGNOSIS — H25.11 NUCLEAR SCLEROTIC CATARACT OF RIGHT EYE: ICD-10-CM

## 2021-05-19 LAB
ATRIAL RATE: 95 BPM
P AXIS: 30 DEGREES
PR INTERVAL: 122 MS
QRS AXIS: 30 DEGREES
QRSD INTERVAL: 74 MS
QT INTERVAL: 354 MS
QTC INTERVAL: 444 MS
T WAVE AXIS: 29 DEGREES
VENTRICULAR RATE: 95 BPM

## 2021-05-19 PROCEDURE — 93005 ELECTROCARDIOGRAM TRACING: CPT

## 2021-07-13 ENCOUNTER — HOSPITAL ENCOUNTER (OUTPATIENT)
Dept: RADIOLOGY | Facility: HOSPITAL | Age: 56
Discharge: HOME | End: 2021-07-13
Payer: MEDICARE

## 2021-07-13 ENCOUNTER — OFFICE VISIT (OUTPATIENT)
Dept: FAMILY MEDICINE CLINIC | Facility: HOSPITAL | Age: 56
End: 2021-07-13
Payer: MEDICARE

## 2021-07-13 VITALS
WEIGHT: 130 LBS | HEART RATE: 84 BPM | HEIGHT: 58 IN | DIASTOLIC BLOOD PRESSURE: 80 MMHG | BODY MASS INDEX: 27.29 KG/M2 | OXYGEN SATURATION: 97 % | TEMPERATURE: 98.3 F | SYSTOLIC BLOOD PRESSURE: 118 MMHG

## 2021-07-13 DIAGNOSIS — M62.838 TRAPEZIUS MUSCLE SPASM: ICD-10-CM

## 2021-07-13 DIAGNOSIS — M54.2 CERVICALGIA: ICD-10-CM

## 2021-07-13 DIAGNOSIS — M79.2 RADICULAR PAIN IN LEFT ARM: ICD-10-CM

## 2021-07-13 DIAGNOSIS — M54.2 CERVICALGIA: Primary | ICD-10-CM

## 2021-07-13 PROCEDURE — 99214 OFFICE O/P EST MOD 30 MIN: CPT | Performed by: NURSE PRACTITIONER

## 2021-07-13 PROCEDURE — 72050 X-RAY EXAM NECK SPINE 4/5VWS: CPT

## 2021-07-13 NOTE — PROGRESS NOTES
Assessment/Plan:    No problem-specific Assessment & Plan notes found for this encounter  Diagnoses and all orders for this visit:    Cervicalgia  -     XR spine cervical complete 4 or 5 vw non injury; Future    Trapezius muscle spasm  -     XR spine cervical complete 4 or 5 vw non injury; Future    Radicular pain in left arm  -     XR spine cervical complete 4 or 5 vw non injury; Future      Chronic cervicalgia w/left arm radiculopathy  Rx issued to start topical voltaren gel  Advise she continue PT as scheduled  Given chronicity, will eval further w/x-ray of c-spine  May need MRI and/or pain management pending result  Subjective:      Patient ID: Bettie Serra is a 55 y o  female  Here w/[facility] staff for L shoulder discomfort  Has history of R mastectomy so only does labs sticks/bps in L arm  Dr Zach iVla ordered PT for the patient last year due to the same complaint, but was on hold due to [place]  Recently resumed at the end of April  Does therapy specifically for L arm and gait, PT noted spasms in L cervical shoulder area  Uses heat for some relief along with massage  Recent DEXA scan last year negative for osteoporosis  Taking Tylenol with little relief  Used lidocaine cream as well with some relief  The following portions of the patient's history were reviewed and updated as appropriate: allergies, current medications, past family history, past medical history, past social history, past surgical history and problem list     Review of Systems   Constitutional: Negative for diaphoresis, fatigue and fever  HENT: Negative for congestion, rhinorrhea, sinus pain and sore throat  Eyes: Positive for visual disturbance  Negative for photophobia  Respiratory: Negative for chest tightness, shortness of breath and wheezing  Cardiovascular: Negative for chest pain, palpitations and leg swelling  Gastrointestinal: Negative for abdominal pain, diarrhea, nausea and vomiting     Genitourinary: Negative for difficulty urinating, flank pain and hematuria  Musculoskeletal: Positive for arthralgias and gait problem  Neurological: Negative for dizziness, seizures, light-headedness and headaches  Psychiatric/Behavioral: Negative for hallucinations  The patient is not nervous/anxious and is not hyperactive  Objective:      /80 (Patient Position: Sitting, Cuff Size: Standard)   Pulse 84   Temp 98 3 °F (36 8 °C) (Tympanic)   Ht 4' 9 5" (1 461 m)   Wt 59 kg (130 lb)   SpO2 97%   BMI 27 64 kg/m²          Physical Exam  Constitutional:       General: She is not in acute distress  Appearance: She is not toxic-appearing  HENT:      Head: Normocephalic  Cardiovascular:      Rate and Rhythm: Normal rate and regular rhythm  Pulses: Normal pulses  Heart sounds: Normal heart sounds  Pulmonary:      Effort: Pulmonary effort is normal       Breath sounds: Normal breath sounds  Abdominal:      General: Bowel sounds are normal       Palpations: Abdomen is soft  Musculoskeletal:      Left upper arm: Tenderness (L shoulder joint extending into neck) present  Comments: Strong R , Moderate L    Skin:     General: Skin is warm and dry  Capillary Refill: Capillary refill takes less than 2 seconds  Neurological:      Mental Status: She is alert  Sensory: No sensory deficit  Motor: No pronator drift     Psychiatric:         Mood and Affect: Mood normal          Behavior: Behavior normal

## 2021-07-30 ENCOUNTER — OFFICE VISIT (OUTPATIENT)
Dept: HEMATOLOGY ONCOLOGY | Facility: HOSPITAL | Age: 56
End: 2021-07-30
Payer: MEDICARE

## 2021-07-30 VITALS
WEIGHT: 133 LBS | HEART RATE: 94 BPM | TEMPERATURE: 98.4 F | DIASTOLIC BLOOD PRESSURE: 82 MMHG | BODY MASS INDEX: 28.28 KG/M2 | RESPIRATION RATE: 16 BRPM | SYSTOLIC BLOOD PRESSURE: 114 MMHG | OXYGEN SATURATION: 96 %

## 2021-07-30 DIAGNOSIS — D50.9 IRON DEFICIENCY ANEMIA, UNSPECIFIED IRON DEFICIENCY ANEMIA TYPE: ICD-10-CM

## 2021-07-30 DIAGNOSIS — Z17.0 MALIGNANT NEOPLASM OF OVERLAPPING SITES OF RIGHT BREAST IN FEMALE, ESTROGEN RECEPTOR POSITIVE (HCC): Primary | ICD-10-CM

## 2021-07-30 DIAGNOSIS — C50.911 MALIGNANT NEOPLASM OF RIGHT FEMALE BREAST, UNSPECIFIED ESTROGEN RECEPTOR STATUS, UNSPECIFIED SITE OF BREAST (HCC): ICD-10-CM

## 2021-07-30 DIAGNOSIS — C50.811 MALIGNANT NEOPLASM OF OVERLAPPING SITES OF RIGHT BREAST IN FEMALE, ESTROGEN RECEPTOR POSITIVE (HCC): Primary | ICD-10-CM

## 2021-07-30 PROCEDURE — 99214 OFFICE O/P EST MOD 30 MIN: CPT | Performed by: INTERNAL MEDICINE

## 2021-07-30 NOTE — PROGRESS NOTES
Hematology/Oncology Outpatient Follow- up Note  Tanesha Fontaine 55 y o  female MRN: @ Encounter: [id number]        Date:  7/30/2021    Presenting Complaint/Diagnosis : Multifocal ER positive UT positive HER-2 positive breast cancer  Final pathologic stage IA i e  pathologic T1 C  pathologic N0 grade 2 tumor  All sentinel lymph nodes were negative for malignancy    HPI:    Orlin Guzman is a 49year old single  female, who lives in a group home, and is autistic  Waqar  has stage I breast cancer, which is multifocal and is HER-2 (+)   She had 6 cycles of TCH chemotherapy   This was followed by 1 year of Herceptin   She then proceeded to Tamoxifen and with blood work indicating she was menopausal, and without a period for 1 year, she switched to Arimidex daily  Previous Hematologic/ Oncologic History:    Oncology History   Malignant neoplasm of overlapping sites of right breast in female, estrogen receptor positive ([facility] [address] )   2013 Surgery    Right breast mastectomy   Invasive breast carcinoma  Grade 2  2 foci:    UT 60/90  HER2 0/3+  Lymph nodes negative  Stage IA  Dr Fabrizio Wilkerson     2/2014 - 2/2015 Chemotherapy    TCH x 6 cycles  1 year of Herceptin  Dr Beto Holcomb     2/2015 -  Hormone Therapy    Tamoxifen daily until 2018  Arimidex 1 mg daily since 2018  Dr Beto Holcomb       Patient is status post a mastectomy on the right side  Chemotherapy with TCH  She had 6 cycles  Her treatment started in February of 2014  Then got one year of Herceptin which ended in February of 2015  Tamoxifen started in 2015 which was then converted to Arimidex in 2018  Venofer 200mg IV weekly x 6 doses    Current Hematologic/ Oncologic Treatment:       Arimidex 1 mg daily    Interval History:       Patient returns for follow-up visit  Her mother and caregiver accompany her  They states she is doing reasonably well  Her blood work is within acceptable limits  Denies any nausea denies any vomiting denies any diarrhea currently    Her last CT scan in June of 2020 had shown no evidence of metastatic disease  She then had a CT scan done in October of 2020 of the abdomen pelvis which again showed no metastatic disease but did show constipation  She has been doing well otherwise  Her 14 point review of systems today was negative  Test Results:    Imaging: XR spine cervical complete 4 or 5 vw non injury    Result Date: 7/13/2021  Narrative: CERVICAL SPINE INDICATION:   M54 2: Cervicalgia M62 838: Other muscle spasm M79 2: Neuralgia and neuritis, unspecified  COMPARISON:  None VIEWS:  XR SPINE CERVICAL COMPLETE 4 OR 5 VW NON INJURY Images: 6 FINDINGS: No fracture  Normal alignment without subluxation  Very severe multilevel degenerative changes noted involving discs, facet joints and uncovertebral joints  Bilateral neural foraminal narrowing noted at multiple levels  The prevertebral soft tissues are within normal limits  The visualized lung apices appear grossly clear  There are bilateral  shunt tube is noted  The tube on the right is densely calcified throughout much of its course  The tube on the left has spotty calcifications  Impression: No acute osseous abnormality  Severe degenerative changes as above  Bilateral neural foraminal narrowing  Bilateral  shunt tubes  Workstation performed: [id number]       Labs:   Lab Results   Component Value Date    WBC 6 82 10/09/2020    HGB 6 8 (LL) 10/09/2020    HCT 24 5 (L) 10/09/2020    MCV 75 (L) 10/09/2020     (H) 10/09/2020     Lab Results   Component Value Date     (L) 12/18/2013    K 4 0 10/09/2020    CL 99 (L) 10/09/2020    CO2 30 10/09/2020    ANIONGAP 5 12/18/2013    BUN 8 10/09/2020    CREATININE 0 42 (L) 10/09/2020    GLUCOSE 127 12/18/2013    CALCIUM 8 9 10/09/2020    EGFR 117 10/09/2020     ROS: As stated in the history of present illness otherwise his 14 point review of systems today was negative        Active Problems:   Patient Active Problem List   Diagnosis    Vitamin D deficiency    Scoliosis    Retinal detachment    Obsessive compulsive disorder    Multiple thyroid nodules    Mild intellectual disability    Malignant neoplasm of overlapping sites of right breast in female, estrogen receptor positive ([address] )    Legal blindness [place]    Irregular menstrual cycle    Internal hemorrhoids    Impulse control disorder    Glaucoma    Dyslipidemia    Depression    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HCC)    Chronic gingivitis    Cerebral palsy (HCC)    Benign neoplasm of large intestine    Autistic disorder    Anxiety    Other long term (current) drug therapy    Hyperglycemia    Screening for colorectal cancer    Chronic GERD    Iron deficiency anemia, unspecified    Use of anastrozole (Arimidex)    Family history of colonic polyps    Osteoporosis    Ambulatory dysfunction       Past Medical History:   Past Medical History:   Diagnosis Date    Autism     Breast cyst, left     last assessed 12/30/2013    Cerebral palsy (HCC)     Chronic GERD     Depression     Fibrocystic breast disease     last assessed 06/29/2012    Gastrointestinal hemorrhage     Glaucoma     History of chemotherapy     Hydrocephalus ([facility] [address] )     Scoliosis     Scoliosis        Surgical History:   Past Surgical History:   Procedure Laterality Date    BREAST BIOPSY Right     CATARACT EXTRACTION  06/14/2021    COLONOSCOPY      Description 04/13/2016-5 yrs  Description 4 yr f/u d/t polyp and family history, onset 07/20/2012   COLONOSCOPY      ESOPHAGOGASTRODUODENOSCOPY      description-04/13/2016 gastritis with antral nodule    EYE SURGERY      for relief of intraocular pressure    INCISIONAL BREAST BIOPSY      description 2008    MASTECTOMY Left     SENTINEL LYMPH NODE BIOPSY      STRABISMUS SURGERY      description 1973, 66    UPPER GASTROINTESTINAL ENDOSCOPY  05/16/2019    Grade C erosive esophagitis    7 mm nodule in the stomach       Family History:    Family History   Problem Relation Age of Onset    Osteopenia Mother     Heart attack Father 59        Acute MI    Coronary artery disease Father     Thyroid nodules Father     Osteopenia Maternal Grandmother     Prostate cancer Maternal Grandfather     Leukemia Paternal Grandfather     Heart attack Maternal Uncle 48        acute MI, stent    Cancer Maternal Uncle         adenocarcinoma of oral cavity    Colon cancer Maternal Uncle 62    Hyperlipidemia Maternal Uncle     Pancreatic cancer Paternal Uncle     Prostate cancer Paternal Uncle     Colon cancer Family     Coronary artery disease Family     Thyroid disease Family        Cancer-related family history includes Cancer in her maternal uncle; Colon cancer in her family; Colon cancer (age of onset: 62) in her maternal uncle; Pancreatic cancer in her paternal uncle; Prostate cancer in her maternal grandfather and paternal uncle  Social History:   Social History     Socioeconomic History    Marital status: Single     Spouse name: Not on file    Number of children: Not on file    Years of education: Not on file    Highest education level: Not on file   Occupational History    Not on file   Tobacco Use    Smoking status: Never Smoker    Smokeless tobacco: Never Used   Vaping Use    Vaping Use: Never used   Substance and Sexual Activity    Alcohol use: No    Drug use: No    Sexual activity: Not on file   Other Topics Concern    Not on file   Social History Narrative    Person living in a residential institution    Uses safety equipment-seat belts     Social Determinants of Health     Financial Resource Strain:     Difficulty of Paying Living Expenses:    Food Insecurity:     Worried About [address] in the Last Year:     [address] in the Last Year:    Transportation Needs:     Lack of Transportation (Medical):      Lack of Transportation (Non-Medical):    Physical Activity:     Days of Exercise per Week:     Minutes of Exercise per Session: Stress:     Feeling of Stress :    Social Connections:     Frequency of Communication with Friends and Family:     Frequency of Social Gatherings with Friends and Family:     Attends [employer] Services:     Active Member of Clubs or Organizations:     Attends Club or Organization Meetings:     Marital Status:    Intimate Partner Violence:     Fear of Current or Ex-Partner:     Emotionally Abused:     Physically Abused:     Sexually Abused:        Current Medications:   Current Outpatient Medications   Medication Sig Dispense Refill    acetaminophen (TYLENOL) 500 mg tablet Take 500 mg by mouth every 6 (six) hours as needed for mild pain      alendronate (FOSAMAX) 70 mg tablet Take 1 tablet (70 mg total) by mouth every 7 days 4 tablet 11    ascorbic acid (VITAMIN C) 500 mg tablet 1 tab PO q am with iron supplement 30 tablet 2    BANOPHEN 25 MG capsule Take 25 mg by mouth daily at bedtime as needed       bromfenac sodium (Prolensa) 0 07 % SOLN Apply to eye 1 DROP IN RIGHT EYE IN THE MORNING      calamine lotion Apply topically every 4 (four) hours as needed for itching      Calcium Carb-Cholecalciferol (OYSTER SHELL CALCIUM) 500-400 MG-UNIT TABS Take 1 tablet by mouth 2 (two) times a day       Calcium Carbonate-Vitamin D (OYSCO 500/D PO) Take by mouth daily      carBAMazepine (TEGretol) 200 mg tablet Take 1 tablet (200 mg total) by mouth 2 (two) times a day      Carboxymethylcell-Hypromellose (GENTEAL) 0 25-0 3 % GEL Apply to eye       Cholecalciferol (VITAMIN D) 2000 units tablet TAKE TWO TABLETS BY MOUTH (4000IU) EVERY MORNING FOR VITAMIN DIFICIENCY 62 tablet 0    citalopram (CeleXA) 10 mg tablet Take 10 mg by mouth daily 30 mg total       Dentifrices (SENSODYNE PRONAMEL DT) Apply to teeth       Diclofenac Sodium (VOLTAREN) 1 %       diphenhydrAMINE (BENADRYL) 25 mg tablet Take 25 mg by mouth daily at bedtime as needed for itching (for insomnia and allergy ** DO NOT TAKE WITH XYZAL **)  docusate sodium (COLACE) 100 mg capsule Take 100 mg by mouth 3 (three) times a week       ferrous sulfate 324 (65 Fe) mg Take 1 tablet (324 mg total) by mouth daily before breakfast 30 tablet 5    guaiFENesin (SILTUSSIN SA PO) Take by mouth Take 2 teaspoonful (10 ml) PRN 6 hrs for cough      haloperidol (HALDOL) 2 mg tablet Take 2 mg by mouth 2 (two) times a day 1 tab am and 2 tabs qhs      hydrocortisone (ANUSOL-HC, PROCTOSOL HC,) 2 5 % rectal cream Insert into the rectum 2 (two) times a day (Patient taking differently: Insert into the rectum once ) 30 g 0    Hypromellose 0 3 % SOLN Apply to eye       ibuprofen (MOTRIN) 200 mg tablet Take 400 mg by mouth every 6 (six) hours as needed for mild pain        ketorolac (ACULAR) 0 5 % ophthalmic solution       latanoprost (XALATAN) 0 005 % ophthalmic solution Apply 1 drop to eye      levocetirizine (XYZAL) 5 MG tablet Take 1 tablet by mouth daily as needed      lithium carbonate 300 mg capsule Take 300 mg by mouth 3 (three) times a day with meals       loperamide (IMODIUM) 2 mg capsule Take by mouth       LORazepam (ATIVAN) 0 5 mg tablet Take 1 mg by mouth 2 (two) times a day       melatonin 1 mg Take 5 mg by mouth daily at bedtime       memantine (NAMENDA) 10 mg tablet Take 1 tablet (10 mg total) by mouth 2 (two) times a day 60 tablet 5    mineral oil-white petrolatum (LUBRIFRESH P M ) ophthalmic ointment 0 5 inches       neomycin-polymyxin b-bacitracin (NEOSPORIN) 3 5-400-5,000       NON FORMULARY ACT FLUORIDE DENTAL RINSE   RINSE WITH 1 TBSP (15ML)2X A DAY      NON FORMULARY TOLNAFTATE SPRAY 1% SPRAY  SPRAY BETWEEN TOES      ofloxacin (FLOXIN) 0 3 % otic solution Administer 10 drops to the right ear 4 (four) times a day       ofloxacin (OCUFLOX) 0 3 % ophthalmic solution       ondansetron (ZOFRAN) 4 mg tablet Take by mouth      pantoprazole (PROTONIX) 40 mg tablet TAKE ONE TABLET BY MOUTH 2 TIMES A DAY( MORNING/ BEDTIME) (ESOPHAGITIS) 60 tablet 5    polyethylene glycol (MIRALAX) 17 g packet Take 17 g by mouth daily as needed      prednisoLONE acetate (prednisoLONE Acetate P-F) 1 % ophthalmic suspension Administer 1 drop to the right eye every morning       PROCTOSOL HC 2 5 % rectal cream       sodium chloride (SOCHLOR) 5 % hypertonic ophthalmic solution Apply 1 drop to eye 2 (two) times a day       sodium fluoride 0 275 (0 125 F) MG/DROP solution Take 275 mcg by mouth daily       Sunscreens (SUNBLOCK LOTION SPF30 EX) Apply topically daily as needed      talc (Zeasorb) Apply topically once as needed for irritation Sprinkle powder on affected area of skin to help with excess moisture up to 2 times a day as needed    timolol (TIMOPTIC-XE) 0 5 % ophthalmic gel-forming Administer 1 drop into the left eye daily      tolnaftate (TINACTIN) 1 % spray Apply topically      white petrolatum (Vaseline) Apply topically once Apply to hand every day prn      anastrozole (ARIMIDEX) 1 mg tablet Take 1 tablet (1 mg total) by mouth daily at bedtime 90 tablet 3     No current facility-administered medications for this visit  Allergies: Allergies   Allergen Reactions    Bactrim [Sulfamethoxazole-Trimethoprim]     Methylphenidate Hyperactivity     Reaction Date: [id number];     Sulfa Antibiotics     Thioridazine     Amphetamines     Chlorpromazine     Fexofenadine Hives     Reaction Date: [id number];    Jaden Crimes Medroxyprogesterone Rash and Hives     Reaction Date: [id number];     Other Hives     Mellaril, thorazine    Trimethoprim        Physical Exam:    Body surface area is 1 52 meters squared      Wt Readings from Last 3 Encounters:   07/30/21 60 3 kg (133 lb)   07/13/21 59 kg (130 lb)   04/30/21 57 2 kg (126 lb)        Temp Readings from Last 3 Encounters:   07/30/21 98 4 °F (36 9 °C) (Temporal)   07/13/21 98 3 °F (36 8 °C) (Tympanic)   04/30/21 98 2 °F (36 8 °C) (Temporal)        BP Readings from Last 3 Encounters:   07/30/21 114/82   07/13/21 118/80   04/30/21 106/68         Pulse Readings from Last 3 Encounters:   07/30/21 94   07/13/21 84   04/30/21 86        Physical Exam     Constitutional   General appearance: No acute distress,   Eyes   Conjunctiva and lids: No swelling, erythema or discharge  Pupils and irises: Equal, round and reactive to light  Ears, Nose, Mouth, and Throat   External inspection of ears and nose: Normal     Nasal mucosa, septum, and turbinates: Normal without edema or erythema  Oropharynx: Normal with no erythema, edema, exudate or lesions  Pulmonary   Respiratory effort: No increased work of breathing or signs of respiratory distress  Auscultation of lungs: Clear to auscultation  Cardiovascular   Palpation of heart: Normal PMI, no thrills  Auscultation of heart: Normal rate and rhythm, normal S1 and S2, without murmurs  Examination of extremities for edema and/or varicosities: Normal     Carotid pulses: Normal     Abdomen   Abdomen: Non-tender, no masses  Liver and spleen: No hepatomegaly or splenomegaly  Lymphatic   Palpation of lymph nodes in neck: No lymphadenopathy  Musculoskeletal   Digits and nails: Normal without clubbing or cyanosis  Inspection/palpation of joints, bones, and muscles: Normal     Skin   Skin and subcutaneous tissue: Normal without rashes or lesions  Assessment / Plan:      The patient is a 52-year-old autistic female with a history of stage IA ER/ME positive, HER2 positive breast cancer   She initially underwent chemotherapy with [address] followed by 1 year of Herceptin   This is completed in February 2015  She was  started on tamoxifen in 2014 and took this until 2018 when she was switched to Arimidex  She is still on this with a plan to complete 10 years  Her hemoglobin was within acceptable limits as or her other blood test   CA 07/20/2029 was not drawn  I will see her back in 6 months with repeat blood work  I will repeat imaging at a year  This is because she did have HER2 positive disease and her last scan will have been more than a year ago  The patient and her mother along with her caregiver agree with this plan  I will see her back in 6 months  If she has any questions she will call our office  Goals and Barriers:  Current Goal:  Prolong Survival from Breast cancer HER2 positive  Barriers: None  Patient's Capacity to Self Care:  Patient able to self care  Portions of the record may have been created with voice recognition software   Occasional wrong word or "sound a like" substitutions may have occurred due to the inherent limitations of voice recognition software   Read the chart carefully and recognize, using context, where substitutions have occurred

## 2021-08-02 ENCOUNTER — TELEPHONE (OUTPATIENT)
Dept: FAMILY MEDICINE CLINIC | Facility: HOSPITAL | Age: 56
End: 2021-08-02

## 2021-08-02 NOTE — TELEPHONE ENCOUNTER
Spoke to Cassidy Olsen  They are cancelling the MRI scheduled for 8/5  They will wait for us to contact them back with what Dr Tona Us suggests  Aware Dr Tona Us is out of the office until 8/5

## 2021-08-02 NOTE — TELEPHONE ENCOUNTER
Patient has shoulder mri ordered and scheduled  Her nurse has since remembered that patient has 2 shunts in her brain  One was confirmed, by a [facility]'s neuro-surg, to have metal in it      Since she can't have mri, nurse César Shell is asking what else can be done?    pcb

## 2021-08-03 ENCOUNTER — HOSPITAL ENCOUNTER (OUTPATIENT)
Dept: ULTRASOUND IMAGING | Facility: HOSPITAL | Age: 56
Discharge: HOME | End: 2021-08-03
Payer: MEDICARE

## 2021-08-03 DIAGNOSIS — C50.919 MALIGNANT NEOPLASM OF UNSPECIFIED SITE OF UNSPECIFIED FEMALE BREAST (HCC): ICD-10-CM

## 2021-08-03 DIAGNOSIS — R35.0 FREQUENCY OF MICTURITION: ICD-10-CM

## 2021-08-03 PROCEDURE — 76856 US EXAM PELVIC COMPLETE: CPT

## 2021-08-05 NOTE — TELEPHONE ENCOUNTER
I never saw pt for this issue - she saw Ruchi Jeff, I reviewed Fantasma's note and to me it is unclear whether pain is at the L shoulder or the cervical spine, I would recommend in office eval with Ortho or myself for further eval and to determine what study is needed    TY

## 2021-08-19 ENCOUNTER — HOSPITAL ENCOUNTER (OUTPATIENT)
Dept: ULTRASOUND IMAGING | Facility: CLINIC | Age: 56
Discharge: HOME | End: 2021-08-19
Payer: MEDICARE

## 2021-08-19 DIAGNOSIS — Z85.3 HISTORY OF RIGHT BREAST CANCER: ICD-10-CM

## 2021-08-19 DIAGNOSIS — S49.92XA UNSPECIFIED INJURY OF LEFT SHOULDER AND UPPER ARM, INITIAL ENCOUNTER: ICD-10-CM

## 2021-08-19 DIAGNOSIS — Z90.11 ACQUIRED ABSENCE OF RIGHT BREAST AND NIPPLE: ICD-10-CM

## 2021-08-19 PROCEDURE — 76642 ULTRASOUND BREAST LIMITED: CPT

## 2021-08-27 ENCOUNTER — CONSULT (OUTPATIENT)
Dept: OBGYN CLINIC | Facility: CLINIC | Age: 56
End: 2021-08-27
Payer: MEDICARE

## 2021-08-27 ENCOUNTER — APPOINTMENT (OUTPATIENT)
Dept: RADIOLOGY | Facility: CLINIC | Age: 56
End: 2021-08-27
Payer: MEDICARE

## 2021-08-27 VITALS — WEIGHT: 133 LBS | HEIGHT: 57 IN | BODY MASS INDEX: 28.69 KG/M2

## 2021-08-27 DIAGNOSIS — Q79.9 BONY ABNORMALITY: ICD-10-CM

## 2021-08-27 DIAGNOSIS — M25.512 ACUTE PAIN OF LEFT SHOULDER: ICD-10-CM

## 2021-08-27 DIAGNOSIS — M19.012 GLENOHUMERAL ARTHRITIS, LEFT: ICD-10-CM

## 2021-08-27 DIAGNOSIS — M50.30 DDD (DEGENERATIVE DISC DISEASE), CERVICAL: Primary | ICD-10-CM

## 2021-08-27 PROCEDURE — 73030 X-RAY EXAM OF SHOULDER: CPT

## 2021-08-27 PROCEDURE — 99203 OFFICE O/P NEW LOW 30 MIN: CPT | Performed by: ORTHOPAEDIC SURGERY

## 2021-08-27 NOTE — PROGRESS NOTES
Assessment:     1  DDD (degenerative disc disease), cervical    2  Glenohumeral arthritis, left    3  Bony abnormality        Plan:     Problem List Items Addressed This Visit     None      Visit Diagnoses     DDD (degenerative disc disease), cervical    -  Primary    Glenohumeral arthritis, left        Relevant Orders    XR shoulder 2+ vw left    CT upper extremity wo contrast left    Bony abnormality        Relevant Orders    CT upper extremity wo contrast left        Findings consistent with left shoulder advanced glenohumeral arthritis with bony abnormality  Imaging and prognosis reviewed with patient  She does have functional limitations with arm which could be due to arthritis  Also discussed since she did have breast cancer it could be related to abnormal appearance of proximal humerus vs arthritic changes  Will order CT scan to further evaluate and see back to review  If just related to arthritis than can proceed with cortisone injection  She can use arm to tolerance, oral medications as needed for pain  See back for CT review  All patient's questions were answered to their satisfaction  This note is created using dictation transcription  It may contain typographical errors, grammatical errors, improperly dictated words, background noise and other errors  Subjective:     Patient ID: Lawyer Dillon is a 55 y o  female  Chief Complaint:  55 yr old female in for evaluation of cervical and left arm pain  Referred by Trinidad Gayle  She has had these symptoms on and off for the past year gradually worsening  The pain will radiate from neck to shoulder and down arm into forearm  Pain is daily  Pain will bother her at night  Limited use of left arm due to pain  She avoids lifting arm above shoulder level due to pain  No injury or trauma  Denies any numbness or tingling in left arm  She is from [facility], presents with mother and care giver  History shunts 3  Dandy walker syndrome   She has history breast cancer 2013 with chemotherapy  Information on patient's intake form was reviewed  Allergy:  Allergies   Allergen Reactions    Bactrim [Sulfamethoxazole-Trimethoprim]     Methylphenidate Hyperactivity     Reaction Date: [id number];     Sulfa Antibiotics     Thioridazine     Amphetamines     Chlorpromazine     Fexofenadine Hives     Reaction Date: [id number];     Medrogestone     Medroxyprogesterone Rash and Hives     Reaction Date: [id number];     Other Hives     Mellaril, thorazine    Trimethoprim      Medications:  all current active meds have been reviewed  Past Medical History:  Past Medical History:   Diagnosis Date    Autism     Breast cyst, left     last assessed 12/30/2013    Cerebral palsy (HCC)     Chronic GERD     Depression     Fibrocystic breast disease     last assessed 06/29/2012    Gastrointestinal hemorrhage     Glaucoma     History of chemotherapy     Hydrocephalus ([facility] [address] )     Scoliosis     Scoliosis      Past Surgical History:  Past Surgical History:   Procedure Laterality Date    BREAST BIOPSY Right     CATARACT EXTRACTION  06/14/2021    COLONOSCOPY      Description 04/13/2016-5 yrs  Description 4 yr f/u d/t polyp and family history, onset 07/20/2012   COLONOSCOPY      ESOPHAGOGASTRODUODENOSCOPY      description-04/13/2016 gastritis with antral nodule    EYE SURGERY      for relief of intraocular pressure    INCISIONAL BREAST BIOPSY      description 2008    MASTECTOMY Left     SENTINEL LYMPH NODE BIOPSY      STRABISMUS SURGERY      description 1973, 66    UPPER GASTROINTESTINAL ENDOSCOPY  05/16/2019    Grade C erosive esophagitis    7 mm nodule in the stomach     Family History:  Family History   Problem Relation Age of Onset    Osteopenia Mother     Heart attack Father 59        Acute MI    Coronary artery disease Father     Thyroid nodules Father     Osteopenia Maternal Grandmother     Prostate cancer Maternal Grandfather     Leukemia Paternal Grandfather     Heart attack Maternal Uncle 48        acute MI, stent    Cancer Maternal Uncle         adenocarcinoma of oral cavity    Colon cancer Maternal Uncle 62    Hyperlipidemia Maternal Uncle     Pancreatic cancer Paternal Uncle     Prostate cancer Paternal Uncle     Colon cancer Family     Coronary artery disease Family     Thyroid disease Family      Social History:  Social History     Substance and Sexual Activity   Alcohol Use No     Social History     Substance and Sexual Activity   Drug Use No     Social History     Tobacco Use   Smoking Status Never Smoker   Smokeless Tobacco Never Used     Review of Systems   Constitutional: Negative for chills and fever  HENT: Negative for ear pain and sore throat  Eyes: Negative for pain and visual disturbance  Respiratory: Negative for cough and shortness of breath  Cardiovascular: Negative for chest pain and palpitations  Gastrointestinal: Negative for abdominal pain and vomiting  Genitourinary: Negative for dysuria and hematuria  Musculoskeletal: Positive for arthralgias (Left shoulder) and neck pain  Negative for back pain  Skin: Negative for color change and rash  Neurological: Negative for seizures and syncope  Psychiatric/Behavioral: Negative  All other systems reviewed and are negative  Objective:  BP Readings from Last 1 Encounters:   07/30/21 114/82      Wt Readings from Last 1 Encounters:   08/27/21 60 3 kg (133 lb)      BMI:   Estimated body mass index is 28 78 kg/m² as calculated from the following:    Height as of this encounter: 4' 9" (1 448 m)  Weight as of this encounter: 60 3 kg (133 lb)  BSA:   Estimated body surface area is 1 51 meters squared as calculated from the following:    Height as of this encounter: 4' 9" (1 448 m)  Weight as of this encounter: 60 3 kg (133 lb)  Physical Exam  Vitals and nursing note reviewed  Constitutional:       Appearance: Normal appearance   She is well-developed  HENT:      Head: Normocephalic and atraumatic  Right Ear: External ear normal       Left Ear: External ear normal    Eyes:      Extraocular Movements: Extraocular movements intact  Conjunctiva/sclera: Conjunctivae normal    Pulmonary:      Effort: Pulmonary effort is normal    Musculoskeletal:      Cervical back: Neck supple  Skin:     General: Skin is warm and dry  Neurological:      Mental Status: She is alert and oriented to person, place, and time  Deep Tendon Reflexes: Reflexes are normal and symmetric  Psychiatric:         Mood and Affect: Mood normal          Behavior: Behavior normal        Left Shoulder Exam     Tenderness   Left shoulder tenderness location: lateral shoulder     Range of Motion   Active abduction:  130 (pain) abnormal   Passive abduction:  140 (pain) abnormal   External rotation: abnormal   Forward flexion:  150 (pain) abnormal   Internal rotation 0 degrees:  Sacrum abnormal     Muscle Strength   Abduction: 5/5   Internal rotation: 5/5   External rotation: 5/5   Biceps: 5/5     Tests   Drop arm: negative    Other   Erythema: absent  Scars: absent  Sensation: normal  Pulse: present             I have personally reviewed pertinent films in PACS and my interpretation is xr cervical spine demonstrates multilevel severe ddd with foraminal narrowing, shunt tubes  XR left shoulder demonstrates significant degenerative changes glenohumeral joint with abnormal  With multiple radiolucency in the glenoid and humerus bone       Scribe Attestation    I,:  Pili Bui am acting as a scribe while in the presence of the attending physician :       I,:  Nisha Fuller MD personally performed the services described in this documentation    as scribed in my presence :

## 2021-09-03 ENCOUNTER — HOSPITAL ENCOUNTER (OUTPATIENT)
Dept: CT IMAGING | Facility: HOSPITAL | Age: 56
Discharge: HOME | End: 2021-09-03
Attending: ORTHOPAEDIC SURGERY
Payer: MEDICARE

## 2021-09-03 DIAGNOSIS — Q79.9 BONY ABNORMALITY: ICD-10-CM

## 2021-09-03 DIAGNOSIS — M19.012 GLENOHUMERAL ARTHRITIS, LEFT: ICD-10-CM

## 2021-09-03 PROCEDURE — 73200 CT UPPER EXTREMITY W/O DYE: CPT

## 2021-09-10 ENCOUNTER — OFFICE VISIT (OUTPATIENT)
Dept: OBGYN CLINIC | Facility: CLINIC | Age: 56
End: 2021-09-10
Payer: MEDICARE

## 2021-09-10 ENCOUNTER — OFFICE VISIT (OUTPATIENT)
Dept: FAMILY MEDICINE CLINIC | Facility: HOSPITAL | Age: 56
End: 2021-09-10
Payer: MEDICARE

## 2021-09-10 VITALS
DIASTOLIC BLOOD PRESSURE: 88 MMHG | BODY MASS INDEX: 29.04 KG/M2 | HEART RATE: 84 BPM | WEIGHT: 134.6 LBS | SYSTOLIC BLOOD PRESSURE: 138 MMHG | HEIGHT: 57 IN | TEMPERATURE: 97.5 F

## 2021-09-10 VITALS — HEIGHT: 57 IN | BODY MASS INDEX: 28.69 KG/M2 | WEIGHT: 133 LBS

## 2021-09-10 DIAGNOSIS — Z17.0 MALIGNANT NEOPLASM OF OVERLAPPING SITES OF RIGHT BREAST IN FEMALE, ESTROGEN RECEPTOR POSITIVE (HCC): ICD-10-CM

## 2021-09-10 DIAGNOSIS — C50.811 MALIGNANT NEOPLASM OF OVERLAPPING SITES OF RIGHT BREAST IN FEMALE, ESTROGEN RECEPTOR POSITIVE (HCC): ICD-10-CM

## 2021-09-10 DIAGNOSIS — M19.012 PRIMARY OSTEOARTHRITIS OF LEFT SHOULDER: Primary | ICD-10-CM

## 2021-09-10 DIAGNOSIS — F42.9 OBSESSIVE-COMPULSIVE DISORDER, UNSPECIFIED TYPE: ICD-10-CM

## 2021-09-10 DIAGNOSIS — Z00.00 MEDICARE ANNUAL WELLNESS VISIT, SUBSEQUENT: Primary | ICD-10-CM

## 2021-09-10 PROCEDURE — G0439 PPPS, SUBSEQ VISIT: HCPCS | Performed by: INTERNAL MEDICINE

## 2021-09-10 PROCEDURE — 99213 OFFICE O/P EST LOW 20 MIN: CPT | Performed by: ORTHOPAEDIC SURGERY

## 2021-09-10 PROCEDURE — 20610 DRAIN/INJ JOINT/BURSA W/O US: CPT | Performed by: ORTHOPAEDIC SURGERY

## 2021-09-10 RX ORDER — BETAMETHASONE SODIUM PHOSPHATE AND BETAMETHASONE ACETATE 3; 3 MG/ML; MG/ML
6 INJECTION, SUSPENSION INTRA-ARTICULAR; INTRALESIONAL; INTRAMUSCULAR; SOFT TISSUE
Status: COMPLETED | OUTPATIENT
Start: 2021-09-10 | End: 2021-09-10

## 2021-09-10 RX ORDER — LIDOCAINE HYDROCHLORIDE 10 MG/ML
5 INJECTION, SOLUTION EPIDURAL; INFILTRATION; INTRACAUDAL; PERINEURAL
Status: COMPLETED | OUTPATIENT
Start: 2021-09-10 | End: 2021-09-10

## 2021-09-10 RX ADMIN — BETAMETHASONE SODIUM PHOSPHATE AND BETAMETHASONE ACETATE 6 MG: 3; 3 INJECTION, SUSPENSION INTRA-ARTICULAR; INTRALESIONAL; INTRAMUSCULAR; SOFT TISSUE at 13:52

## 2021-09-10 RX ADMIN — LIDOCAINE HYDROCHLORIDE 5 ML: 10 INJECTION, SOLUTION EPIDURAL; INFILTRATION; INTRACAUDAL; PERINEURAL at 13:52

## 2021-09-10 NOTE — PROGRESS NOTES
Assessment:     1  Primary osteoarthritis of left shoulder        Plan:     Problem List Items Addressed This Visit        Musculoskeletal and Integument    Primary osteoarthritis of left shoulder - Primary     Findings consistent with left shoulder osteoarthritis  Discussed findings and treatment options with the patient  I reviewed should patient's left shoulder CT scan which showed no bone lesions  Patient's symptom is mostly due to the arthritis  I provided patient cortisone injection in her left glenohumeral joint, which patient tolerated well  I informed the caretaker that she can have the cortisone shot every 3-4 months or as needed  Cold compress over the left shoulder today  I advised them to avoid repetitive overhead activities  I will see patient back as needed  All patient's questions were answered to their satisfaction  This note is created using dictation transcription  It may contain typographical errors, grammatical errors, improperly dictated words, background noise and other errors  Relevant Medications    lidocaine (PF) (XYLOCAINE-MPF) 1 % injection 5 mL (Completed)    betamethasone acetate-betamethasone sodium phosphate (CELESTONE) injection 6 mg (Completed)    Other Relevant Orders    Large joint arthrocentesis: L glenohumeral (Completed)         Subjective:     Patient ID: Jody Zimmer is a 55 y o  female  Chief Complaint:    60-year-old female follow-up left shoulder pain  Patient is here to review her CT scan of the shoulder  There were concern of possible bone lesions due to her history of breast cancer  She continued to complain of pain in her left shoulder with motion  There is also grinding sensation with shoulder motion  Patient is here with her caretakers      Allergy:  Allergies   Allergen Reactions    Bactrim [Sulfamethoxazole-Trimethoprim]     Methylphenidate Hyperactivity     Reaction Date: [id number];     Sulfa Antibiotics     Thioridazine     Amphetamines     Chlorpromazine     Fexofenadine Hives     Reaction Date: [id number];    Renella Fanning Medroxyprogesterone Rash and Hives     Reaction Date: [id number];     Other Hives     Mellaril, thorazine    Trimethoprim      Medications:  all current active meds have been reviewed  Past Medical History:  Past Medical History:   Diagnosis Date    Autism     Breast cyst, left     last assessed 12/30/2013    Cerebral palsy (HCC)     Chronic GERD     Depression     Fibrocystic breast disease     last assessed 06/29/2012    Gastrointestinal hemorrhage     Glaucoma     History of chemotherapy     [facility]cephalus ([facility] [address]     Scoliosis     Scoliosis      Past Surgical History:  Past Surgical History:   Procedure Laterality Date    BREAST BIOPSY Right     CATARACT EXTRACTION  06/14/2021    COLONOSCOPY      Description 04/13/2016-5 yrs  Description 4 yr f/u d/t polyp and family history, onset 07/20/2012   COLONOSCOPY      ESOPHAGOGASTRODUODENOSCOPY      description-04/13/2016 gastritis with antral nodule    EYE SURGERY      for relief of intraocular pressure    INCISIONAL BREAST BIOPSY      description 2008    MASTECTOMY Left     SENTINEL LYMPH NODE BIOPSY      STRABISMUS SURGERY      description 1973, 66    UPPER GASTROINTESTINAL ENDOSCOPY  05/16/2019    Grade C erosive esophagitis    7 mm nodule in the stomach     Family History:  Family History   Problem Relation Age of Onset    Osteopenia Mother     Heart attack Father 59        Acute MI    Coronary artery disease Father     Thyroid nodules Father     Osteopenia Maternal Grandmother     Prostate cancer Maternal Grandfather     Leukemia Paternal Grandfather     Heart attack Maternal Uncle 48        acute MI, stent    Cancer Maternal Uncle         adenocarcinoma of oral cavity    Colon cancer Maternal Uncle 62    Hyperlipidemia Maternal Uncle     Pancreatic cancer Paternal Uncle     Prostate cancer Paternal Uncle     Colon cancer Family     Coronary artery disease Family     Thyroid disease Family      Social History:  Social History     Substance and Sexual Activity   Alcohol Use No     Social History     Substance and Sexual Activity   Drug Use No     Social History     Tobacco Use   Smoking Status Never Smoker   Smokeless Tobacco Never Used     Review of Systems   Constitutional: Negative  HENT: Negative  Eyes: Negative  Respiratory: Negative  Cardiovascular: Negative  Gastrointestinal: Negative  Endocrine: Negative  Genitourinary: Negative  Musculoskeletal: Positive for arthralgias (Left shoulder)  Skin: Negative  Allergic/Immunologic: Negative  Hematological: Negative  Psychiatric/Behavioral: Negative  Objective:  BP Readings from Last 1 Encounters:   07/30/21 114/82      Wt Readings from Last 1 Encounters:   09/10/21 60 3 kg (133 lb)      BMI:   Estimated body mass index is 28 78 kg/m² as calculated from the following:    Height as of this encounter: 4' 9" (1 448 m)  Weight as of this encounter: 60 3 kg (133 lb)  BSA:   Estimated body surface area is 1 51 meters squared as calculated from the following:    Height as of this encounter: 4' 9" (1 448 m)  Weight as of this encounter: 60 3 kg (133 lb)  Physical Exam  Vitals and nursing note reviewed  Constitutional:       Appearance: Normal appearance  She is well-developed  HENT:      Head: Normocephalic and atraumatic  Right Ear: External ear normal       Left Ear: External ear normal    Eyes:      Extraocular Movements: Extraocular movements intact  Conjunctiva/sclera: Conjunctivae normal    Pulmonary:      Effort: Pulmonary effort is normal    Musculoskeletal:      Cervical back: Neck supple  Right knee: No effusion  Skin:     General: Skin is warm and dry  Neurological:      Mental Status: She is alert and oriented to person, place, and time        Deep Tendon Reflexes: Reflexes are normal and symmetric  Psychiatric:         Mood and Affect: Mood normal          Behavior: Behavior normal        Right Knee Exam     Other   Effusion: no effusion present      Left Shoulder Exam     Tenderness   Left shoulder tenderness location: Anterior and posterior  Range of Motion   Active abduction: abnormal Left shoulder active abduction: Pain  Passive abduction: abnormal Left shoulder passive abduction: Pain  Forward flexion: abnormal Left shoulder forward flexion: Pain  Internal rotation 0 degrees: abnormal Left shoulder internal rotation 0 degrees: Pain  Tests   Drop arm: negative    Other   Erythema: absent  Sensation: normal  Pulse: present     Comments:    Crepitation with shoulder motion              I have personally reviewed pertinent films in PACS and my interpretation is Left shoulder CT scan show advanced osteoarthritis with marginal osteophyte  No bone lesions  Large joint arthrocentesis: L glenohumeral  Universal Protocol:  Consent: Verbal consent obtained  Risks and benefits: risks, benefits and alternatives were discussed  Consent given by: guardian and patient  Time out: Immediately prior to procedure a "time out" was called to verify the correct patient, procedure, equipment, support staff and site/side marked as required    Patient understanding: patient states understanding of the procedure being performed  Site marked: the operative site was marked  Supporting Documentation  Indications: pain   Procedure Details  Location: shoulder - L glenohumeral  Preparation: Patient was prepped and draped in the usual sterile fashion  Needle size: 22 G  Ultrasound guidance: no  Approach: posterolateral  Medications administered: 5 mL lidocaine (PF) 1 %; 6 mg betamethasone acetate-betamethasone sodium phosphate 6 (3-3) mg/mL    Patient tolerance: patient tolerated the procedure well with no immediate complications  Dressing:  Sterile dressing applied

## 2021-09-10 NOTE — PATIENT INSTRUCTIONS
Medicare Preventive Visit Patient Instructions  Thank you for completing your Welcome to Medicare Visit or Medicare Annual Wellness Visit today  Your next wellness visit will be due in one year (9/11/2022)  The screening/preventive services that you may require over the next 5-10 years are detailed below  Some tests may not apply to you based off risk factors and/or age  Screening tests ordered at today's visit but not completed yet may show as past due  Also, please note that scanned in results may not display below  Preventive Screenings:  Service Recommendations Previous Testing/Comments   Colorectal Cancer Screening  * Colonoscopy    * Fecal Occult Blood Test (FOBT)/Fecal Immunochemical Test (FIT)  * Fecal DNA/Cologuard Test  * Flexible Sigmoidoscopy Age: 54-65 years old   Colonoscopy: every 10 years (may be performed more frequently if at higher risk)  OR  FOBT/FIT: every 1 year  OR  Cologuard: every 3 years  OR  Sigmoidoscopy: every 5 years  Screening may be recommended earlier than age 48 if at higher risk for colorectal cancer  Also, an individualized decision between you and your healthcare provider will decide whether screening between the ages of 74-80 would be appropriate  Colonoscopy: 10/13/2020  FOBT/FIT: 12/28/2020  Cologuard: Not on file  Sigmoidoscopy: Not on file    Screening Current     Breast Cancer Screening Age: 36 years old  Frequency: every 1-2 years  Not required if history of left and right mastectomy Mammogram: 07/17/2020    History Breast Cancer   Cervical Cancer Screening Between the ages of 21-29, pap smear recommended once every 3 years  Between the ages of 33-67, can perform pap smear with HPV co-testing every 5 years     Recommendations may differ for women with a history of total hysterectomy, cervical cancer, or abnormal pap smears in past  Pap Smear: 07/08/2021    Screening Current   Hepatitis C Screening Once for adults born between 1945 and 1965  More frequently in patients at high risk for Hepatitis C Hep C Antibody: Not on file        Diabetes Screening 1-2 times per year if you're at risk for diabetes or have pre-diabetes Fasting glucose: No results in last 5 years   A1C: No results in last 5 years    Screening Current   Cholesterol Screening Once every 5 years if you don't have a lipid disorder  May order more often based on risk factors  Lipid panel: Not on file          Other Preventive Screenings Covered by Medicare:  1  Abdominal Aortic Aneurysm (AAA) Screening: covered once if your at risk  You're considered to be at risk if you have a family history of AAA  2  Lung Cancer Screening: covers low dose CT scan once per year if you meet all of the following conditions: (1) Age 50-69; (2) No signs or symptoms of lung cancer; (3) Current smoker or have quit smoking within the last 15 years; (4) You have a tobacco smoking history of at least 30 pack years (packs per day multiplied by number of years you smoked); (5) You get a written order from a healthcare provider  3  Glaucoma Screening: covered annually if you're considered high risk: (1) You have diabetes OR (2) Family history of glaucoma OR (3)  aged 48 and older OR (3)  American aged 72 and older  3  Osteoporosis Screening: covered every 2 years if you meet one of the following conditions: (1) You're estrogen deficient and at risk for osteoporosis based off medical history and other findings; (2) Have a vertebral abnormality; (3) On glucocorticoid therapy for more than 3 months; (4) Have primary hyperparathyroidism; (5) On osteoporosis medications and need to assess response to drug therapy  · Last bone density test (DXA Scan): 10/16/2020   5  HIV Screening: covered annually if you're between the age of 15-65  Also covered annually if you are younger than 13 and older than 72 with risk factors for HIV infection   For pregnant patients, it is covered up to 3 times per pregnancy  Immunizations:  Immunization Recommendations   Influenza Vaccine Annual influenza vaccination during flu season is recommended for all persons aged >= 6 months who do not have contraindications   Pneumococcal Vaccine (Prevnar and Pneumovax)  * Prevnar = PCV13  * Pneumovax = PPSV23   Adults 25-60 years old: 1-3 doses may be recommended based on certain risk factors  Adults 72 years old: Prevnar (PCV13) vaccine recommended followed by Pneumovax (PPSV23) vaccine  If already received PPSV23 since turning 65, then PCV13 recommended at least one year after PPSV23 dose  Hepatitis B Vaccine 3 dose series if at intermediate or high risk (ex: diabetes, end stage renal disease, liver disease)   Tetanus (Td) Vaccine - COST NOT COVERED BY MEDICARE PART B Following completion of primary series, a booster dose should be given every 10 years to maintain immunity against tetanus  Td may also be given as tetanus wound prophylaxis  Tdap Vaccine - COST NOT COVERED BY MEDICARE PART B Recommended at least once for all adults  For pregnant patients, recommended with each pregnancy  Shingles Vaccine (Shingrix) - COST NOT COVERED BY MEDICARE PART B  2 shot series recommended in those aged 48 and above     Health Maintenance Due:      Topic Date Due    Hepatitis C Screening  Never done    HIV Screening  Never done    Colorectal Cancer Screening  10/13/2025    Cervical Cancer Screening  07/08/2026     Immunizations Due:      Topic Date Due    COVID-19 Vaccine (1) Never done    DTaP,Tdap,and Td Vaccines (1 - Tdap) 04/02/1987    Influenza Vaccine (1) 09/01/2021     Advance Directives   What are advance directives? Advance directives are legal documents that state your wishes and plans for medical care  These plans are made ahead of time in case you lose your ability to make decisions for yourself  Advance directives can apply to any medical decision, such as the treatments you want, and if you want to donate organs  What are the types of advance directives? There are many types of advance directives, and each state has rules about how to use them  You may choose a combination of any of the following:  · Living will: This is a written record of the treatment you want  You can also choose which treatments you do not want, which to limit, and which to stop at a certain time  This includes surgery, medicine, IV fluid, and tube feedings  · Durable power of  for healthcare [facility]): This is a written record that states who you want to make healthcare choices for you when you are unable to make them for yourself  This person, called a proxy, is usually a family member or a friend  You may choose more than 1 proxy  · Do not resuscitate (DNR) order:  A DNR order is used in case your heart stops beating or you stop breathing  It is a request not to have certain forms of treatment, such as CPR  A DNR order may be included in other types of advance directives  · Medical directive: This covers the care that you want if you are in a coma, near death, or unable to make decisions for yourself  You can list the treatments you want for each condition  Treatment may include pain medicine, surgery, blood transfusions, dialysis, IV or tube feedings, and a ventilator (breathing machine)  · Values history: This document has questions about your views, beliefs, and how you feel and think about life  This information can help others choose the care that you would choose  Why are advance directives important? An advance directive helps you control your care  Although spoken wishes may be used, it is better to have your wishes written down  Spoken wishes can be misunderstood, or not followed  Treatments may be given even if you do not want them  An advance directive may make it easier for your family to make difficult choices about your care     Weight Management   Why it is important to manage your weight:  Being overweight increases your risk of health conditions such as heart disease, high blood pressure, type 2 diabetes, and certain types of cancer  It can also increase your risk for osteoarthritis, sleep apnea, and other respiratory problems  Aim for a slow, steady weight loss  Even a small amount of weight loss can lower your risk of health problems  How to lose weight safely:  A safe and healthy way to lose weight is to eat fewer calories and get regular exercise  You can lose up about 1 pound a week by decreasing the number of calories you eat by 500 calories each day  Healthy meal plan for weight management:  A healthy meal plan includes a variety of foods, contains fewer calories, and helps you stay healthy  A healthy meal plan includes the following:  · Eat whole-grain foods more often  A healthy meal plan should contain fiber  Fiber is the part of grains, fruits, and vegetables that is not broken down by your body  Whole-grain foods are healthy and provide extra fiber in your diet  Some examples of whole-grain foods are whole-wheat breads and pastas, oatmeal, brown rice, and bulgur  · Eat a variety of vegetables every day  Include dark, leafy greens such as spinach, kale, royce greens, and mustard greens  Eat yellow and orange vegetables such as carrots, sweet potatoes, and winter squash  · Eat a variety of fruits every day  Choose fresh or canned fruit (canned in its own juice or light syrup) instead of juice  Fruit juice has very little or no fiber  · Eat low-fat dairy foods  Drink fat-free (skim) milk or 1% milk  Eat fat-free yogurt and low-fat cottage cheese  Try low-fat cheeses such as mozzarella and other reduced-fat cheeses  · Choose meat and other protein foods that are low in fat  Choose beans or other legumes such as split peas or lentils  Choose fish, skinless poultry (chicken or turkey), or lean cuts of red meat (beef or pork)  Before you cook meat or poultry, cut off any visible fat     · Use less fat and oil   Try baking foods instead of frying them  Add less fat, such as margarine, sour cream, regular salad dressing and mayonnaise to foods  Eat fewer high-fat foods  Some examples of high-fat foods include french fries, doughnuts, ice cream, and cakes  · Eat fewer sweets  Limit foods and drinks that are high in sugar  This includes candy, cookies, regular soda, and sweetened drinks  Exercise:  Exercise at least 30 minutes per day on most days of the week  Some examples of exercise include walking, biking, dancing, and swimming  You can also fit in more physical activity by taking the stairs instead of the elevator or parking farther away from stores  Ask your healthcare provider about the best exercise plan for you  © Copyright [employer] 2018 Information is for End User's use only and may not be sold, redistributed or otherwise used for commercial purposes  All illustrations and images included in CareNotes® are the copyrighted property of [employer]  or [facility] Preventive Visit Patient Instructions  Thank you for completing your Welcome to Medicare Visit or Medicare Annual Wellness Visit today  Your next wellness visit will be due in one year (9/11/2022)  The screening/preventive services that you may require over the next 5-10 years are detailed below  Some tests may not apply to you based off risk factors and/or age  Screening tests ordered at today's visit but not completed yet may show as past due  Also, please note that scanned in results may not display below    Preventive Screenings:  Service Recommendations Previous Testing/Comments   Colorectal Cancer Screening  * Colonoscopy    * Fecal Occult Blood Test (FOBT)/Fecal Immunochemical Test (FIT)  * Fecal DNA/Cologuard Test  * Flexible Sigmoidoscopy Age: 54-65 years old   Colonoscopy: every 10 years (may be performed more frequently if at higher risk)  OR  FOBT/FIT: every 1 year  OR  Cologuard: every 3 years OR  Sigmoidoscopy: every 5 years  Screening may be recommended earlier than age 48 if at higher risk for colorectal cancer  Also, an individualized decision between you and your healthcare provider will decide whether screening between the ages of 74-80 would be appropriate  Colonoscopy: 10/13/2020  FOBT/FIT: 12/28/2020  Cologuard: Not on file  Sigmoidoscopy: Not on file    Screening Current     Breast Cancer Screening Age: 36 years old  Frequency: every 1-2 years  Not required if history of left and right mastectomy Mammogram: 07/17/2020    History Breast Cancer   Cervical Cancer Screening Between the ages of 21-29, pap smear recommended once every 3 years  Between the ages of 33-67, can perform pap smear with HPV co-testing every 5 years  Recommendations may differ for women with a history of total hysterectomy, cervical cancer, or abnormal pap smears in past  Pap Smear: 07/08/2021    Screening Current   Hepatitis C Screening Once for adults born between 1945 and 1965  More frequently in patients at high risk for Hepatitis C Hep C Antibody: Not on file        Diabetes Screening 1-2 times per year if you're at risk for diabetes or have pre-diabetes Fasting glucose: No results in last 5 years   A1C: No results in last 5 years    Screening Current   Cholesterol Screening Once every 5 years if you don't have a lipid disorder  May order more often based on risk factors  Lipid panel: Not on file          Other Preventive Screenings Covered by Medicare:  6  Abdominal Aortic Aneurysm (AAA) Screening: covered once if your at risk  You're considered to be at risk if you have a family history of AAA    7  Lung Cancer Screening: covers low dose CT scan once per year if you meet all of the following conditions: (1) Age 50-69; (2) No signs or symptoms of lung cancer; (3) Current smoker or have quit smoking within the last 15 years; (4) You have a tobacco smoking history of at least 30 pack years (packs per day multiplied by number of years you smoked); (5) You get a written order from a healthcare provider  8  Glaucoma Screening: covered annually if you're considered high risk: (1) You have diabetes OR (2) Family history of glaucoma OR (3)  aged 48 and older OR (3)  American aged 72 and older  5  Osteoporosis Screening: covered every 2 years if you meet one of the following conditions: (1) You're estrogen deficient and at risk for osteoporosis based off medical history and other findings; (2) Have a vertebral abnormality; (3) On glucocorticoid therapy for more than 3 months; (4) Have primary hyperparathyroidism; (5) On osteoporosis medications and need to assess response to drug therapy  · Last bone density test (DXA Scan): 10/16/2020  10  HIV Screening: covered annually if you're between the age of 12-76  Also covered annually if you are younger than 13 and older than 72 with risk factors for HIV infection  For pregnant patients, it is covered up to 3 times per pregnancy  Immunizations:  Immunization Recommendations   Influenza Vaccine Annual influenza vaccination during flu season is recommended for all persons aged >= 6 months who do not have contraindications   Pneumococcal Vaccine (Prevnar and Pneumovax)  * Prevnar = PCV13  * Pneumovax = PPSV23   Adults 25-60 years old: 1-3 doses may be recommended based on certain risk factors  Adults 72 years old: Prevnar (PCV13) vaccine recommended followed by Pneumovax (PPSV23) vaccine  If already received PPSV23 since turning 65, then PCV13 recommended at least one year after PPSV23 dose  Hepatitis B Vaccine 3 dose series if at intermediate or high risk (ex: diabetes, end stage renal disease, liver disease)   Tetanus (Td) Vaccine - COST NOT COVERED BY MEDICARE PART B Following completion of primary series, a booster dose should be given every 10 years to maintain immunity against tetanus  Td may also be given as tetanus wound prophylaxis     Tdap Vaccine - COST NOT COVERED BY MEDICARE PART B Recommended at least once for all adults  For pregnant patients, recommended with each pregnancy  Shingles Vaccine (Shingrix) - COST NOT COVERED BY MEDICARE PART B  2 shot series recommended in those aged 48 and above     Health Maintenance Due:      Topic Date Due    Hepatitis C Screening  Never done    HIV Screening  Never done    Colorectal Cancer Screening  10/13/2025    Cervical Cancer Screening  07/08/2026     Immunizations Due:      Topic Date Due    COVID-19 Vaccine (1) Never done    DTaP,Tdap,and Td Vaccines (1 - Tdap) 04/02/1987    Influenza Vaccine (1) 09/01/2021     Advance Directives   What are advance directives? Advance directives are legal documents that state your wishes and plans for medical care  These plans are made ahead of time in case you lose your ability to make decisions for yourself  Advance directives can apply to any medical decision, such as the treatments you want, and if you want to donate organs  What are the types of advance directives? There are many types of advance directives, and each state has rules about how to use them  You may choose a combination of any of the following:  · Living will: This is a written record of the treatment you want  You can also choose which treatments you do not want, which to limit, and which to stop at a certain time  This includes surgery, medicine, IV fluid, and tube feedings  · Durable power of  for healthcare [facility]): This is a written record that states who you want to make healthcare choices for you when you are unable to make them for yourself  This person, called a proxy, is usually a family member or a friend  You may choose more than 1 proxy  · Do not resuscitate (DNR) order:  A DNR order is used in case your heart stops beating or you stop breathing  It is a request not to have certain forms of treatment, such as CPR   A DNR order may be included in other types of advance directives  · Medical directive: This covers the care that you want if you are in a coma, near death, or unable to make decisions for yourself  You can list the treatments you want for each condition  Treatment may include pain medicine, surgery, blood transfusions, dialysis, IV or tube feedings, and a ventilator (breathing machine)  · Values history: This document has questions about your views, beliefs, and how you feel and think about life  This information can help others choose the care that you would choose  Why are advance directives important? An advance directive helps you control your care  Although spoken wishes may be used, it is better to have your wishes written down  Spoken wishes can be misunderstood, or not followed  Treatments may be given even if you do not want them  An advance directive may make it easier for your family to make difficult choices about your care  Weight Management   Why it is important to manage your weight:  Being overweight increases your risk of health conditions such as heart disease, high blood pressure, type 2 diabetes, and certain types of cancer  It can also increase your risk for osteoarthritis, sleep apnea, and other respiratory problems  Aim for a slow, steady weight loss  Even a small amount of weight loss can lower your risk of health problems  How to lose weight safely:  A safe and healthy way to lose weight is to eat fewer calories and get regular exercise  You can lose up about 1 pound a week by decreasing the number of calories you eat by 500 calories each day  Healthy meal plan for weight management:  A healthy meal plan includes a variety of foods, contains fewer calories, and helps you stay healthy  A healthy meal plan includes the following:  · Eat whole-grain foods more often  A healthy meal plan should contain fiber  Fiber is the part of grains, fruits, and vegetables that is not broken down by your body   Whole-grain foods are healthy and provide extra fiber in your diet  Some examples of whole-grain foods are whole-wheat breads and pastas, oatmeal, brown rice, and bulgur  · Eat a variety of vegetables every day  Include dark, leafy greens such as spinach, kale, royce greens, and mustard greens  Eat yellow and orange vegetables such as carrots, sweet potatoes, and winter squash  · Eat a variety of fruits every day  Choose fresh or canned fruit (canned in its own juice or light syrup) instead of juice  Fruit juice has very little or no fiber  · Eat low-fat dairy foods  Drink fat-free (skim) milk or 1% milk  Eat fat-free yogurt and low-fat cottage cheese  Try low-fat cheeses such as mozzarella and other reduced-fat cheeses  · Choose meat and other protein foods that are low in fat  Choose beans or other legumes such as split peas or lentils  Choose fish, skinless poultry (chicken or turkey), or lean cuts of red meat (beef or pork)  Before you cook meat or poultry, cut off any visible fat  · Use less fat and oil  Try baking foods instead of frying them  Add less fat, such as margarine, sour cream, regular salad dressing and mayonnaise to foods  Eat fewer high-fat foods  Some examples of high-fat foods include french fries, doughnuts, ice cream, and cakes  · Eat fewer sweets  Limit foods and drinks that are high in sugar  This includes candy, cookies, regular soda, and sweetened drinks  Exercise:  Exercise at least 30 minutes per day on most days of the week  Some examples of exercise include walking, biking, dancing, and swimming  You can also fit in more physical activity by taking the stairs instead of the elevator or parking farther away from stores  Ask your healthcare provider about the best exercise plan for you  © Copyright [employer] 2018 Information is for End User's use only and may not be sold, redistributed or otherwise used for commercial purposes   All illustrations and images included in CareNotes® are the copyrighted property of [employer]  or [address] [place], [place] [address]

## 2021-09-10 NOTE — ASSESSMENT & PLAN NOTE
Recent increase in tumor marker, had L breast US but needs to do mammo, had CT C/A/P moved up, con't f/u as per Onc

## 2021-09-10 NOTE — ASSESSMENT & PLAN NOTE
Findings consistent with left shoulder osteoarthritis  Discussed findings and treatment options with the patient  I reviewed should patient's left shoulder CT scan which showed no bone lesions  Patient's symptom is mostly due to the arthritis  I provided patient cortisone injection in her left glenohumeral joint, which patient tolerated well  I informed the caretaker that she can have the cortisone shot every 3-4 months or as needed  Cold compress over the left shoulder today  I advised them to avoid repetitive overhead activities  I will see patient back as needed  All patient's questions were answered to their satisfaction  This note is created using dictation transcription  It may contain typographical errors, grammatical errors, improperly dictated words, background noise and other errors

## 2021-09-10 NOTE — PROGRESS NOTES
Assessment and Plan:     Problem List Items Addressed This Visit        Other    Obsessive compulsive disorder     Had Lithium increased recently by psych, staff reports benefit with med changes, cont' meds as per psycc         Malignant neoplasm of overlapping sites of right breast in female, estrogen receptor positive ([address] )     Recent increase in tumor marker, had L breast US but needs to do mammo, had CT C/A/P moved up, con't f/u as per Onc           Other Visit Diagnoses     Medicare annual wellness visit, subsequent    -  Primary           Preventive health issues were discussed with patient, and age appropriate screening tests were ordered as noted in patient's After Visit Summary  Personalized health advice and appropriate referrals for health education or preventive services given if needed, as noted in patient's After Visit Summary  History of Present Illness:     Patient presents for Medicare Annual Wellness visit    Pt saw Ortho today and had a steroid injection in her L shoulder  She was advised to ice the shoulder as well  CT of shoulder reviewed and showed severe glenohumeral OA       She had a breast US in Aug   She had her CT C/A/P moved up from Jan to Oct       Patient Care Team:  Huong Camacho DO as PCP - Jennifer Bower MD as PCP - Endocrinology (Endocrinology)  Roseline Giang MD as Surgeon (Surgical Oncology)  Huong Camacho DO (Internal Medicine)  JESSE Gu (Family Medicine)  Maria Eugenia Tucker (Obstetrics and Gynecology)  Addie Montez MD (Neurology)  Hugh Rodriguez MD (Ophthalmology)  DUSTIN Fuentes DPM (Podiatry)  Chandrika Espinoza MD as Medical Oncologist (Oncology)     Problem List:     Patient Active Problem List   Diagnosis    Vitamin D deficiency    Scoliosis    Retinal detachment    Obsessive compulsive disorder    Multiple thyroid nodules    Mild intellectual disability    Malignant neoplasm of overlapping sites of right breast in female, estrogen receptor positive ([facility] [address] )    Legal blindness [place]    Irregular menstrual cycle    Internal hemorrhoids    Impulse control disorder    Glaucoma    Dyslipidemia    Depression    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HCC)    Chronic gingivitis    Cerebral palsy (HCC)    Benign neoplasm of large intestine    Autistic disorder    Anxiety    Other long term (current) drug therapy    Hyperglycemia    Screening for colorectal cancer    Chronic GERD    Iron deficiency anemia, unspecified    Use of anastrozole (Arimidex)    Family history of colonic polyps    Osteoporosis    Ambulatory dysfunction    Primary osteoarthritis of left shoulder      Past Medical and Surgical History:     Past Medical History:   Diagnosis Date    Autism     Breast cyst, left     last assessed 12/30/2013    Cerebral palsy (HCC)     Chronic GERD     Depression     Fibrocystic breast disease     last assessed 06/29/2012    Gastrointestinal hemorrhage     Glaucoma     History of chemotherapy     Hydrocephalus ([facility] [address] )     Scoliosis     Scoliosis      Past Surgical History:   Procedure Laterality Date    BREAST BIOPSY Right     CATARACT EXTRACTION  06/14/2021    COLONOSCOPY      Description 04/13/2016-5 yrs  Description 4 yr f/u d/t polyp and family history, onset 07/20/2012   COLONOSCOPY      ESOPHAGOGASTRODUODENOSCOPY      description-04/13/2016 gastritis with antral nodule    EYE SURGERY      for relief of intraocular pressure    INCISIONAL BREAST BIOPSY      description 2008    MASTECTOMY Left     SENTINEL LYMPH NODE BIOPSY      STRABISMUS SURGERY      description 1973, 66    UPPER GASTROINTESTINAL ENDOSCOPY  05/16/2019    Grade C erosive esophagitis    7 mm nodule in the stomach      Family History:     Family History   Problem Relation Age of Onset    Osteopenia Mother     Heart attack Father 59        Acute MI    Coronary artery disease Father     Thyroid nodules Father     Osteopenia Maternal Grandmother     Prostate cancer Maternal Grandfather     Leukemia Paternal Grandfather     Heart attack Maternal Uncle 48        acute MI, stent    Cancer Maternal Uncle         adenocarcinoma of oral cavity    Colon cancer Maternal Uncle 62    Hyperlipidemia Maternal Uncle     Pancreatic cancer Paternal Uncle     Prostate cancer Paternal Uncle     Colon cancer Family     Coronary artery disease Family     Thyroid disease Family       Social History:     Social History     Socioeconomic History    Marital status: Single     Spouse name: None    Number of children: None    Years of education: None    Highest education level: None   Occupational History    None   Tobacco Use    Smoking status: Never Smoker    Smokeless tobacco: Never Used   Vaping Use    Vaping Use: Never used   Substance and Sexual Activity    Alcohol use: No    Drug use: No    Sexual activity: None   Other Topics Concern    None   Social History Narrative    Person living in a residential institution    Uses safety equipment-seat belts     Social Determinants of Health     Financial Resource Strain:     Difficulty of Paying Living Expenses:    Food Insecurity:     Worried About Running Out of Food in the Last Year:     Ran Out of Food in the Last Year:    Transportation Needs:     Lack of Transportation (Medical):      Lack of Transportation (Non-Medical):    Physical Activity:     Days of Exercise per Week:     Minutes of Exercise per Session:    Stress:     Feeling of Stress :    Social Connections:     Frequency of Communication with Friends and Family:     Frequency of Social Gatherings with Friends and Family:     Attends Religion Services:     Active Member of Clubs or Organizations:     Attends Club or Organization Meetings:     Marital Status:    Intimate Partner Violence:     Fear of Current or Ex-Partner:     Emotionally Abused:     Physically Abused:     Sexually Abused:       Medications and Allergies:     Current Outpatient Medications   Medication Sig Dispense Refill    acetaminophen (TYLENOL) 500 mg tablet Take 500 mg by mouth every 6 (six) hours as needed for mild pain      alendronate (FOSAMAX) 70 mg tablet Take 1 tablet (70 mg total) by mouth every 7 days 4 tablet 11    anastrozole (ARIMIDEX) 1 mg tablet Take 1 tablet (1 mg total) by mouth daily at bedtime 90 tablet 3    ascorbic acid (VITAMIN C) 500 mg tablet 1 tab PO q am with iron supplement 30 tablet 2    BANOPHEN 25 MG capsule Take 25 mg by mouth daily at bedtime as needed       bromfenac sodium (Prolensa) 0 07 % SOLN Apply to eye 1 DROP IN RIGHT EYE IN THE MORNING      calamine lotion Apply topically every 4 (four) hours as needed for itching      Calcium Carb-Cholecalciferol (OYSTER SHELL CALCIUM) 500-400 MG-UNIT TABS Take 1 tablet by mouth 2 (two) times a day       Calcium Carbonate-Vitamin D (OYSCO 500/D PO) Take by mouth daily      carBAMazepine (TEGretol) 200 mg tablet Take 1 tablet (200 mg total) by mouth 2 (two) times a day      Carboxymethylcell-Hypromellose (GENTEAL) 0 25-0 3 % GEL Apply to eye       Cholecalciferol (VITAMIN D) 2000 units tablet TAKE TWO TABLETS BY MOUTH (4000IU) EVERY MORNING FOR VITAMIN DIFICIENCY 62 tablet 0    citalopram (CeleXA) 10 mg tablet Take 10 mg by mouth daily 30 mg total       Dentifrices (SENSODYNE PRONAMEL DT) Apply to teeth       Diclofenac Sodium (VOLTAREN) 1 %       diphenhydrAMINE (BENADRYL) 25 mg tablet Take 25 mg by mouth daily at bedtime as needed for itching (for insomnia and allergy ** DO NOT TAKE WITH XYZAL **)       docusate sodium (COLACE) 100 mg capsule Take 100 mg by mouth 3 (three) times a week       ferrous sulfate 324 (65 Fe) mg Take 1 tablet (324 mg total) by mouth daily before breakfast 30 tablet 5    guaiFENesin (SILTUSSIN SA PO) Take by mouth Take 2 teaspoonful (10 ml) PRN 6 hrs for cough      haloperidol (HALDOL) 2 mg tablet Take 2 mg by mouth 2 (two) times a day 1 tab am and 2 tabs qhs      hydrocortisone (ANUSOL-HC, PROCTOSOL HC,) 2 5 % rectal cream Insert into the rectum 2 (two) times a day (Patient taking differently: Insert into the rectum once ) 30 g 0    Hypromellose 0 3 % SOLN Apply to eye       ibuprofen (MOTRIN) 200 mg tablet Take 400 mg by mouth every 6 (six) hours as needed for mild pain        ketorolac (ACULAR) 0 5 % ophthalmic solution       latanoprost (XALATAN) 0 005 % ophthalmic solution Apply 1 drop to eye      levocetirizine (XYZAL) 5 MG tablet Take 1 tablet by mouth daily as needed      lithium carbonate 300 mg capsule Take 600 mg by mouth 2 (two) times a day      loperamide (IMODIUM) 2 mg capsule Take by mouth       LORazepam (ATIVAN) 0 5 mg tablet Take 1 mg by mouth 2 (two) times a day       melatonin 1 mg Take 5 mg by mouth daily at bedtime       memantine (NAMENDA) 10 mg tablet Take 1 tablet (10 mg total) by mouth 2 (two) times a day 60 tablet 5    mineral oil-white petrolatum (LUBRIFRESH P M ) ophthalmic ointment 0 5 inches       neomycin-polymyxin b-bacitracin (NEOSPORIN) 3 5-400-5,000       NON FORMULARY ACT FLUORIDE DENTAL RINSE   RINSE WITH 1 TBSP (15ML)2X A DAY      NON FORMULARY TOLNAFTATE SPRAY 1% SPRAY  SPRAY BETWEEN TOES      ofloxacin (FLOXIN) 0 3 % otic solution Administer 10 drops to the right ear 4 (four) times a day       ofloxacin (OCUFLOX) 0 3 % ophthalmic solution       ondansetron (ZOFRAN) 4 mg tablet Take by mouth      pantoprazole (PROTONIX) 40 mg tablet TAKE ONE TABLET BY MOUTH 2 TIMES A DAY( MORNING/ BEDTIME) (ESOPHAGITIS) 60 tablet 5    polyethylene glycol (MIRALAX) 17 g packet Take 17 g by mouth daily as needed      prednisoLONE acetate (prednisoLONE Acetate P-F) 1 % ophthalmic suspension Administer 1 drop to the right eye every morning       PROCTOSOL HC 2 5 % rectal cream       sodium chloride (SOCHLOR) 5 % hypertonic ophthalmic solution Apply 1 drop to eye 2 (two) times a day       sodium fluoride 0 275 (0 125 F) MG/DROP solution Take 275 mcg by mouth daily       Sunscreens (SUNBLOCK LOTION SPF30 EX) Apply topically daily as needed      talc (Zeasorb) Apply topically once as needed for irritation Sprinkle powder on affected area of skin to help with excess moisture up to 2 times a day as needed    timolol (TIMOPTIC-XE) 0 5 % ophthalmic gel-forming Administer 1 drop into the left eye daily      tolnaftate (TINACTIN) 1 % spray Apply topically      white petrolatum (Vaseline) Apply topically once Apply to hand every day prn       No current facility-administered medications for this visit       Allergies   Allergen Reactions    Bactrim [Sulfamethoxazole-Trimethoprim]     Methylphenidate Hyperactivity     Reaction Date: [id number];     Sulfa Antibiotics     Thioridazine     Amphetamines     Chlorpromazine     Fexofenadine Hives     Reaction Date: [id number];    Renella Fanning Medroxyprogesterone Rash and Hives     Reaction Date: [id number];     Other Hives     Mellaril, thorazine    Trimethoprim       Immunizations:     Immunization History   Administered Date(s) Administered    INFLUENZA 11/07/2014, 09/29/2015, 09/07/2016, 09/29/2017, 10/15/2018, 10/25/2019, 10/01/2020    IPV 1966, 1966, 09/06/1968    Influenza Quadrivalent Preservative Free 3 years and older IM 11/07/2014, 09/29/2015, 09/07/2016    Influenza Quadrivalent, 6-35 Months IM 09/29/2017    Influenza, injectable, quadrivalent, preservative free 0 5 mL 10/15/2018, 10/25/2019    Influenza, seasonal, injectable 09/25/2009, 09/23/2010, 10/02/2012, 09/16/2013    Pneumococcal Polysaccharide PPV23 09/17/2015, 05/29/2018    TD (adult) Preservative Free 02/18/2015    Td (adult), adsorbed 04/13/1993, 02/18/2005, 02/18/2015    Tetanus, adsorbed 02/18/2015    Tuberculin Skin Test-PPD Intradermal 08/01/1993, 07/01/1995, 10/01/2007, 08/16/2011, 09/16/2013, 09/15/2015, 09/19/2017, 09/16/2019      Health Maintenance:         Topic Date Due  Hepatitis C Screening  Never done    HIV Screening  Never done    Colorectal Cancer Screening  10/13/2025    Cervical Cancer Screening  07/08/2026         Topic Date Due    COVID-19 Vaccine (1) Never done    DTaP,Tdap,and Td Vaccines (1 - Tdap) 04/02/1987    Influenza Vaccine (1) 09/01/2021     Review of Systems   Constitutional: Negative for fever  Respiratory: Negative for cough  Cardiovascular: Negative for chest pain  Gastrointestinal: Negative for abdominal pain  Musculoskeletal: Positive for arthralgias  Neurological: Negative for seizures  Psychiatric/Behavioral: Negative for behavioral problems  All other systems reviewed and are negative  Medicare Health Risk Assessment:     /88   Pulse 84   Temp 97 5 °F (36 4 °C) (Tympanic)   Ht 4' 9" (1 448 m)   Wt 61 1 kg (134 lb 9 6 oz)   BMI 29 13 kg/m²      Physical Exam  Vitals and nursing note reviewed  Constitutional:       General: She is not in acute distress  Appearance: She is well-developed  She is obese  She is not ill-appearing  HENT:      Head: Normocephalic and atraumatic  Right Ear: Tympanic membrane and external ear normal  There is no impacted cerumen  Left Ear: Tympanic membrane normal  There is no impacted cerumen  Mouth/Throat:      Mouth: Mucous membranes are moist       Pharynx: Oropharynx is clear  No oropharyngeal exudate  Eyes:      General:         Right eye: No discharge  Left eye: No discharge  Conjunctiva/sclera: Conjunctivae normal    Neck:      Trachea: No tracheal deviation  Cardiovascular:      Rate and Rhythm: Normal rate and regular rhythm  Heart sounds: Normal heart sounds  No murmur heard  Pulmonary:      Effort: Pulmonary effort is normal  No respiratory distress  Breath sounds: Normal breath sounds  No wheezing, rhonchi or rales  Abdominal:      General: Bowel sounds are normal  There is no distension        Palpations: Abdomen is soft       Tenderness: There is no abdominal tenderness  There is no guarding or rebound  Musculoskeletal:         General: No deformity or signs of injury  Normal range of motion  Cervical back: Neck supple  Comments: Decrease abduction and internal rotation L shoulder   Lymphadenopathy:      Cervical: No cervical adenopathy  Skin:     General: Skin is warm and dry  Coloration: Skin is not pale  Findings: No rash  Neurological:      Mental Status: She is alert  Mental status is at baseline  Motor: No abnormal muscle tone  Psychiatric:      Comments: Anxious but pleasant and cooperative     Breast: R mastectomy      Michelet Rodriguez is here for her Subsequent Wellness visit  Last Medicare Wellness visit information reviewed, patient interviewed and updates made to the record today  Health Risk Assessment:   Patient rates overall health as good  Patient feels that their physical health rating is same  Patient is satisfied with their life  Eyesight was rated as same  Hearing was rated as same  Patient feels that their emotional and mental health rating is same  Patients states they are sometimes angry  Patient states they are often unusually tired/fatigued  Pain experienced in the last 7 days has been a lot  Patient's pain rating has been 10/10  Patient states that she has experienced no weight loss or gain in last 6 months  Depression Screening:   PHQ-2 Score: 1  PHQ-9 Score: 1      Fall Risk Screening: In the past year, patient has experienced: no history of falling in past year      Urinary Incontinence Screening:   Patient has not leaked urine accidently in the last six months  Home Safety:  Patient has trouble with stairs inside or outside of their home  Patient has working smoke alarms and has working carbon monoxide detector  Home safety hazards include: none  Nutrition:   Current diet is Regular       Medications:   Patient is currently taking over-the-counter supplements  OTC medications include: see medication list  Patient is not able to manage medications  Activities of Daily Living (ADLs)/Instrumental Activities of Daily Living (IADLs):   Walk and transfer into and out of bed and chair?: Yes  Dress and groom yourself?: Yes    Bathe or shower yourself?: No    Feed yourself? Yes  Do your laundry/housekeeping?: No  Manage your money, pay your bills and track your expenses?: No  Make your own meals?: No    Do your own shopping?: No    Previous Hospitalizations:   Any hospitalizations or ED visits within the last 12 months?: No      Advance Care Planning:   Living will: No    Durable POA for healthcare: Yes    Advanced directive: No      Cognitive Screening:   Provider or family/friend/caregiver concerned regarding cognition?: No    PREVENTIVE SCREENINGS      Cardiovascular Screening:    General: Risks and Benefits Discussed    Due for: Lipid Panel      Diabetes Screening:     General: Screening Current and Risks and Benefits Discussed      Colorectal Cancer Screening:     General: Screening Current      Breast Cancer Screening:     General: Risks and Benefits Discussed    Due for: Mammogram        Cervical Cancer Screening:    General: Screening Current and Risks and Benefits Discussed      Osteoporosis Screening:    General: History Osteoporosis, Risks and Benefits Discussed and Screening Current      Abdominal Aortic Aneurysm (AAA) Screening:        General: Risks and Benefits Discussed and Screening Not Indicated      Lung Cancer Screening:     General: Screening Not Indicated and Risks and Benefits Discussed    Screening, Brief Intervention, and Referral to Treatment (SBIRT)    Screening  Typical number of drinks in a day: 0  Typical number of drinks in a week: 0  Interpretation: Low risk drinking behavior      Single Item Drug Screening:  How often have you used an illegal drug (including marijuana) or a prescription medication for non-medical reasons in the past year? never    Single Item Drug Screen Score: 0  Interpretation: Negative screen for possible drug use disorder    Other Counseling Topics:   Sunscreen and regular weightbearing exercise         Cabrera Dowd, DO

## 2021-09-10 NOTE — ASSESSMENT & PLAN NOTE
Had Lithium increased recently by psych, staff reports benefit with med changes, cont' meds as per psycc

## 2021-09-15 ENCOUNTER — CLINICAL SUPPORT (OUTPATIENT)
Dept: FAMILY MEDICINE CLINIC | Facility: HOSPITAL | Age: 56
End: 2021-09-15
Payer: MEDICARE

## 2021-09-15 DIAGNOSIS — Z23 ENCOUNTER FOR IMMUNIZATION: Primary | ICD-10-CM

## 2021-09-15 PROCEDURE — 86580 TB INTRADERMAL TEST: CPT | Performed by: INTERNAL MEDICINE

## 2021-09-17 ENCOUNTER — CLINICAL SUPPORT (OUTPATIENT)
Dept: FAMILY MEDICINE CLINIC | Facility: HOSPITAL | Age: 56
End: 2021-09-17

## 2021-09-17 DIAGNOSIS — Z11.1 ENCOUNTER FOR PPD SKIN TEST READING: Primary | ICD-10-CM

## 2021-09-17 LAB
INDURATION: 0 MM
TB SKIN TEST: NEGATIVE

## 2021-09-17 PROCEDURE — 99024 POSTOP FOLLOW-UP VISIT: CPT | Performed by: INTERNAL MEDICINE

## 2021-10-05 ENCOUNTER — HOSPITAL ENCOUNTER (OUTPATIENT)
Dept: CT IMAGING | Facility: HOSPITAL | Age: 56
Discharge: HOME | End: 2021-10-05
Attending: INTERNAL MEDICINE
Payer: MEDICARE

## 2021-10-05 DIAGNOSIS — C50.811 MALIGNANT NEOPLASM OF OVERLAPPING SITES OF RIGHT BREAST IN FEMALE, ESTROGEN RECEPTOR POSITIVE (HCC): ICD-10-CM

## 2021-10-05 DIAGNOSIS — D50.9 IRON DEFICIENCY ANEMIA, UNSPECIFIED IRON DEFICIENCY ANEMIA TYPE: ICD-10-CM

## 2021-10-05 DIAGNOSIS — C50.911 MALIGNANT NEOPLASM OF RIGHT FEMALE BREAST, UNSPECIFIED ESTROGEN RECEPTOR STATUS, UNSPECIFIED SITE OF BREAST (HCC): ICD-10-CM

## 2021-10-05 DIAGNOSIS — Z17.0 MALIGNANT NEOPLASM OF OVERLAPPING SITES OF RIGHT BREAST IN FEMALE, ESTROGEN RECEPTOR POSITIVE (HCC): ICD-10-CM

## 2021-10-05 PROCEDURE — 74177 CT ABD & PELVIS W/CONTRAST: CPT

## 2021-10-05 PROCEDURE — 71260 CT THORAX DX C+: CPT

## 2021-10-05 PROCEDURE — G1004 CDSM NDSC: HCPCS

## 2021-10-05 RX ADMIN — IOHEXOL 100 ML: 350 INJECTION, SOLUTION INTRAVENOUS at 11:55

## 2021-10-07 ENCOUNTER — TELEPHONE (OUTPATIENT)
Dept: HEMATOLOGY ONCOLOGY | Facility: CLINIC | Age: 56
End: 2021-10-07

## 2021-10-11 ENCOUNTER — OFFICE VISIT (OUTPATIENT)
Dept: HEMATOLOGY ONCOLOGY | Facility: HOSPITAL | Age: 56
End: 2021-10-11
Payer: MEDICARE

## 2021-10-11 ENCOUNTER — TELEPHONE (OUTPATIENT)
Dept: HEMATOLOGY ONCOLOGY | Facility: HOSPITAL | Age: 56
End: 2021-10-11

## 2021-10-11 VITALS
WEIGHT: 134 LBS | HEART RATE: 93 BPM | OXYGEN SATURATION: 97 % | BODY MASS INDEX: 28.91 KG/M2 | HEIGHT: 57 IN | TEMPERATURE: 99 F | SYSTOLIC BLOOD PRESSURE: 102 MMHG | DIASTOLIC BLOOD PRESSURE: 60 MMHG | RESPIRATION RATE: 16 BRPM

## 2021-10-11 DIAGNOSIS — C50.911 MALIGNANT NEOPLASM OF RIGHT FEMALE BREAST, UNSPECIFIED ESTROGEN RECEPTOR STATUS, UNSPECIFIED SITE OF BREAST (HCC): Primary | ICD-10-CM

## 2021-10-11 DIAGNOSIS — C50.811 MALIGNANT NEOPLASM OF OVERLAPPING SITES OF RIGHT BREAST IN FEMALE, ESTROGEN RECEPTOR POSITIVE (HCC): ICD-10-CM

## 2021-10-11 DIAGNOSIS — Z17.0 MALIGNANT NEOPLASM OF OVERLAPPING SITES OF RIGHT BREAST IN FEMALE, ESTROGEN RECEPTOR POSITIVE (HCC): ICD-10-CM

## 2021-10-11 PROCEDURE — 99214 OFFICE O/P EST MOD 30 MIN: CPT | Performed by: INTERNAL MEDICINE

## 2021-10-14 ENCOUNTER — TELEPHONE (OUTPATIENT)
Dept: GENETICS | Facility: CLINIC | Age: 56
End: 2021-10-14

## 2021-11-02 ENCOUNTER — OFFICE VISIT (OUTPATIENT)
Dept: GASTROENTEROLOGY | Facility: CLINIC | Age: 56
End: 2021-11-02
Payer: MEDICARE

## 2021-11-02 VITALS
BODY MASS INDEX: 28.69 KG/M2 | DIASTOLIC BLOOD PRESSURE: 70 MMHG | HEIGHT: 57 IN | SYSTOLIC BLOOD PRESSURE: 118 MMHG | WEIGHT: 133 LBS

## 2021-11-02 DIAGNOSIS — D50.8 OTHER IRON DEFICIENCY ANEMIA: ICD-10-CM

## 2021-11-02 DIAGNOSIS — K21.9 CHRONIC GERD: Primary | ICD-10-CM

## 2021-11-02 DIAGNOSIS — Z86.010 HISTORY OF COLON POLYPS: ICD-10-CM

## 2021-11-02 PROCEDURE — 99214 OFFICE O/P EST MOD 30 MIN: CPT | Performed by: INTERNAL MEDICINE

## 2021-11-03 ENCOUNTER — TELEPHONE (OUTPATIENT)
Dept: GASTROENTEROLOGY | Facility: CLINIC | Age: 56
End: 2021-11-03

## 2021-11-04 ENCOUNTER — HOSPITAL ENCOUNTER (OUTPATIENT)
Dept: ULTRASOUND IMAGING | Facility: HOSPITAL | Age: 56
Discharge: HOME | End: 2021-11-04
Attending: INTERNAL MEDICINE
Payer: MEDICARE

## 2021-11-04 DIAGNOSIS — E04.2 MULTIPLE THYROID NODULES: ICD-10-CM

## 2021-11-04 PROCEDURE — 76536 US EXAM OF HEAD AND NECK: CPT

## 2021-11-10 ENCOUNTER — TELEPHONE (OUTPATIENT)
Dept: ENDOCRINOLOGY | Facility: CLINIC | Age: 56
End: 2021-11-10

## 2021-11-13 ENCOUNTER — TELEPHONE (OUTPATIENT)
Dept: OTHER | Facility: OTHER | Age: 56
End: 2021-11-13

## 2021-11-15 ENCOUNTER — TELEPHONE (OUTPATIENT)
Dept: HEMATOLOGY ONCOLOGY | Facility: CLINIC | Age: 56
End: 2021-11-15

## 2021-11-15 DIAGNOSIS — D64.89 ANEMIA DUE TO OTHER CAUSE, NOT CLASSIFIED: Primary | ICD-10-CM

## 2021-11-15 RX ORDER — ACETAMINOPHEN 325 MG/1
650 TABLET ORAL ONCE
Status: CANCELLED | OUTPATIENT
Start: 2021-11-18

## 2021-11-15 RX ORDER — SODIUM CHLORIDE 9 MG/ML
20 INJECTION, SOLUTION INTRAVENOUS ONCE
Status: CANCELLED | OUTPATIENT
Start: 2021-11-18

## 2021-11-16 ENCOUNTER — HOSPITAL ENCOUNTER (OUTPATIENT)
Dept: MAMMOGRAPHY | Facility: IMAGING CENTER | Age: 56
Discharge: HOME | End: 2021-11-16
Payer: MEDICARE

## 2021-11-16 ENCOUNTER — TELEPHONE (OUTPATIENT)
Dept: HEMATOLOGY ONCOLOGY | Facility: CLINIC | Age: 56
End: 2021-11-16

## 2021-11-16 ENCOUNTER — APPOINTMENT (OUTPATIENT)
Dept: LAB | Facility: HOSPITAL | Age: 56
End: 2021-11-16
Attending: INTERNAL MEDICINE
Payer: MEDICARE

## 2021-11-16 VITALS — BODY MASS INDEX: 28.69 KG/M2 | HEIGHT: 57 IN | WEIGHT: 133 LBS

## 2021-11-16 DIAGNOSIS — D50.9 IRON DEFICIENCY ANEMIA, UNSPECIFIED IRON DEFICIENCY ANEMIA TYPE: ICD-10-CM

## 2021-11-16 DIAGNOSIS — D64.9 ANEMIA, UNSPECIFIED TYPE: ICD-10-CM

## 2021-11-16 DIAGNOSIS — C50.911 MALIGNANT NEOPLASM OF RIGHT FEMALE BREAST, UNSPECIFIED ESTROGEN RECEPTOR STATUS, UNSPECIFIED SITE OF BREAST (HCC): ICD-10-CM

## 2021-11-16 DIAGNOSIS — D64.89 ANEMIA DUE TO OTHER CAUSE, NOT CLASSIFIED: ICD-10-CM

## 2021-11-16 DIAGNOSIS — Z12.31 VISIT FOR SCREENING MAMMOGRAM: ICD-10-CM

## 2021-11-16 DIAGNOSIS — D50.9 IRON DEFICIENCY ANEMIA, UNSPECIFIED IRON DEFICIENCY ANEMIA TYPE: Primary | ICD-10-CM

## 2021-11-16 LAB
ABO GROUP BLD: NORMAL
ALBUMIN SERPL BCP-MCNC: 3.1 G/DL (ref 3.5–5)
ALP SERPL-CCNC: 75 U/L (ref 46–116)
ALT SERPL W P-5'-P-CCNC: 18 U/L (ref 12–78)
ANION GAP SERPL CALCULATED.3IONS-SCNC: 8 MMOL/L (ref 4–13)
AST SERPL W P-5'-P-CCNC: 10 U/L (ref 5–45)
BASOPHILS # BLD AUTO: 0.11 THOUSANDS/ΜL (ref 0–0.1)
BASOPHILS NFR BLD AUTO: 1 % (ref 0–1)
BILIRUB SERPL-MCNC: 0.1 MG/DL (ref 0.2–1)
BLD GP AB SCN SERPL QL: NEGATIVE
BUN SERPL-MCNC: 9 MG/DL (ref 5–25)
CALCIUM ALBUM COR SERPL-MCNC: 9.5 MG/DL (ref 8.3–10.1)
CALCIUM SERPL-MCNC: 8.8 MG/DL (ref 8.3–10.1)
CANCER AG27-29 SERPL-ACNC: 26.1 U/ML (ref 0–42.3)
CHLORIDE SERPL-SCNC: 103 MMOL/L (ref 100–108)
CO2 SERPL-SCNC: 28 MMOL/L (ref 21–32)
CREAT SERPL-MCNC: 0.62 MG/DL (ref 0.6–1.3)
EOSINOPHIL # BLD AUTO: 0.33 THOUSAND/ΜL (ref 0–0.61)
EOSINOPHIL NFR BLD AUTO: 3 % (ref 0–6)
ERYTHROCYTE [DISTWIDTH] IN BLOOD BY AUTOMATED COUNT: 16.4 % (ref 11.6–15.1)
GFR SERPL CREATININE-BSD FRML MDRD: 102 ML/MIN/1.73SQ M
GLUCOSE P FAST SERPL-MCNC: 97 MG/DL (ref 65–99)
HCT VFR BLD AUTO: 24.7 % (ref 34.8–46.1)
HGB BLD-MCNC: 6.4 G/DL (ref 11.5–15.4)
IMM GRANULOCYTES # BLD AUTO: 0.04 THOUSAND/UL (ref 0–0.2)
IMM GRANULOCYTES NFR BLD AUTO: 0 % (ref 0–2)
IRON SATN MFR SERPL: 2 % (ref 15–50)
IRON SERPL-MCNC: 13 UG/DL (ref 50–170)
LYMPHOCYTES # BLD AUTO: 1.5 THOUSANDS/ΜL (ref 0.6–4.47)
LYMPHOCYTES NFR BLD AUTO: 13 % (ref 14–44)
MCH RBC QN AUTO: 19.9 PG (ref 26.8–34.3)
MCHC RBC AUTO-ENTMCNC: 25.9 G/DL (ref 31.4–37.4)
MCV RBC AUTO: 77 FL (ref 82–98)
MONOCYTES # BLD AUTO: 0.85 THOUSAND/ΜL (ref 0.17–1.22)
MONOCYTES NFR BLD AUTO: 8 % (ref 4–12)
NEUTROPHILS # BLD AUTO: 8.42 THOUSANDS/ΜL (ref 1.85–7.62)
NEUTS SEG NFR BLD AUTO: 75 % (ref 43–75)
NRBC BLD AUTO-RTO: 0 /100 WBCS
PLATELET # BLD AUTO: 544 THOUSANDS/UL (ref 149–390)
PMV BLD AUTO: 10.5 FL (ref 8.9–12.7)
POTASSIUM SERPL-SCNC: 3.8 MMOL/L (ref 3.5–5.3)
PROT SERPL-MCNC: 6.7 G/DL (ref 6.4–8.2)
RBC # BLD AUTO: 3.22 MILLION/UL (ref 3.81–5.12)
RH BLD: POSITIVE
SODIUM SERPL-SCNC: 139 MMOL/L (ref 136–145)
SPECIMEN EXPIRATION DATE: NORMAL
TIBC SERPL-MCNC: 550 UG/DL (ref 250–450)
WBC # BLD AUTO: 11.25 THOUSAND/UL (ref 4.31–10.16)

## 2021-11-16 PROCEDURE — 86850 RBC ANTIBODY SCREEN: CPT | Performed by: INTERNAL MEDICINE

## 2021-11-16 PROCEDURE — 85025 COMPLETE CBC W/AUTO DIFF WBC: CPT

## 2021-11-16 PROCEDURE — 77067 SCR MAMMO BI INCL CAD: CPT

## 2021-11-16 PROCEDURE — 86901 BLOOD TYPING SEROLOGIC RH(D): CPT | Performed by: INTERNAL MEDICINE

## 2021-11-16 PROCEDURE — 83918 ORGANIC ACIDS TOTAL QUANT: CPT

## 2021-11-16 PROCEDURE — 83550 IRON BINDING TEST: CPT

## 2021-11-16 PROCEDURE — 80053 COMPREHEN METABOLIC PANEL: CPT

## 2021-11-16 PROCEDURE — 86923 COMPATIBILITY TEST ELECTRIC: CPT

## 2021-11-16 PROCEDURE — 77063 BREAST TOMOSYNTHESIS BI: CPT

## 2021-11-16 PROCEDURE — 83540 ASSAY OF IRON: CPT

## 2021-11-16 PROCEDURE — 86900 BLOOD TYPING SEROLOGIC ABO: CPT | Performed by: INTERNAL MEDICINE

## 2021-11-16 PROCEDURE — 86300 IMMUNOASSAY TUMOR CA 15-3: CPT

## 2021-11-16 PROCEDURE — 36415 COLL VENOUS BLD VENIPUNCTURE: CPT

## 2021-11-16 RX ORDER — SODIUM CHLORIDE 9 MG/ML
20 INJECTION, SOLUTION INTRAVENOUS ONCE
Status: CANCELLED | OUTPATIENT
Start: 2021-11-24

## 2021-11-17 RX ORDER — SODIUM CHLORIDE 9 MG/ML
20 INJECTION, SOLUTION INTRAVENOUS ONCE
Status: CANCELLED | OUTPATIENT
Start: 2021-11-17

## 2021-11-17 RX ORDER — ACETAMINOPHEN 325 MG/1
650 TABLET ORAL ONCE
Status: CANCELLED | OUTPATIENT
Start: 2021-11-17

## 2021-11-18 ENCOUNTER — HOSPITAL ENCOUNTER (OUTPATIENT)
Dept: INFUSION CENTER | Facility: HOSPITAL | Age: 56
End: 2021-11-18

## 2021-11-18 ENCOUNTER — HOSPITAL ENCOUNTER (OUTPATIENT)
Dept: INFUSION CENTER | Facility: HOSPITAL | Age: 56
Discharge: HOME | End: 2021-11-18
Payer: MEDICARE

## 2021-11-18 VITALS
WEIGHT: 133 LBS | DIASTOLIC BLOOD PRESSURE: 71 MMHG | TEMPERATURE: 98.5 F | SYSTOLIC BLOOD PRESSURE: 141 MMHG | OXYGEN SATURATION: 97 % | HEIGHT: 57 IN | BODY MASS INDEX: 28.69 KG/M2 | RESPIRATION RATE: 20 BRPM | HEART RATE: 95 BPM

## 2021-11-18 DIAGNOSIS — D64.89 ANEMIA DUE TO OTHER CAUSE, NOT CLASSIFIED: Primary | ICD-10-CM

## 2021-11-18 PROCEDURE — P9040 RBC LEUKOREDUCED IRRADIATED: HCPCS

## 2021-11-18 PROCEDURE — 36430 TRANSFUSION BLD/BLD COMPNT: CPT

## 2021-11-18 PROCEDURE — 96365 THER/PROPH/DIAG IV INF INIT: CPT

## 2021-11-18 RX ORDER — ACETAMINOPHEN 325 MG/1
650 TABLET ORAL ONCE
Status: COMPLETED | OUTPATIENT
Start: 2021-11-18 | End: 2021-11-18

## 2021-11-18 RX ORDER — SODIUM CHLORIDE 9 MG/ML
20 INJECTION, SOLUTION INTRAVENOUS ONCE
Status: COMPLETED | OUTPATIENT
Start: 2021-11-18 | End: 2021-11-18

## 2021-11-18 RX ADMIN — DIPHENHYDRAMINE HYDROCHLORIDE 25 MG: 50 INJECTION, SOLUTION INTRAMUSCULAR; INTRAVENOUS at 09:45

## 2021-11-18 RX ADMIN — SODIUM CHLORIDE 20 ML/HR: 9 INJECTION, SOLUTION INTRAVENOUS at 09:45

## 2021-11-18 RX ADMIN — ACETAMINOPHEN 650 MG: 325 TABLET, FILM COATED ORAL at 09:47

## 2021-11-19 ENCOUNTER — OFFICE VISIT (OUTPATIENT)
Dept: ENDOCRINOLOGY | Facility: CLINIC | Age: 56
End: 2021-11-19
Payer: MEDICARE

## 2021-11-19 VITALS
SYSTOLIC BLOOD PRESSURE: 100 MMHG | BODY MASS INDEX: 28.91 KG/M2 | HEIGHT: 57 IN | WEIGHT: 134 LBS | HEART RATE: 99 BPM | DIASTOLIC BLOOD PRESSURE: 70 MMHG

## 2021-11-19 DIAGNOSIS — E04.2 MULTIPLE THYROID NODULES: Primary | ICD-10-CM

## 2021-11-19 DIAGNOSIS — M81.0 OSTEOPOROSIS, UNSPECIFIED OSTEOPOROSIS TYPE, UNSPECIFIED PATHOLOGICAL FRACTURE PRESENCE: ICD-10-CM

## 2021-11-19 LAB
ABO GROUP BLD BPU: NORMAL
ABO GROUP BLD BPU: NORMAL
BPU ID: NORMAL
BPU ID: NORMAL
CROSSMATCH: NORMAL
CROSSMATCH: NORMAL
UNIT DISPENSE STATUS: NORMAL
UNIT DISPENSE STATUS: NORMAL
UNIT PRODUCT CODE: NORMAL
UNIT PRODUCT CODE: NORMAL
UNIT PRODUCT VOLUME: 350 ML
UNIT PRODUCT VOLUME: 350 ML
UNIT RH: NORMAL
UNIT RH: NORMAL

## 2021-11-19 PROCEDURE — 99214 OFFICE O/P EST MOD 30 MIN: CPT | Performed by: INTERNAL MEDICINE

## 2021-11-20 LAB
METHYLMALONATE SERPL-SCNC: 234 NMOL/L (ref 0–378)
SL AMB DISCLAIMER: NORMAL

## 2021-11-22 ENCOUNTER — TELEPHONE (OUTPATIENT)
Dept: GASTROENTEROLOGY | Facility: CLINIC | Age: 56
End: 2021-11-22

## 2021-11-22 DIAGNOSIS — D50.8 OTHER IRON DEFICIENCY ANEMIA: Primary | ICD-10-CM

## 2021-11-22 DIAGNOSIS — K44.9 HIATAL HERNIA: ICD-10-CM

## 2021-11-24 ENCOUNTER — HOSPITAL ENCOUNTER (OUTPATIENT)
Dept: INFUSION CENTER | Facility: HOSPITAL | Age: 56
Discharge: HOME | End: 2021-11-24
Attending: INTERNAL MEDICINE
Payer: MEDICARE

## 2021-11-24 VITALS
OXYGEN SATURATION: 99 % | RESPIRATION RATE: 20 BRPM | TEMPERATURE: 97.8 F | HEART RATE: 94 BPM | SYSTOLIC BLOOD PRESSURE: 120 MMHG | DIASTOLIC BLOOD PRESSURE: 64 MMHG

## 2021-11-24 DIAGNOSIS — D50.9 IRON DEFICIENCY ANEMIA, UNSPECIFIED IRON DEFICIENCY ANEMIA TYPE: Primary | ICD-10-CM

## 2021-11-24 PROCEDURE — 96365 THER/PROPH/DIAG IV INF INIT: CPT

## 2021-11-24 RX ORDER — SODIUM CHLORIDE 9 MG/ML
20 INJECTION, SOLUTION INTRAVENOUS ONCE
Status: CANCELLED | OUTPATIENT
Start: 2021-12-01

## 2021-11-24 RX ORDER — SODIUM CHLORIDE 9 MG/ML
20 INJECTION, SOLUTION INTRAVENOUS ONCE
Status: COMPLETED | OUTPATIENT
Start: 2021-11-24 | End: 2021-11-24

## 2021-11-24 RX ADMIN — IRON SUCROSE 200 MG: 20 INJECTION, SOLUTION INTRAVENOUS at 13:30

## 2021-11-24 RX ADMIN — SODIUM CHLORIDE 20 ML/HR: 9 INJECTION, SOLUTION INTRAVENOUS at 13:30

## 2021-12-01 ENCOUNTER — TELEPHONE (OUTPATIENT)
Dept: HEMATOLOGY ONCOLOGY | Facility: CLINIC | Age: 56
End: 2021-12-01

## 2021-12-02 ENCOUNTER — HOSPITAL ENCOUNTER (OUTPATIENT)
Dept: INFUSION CENTER | Facility: HOSPITAL | Age: 56
Discharge: HOME | End: 2021-12-02
Attending: INTERNAL MEDICINE
Payer: MEDICARE

## 2021-12-02 VITALS
HEART RATE: 86 BPM | SYSTOLIC BLOOD PRESSURE: 133 MMHG | TEMPERATURE: 97.5 F | RESPIRATION RATE: 18 BRPM | DIASTOLIC BLOOD PRESSURE: 76 MMHG | OXYGEN SATURATION: 99 %

## 2021-12-02 DIAGNOSIS — D50.9 IRON DEFICIENCY ANEMIA, UNSPECIFIED IRON DEFICIENCY ANEMIA TYPE: Primary | ICD-10-CM

## 2021-12-02 PROCEDURE — 96365 THER/PROPH/DIAG IV INF INIT: CPT

## 2021-12-02 RX ORDER — SODIUM CHLORIDE 9 MG/ML
20 INJECTION, SOLUTION INTRAVENOUS ONCE
Status: COMPLETED | OUTPATIENT
Start: 2021-12-02 | End: 2021-12-02

## 2021-12-02 RX ORDER — SODIUM CHLORIDE 9 MG/ML
20 INJECTION, SOLUTION INTRAVENOUS ONCE
Status: CANCELLED | OUTPATIENT
Start: 2021-12-09

## 2021-12-02 RX ADMIN — SODIUM CHLORIDE 20 ML/HR: 9 INJECTION, SOLUTION INTRAVENOUS at 10:43

## 2021-12-02 RX ADMIN — IRON SUCROSE 200 MG: 20 INJECTION, SOLUTION INTRAVENOUS at 10:43

## 2021-12-03 ENCOUNTER — TELEPHONE (OUTPATIENT)
Dept: GASTROENTEROLOGY | Facility: CLINIC | Age: 56
End: 2021-12-03

## 2021-12-06 DIAGNOSIS — D64.89 ANEMIA DUE TO OTHER CAUSE, NOT CLASSIFIED: Primary | ICD-10-CM

## 2021-12-06 RX ORDER — ACETAMINOPHEN 325 MG/1
650 TABLET ORAL ONCE
OUTPATIENT
Start: 2021-12-09

## 2021-12-06 RX ORDER — SODIUM CHLORIDE 9 MG/ML
20 INJECTION, SOLUTION INTRAVENOUS ONCE
OUTPATIENT
Start: 2021-12-09

## 2021-12-09 ENCOUNTER — HOSPITAL ENCOUNTER (OUTPATIENT)
Dept: INFUSION CENTER | Facility: HOSPITAL | Age: 56
Discharge: HOME | End: 2021-12-09
Attending: INTERNAL MEDICINE
Payer: MEDICARE

## 2021-12-09 VITALS
DIASTOLIC BLOOD PRESSURE: 76 MMHG | RESPIRATION RATE: 16 BRPM | OXYGEN SATURATION: 96 % | TEMPERATURE: 97.8 F | HEART RATE: 95 BPM | SYSTOLIC BLOOD PRESSURE: 135 MMHG

## 2021-12-09 DIAGNOSIS — D50.9 IRON DEFICIENCY ANEMIA, UNSPECIFIED IRON DEFICIENCY ANEMIA TYPE: ICD-10-CM

## 2021-12-09 DIAGNOSIS — D64.89 ANEMIA DUE TO OTHER CAUSE, NOT CLASSIFIED: Primary | ICD-10-CM

## 2021-12-09 LAB
BASOPHILS # BLD AUTO: 0.14 THOUSANDS/ΜL (ref 0–0.1)
BASOPHILS NFR BLD AUTO: 1 % (ref 0–1)
EOSINOPHIL # BLD AUTO: 0.44 THOUSAND/ΜL (ref 0–0.61)
EOSINOPHIL NFR BLD AUTO: 4 % (ref 0–6)
ERYTHROCYTE [DISTWIDTH] IN BLOOD BY AUTOMATED COUNT: 24.9 % (ref 11.6–15.1)
HCT VFR BLD AUTO: 37.8 % (ref 34.8–46.1)
HGB BLD-MCNC: 10.7 G/DL (ref 11.5–15.4)
IMM GRANULOCYTES # BLD AUTO: 0.05 THOUSAND/UL (ref 0–0.2)
IMM GRANULOCYTES NFR BLD AUTO: 1 % (ref 0–2)
LYMPHOCYTES # BLD AUTO: 1.39 THOUSANDS/ΜL (ref 0.6–4.47)
LYMPHOCYTES NFR BLD AUTO: 14 % (ref 14–44)
MCH RBC QN AUTO: 24.4 PG (ref 26.8–34.3)
MCHC RBC AUTO-ENTMCNC: 28.3 G/DL (ref 31.4–37.4)
MCV RBC AUTO: 86 FL (ref 82–98)
MONOCYTES # BLD AUTO: 0.87 THOUSAND/ΜL (ref 0.17–1.22)
MONOCYTES NFR BLD AUTO: 9 % (ref 4–12)
NEUTROPHILS # BLD AUTO: 7.26 THOUSANDS/ΜL (ref 1.85–7.62)
NEUTS SEG NFR BLD AUTO: 71 % (ref 43–75)
NRBC BLD AUTO-RTO: 0 /100 WBCS
PLATELET # BLD AUTO: 518 THOUSANDS/UL (ref 149–390)
PMV BLD AUTO: 10.8 FL (ref 8.9–12.7)
RBC # BLD AUTO: 4.39 MILLION/UL (ref 3.81–5.12)
WBC # BLD AUTO: 10.15 THOUSAND/UL (ref 4.31–10.16)

## 2021-12-09 PROCEDURE — 85025 COMPLETE CBC W/AUTO DIFF WBC: CPT | Performed by: INTERNAL MEDICINE

## 2021-12-09 PROCEDURE — 96365 THER/PROPH/DIAG IV INF INIT: CPT

## 2021-12-09 RX ORDER — SODIUM CHLORIDE 9 MG/ML
20 INJECTION, SOLUTION INTRAVENOUS ONCE
Status: CANCELLED | OUTPATIENT
Start: 2021-12-16

## 2021-12-09 RX ORDER — SODIUM CHLORIDE 9 MG/ML
20 INJECTION, SOLUTION INTRAVENOUS ONCE
Status: COMPLETED | OUTPATIENT
Start: 2021-12-09 | End: 2021-12-09

## 2021-12-09 RX ADMIN — IRON SUCROSE 200 MG: 20 INJECTION, SOLUTION INTRAVENOUS at 08:36

## 2021-12-09 RX ADMIN — SODIUM CHLORIDE 20 ML/HR: 9 INJECTION, SOLUTION INTRAVENOUS at 08:30

## 2021-12-10 ENCOUNTER — OFFICE VISIT (OUTPATIENT)
Dept: OBGYN CLINIC | Facility: CLINIC | Age: 56
End: 2021-12-10
Payer: MEDICARE

## 2021-12-10 ENCOUNTER — TELEPHONE (OUTPATIENT)
Dept: HEMATOLOGY ONCOLOGY | Facility: CLINIC | Age: 56
End: 2021-12-10

## 2021-12-10 VITALS
SYSTOLIC BLOOD PRESSURE: 132 MMHG | HEIGHT: 57 IN | DIASTOLIC BLOOD PRESSURE: 80 MMHG | BODY MASS INDEX: 28.05 KG/M2 | WEIGHT: 130 LBS

## 2021-12-10 DIAGNOSIS — M19.012 PRIMARY OSTEOARTHRITIS OF LEFT SHOULDER: Primary | ICD-10-CM

## 2021-12-10 PROCEDURE — 20610 DRAIN/INJ JOINT/BURSA W/O US: CPT | Performed by: ORTHOPAEDIC SURGERY

## 2021-12-10 PROCEDURE — 99213 OFFICE O/P EST LOW 20 MIN: CPT | Performed by: ORTHOPAEDIC SURGERY

## 2021-12-10 RX ORDER — BETAMETHASONE SODIUM PHOSPHATE AND BETAMETHASONE ACETATE 3; 3 MG/ML; MG/ML
6 INJECTION, SUSPENSION INTRA-ARTICULAR; INTRALESIONAL; INTRAMUSCULAR; SOFT TISSUE
Status: COMPLETED | OUTPATIENT
Start: 2021-12-10 | End: 2021-12-10

## 2021-12-10 RX ORDER — LIDOCAINE HYDROCHLORIDE 10 MG/ML
5 INJECTION, SOLUTION INFILTRATION; PERINEURAL
Status: COMPLETED | OUTPATIENT
Start: 2021-12-10 | End: 2021-12-10

## 2021-12-10 RX ADMIN — BETAMETHASONE SODIUM PHOSPHATE AND BETAMETHASONE ACETATE 6 MG: 3; 3 INJECTION, SUSPENSION INTRA-ARTICULAR; INTRALESIONAL; INTRAMUSCULAR; SOFT TISSUE at 13:58

## 2021-12-10 RX ADMIN — LIDOCAINE HYDROCHLORIDE 5 ML: 10 INJECTION, SOLUTION INFILTRATION; PERINEURAL at 13:58

## 2021-12-13 ENCOUNTER — CONSULT (OUTPATIENT)
Dept: SURGERY | Facility: HOSPITAL | Age: 56
End: 2021-12-13
Payer: MEDICARE

## 2021-12-13 VITALS
HEART RATE: 97 BPM | TEMPERATURE: 97.8 F | WEIGHT: 135 LBS | BODY MASS INDEX: 29.21 KG/M2 | DIASTOLIC BLOOD PRESSURE: 104 MMHG | SYSTOLIC BLOOD PRESSURE: 161 MMHG

## 2021-12-13 DIAGNOSIS — D50.8 OTHER IRON DEFICIENCY ANEMIA: ICD-10-CM

## 2021-12-13 DIAGNOSIS — K44.9 HIATAL HERNIA: ICD-10-CM

## 2021-12-13 PROCEDURE — 99203 OFFICE O/P NEW LOW 30 MIN: CPT | Performed by: SURGERY

## 2021-12-14 ENCOUNTER — TELEPHONE (OUTPATIENT)
Dept: GASTROENTEROLOGY | Facility: CLINIC | Age: 56
End: 2021-12-14

## 2021-12-15 DIAGNOSIS — C50.911 MALIGNANT NEOPLASM OF RIGHT FEMALE BREAST, UNSPECIFIED ESTROGEN RECEPTOR STATUS, UNSPECIFIED SITE OF BREAST (HCC): ICD-10-CM

## 2021-12-15 RX ORDER — ANASTROZOLE 1 MG/1
TABLET ORAL
Qty: 90 TABLET | Refills: 3 | Status: SHIPPED | OUTPATIENT
Start: 2021-12-15

## 2021-12-16 ENCOUNTER — HOSPITAL ENCOUNTER (OUTPATIENT)
Dept: INFUSION CENTER | Facility: HOSPITAL | Age: 56
Discharge: HOME | End: 2021-12-16
Attending: INTERNAL MEDICINE
Payer: MEDICARE

## 2021-12-16 VITALS
TEMPERATURE: 96.9 F | OXYGEN SATURATION: 96 % | DIASTOLIC BLOOD PRESSURE: 71 MMHG | HEART RATE: 95 BPM | SYSTOLIC BLOOD PRESSURE: 146 MMHG | RESPIRATION RATE: 16 BRPM

## 2021-12-16 DIAGNOSIS — D50.9 IRON DEFICIENCY ANEMIA, UNSPECIFIED IRON DEFICIENCY ANEMIA TYPE: Primary | ICD-10-CM

## 2021-12-16 PROCEDURE — 96365 THER/PROPH/DIAG IV INF INIT: CPT

## 2021-12-16 RX ORDER — SODIUM CHLORIDE 9 MG/ML
20 INJECTION, SOLUTION INTRAVENOUS ONCE
Status: COMPLETED | OUTPATIENT
Start: 2021-12-16 | End: 2021-12-16

## 2021-12-16 RX ORDER — SODIUM CHLORIDE 9 MG/ML
20 INJECTION, SOLUTION INTRAVENOUS ONCE
Status: CANCELLED | OUTPATIENT
Start: 2021-12-23

## 2021-12-16 RX ADMIN — IRON SUCROSE 200 MG: 20 INJECTION, SOLUTION INTRAVENOUS at 08:40

## 2021-12-16 RX ADMIN — SODIUM CHLORIDE 20 ML/HR: 9 INJECTION, SOLUTION INTRAVENOUS at 08:37

## 2021-12-22 ENCOUNTER — HOSPITAL ENCOUNTER (OUTPATIENT)
Dept: RADIOLOGY | Facility: HOSPITAL | Age: 56
Discharge: HOME | End: 2021-12-22
Attending: SURGERY
Payer: MEDICARE

## 2021-12-22 DIAGNOSIS — K44.9 HIATAL HERNIA: ICD-10-CM

## 2021-12-22 PROCEDURE — 74240 X-RAY XM UPR GI TRC 1CNTRST: CPT

## 2021-12-23 ENCOUNTER — HOSPITAL ENCOUNTER (OUTPATIENT)
Dept: INFUSION CENTER | Facility: HOSPITAL | Age: 56
Discharge: HOME | End: 2021-12-23
Attending: INTERNAL MEDICINE
Payer: MEDICARE

## 2021-12-23 VITALS
OXYGEN SATURATION: 96 % | HEART RATE: 91 BPM | TEMPERATURE: 97.5 F | RESPIRATION RATE: 16 BRPM | SYSTOLIC BLOOD PRESSURE: 137 MMHG | DIASTOLIC BLOOD PRESSURE: 75 MMHG

## 2021-12-23 DIAGNOSIS — D50.9 IRON DEFICIENCY ANEMIA, UNSPECIFIED IRON DEFICIENCY ANEMIA TYPE: Primary | ICD-10-CM

## 2021-12-23 DIAGNOSIS — D64.89 ANEMIA DUE TO OTHER CAUSE, NOT CLASSIFIED: ICD-10-CM

## 2021-12-23 LAB
BASOPHILS # BLD AUTO: 0.1 THOUSANDS/ΜL (ref 0–0.1)
BASOPHILS NFR BLD AUTO: 1 % (ref 0–1)
EOSINOPHIL # BLD AUTO: 0.38 THOUSAND/ΜL (ref 0–0.61)
EOSINOPHIL NFR BLD AUTO: 4 % (ref 0–6)
ERYTHROCYTE [DISTWIDTH] IN BLOOD BY AUTOMATED COUNT: 24.5 % (ref 11.6–15.1)
HCT VFR BLD AUTO: 41.2 % (ref 34.8–46.1)
HGB BLD-MCNC: 11.4 G/DL (ref 11.5–15.4)
IMM GRANULOCYTES # BLD AUTO: 0.04 THOUSAND/UL (ref 0–0.2)
IMM GRANULOCYTES NFR BLD AUTO: 0 % (ref 0–2)
LYMPHOCYTES # BLD AUTO: 1.26 THOUSANDS/ΜL (ref 0.6–4.47)
LYMPHOCYTES NFR BLD AUTO: 12 % (ref 14–44)
MCH RBC QN AUTO: 25.5 PG (ref 26.8–34.3)
MCHC RBC AUTO-ENTMCNC: 27.7 G/DL (ref 31.4–37.4)
MCV RBC AUTO: 92 FL (ref 82–98)
MONOCYTES # BLD AUTO: 0.95 THOUSAND/ΜL (ref 0.17–1.22)
MONOCYTES NFR BLD AUTO: 9 % (ref 4–12)
NEUTROPHILS # BLD AUTO: 7.54 THOUSANDS/ΜL (ref 1.85–7.62)
NEUTS SEG NFR BLD AUTO: 74 % (ref 43–75)
NRBC BLD AUTO-RTO: 0 /100 WBCS
PLATELET # BLD AUTO: 331 THOUSANDS/UL (ref 149–390)
PMV BLD AUTO: 10.7 FL (ref 8.9–12.7)
RBC # BLD AUTO: 4.47 MILLION/UL (ref 3.81–5.12)
WBC # BLD AUTO: 10.27 THOUSAND/UL (ref 4.31–10.16)

## 2021-12-23 PROCEDURE — 96365 THER/PROPH/DIAG IV INF INIT: CPT

## 2021-12-23 PROCEDURE — 85025 COMPLETE CBC W/AUTO DIFF WBC: CPT | Performed by: INTERNAL MEDICINE

## 2021-12-23 RX ORDER — SODIUM CHLORIDE 9 MG/ML
20 INJECTION, SOLUTION INTRAVENOUS ONCE
Status: CANCELLED | OUTPATIENT
Start: 2021-12-30

## 2021-12-23 RX ORDER — SODIUM CHLORIDE 9 MG/ML
20 INJECTION, SOLUTION INTRAVENOUS ONCE
Status: COMPLETED | OUTPATIENT
Start: 2021-12-23 | End: 2021-12-23

## 2021-12-23 RX ADMIN — SODIUM CHLORIDE 20 ML/HR: 9 INJECTION, SOLUTION INTRAVENOUS at 09:42

## 2021-12-23 RX ADMIN — IRON SUCROSE 200 MG: 20 INJECTION, SOLUTION INTRAVENOUS at 09:47

## 2021-12-28 ENCOUNTER — TELEPHONE (OUTPATIENT)
Dept: HEMATOLOGY ONCOLOGY | Facility: CLINIC | Age: 56
End: 2021-12-28

## 2021-12-30 ENCOUNTER — TELEPHONE (OUTPATIENT)
Dept: HEMATOLOGY ONCOLOGY | Facility: CLINIC | Age: 56
End: 2021-12-30

## 2021-12-30 ENCOUNTER — HOSPITAL ENCOUNTER (OUTPATIENT)
Dept: INFUSION CENTER | Facility: HOSPITAL | Age: 56
Discharge: HOME | End: 2021-12-30
Attending: INTERNAL MEDICINE
Payer: MEDICARE

## 2021-12-30 VITALS
SYSTOLIC BLOOD PRESSURE: 128 MMHG | HEART RATE: 77 BPM | RESPIRATION RATE: 16 BRPM | OXYGEN SATURATION: 95 % | TEMPERATURE: 97.5 F | DIASTOLIC BLOOD PRESSURE: 77 MMHG

## 2021-12-30 DIAGNOSIS — D50.9 IRON DEFICIENCY ANEMIA, UNSPECIFIED IRON DEFICIENCY ANEMIA TYPE: Primary | ICD-10-CM

## 2021-12-30 PROCEDURE — 96365 THER/PROPH/DIAG IV INF INIT: CPT

## 2021-12-30 RX ORDER — SODIUM CHLORIDE 9 MG/ML
20 INJECTION, SOLUTION INTRAVENOUS ONCE
Status: CANCELLED | OUTPATIENT
Start: 2022-01-06

## 2021-12-30 RX ORDER — SODIUM CHLORIDE 9 MG/ML
20 INJECTION, SOLUTION INTRAVENOUS ONCE
Status: COMPLETED | OUTPATIENT
Start: 2021-12-30 | End: 2021-12-30

## 2021-12-30 RX ADMIN — IRON SUCROSE 200 MG: 20 INJECTION, SOLUTION INTRAVENOUS at 11:35

## 2021-12-30 RX ADMIN — SODIUM CHLORIDE 20 ML/HR: 9 INJECTION, SOLUTION INTRAVENOUS at 11:35

## 2021-12-30 NOTE — PROGRESS NOTES
Pt tolerated IV Venofer infusion without adverse reaction  Pt left unit ambulatory with steady gait with caregiver at side  AVS printed and given to caregiver

## 2022-01-06 ENCOUNTER — HOSPITAL ENCOUNTER (OUTPATIENT)
Dept: INFUSION CENTER | Facility: HOSPITAL | Age: 57
Discharge: HOME | End: 2022-01-06
Attending: INTERNAL MEDICINE
Payer: MEDICARE

## 2022-01-06 VITALS
TEMPERATURE: 97.3 F | SYSTOLIC BLOOD PRESSURE: 133 MMHG | OXYGEN SATURATION: 94 % | DIASTOLIC BLOOD PRESSURE: 80 MMHG | HEART RATE: 88 BPM | RESPIRATION RATE: 16 BRPM

## 2022-01-06 DIAGNOSIS — D64.89 ANEMIA DUE TO OTHER CAUSE, NOT CLASSIFIED: ICD-10-CM

## 2022-01-06 DIAGNOSIS — D50.9 IRON DEFICIENCY ANEMIA, UNSPECIFIED IRON DEFICIENCY ANEMIA TYPE: Primary | ICD-10-CM

## 2022-01-06 LAB
BASOPHILS # BLD AUTO: 0.07 THOUSANDS/ΜL (ref 0–0.1)
BASOPHILS NFR BLD AUTO: 1 % (ref 0–1)
EOSINOPHIL # BLD AUTO: 0.46 THOUSAND/ΜL (ref 0–0.61)
EOSINOPHIL NFR BLD AUTO: 6 % (ref 0–6)
ERYTHROCYTE [DISTWIDTH] IN BLOOD BY AUTOMATED COUNT: 20.6 % (ref 11.6–15.1)
HCT VFR BLD AUTO: 41.3 % (ref 34.8–46.1)
HGB BLD-MCNC: 12.1 G/DL (ref 11.5–15.4)
IMM GRANULOCYTES # BLD AUTO: 0.02 THOUSAND/UL (ref 0–0.2)
IMM GRANULOCYTES NFR BLD AUTO: 0 % (ref 0–2)
LYMPHOCYTES # BLD AUTO: 1.3 THOUSANDS/ΜL (ref 0.6–4.47)
LYMPHOCYTES NFR BLD AUTO: 16 % (ref 14–44)
MCH RBC QN AUTO: 27.4 PG (ref 26.8–34.3)
MCHC RBC AUTO-ENTMCNC: 29.3 G/DL (ref 31.4–37.4)
MCV RBC AUTO: 94 FL (ref 82–98)
MONOCYTES # BLD AUTO: 0.74 THOUSAND/ΜL (ref 0.17–1.22)
MONOCYTES NFR BLD AUTO: 9 % (ref 4–12)
NEUTROPHILS # BLD AUTO: 5.66 THOUSANDS/ΜL (ref 1.85–7.62)
NEUTS SEG NFR BLD AUTO: 68 % (ref 43–75)
NRBC BLD AUTO-RTO: 0 /100 WBCS
PLATELET # BLD AUTO: 441 THOUSANDS/UL (ref 149–390)
PMV BLD AUTO: 10.4 FL (ref 8.9–12.7)
RBC # BLD AUTO: 4.41 MILLION/UL (ref 3.81–5.12)
WBC # BLD AUTO: 8.25 THOUSAND/UL (ref 4.31–10.16)

## 2022-01-06 PROCEDURE — 85025 COMPLETE CBC W/AUTO DIFF WBC: CPT | Performed by: INTERNAL MEDICINE

## 2022-01-06 PROCEDURE — 96365 THER/PROPH/DIAG IV INF INIT: CPT

## 2022-01-06 RX ORDER — SODIUM CHLORIDE 9 MG/ML
20 INJECTION, SOLUTION INTRAVENOUS ONCE
Status: COMPLETED | OUTPATIENT
Start: 2022-01-06 | End: 2022-01-06

## 2022-01-06 RX ORDER — SODIUM CHLORIDE 9 MG/ML
20 INJECTION, SOLUTION INTRAVENOUS ONCE
Status: CANCELLED | OUTPATIENT
Start: 2022-01-13

## 2022-01-06 RX ADMIN — IRON SUCROSE 200 MG: 20 INJECTION, SOLUTION INTRAVENOUS at 08:41

## 2022-01-06 RX ADMIN — SODIUM CHLORIDE 20 ML/HR: 9 INJECTION, SOLUTION INTRAVENOUS at 08:40

## 2022-01-06 NOTE — PROGRESS NOTES
Labs drawn and venofer infused with no issues  PIV site removed, dressing CD&I  AVS provided to caregiver, Salvador Shown  Left unit ambulatory with steady gait

## 2022-01-10 ENCOUNTER — OFFICE VISIT (OUTPATIENT)
Dept: SURGICAL ONCOLOGY | Facility: CLINIC | Age: 57
End: 2022-01-10
Payer: MEDICARE

## 2022-01-10 VITALS
TEMPERATURE: 97.8 F | RESPIRATION RATE: 16 BRPM | SYSTOLIC BLOOD PRESSURE: 124 MMHG | BODY MASS INDEX: 28.78 KG/M2 | HEART RATE: 72 BPM | OXYGEN SATURATION: 97 % | DIASTOLIC BLOOD PRESSURE: 72 MMHG | WEIGHT: 133 LBS

## 2022-01-10 DIAGNOSIS — C50.811 MALIGNANT NEOPLASM OF OVERLAPPING SITES OF RIGHT BREAST IN FEMALE, ESTROGEN RECEPTOR POSITIVE (HCC): Primary | ICD-10-CM

## 2022-01-10 DIAGNOSIS — Z17.0 MALIGNANT NEOPLASM OF OVERLAPPING SITES OF RIGHT BREAST IN FEMALE, ESTROGEN RECEPTOR POSITIVE (HCC): Primary | ICD-10-CM

## 2022-01-10 DIAGNOSIS — Z79.811 USE OF ANASTROZOLE (ARIMIDEX): ICD-10-CM

## 2022-01-10 DIAGNOSIS — Z12.31 SCREENING MAMMOGRAM, ENCOUNTER FOR: ICD-10-CM

## 2022-01-10 PROCEDURE — 99214 OFFICE O/P EST MOD 30 MIN: CPT | Performed by: SURGERY

## 2022-01-10 NOTE — PROGRESS NOTES
Surgical Oncology Follow Up       [address] Chamisal  CANCER [facility] [place]  [address] [place] [id number]    Refugia Late  1965  [id number]  42 Cecille Holloway Chamisal  CANCER [facility]GICAL ONCOLOGY [place]  [address] [facility] [address]    No chief complaint on file  Assessment & Plan:   Patient presents with her caregiver she has no complaints referable to her breast   She remains on her anastrozole without difficulty  She does have a hiatal hernia and some shoulder discomfort and is following with ortho in GI  Her breast exam shows no evidence of local regional or distant recurrence disease on the right side her left breast is normal and she has a mammogram from November which showed no worrisome findings  We will see her again in 1 year's time with a repeat screening mammogram for the left side  She will follow-up with Dr Barbara Thompson all she is on her anastrozole  Cancer History:     Oncology History   Malignant neoplasm of overlapping sites of right breast in female, estrogen receptor positive ([facility] [address] )   2013 Surgery    Right breast mastectomy   Invasive breast carcinoma  Grade 2  2 foci:    ME 60/90  HER2 0/3+  Lymph nodes negative  Stage IA  Dr Lucie Nickerson     2/2014 - 2/2015 Chemotherapy    TCH x 6 cycles  1 year of Herceptin  Dr Barbara Thompson     2/2015 -  Hormone Therapy    Tamoxifen daily until 2018  Arimidex 1 mg daily since 2018  Dr Barbara Thompson           Interval History:   Patient has developed some shoulder soreness and is seeing Ortho for this   She also follows with Dr Barbara Thompson for her breast cancer as well as her anemia  Review of Systems:   Review of Systems   All other systems reviewed and are negative        Past Medical History     Patient Active Problem List   Diagnosis    Vitamin D deficiency    Scoliosis    Retinal detachment    Obsessive compulsive disorder    Multiple thyroid nodules    Mild intellectual disability    Malignant neoplasm of overlapping sites of right breast in female, estrogen receptor positive ([facility] [address] )   [facility]    Irregular menstrual cycle    Internal hemorrhoids    Impulse control disorder    Glaucoma    Dyslipidemia    Depression    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HCC)    Chronic gingivitis    Cerebral palsy (HCC)    Benign neoplasm of large intestine    Autistic disorder    Anxiety    Other long term (current) drug therapy    Hyperglycemia    Screening for colorectal cancer    Chronic GERD    Iron deficiency anemia, unspecified    Use of anastrozole (Arimidex)    Family history of colonic polyps    Osteoporosis    Ambulatory dysfunction    Primary osteoarthritis of left shoulder    Other specified anemias     Past Medical History:   Diagnosis Date    Arthritis 11/18/2021    right shoulder    Autism     Breast cancer ([facility] [address] )     Breast cyst, left     last assessed 12/30/2013    Cerebral palsy (HCC)     Chronic GERD     Depression     Fibrocystic breast disease     last assessed 06/29/2012    Gastrointestinal hemorrhage     Glaucoma     History of chemotherapy     Hydrocephalus ([facility] [address] )      SHUNT IN PLACE    Scoliosis     Scoliosis      Past Surgical History:   Procedure Laterality Date    BREAST BIOPSY Right     CATARACT EXTRACTION  06/14/2021    COLONOSCOPY      Description 04/13/2016-5 yrs  Description 4 yr f/u d/t polyp and family history, onset 07/20/2012   COLONOSCOPY      ESOPHAGOGASTRODUODENOSCOPY      description-04/13/2016 gastritis with antral nodule    EYE SURGERY      for relief of intraocular pressure    INCISIONAL BREAST BIOPSY      description 2008    MASTECTOMY Left     SENTINEL LYMPH NODE BIOPSY      STRABISMUS SURGERY      description 1973, 66    UPPER GASTROINTESTINAL ENDOSCOPY  05/16/2019    Grade C erosive esophagitis    7 mm nodule in the stomach     Family History   Problem Relation Age of Onset    Osteopenia Mother     Heart attack Father 59        Acute MI    Coronary artery disease Father     Thyroid nodules Father     Osteopenia Maternal Grandmother     Prostate cancer Maternal Grandfather     Leukemia Paternal Grandfather     Heart attack Maternal Uncle 48        acute MI, stent    Cancer Maternal Uncle         adenocarcinoma of oral cavity    Colon cancer Maternal Uncle 62    Hyperlipidemia Maternal Uncle     Pancreatic cancer Paternal Uncle     Prostate cancer Paternal Uncle     Colon cancer Family     Coronary artery disease Family     Thyroid disease Family      Social History     Socioeconomic History    Marital status: Single     Spouse name: Not on file    Number of children: Not on file    Years of education: Not on file    Highest education level: Not on file   Occupational History    Not on file   Tobacco Use    Smoking status: Never Smoker    Smokeless tobacco: Never Used   Vaping Use    Vaping Use: Never used   Substance and Sexual Activity    Alcohol use: No    Drug use: No    Sexual activity: Not on file   Other Topics Concern    Not on file   Social History Narrative    Person living in a residential institution    Uses safety equipment-seat belts     Social Determinants of Health     Financial Resource Strain: Not on file   Food Insecurity: Not on file   Transportation Needs: Not on file   Physical Activity: Not on file   Stress: Not on file   Social Connections: Not on file   Intimate Partner Violence: Not on file   Housing Stability: Not on file       Current Outpatient Medications:     acetaminophen (TYLENOL) 500 mg tablet, Take 500 mg by mouth as needed for mild pain  , Disp: , Rfl:     alendronate (FOSAMAX) 70 mg tablet, Take 1 tablet (70 mg total) by mouth every 7 days, Disp: 4 tablet, Rfl: 11    anastrozole (ARIMIDEX) 1 mg tablet, TAKE 1 TABLET BY MOUTH AT BEDTIME DX:BREAST CANCER PREVENTION (WEAR GLOVES WHEN ADMINISTERING), Disp: 90 tablet, Rfl: 3    BANOPHEN 25 MG capsule, Take 25 mg by mouth daily at bedtime as needed , Disp: , Rfl:     calamine lotion, Apply topically every 4 (four) hours as needed for itching, Disp: , Rfl:     Calcium Carb-Cholecalciferol (OYSTER SHELL CALCIUM) 500-400 MG-UNIT TABS, Take 1 tablet by mouth 2 (two) times a day , Disp: , Rfl:     Calcium Carbonate-Vitamin D (OYSCO 500/D PO), Take by mouth daily, Disp: , Rfl:     carBAMazepine (TEGretol) 200 mg tablet, Take 1 tablet (200 mg total) by mouth 2 (two) times a day, Disp: , Rfl:     Carboxymethylcell-Hypromellose (GENTEAL) 0 25-0 3 % GEL, Apply to eye , Disp: , Rfl:     Cholecalciferol (VITAMIN D) 2000 units tablet, TAKE TWO TABLETS BY MOUTH (4000IU) EVERY MORNING FOR VITAMIN DIFICIENCY, Disp: 62 tablet, Rfl: 0    citalopram (CeleXA) 10 mg tablet, Take 10 mg by mouth daily 30 mg total , Disp: , Rfl:     Dentifrices (SENSODYNE PRONAMEL DT), Apply to teeth , Disp: , Rfl:     Diclofenac Sodium (VOLTAREN) 1 %, , Disp: , Rfl:     diphenhydrAMINE (BENADRYL) 25 mg tablet, Take 25 mg by mouth daily at bedtime as needed for itching (for insomnia and allergy ** DO NOT TAKE WITH XYZAL **) , Disp: , Rfl:     docusate sodium (COLACE) 100 mg capsule, Take 100 mg by mouth 3 (three) times a week , Disp: , Rfl:     guaiFENesin (SILTUSSIN SA PO), Take by mouth Take 2 teaspoonful (10 ml) PRN 6 hrs for cough, Disp: , Rfl:     haloperidol (HALDOL) 2 mg tablet, Take 2 mg by mouth 2 (two) times a day 1 tab am and 2 tabs qhs, Disp: , Rfl:     ibuprofen (MOTRIN) 200 mg tablet, Take 400 mg by mouth every 6 (six) hours as needed for mild pain , Disp: , Rfl:     ketorolac (ACULAR) 0 5 % ophthalmic solution,  , Disp: , Rfl:     latanoprost (XALATAN) 0 005 % ophthalmic solution, Apply 1 drop to eye, Disp: , Rfl:     levocetirizine (XYZAL) 5 MG tablet, Take 1 tablet by mouth daily as needed, Disp: , Rfl:     lithium carbonate 300 mg capsule, Take 600 mg by mouth 2 (two) times a day, Disp: , Rfl:     loperamide (IMODIUM) 2 mg capsule, Take by mouth , Disp: , Rfl:     LORazepam (ATIVAN) 0 5 mg tablet, Take 1 mg by mouth 2 (two) times a day , Disp: , Rfl:     melatonin 1 mg, Take 5 mg by mouth daily at bedtime , Disp: , Rfl:     memantine (NAMENDA) 10 mg tablet, Take 1 tablet (10 mg total) by mouth 2 (two) times a day, Disp: 60 tablet, Rfl: 5    mineral oil-white petrolatum (LUBRIFRESH P M ) ophthalmic ointment, 0 5 inches , Disp: , Rfl:     neomycin-polymyxin b-bacitracin (NEOSPORIN) 3 5-400-5,000, , Disp: , Rfl:     NON FORMULARY, ACT FLUORIDE DENTAL RINSE  RINSE WITH 1 TBSP (15ML)2X A DAY, Disp: , Rfl:     NON FORMULARY, TOLNAFTATE SPRAY 1% SPRAY SPRAY BETWEEN TOES, Disp: , Rfl:     ondansetron (ZOFRAN) 4 mg tablet, Take by mouth as needed  , Disp: , Rfl:     polyethylene glycol (MIRALAX) 17 g packet, Take 17 g by mouth daily as needed, Disp: , Rfl:     PROCTOSOL HC 2 5 % rectal cream, , Disp: , Rfl:     sodium fluoride 0 275 (0 125 F) MG/DROP solution, Take 275 mcg by mouth daily , Disp: , Rfl:     Sunscreens (SUNBLOCK LOTION SPF30 EX), Apply topically daily as needed, Disp: , Rfl:     talc (Zeasorb), Apply topically once as needed for irritation Sprinkle powder on affected area of skin to help with excess moisture up to 2 times a day as needed   , Disp: , Rfl:     timolol (TIMOPTIC-XE) 0 5 % ophthalmic gel-forming, Administer 1 drop into the left eye daily, Disp: , Rfl:     tolnaftate (TINACTIN) 1 % spray, Apply topically, Disp: , Rfl:     white petrolatum (Vaseline), Apply topically once Apply to hand every day prn, Disp: , Rfl:     ascorbic acid (VITAMIN C) 500 mg tablet, 1 tab PO q am with iron supplement (Patient not taking: Reported on 11/18/2021 ), Disp: 30 tablet, Rfl: 2    bromfenac sodium (Prolensa) 0 07 % SOLN, Apply to eye 1 DROP IN RIGHT EYE IN THE MORNING (Patient not taking: Reported on 11/18/2021 ), Disp: , Rfl:     ferrous sulfate 324 (65 Fe) mg, Take 1 tablet (324 mg total) by mouth daily before breakfast (Patient not taking: Reported on 11/18/2021 ), Disp: 30 tablet, Rfl: 5    hydrocortisone (ANUSOL-HC, PROCTOSOL HC,) 2 5 % rectal cream, Insert into the rectum 2 (two) times a day (Patient not taking: Reported on 1/10/2022 ), Disp: 30 g, Rfl: 0    Hypromellose 0 3 % SOLN, Apply to eye  (Patient not taking: Reported on 11/18/2021 ), Disp: , Rfl:     ofloxacin (FLOXIN) 0 3 % otic solution, Administer 10 drops to the right ear 4 (four) times a day  (Patient not taking: Reported on 11/18/2021 ), Disp: , Rfl:     ofloxacin (OCUFLOX) 0 3 % ophthalmic solution, , Disp: , Rfl:     pantoprazole (PROTONIX) 40 mg tablet, TAKE ONE TABLET BY MOUTH 2 TIMES A DAY( MORNING/ BEDTIME) (ESOPHAGITIS) (Patient not taking: Reported on 1/10/2022), Disp: 60 tablet, Rfl: 5    prednisoLONE acetate (prednisoLONE Acetate P-F) 1 % ophthalmic suspension, Administer 1 drop to the right eye every morning  (Patient not taking: Reported on 11/18/2021 ), Disp: , Rfl:     sodium chloride (SOCHLOR) 5 % hypertonic ophthalmic solution, Apply 1 drop to eye 2 (two) times a day  (Patient not taking: Reported on 11/18/2021 ), Disp: , Rfl:   Allergies   Allergen Reactions    Bactrim [Sulfamethoxazole-Trimethoprim]     Methylphenidate Hyperactivity     Reaction Date: [id number];     Sulfa Antibiotics     Thioridazine     Amphetamines     Chlorpromazine     Fexofenadine Hives     Reaction Date: [id number];    Junius Hollering Medroxyprogesterone Rash and Hives     Reaction Date: [id number];     Other Hives     Mellaril, thorazine    Trimethoprim        Physical Exam:     Vitals:    01/10/22 1150   BP: 124/72   Pulse: 72   Resp: 16   Temp: 97 8 °F (36 6 °C)   SpO2: 97%     Physical Exam  Vitals reviewed  Constitutional:       Appearance: She is well-developed  HENT:      Head: Normocephalic and atraumatic  Eyes:      Pupils: Pupils are equal, round, and reactive to light  Neck:      Thyroid: No thyromegaly  Vascular: No JVD  Trachea: No tracheal deviation  Cardiovascular:      Rate and Rhythm: Normal rate and regular rhythm  Heart sounds: Normal heart sounds  No murmur heard  No friction rub  No gallop  Pulmonary:      Effort: Pulmonary effort is normal  No respiratory distress  Breath sounds: Normal breath sounds  No wheezing or rales  Abdominal:      General: There is no distension  Palpations: Abdomen is soft  There is no hepatomegaly or mass  Tenderness: There is no abdominal tenderness  There is no guarding or rebound  Musculoskeletal:         General: No tenderness  Normal range of motion  Cervical back: Normal range of motion and neck supple  Lymphadenopathy:      Cervical: No cervical adenopathy  Skin:     General: Skin is warm and dry  Findings: No erythema or rash  Neurological:      Mental Status: She is alert and oriented to person, place, and time  Cranial Nerves: No cranial nerve deficit  Psychiatric:      Comments: The patient is appropriate for her autistic tendencies  Results & Discussion:   Patient has no evidence of local regional or distant recurrence disease  She is following with several other physicians for hiatal hernia shoulder arthritis and anemia  We will see her back in 1 year's time  We will coordinate a mammogram in 1 year's time for the left side  Advance Care Planning/Advance Directives:  I discussed the disease status, treatment plans and follow-up with the patient

## 2022-01-12 ENCOUNTER — OFFICE VISIT (OUTPATIENT)
Dept: GASTROENTEROLOGY | Facility: CLINIC | Age: 57
End: 2022-01-12
Payer: MEDICARE

## 2022-01-12 VITALS
SYSTOLIC BLOOD PRESSURE: 134 MMHG | BODY MASS INDEX: 28.69 KG/M2 | HEIGHT: 57 IN | WEIGHT: 133 LBS | DIASTOLIC BLOOD PRESSURE: 80 MMHG

## 2022-01-12 DIAGNOSIS — K21.9 CHRONIC GERD: ICD-10-CM

## 2022-01-12 DIAGNOSIS — K44.9 HIATAL HERNIA: ICD-10-CM

## 2022-01-12 DIAGNOSIS — K59.09 OTHER CONSTIPATION: ICD-10-CM

## 2022-01-12 DIAGNOSIS — D50.8 OTHER IRON DEFICIENCY ANEMIA: Primary | ICD-10-CM

## 2022-01-12 DIAGNOSIS — Z86.010 HISTORY OF COLON POLYPS: ICD-10-CM

## 2022-01-12 PROCEDURE — 99214 OFFICE O/P EST MOD 30 MIN: CPT | Performed by: INTERNAL MEDICINE

## 2022-01-12 NOTE — LETTER
January 12, 2022     Haider Freedman, [address] [place] [address] [address]  [facility]  [address] [address]    Patient: Shaka Woody   YOB: 1965   Date of Visit: 1/12/2022       Dear Dr Mike Nation: Thank you for referring Shaka Woody to me for evaluation  Below are my notes for this consultation  If you have questions, please do not hesitate to call me  I look forward to following your patient along with you           Sincerely,        Jennie Copeland MD        CC: Mamadou Romero MD

## 2022-01-12 NOTE — PROGRESS NOTES
[id number] - Outpatient Follow-up Note  Ruthie Giang 55 y o  female MRN: [id number]  Encounter: [id number]    ASSESSMENT AND PLAN:      1  Other iron deficiency anemia  Almost certainly from large hiatal hernia noted by previous endoscopy confirmed by barium swallow just done  She had a negative capsule and colonoscopy for causes of anemia  I discussed the situation with Dr Charles Hatfield who saw her in consult and also patient's mother by phone today during the visit, her hemoglobin has risen nicely with transfusion now 12 1  As long she is able to maintain her hemoglobin, her weight and tolerate food without symptoms I would not recommend surgery to repair the hernia but this could be considered in the future if needed  2  Chronic GERD  Stable asymptomatic    3  Hiatal hernia  As above    4  History of colon polyps  Colonoscopy 2025 year recall    5  Other constipation  Resolved taking Colace      Followup Appointment:  6 months  ______________________________________________________________________    Chief Complaint   Patient presents with    Follow-up     to testing     HPI:  Patient is seen with her caregiver  Doing reasonably well  No nausea or vomiting eating well maintaining her weight  No dysphagia  No significant constipation  Historical Information   Past Medical History:   Diagnosis Date    Arthritis 11/18/2021    right shoulder    Autism     [facility] cancer ([facility] )     Breast cyst, left     last assessed 12/30/2013    Cerebral palsy (HCC)     Chronic GERD     Depression     Fibrocystic breast disease     last assessed 06/29/2012    Gastrointestinal hemorrhage     Glaucoma     History of chemotherapy     [facility] ([facility] )      SHUNT IN PLACE    Scoliosis     Scoliosis      Past Surgical History:   Procedure Laterality Date    BREAST BIOPSY Right     CATARACT EXTRACTION  06/14/2021    COLONOSCOPY      Description 04/13/2016-5 yrs  Description 4 yr f/u d/t polyp and family history, onset 07/20/2012   COLONOSCOPY      ESOPHAGOGASTRODUODENOSCOPY      description-04/13/2016 gastritis with antral nodule    EYE SURGERY      for relief of intraocular pressure    INCISIONAL BREAST BIOPSY      description 2008    MASTECTOMY Left     SENTINEL LYMPH NODE BIOPSY      STRABISMUS SURGERY      description 1973, 66    UPPER GASTROINTESTINAL ENDOSCOPY  05/16/2019    Grade C erosive esophagitis    7 mm nodule in the stomach     Social History     Substance and Sexual Activity   Alcohol Use No     Social History     Substance and Sexual Activity   Drug Use No     Social History     Tobacco Use   Smoking Status Never Smoker   Smokeless Tobacco Never Used     Family History   Problem Relation Age of Onset    Osteopenia Mother     Heart attack Father 59        Acute MI    Coronary artery disease Father     Thyroid nodules Father     Osteopenia Maternal Grandmother     Prostate cancer Maternal Grandfather     Leukemia Paternal Grandfather     Heart attack Maternal Uncle 48        acute MI, stent    Cancer Maternal Uncle         adenocarcinoma of oral cavity    Colon cancer Maternal Uncle 62    Hyperlipidemia Maternal Uncle     Pancreatic cancer Paternal Uncle     Prostate cancer Paternal Uncle     Colon cancer Family     Coronary artery disease Family     Thyroid disease Family          Current Outpatient Medications:     acetaminophen (TYLENOL) 500 mg tablet    alendronate (FOSAMAX) 70 mg tablet    anastrozole (ARIMIDEX) 1 mg tablet    BANOPHEN 25 MG capsule    calamine lotion    Calcium Carb-Cholecalciferol (OYSTER SHELL CALCIUM) 500-400 MG-UNIT TABS    Calcium Carbonate-Vitamin D (OYSCO 500/D PO)    Carboxymethylcell-Hypromellose (GENTEAL) 0 25-0 3 % GEL    Cholecalciferol (VITAMIN D) 2000 units tablet    citalopram (CeleXA) 10 mg tablet    Dentifrices (SENSODYNE PRONAMEL DT)    Diclofenac Sodium (VOLTAREN) 1 %   diphenhydrAMINE (BENADRYL) 25 mg tablet    docusate sodium (COLACE) 100 mg capsule    guaiFENesin (SILTUSSIN SA PO)    haloperidol (HALDOL) 2 mg tablet    ibuprofen (MOTRIN) 200 mg tablet    ketorolac (ACULAR) 0 5 % ophthalmic solution    latanoprost (XALATAN) 0 005 % ophthalmic solution    levocetirizine (XYZAL) 5 MG tablet    lithium carbonate 300 mg capsule    loperamide (IMODIUM) 2 mg capsule    LORazepam (ATIVAN) 0 5 mg tablet    melatonin 1 mg    memantine (NAMENDA) 10 mg tablet    mineral oil-white petrolatum (LUBRIFRESH P M ) ophthalmic ointment    neomycin-polymyxin b-bacitracin (NEOSPORIN) 3 5-400-5,000    NON FORMULARY    NON FORMULARY    ondansetron (ZOFRAN) 4 mg tablet    polyethylene glycol (MIRALAX) 17 g packet    PROCTOSOL HC 2 5 % rectal cream    sodium fluoride 0 275 (0 125 F) MG/DROP solution    Sunscreens (SUNBLOCK LOTION SPF30 EX)    talc (Zeasorb)    timolol (TIMOPTIC-XE) 0 5 % ophthalmic gel-forming    tolnaftate (TINACTIN) 1 % spray    white petrolatum (Vaseline)    ascorbic acid (VITAMIN C) 500 mg tablet    bromfenac sodium (Prolensa) 0 07 % SOLN    carBAMazepine (TEGretol) 200 mg tablet    ferrous sulfate 324 (65 Fe) mg    hydrocortisone (ANUSOL-HC, PROCTOSOL HC,) 2 5 % rectal cream    Hypromellose 0 3 % SOLN    ofloxacin (FLOXIN) 0 3 % otic solution    ofloxacin (OCUFLOX) 0 3 % ophthalmic solution    pantoprazole (PROTONIX) 40 mg tablet    prednisoLONE acetate (prednisoLONE Acetate P-F) 1 % ophthalmic suspension    sodium chloride (SOCHLOR) 5 % hypertonic ophthalmic solution  Allergies   Allergen Reactions    Bactrim [Sulfamethoxazole-Trimethoprim]     Methylphenidate Hyperactivity     Reaction Date: [id number];     Sulfa Antibiotics     Thioridazine     Amphetamines     Chlorpromazine     Fexofenadine Hives     Reaction Date: [id number];     Medrogestone     Medroxyprogesterone Rash and Hives     Reaction Date: [id number];     Other Hives Mellaril, thorazine    Trimethoprim      Reviewed medications and allergies and updated as indicated    PHYSICAL EXAM:    Blood pressure 134/80, height 4' 9" (1 448 m), weight 60 3 kg (133 lb), not currently breastfeeding  Body mass index is 28 78 kg/m²  General Appearance: NAD, cooperative, alert  Eyes: Anicteric, PERRLA, EOMI  ENT:  Normocephalic, atraumatic, normal mucosa  Neck:  Supple, symmetrical, trachea midline  Resp:  Clear to auscultation bilaterally; no rales, rhonchi or wheezing; respirations unlabored   CV:  S1 S2, Regular rate and rhythm; no murmur, rub, or gallop  GI:  Soft, mild diffuse tenderness, non-distended; normal bowel sounds; no masses, no organomegaly   Rectal: Deferred  Musculoskeletal: No cyanosis, clubbing or edema  Normal ROM  Skin:  No jaundice, rashes, or lesions   Heme/Lymph: No palpable cervical lymphadenopathy  Psych: Normal affect, good eye contact-patient with mild to moderate MR  Neuro: No gross deficits, AAOx3    Lab Results:   Lab Results   Component Value Date    WBC 8 25 01/06/2022    HGB 12 1 01/06/2022    HCT 41 3 01/06/2022    MCV 94 01/06/2022     (H) 01/06/2022     Lab Results   Component Value Date     (L) 12/18/2013    K 3 8 11/16/2021     11/16/2021    CO2 28 11/16/2021    ANIONGAP 5 12/18/2013    BUN 9 11/16/2021    CREATININE 0 62 11/16/2021    GLUCOSE 127 12/18/2013    GLUF 97 11/16/2021    CALCIUM 8 8 11/16/2021    CORRECTEDCA 9 5 11/16/2021    AST 10 11/16/2021    ALT 18 11/16/2021    ALKPHOS 75 11/16/2021    EGFR 102 11/16/2021     Lab Results   Component Value Date    IRON 13 (L) 11/16/2021    TIBC 550 (H) 11/16/2021     No results found for: LIPASE    Radiology Results:   FL UPPER GI UGI    Result Date: 12/22/2021  Narrative: UPPER GI SERIES  SINGLE CONTRAST INDICATION:  Evaluation of hiatal hernia   COMPARISON:  CT abdomen pelvis October 5, 2021 IMAGES:  161 FLUOROSCOPY TIME:   2 5 minutes TECHNIQUE:  The patient was given barium  by mouth and images of the esophagus, stomach, and small bowel were obtained  Study was tailored due to patient's physical limitations  FINDINGS: Large hiatal hernia with at least 30% of the stomach situated above the diaphragm  The gastroesophageal junction appears to be on the right side slightly above the diaphragm, this is concordant with the prior CT findings (best seen on series 15 image 1)  The esophagus has a tortuous course as the distal esophagus is displaced to the right by the herniated stomach  Retrograde flow of contrast is identified from the herniated stomach into the mid to distal esophagus  Overall limited assessment of esophageal motility  No penetrating gastric ulcers or masses  Contrast empties promptly into the duodenum  The duodenum is normal in caliber  The ligament of Treitz/duodenojejunal junction lies in a normal position  Impression: Large hiatal hernia as described above   Workstation performed: [id number]

## 2022-01-13 ENCOUNTER — HOSPITAL ENCOUNTER (OUTPATIENT)
Dept: INFUSION CENTER | Facility: HOSPITAL | Age: 57
Discharge: HOME | End: 2022-01-13
Attending: INTERNAL MEDICINE
Payer: MEDICARE

## 2022-01-13 VITALS
RESPIRATION RATE: 16 BRPM | TEMPERATURE: 97.8 F | SYSTOLIC BLOOD PRESSURE: 127 MMHG | OXYGEN SATURATION: 95 % | DIASTOLIC BLOOD PRESSURE: 70 MMHG | HEART RATE: 89 BPM

## 2022-01-13 DIAGNOSIS — D50.9 IRON DEFICIENCY ANEMIA, UNSPECIFIED IRON DEFICIENCY ANEMIA TYPE: Primary | ICD-10-CM

## 2022-01-13 PROCEDURE — 96365 THER/PROPH/DIAG IV INF INIT: CPT

## 2022-01-13 RX ORDER — SODIUM CHLORIDE 9 MG/ML
20 INJECTION, SOLUTION INTRAVENOUS ONCE
Status: COMPLETED | OUTPATIENT
Start: 2022-01-13 | End: 2022-01-13

## 2022-01-13 RX ORDER — SODIUM CHLORIDE 9 MG/ML
20 INJECTION, SOLUTION INTRAVENOUS ONCE
Status: CANCELLED | OUTPATIENT
Start: 2022-01-20

## 2022-01-13 RX ADMIN — SODIUM CHLORIDE 20 ML/HR: 9 INJECTION, SOLUTION INTRAVENOUS at 08:37

## 2022-01-13 RX ADMIN — IRON SUCROSE 200 MG: 20 INJECTION, SOLUTION INTRAVENOUS at 08:37

## 2022-01-13 NOTE — PROGRESS NOTES
Patient completed venofer infusion with no adverse reactions  PIV site removed, AVS provided, dressing CD&I  Left unit ambulatory with special gait, accompanied by caregiver

## 2022-01-20 ENCOUNTER — HOSPITAL ENCOUNTER (OUTPATIENT)
Dept: INFUSION CENTER | Facility: HOSPITAL | Age: 57
Discharge: HOME | End: 2022-01-20
Attending: INTERNAL MEDICINE
Payer: MEDICARE

## 2022-01-20 VITALS
HEART RATE: 92 BPM | SYSTOLIC BLOOD PRESSURE: 146 MMHG | RESPIRATION RATE: 16 BRPM | DIASTOLIC BLOOD PRESSURE: 71 MMHG | OXYGEN SATURATION: 93 % | TEMPERATURE: 97 F

## 2022-01-20 DIAGNOSIS — D50.9 IRON DEFICIENCY ANEMIA, UNSPECIFIED IRON DEFICIENCY ANEMIA TYPE: Primary | ICD-10-CM

## 2022-01-20 DIAGNOSIS — D64.89 ANEMIA DUE TO OTHER CAUSE, NOT CLASSIFIED: ICD-10-CM

## 2022-01-20 LAB
BASOPHILS # BLD AUTO: 0.11 THOUSANDS/ΜL (ref 0–0.1)
BASOPHILS NFR BLD AUTO: 1 % (ref 0–1)
EOSINOPHIL # BLD AUTO: 0.39 THOUSAND/ΜL (ref 0–0.61)
EOSINOPHIL NFR BLD AUTO: 4 % (ref 0–6)
ERYTHROCYTE [DISTWIDTH] IN BLOOD BY AUTOMATED COUNT: 17.6 % (ref 11.6–15.1)
HCT VFR BLD AUTO: 45.2 % (ref 34.8–46.1)
HGB BLD-MCNC: 12.9 G/DL (ref 11.5–15.4)
IMM GRANULOCYTES # BLD AUTO: 0.03 THOUSAND/UL (ref 0–0.2)
IMM GRANULOCYTES NFR BLD AUTO: 0 % (ref 0–2)
LYMPHOCYTES # BLD AUTO: 1.3 THOUSANDS/ΜL (ref 0.6–4.47)
LYMPHOCYTES NFR BLD AUTO: 14 % (ref 14–44)
MCH RBC QN AUTO: 27.7 PG (ref 26.8–34.3)
MCHC RBC AUTO-ENTMCNC: 28.5 G/DL (ref 31.4–37.4)
MCV RBC AUTO: 97 FL (ref 82–98)
MONOCYTES # BLD AUTO: 0.78 THOUSAND/ΜL (ref 0.17–1.22)
MONOCYTES NFR BLD AUTO: 8 % (ref 4–12)
NEUTROPHILS # BLD AUTO: 6.95 THOUSANDS/ΜL (ref 1.85–7.62)
NEUTS SEG NFR BLD AUTO: 73 % (ref 43–75)
NRBC BLD AUTO-RTO: 0 /100 WBCS
PLATELET # BLD AUTO: 296 THOUSANDS/UL (ref 149–390)
PMV BLD AUTO: 11.5 FL (ref 8.9–12.7)
RBC # BLD AUTO: 4.65 MILLION/UL (ref 3.81–5.12)
WBC # BLD AUTO: 9.56 THOUSAND/UL (ref 4.31–10.16)

## 2022-01-20 PROCEDURE — 85025 COMPLETE CBC W/AUTO DIFF WBC: CPT | Performed by: INTERNAL MEDICINE

## 2022-01-20 PROCEDURE — 96365 THER/PROPH/DIAG IV INF INIT: CPT

## 2022-01-20 RX ORDER — SODIUM CHLORIDE 9 MG/ML
20 INJECTION, SOLUTION INTRAVENOUS ONCE
Status: COMPLETED | OUTPATIENT
Start: 2022-01-20 | End: 2022-01-20

## 2022-01-20 RX ORDER — SODIUM CHLORIDE 9 MG/ML
20 INJECTION, SOLUTION INTRAVENOUS ONCE
Status: CANCELLED | OUTPATIENT
Start: 2022-01-27

## 2022-01-20 RX ADMIN — SODIUM CHLORIDE 20 ML/HR: 9 INJECTION, SOLUTION INTRAVENOUS at 08:33

## 2022-01-20 RX ADMIN — IRON SUCROSE 200 MG: 20 INJECTION, SOLUTION INTRAVENOUS at 08:33

## 2022-01-20 NOTE — PROGRESS NOTES
Pt tolerated IV Venofer infusion without adverse reaction  Pt left unit ambulatory with steady gait with caregiver at side  AVS printed and given to patient

## 2022-01-20 NOTE — PLAN OF CARE
Problem: Knowledge Deficit  Goal: Patient/family/caregiver demonstrates understanding of disease process, treatment plan, medications, and discharge instructions  Description: Complete learning assessment and assess knowledge base    Interventions:  - Provide teaching at level of understanding  - Provide teaching via preferred learning methods  Outcome: Progressing     Problem: Potential for Falls  Goal: Patient will remain free of falls  Description: INTERVENTIONS:  - Educate patient/family on patient safety including physical limitations  - Instruct patient to call for assistance with activity   - Consult OT/PT to assist with strengthening/mobility   - Keep Call bell within reach  - Keep bed low and locked with side rails adjusted as appropriate  - Keep care items and personal belongings within reach  - Initiate and maintain comfort rounds  - Make Fall Risk Sign visible to staff  - Offer Toileting every 2 Hours, in advance of need  - Apply yellow socks and bracelet for high fall risk patients  - Consider moving patient to room near nurses station  Outcome: Progressing

## 2022-01-27 ENCOUNTER — HOSPITAL ENCOUNTER (OUTPATIENT)
Dept: INFUSION CENTER | Facility: HOSPITAL | Age: 57
Discharge: HOME | End: 2022-01-27
Attending: INTERNAL MEDICINE
Payer: MEDICARE

## 2022-01-27 VITALS
TEMPERATURE: 97.2 F | SYSTOLIC BLOOD PRESSURE: 146 MMHG | RESPIRATION RATE: 16 BRPM | HEART RATE: 91 BPM | DIASTOLIC BLOOD PRESSURE: 86 MMHG | OXYGEN SATURATION: 95 %

## 2022-01-27 DIAGNOSIS — D50.9 IRON DEFICIENCY ANEMIA, UNSPECIFIED IRON DEFICIENCY ANEMIA TYPE: Primary | ICD-10-CM

## 2022-01-27 PROCEDURE — 96365 THER/PROPH/DIAG IV INF INIT: CPT

## 2022-01-27 RX ORDER — SODIUM CHLORIDE 9 MG/ML
20 INJECTION, SOLUTION INTRAVENOUS ONCE
Status: CANCELLED | OUTPATIENT
Start: 2022-01-27

## 2022-01-27 RX ORDER — SODIUM CHLORIDE 9 MG/ML
20 INJECTION, SOLUTION INTRAVENOUS ONCE
Status: COMPLETED | OUTPATIENT
Start: 2022-01-27 | End: 2022-01-27

## 2022-01-27 RX ADMIN — IRON SUCROSE 200 MG: 20 INJECTION, SOLUTION INTRAVENOUS at 08:43

## 2022-01-27 RX ADMIN — SODIUM CHLORIDE 20 ML/HR: 9 INJECTION, SOLUTION INTRAVENOUS at 08:40

## 2022-01-27 NOTE — PROGRESS NOTES
Pt brianna Venofer infusion w/o AR seen  PIV removed itnact  Pressure dssg applied  Disch amb with caregiver to [facility]

## 2022-02-02 ENCOUNTER — TELEPHONE (OUTPATIENT)
Dept: HEMATOLOGY ONCOLOGY | Facility: CLINIC | Age: 57
End: 2022-02-02

## 2022-02-02 NOTE — TELEPHONE ENCOUNTER
Patient's nurse, Elian Dowell, is calling in requesting for patient's lab orders to be faxed over to [phone]   Lab orders have been faxed

## 2022-02-16 DIAGNOSIS — K21.9 GASTROESOPHAGEAL REFLUX DISEASE: ICD-10-CM

## 2022-02-16 RX ORDER — PANTOPRAZOLE SODIUM 40 MG/1
TABLET, DELAYED RELEASE ORAL
Qty: 56 TABLET | Refills: 0 | Status: SHIPPED | OUTPATIENT
Start: 2022-02-16 | End: 2022-04-19

## 2022-03-02 ENCOUNTER — TELEPHONE (OUTPATIENT)
Dept: HEMATOLOGY ONCOLOGY | Facility: HOSPITAL | Age: 57
End: 2022-03-02

## 2022-03-02 NOTE — TELEPHONE ENCOUNTER
Called patient and spoke to patient's care giver Aura Diez  Rescheduled patients appt due to provider not being in  [facility] confirmed new appt with different provider  And time  Aura Diez would like labs fax to [phone]  Inge Topete is faxing labs to [facility]uat[facility] Diez

## 2022-03-07 ENCOUNTER — TELEPHONE (OUTPATIENT)
Dept: FAMILY MEDICINE CLINIC | Facility: HOSPITAL | Age: 57
End: 2022-03-07

## 2022-03-18 ENCOUNTER — OFFICE VISIT (OUTPATIENT)
Dept: FAMILY MEDICINE CLINIC | Facility: HOSPITAL | Age: 57
End: 2022-03-18
Payer: MEDICARE

## 2022-03-18 ENCOUNTER — TELEPHONE (OUTPATIENT)
Dept: FAMILY MEDICINE CLINIC | Facility: HOSPITAL | Age: 57
End: 2022-03-18

## 2022-03-18 VITALS
SYSTOLIC BLOOD PRESSURE: 124 MMHG | HEART RATE: 88 BPM | DIASTOLIC BLOOD PRESSURE: 82 MMHG | TEMPERATURE: 98 F | HEIGHT: 57 IN | WEIGHT: 131 LBS | BODY MASS INDEX: 28.26 KG/M2

## 2022-03-18 DIAGNOSIS — Q03.1 DANDY-WALKER SYNDROME (HCC): ICD-10-CM

## 2022-03-18 DIAGNOSIS — G91.9 HYDROCEPHALUS, UNSPECIFIED TYPE (HCC): ICD-10-CM

## 2022-03-18 DIAGNOSIS — M19.012 PRIMARY OSTEOARTHRITIS OF LEFT SHOULDER: ICD-10-CM

## 2022-03-18 DIAGNOSIS — K21.9 CHRONIC GERD: ICD-10-CM

## 2022-03-18 DIAGNOSIS — D50.8 OTHER IRON DEFICIENCY ANEMIA: ICD-10-CM

## 2022-03-18 DIAGNOSIS — G80.9 CEREBRAL PALSY, UNSPECIFIED TYPE (HCC): Primary | ICD-10-CM

## 2022-03-18 DIAGNOSIS — M81.0 OSTEOPOROSIS, UNSPECIFIED OSTEOPOROSIS TYPE, UNSPECIFIED PATHOLOGICAL FRACTURE PRESENCE: ICD-10-CM

## 2022-03-18 PROCEDURE — 99214 OFFICE O/P EST MOD 30 MIN: CPT | Performed by: INTERNAL MEDICINE

## 2022-03-18 NOTE — TELEPHONE ENCOUNTER
Should not be D/C'd - I only took one off the med list as it was a duplicate order - it is still on her med list

## 2022-03-18 NOTE — ASSESSMENT & PLAN NOTE
Has had EGD/[place] and CT scan, finished iron infusions, following with GI and Heme, currently off all iron supplements, will follow as well

## 2022-03-18 NOTE — TELEPHONE ENCOUNTER
Catrachita nurse at [facility] and said that Calcium Carb-Cholecalciferol (OYSTER SHELL CALCIUM) 500-400 MG-UNIT TABS   Was discontinued today she wanted to see if they could continue to take it-   [phone] [id number]

## 2022-03-18 NOTE — ASSESSMENT & PLAN NOTE
Most recent BMD con't to show osteoporosis w/continued worsening of BMD - would D/c Fosamax and f/u with Heme or Endo about Prolia or other injectable or infusions, on Ca/Vit D

## 2022-03-18 NOTE — ASSESSMENT & PLAN NOTE
Pt notes some epigastric discomfort at times, advised to avoid triggers, on PPI, con't tx and f/u as per GI

## 2022-03-18 NOTE — ASSESSMENT & PLAN NOTE
Following with Dr Licha Conway - great benefit reported with cortisone injections, con't tx and f/u as per Ortho

## 2022-03-25 ENCOUNTER — TELEPHONE (OUTPATIENT)
Dept: SURGICAL ONCOLOGY | Facility: CLINIC | Age: 57
End: 2022-03-25

## 2022-03-25 ENCOUNTER — OFFICE VISIT (OUTPATIENT)
Dept: OBGYN CLINIC | Facility: CLINIC | Age: 57
End: 2022-03-25
Payer: MEDICARE

## 2022-03-25 VITALS
HEIGHT: 58 IN | SYSTOLIC BLOOD PRESSURE: 116 MMHG | BODY MASS INDEX: 27.92 KG/M2 | WEIGHT: 133 LBS | DIASTOLIC BLOOD PRESSURE: 72 MMHG

## 2022-03-25 DIAGNOSIS — M19.012 PRIMARY OSTEOARTHRITIS OF LEFT SHOULDER: Primary | ICD-10-CM

## 2022-03-25 PROCEDURE — 20610 DRAIN/INJ JOINT/BURSA W/O US: CPT | Performed by: ORTHOPAEDIC SURGERY

## 2022-03-25 PROCEDURE — 99213 OFFICE O/P EST LOW 20 MIN: CPT | Performed by: ORTHOPAEDIC SURGERY

## 2022-03-25 RX ORDER — BETAMETHASONE SODIUM PHOSPHATE AND BETAMETHASONE ACETATE 3; 3 MG/ML; MG/ML
6 INJECTION, SUSPENSION INTRA-ARTICULAR; INTRALESIONAL; INTRAMUSCULAR; SOFT TISSUE
Status: COMPLETED | OUTPATIENT
Start: 2022-03-25 | End: 2022-03-25

## 2022-03-25 RX ORDER — LIDOCAINE HYDROCHLORIDE 10 MG/ML
5 INJECTION, SOLUTION INFILTRATION; PERINEURAL
Status: COMPLETED | OUTPATIENT
Start: 2022-03-25 | End: 2022-03-25

## 2022-03-25 RX ADMIN — BETAMETHASONE SODIUM PHOSPHATE AND BETAMETHASONE ACETATE 6 MG: 3; 3 INJECTION, SUSPENSION INTRA-ARTICULAR; INTRALESIONAL; INTRAMUSCULAR; SOFT TISSUE at 13:48

## 2022-03-25 RX ADMIN — LIDOCAINE HYDROCHLORIDE 5 ML: 10 INJECTION, SOLUTION INFILTRATION; PERINEURAL at 13:48

## 2022-03-25 NOTE — TELEPHONE ENCOUNTER
Confirmed appt, on 3/30/22 at 2:00 PM w/ Meme Stage w/ NurseZainab  Nurse had asked if they will draw blood at appointment and she was told, yes  Mentioned that if mom wouldn't be able to find the office, the GC could contact her

## 2022-03-25 NOTE — PROGRESS NOTES
Assessment:     1  Primary osteoarthritis of left shoulder        Plan:     Problem List Items Addressed This Visit        Musculoskeletal and Integument    Primary osteoarthritis of left shoulder - Primary     Findings consistent with left glenohumeral osteoarthritis  Findings and treatment options were discussed with the patient  Patient continues to want to avoid surgery  Repeat cortisone injection was given to left glenohumeral joint today  She tolerated the procedure well  Advised to apply cold compress today  Avoid repetitive overhead activities with that arm  Discussed that she can have repeat cortisone injection as soon as 3-4 months  Follow-up as needed if symptoms return  All patient's questions were answered to her satisfaction  This note is created using dictation transcription  It may contain typographical errors, grammatical errors, improperly dictated words, background noise and other errors  Relevant Medications    betamethasone acetate-betamethasone sodium phosphate (CELESTONE) injection 6 mg (Completed)    lidocaine (XYLOCAINE) 1 % injection 5 mL (Completed)    Other Relevant Orders    Large joint arthrocentesis: L glenohumeral (Completed)          Subjective:     Patient ID: Toro Aguilar is a 55 y o  female  Chief Complaint:  55 yr old female following up for left shoulder glenohumeral osteoarthritis  She is resident at [facility], she is with one care giver and her mother  Last cortisone injection was given in December 2021  She had several weeks of relief and has been gradually returning  The pain is aching and throbbing in nature  She would like a repeat cortisone injection today        Allergy:  Allergies   Allergen Reactions    Bactrim [Sulfamethoxazole-Trimethoprim]     Methylphenidate Hyperactivity     Reaction Date: [id number];     Sulfa Antibiotics     Thioridazine     Amphetamines     Chlorpromazine     Fexofenadine Hives     Reaction Date: [id number];    Alison Dash Medrogestone     Medroxyprogesterone Rash and Hives     Reaction Date: [id number];     Other Hives     Mellaril, thorazine    Trimethoprim      Medications:  all current active meds have been reviewed  Past Medical History:  Past Medical History:   Diagnosis Date    Arthritis 11/18/2021    right shoulder    Autism     Breast cancer ([facility] )     Breast cyst, left     last assessed 12/30/2013    Cerebral palsy (HCC)     Chronic GERD     Depression     Fibrocystic breast disease     last assessed 06/29/2012    Gastrointestinal hemorrhage     Glaucoma     History of chemotherapy     Hydrocephalus ([facility] )      SHUNT IN PLACE    Scoliosis     Scoliosis      Past Surgical History:  Past Surgical History:   Procedure Laterality Date    BREAST BIOPSY Right     CATARACT EXTRACTION  06/14/2021    COLONOSCOPY      Description 04/13/2016-5 yrs  Description 4 yr f/u d/t polyp and family history, onset 07/20/2012   COLONOSCOPY      ESOPHAGOGASTRODUODENOSCOPY      description-04/13/2016 gastritis with antral nodule    EYE SURGERY      for relief of intraocular pressure    INCISIONAL BREAST BIOPSY      description 2008    MASTECTOMY Left     SENTINEL LYMPH NODE BIOPSY      STRABISMUS SURGERY      description 1973, 66    UPPER GASTROINTESTINAL ENDOSCOPY  05/16/2019    Grade C erosive esophagitis    7 mm nodule in the stomach     Family History:  Family History   Problem Relation Age of Onset    Osteopenia Mother     Heart attack Father 59        Acute MI    Coronary artery disease Father     Thyroid nodules Father     Osteopenia Maternal Grandmother     Prostate cancer Maternal Grandfather     Leukemia Paternal Grandfather     Heart attack Maternal Uncle 48        acute MI, stent    Cancer Maternal Uncle         adenocarcinoma of oral cavity    Colon cancer Maternal Uncle 62    Hyperlipidemia Maternal Uncle     Pancreatic cancer Paternal Uncle     Prostate cancer Paternal Uncle     Colon cancer Family     Coronary artery disease Family     Thyroid disease Family      Social History:  Social History     Substance and Sexual Activity   Alcohol Use No     Social History     Substance and Sexual Activity   Drug Use No     Social History     Tobacco Use   Smoking Status Never Smoker   Smokeless Tobacco Never Used     Review of Systems   Constitutional: Negative for chills and fever  HENT: Negative for ear pain and sore throat  Eyes: Negative for pain and visual disturbance  Respiratory: Negative for cough and shortness of breath  Cardiovascular: Negative for chest pain and palpitations  Gastrointestinal: Negative for abdominal pain and vomiting  Genitourinary: Negative for dysuria and hematuria  Musculoskeletal: Positive for arthralgias (left shoulder)  Negative for back pain and joint swelling  Skin: Negative for color change and rash  Neurological: Negative for seizures and syncope  Psychiatric/Behavioral: Negative  All other systems reviewed and are negative  Objective:  BP Readings from Last 1 Encounters:   03/25/22 116/72      Wt Readings from Last 1 Encounters:   03/25/22 60 3 kg (133 lb)      BMI:   Estimated body mass index is 28 28 kg/m² as calculated from the following:    Height as of this encounter: 4' 9 5" (1 461 m)  Weight as of this encounter: 60 3 kg (133 lb)  BSA:   Estimated body surface area is 1 52 meters squared as calculated from the following:    Height as of this encounter: 4' 9 5" (1 461 m)  Weight as of this encounter: 60 3 kg (133 lb)  Physical Exam  Vitals and nursing note reviewed  Constitutional:       Appearance: Normal appearance  She is well-developed  HENT:      Head: Normocephalic and atraumatic  Right Ear: External ear normal       Left Ear: External ear normal    Eyes:      Extraocular Movements: Extraocular movements intact        Conjunctiva/sclera: Conjunctivae normal    Pulmonary:      Effort: Pulmonary effort is normal    Musculoskeletal:      Cervical back: Neck supple  Skin:     General: Skin is warm and dry  Neurological:      Mental Status: She is alert and oriented to person, place, and time  Deep Tendon Reflexes: Reflexes are normal and symmetric  Psychiatric:         Mood and Affect: Mood normal          Behavior: Behavior normal        Left Shoulder Exam     Tenderness   Left shoulder tenderness location: posterior  Range of Motion   Active abduction:  90 (pain) abnormal   Passive abduction: abnormal Left shoulder passive abduction: pain  Forward flexion:  90 (pain) abnormal   Internal rotation 0 degrees: abnormal     Muscle Strength   Abduction: 5/5     Tests   Drop arm: negative    Other   Erythema: absent  Scars: absent  Sensation: normal  Pulse: present     Comments:  Crepitation with motion             no new imaging to review      Large joint arthrocentesis: L glenohumeral  Universal Protocol:  Consent: Verbal consent obtained    Risks and benefits: risks, benefits and alternatives were discussed  Consent given by: patient  Patient understanding: patient states understanding of the procedure being performed    Supporting Documentation  Indications: pain   Procedure Details  Location: shoulder - L glenohumeral  Preparation: Patient was prepped and draped in the usual sterile fashion  Needle size: 22 G  Ultrasound guidance: no  Approach: posterior  Medications administered: 6 mg betamethasone acetate-betamethasone sodium phosphate 6 (3-3) mg/mL; 5 mL lidocaine 1 %    Patient tolerance: patient tolerated the procedure well with no immediate complications  Dressing:  Sterile dressing applied        Scribe Attestation    I,:  Carol Hernandez PA-C am acting as a scribe while in the presence of the attending physician :       I,:  Lacho Rosas MD personally performed the services described in this documentation    as scribed in my presence :

## 2022-03-25 NOTE — ASSESSMENT & PLAN NOTE
Findings consistent with left glenohumeral osteoarthritis  Findings and treatment options were discussed with the patient  Patient continues to want to avoid surgery  Repeat cortisone injection was given to left glenohumeral joint today  She tolerated the procedure well  Advised to apply cold compress today  Avoid repetitive overhead activities with that arm  Discussed that she can have repeat cortisone injection as soon as 3-4 months  Follow-up as needed if symptoms return  All patient's questions were answered to her satisfaction  This note is created using dictation transcription  It may contain typographical errors, grammatical errors, improperly dictated words, background noise and other errors

## 2022-03-29 ENCOUNTER — TELEPHONE (OUTPATIENT)
Dept: HEMATOLOGY ONCOLOGY | Facility: CLINIC | Age: 57
End: 2022-03-29

## 2022-03-29 NOTE — TELEPHONE ENCOUNTER
Appointment Confirmation (to confirm pre existing appointments - ONLY)     Appointment with  Genetics Testing   Appointment date & time 3/30 2PM   Location [place]   Patient verbilized Understanding  Yes

## 2022-03-30 ENCOUNTER — CLINICAL SUPPORT (OUTPATIENT)
Dept: GENETICS | Facility: CLINIC | Age: 57
End: 2022-03-30
Payer: MEDICARE

## 2022-03-30 ENCOUNTER — DOCUMENTATION (OUTPATIENT)
Dept: GENETICS | Facility: CLINIC | Age: 57
End: 2022-03-30

## 2022-03-30 DIAGNOSIS — Z80.0 FAMILY HISTORY OF PANCREATIC CANCER: ICD-10-CM

## 2022-03-30 DIAGNOSIS — Z17.0 MALIGNANT NEOPLASM OF OVERLAPPING SITES OF RIGHT BREAST IN FEMALE, ESTROGEN RECEPTOR POSITIVE (HCC): ICD-10-CM

## 2022-03-30 DIAGNOSIS — Z80.3 FAMILY HISTORY OF BREAST CANCER: ICD-10-CM

## 2022-03-30 DIAGNOSIS — C50.911 MALIGNANT NEOPLASM OF RIGHT FEMALE BREAST, UNSPECIFIED ESTROGEN RECEPTOR STATUS, UNSPECIFIED SITE OF BREAST (HCC): Primary | ICD-10-CM

## 2022-03-30 DIAGNOSIS — C50.811 MALIGNANT NEOPLASM OF OVERLAPPING SITES OF RIGHT BREAST IN FEMALE, ESTROGEN RECEPTOR POSITIVE (HCC): ICD-10-CM

## 2022-03-30 DIAGNOSIS — Z80.42 FAMILY HISTORY OF PROSTATE CANCER: ICD-10-CM

## 2022-03-30 DIAGNOSIS — Z80.0 FAMILY HISTORY OF COLON CANCER: ICD-10-CM

## 2022-03-30 PROCEDURE — NC001 PR NO CHARGE: Performed by: GENETIC COUNSELOR, MS

## 2022-03-30 PROCEDURE — 36415 COLL VENOUS BLD VENIPUNCTURE: CPT

## 2022-03-30 NOTE — PROGRESS NOTES
Pre-Test Genetic Counseling Consult Note    Patient Name: Toro Aguilar DOB/Age: 1965/56 y o  Referring Provider: Chay Diggs MD    Date of Service: 3/30/2022  Genetic Counselor: Lewis Varner MS, [facility]  Interpretation Services: None  [facility]      Debbie was referred to the [address] [place] [address] and Genetic Assessment Program due to her personal history of breast cancer and family history of cancer (breast, colon, prostate, pancreas)  she presents today to discuss the possibility of a hereditary cancer syndrome, options for genetic testing, and implications for her and her family  Her mother, Tanya Artis, accompanied her to the appointment  Cancer History and Treatment:   Oncology History   Malignant neoplasm of overlapping sites of right breast in female, estrogen receptor positive ([facility] [address] )    Surgery     Right breast mastectomy   Invasive breast carcinoma  Grade 2  2 foci:    IN 60/90  HER2 0/3+  Lymph nodes negative  Stage IA  Dr Jose Munoz      2014 - 2015 Chemotherapy     TCH x 6 cycles  1 year of Herceptin  Dr Toña Pink      2015 -  Hormone Therapy     Tamoxifen daily until   Arimidex 1 mg daily since   Dr Toña Pink     Screening Hx:   Breast:  Left mammo 21:  IMPRESSION:  No mammographic evidence of malignancy  Colon:  Colonoscopy: 10/13/20  0 polyps reported; history of polyps on previous colonoscopies  Pat's mother reports that she has had colonoscopies over the past years, since her uncle was diagnosed with colon cancer in   Gynecologic:  Ovaries/Uterus intact    Skin:  Not assessed    Other screening: not assessed    Medical and Surgical History  Pertinent surgical history:   Past Surgical History:   Procedure Laterality Date    BREAST BIOPSY Right     CATARACT EXTRACTION  2021    COLONOSCOPY      Description 2016-5 yrs  Description 4 yr f/u d/t polyp and family history, onset 2012      COLONOSCOPY      ESOPHAGOGASTRODUODENOSCOPY      description-04/13/2016 gastritis with antral nodule    EYE SURGERY      for relief of intraocular pressure    INCISIONAL BREAST BIOPSY      description 2008    MASTECTOMY Left     SENTINEL LYMPH NODE BIOPSY      STRABISMUS SURGERY      description 1973, 66    UPPER GASTROINTESTINAL ENDOSCOPY  05/16/2019    Grade C erosive esophagitis  7 mm nodule in the stomach      Pertinent medical history:  Past Medical History:   Diagnosis Date    Arthritis 11/18/2021    right shoulder    Autism     [facility] [address] )     Breast cyst, left     last assessed 12/30/2013    Cerebral palsy (HCC)     Chronic GERD     Depression     Fibrocystic breast disease     last assessed 06/29/2012    Gastrointestinal hemorrhage     Glaucoma     History of chemotherapy     Hydrocephalus (HCC)      SHUNT IN PLACE    Scoliosis     Scoliosis          Other History:  Height:   Ht Readings from Last 1 Encounters:   03/25/22 4' 9 5" (1 461 m)     Weight:   Wt Readings from Last 1 Encounters:   03/25/22 60 3 kg (133 lb)       Relevant Family History     Reported Ancestry: [facility], [facility] [place], no AJ    Siblings: sister (48) reportedly negative GT; brother (54)    Maternal Family History:  Uncle (76) history of colon cancer (dx 62)  Uncle (77) history of mouth cancer (dx 46) +tobacco  Female first cousin through unaffected uncle (43) history of breast cancer (dx 45)  Grandfather (d 66) history of metastatic prostate cancer    Paternal Family History:   Uncle (d 73) history of prostate and pancreatic cancer   Grandmother (d 82) history of leukemia (dx 80)    Please refer to the scanned pedigree in the Media Tab for a complete family history     *All history is reported as provided by the patient; records are not available for review, except where indicated  Assessment:  We discussed sporadic, familial and hereditary cancer    We also discussed the many factors that influence our risk for cancer such as age, environmental exposures, lifestyle choices and family history  We reviewed the indications suggestive of a hereditary predisposition to cancer  Genetic testing is indicated for Bj Villalta based on the following criteria: Meets NCCN B2 4304 Testing Criteria for High-Penetrance Breast Cancer Susceptibility Genes: personal history of breast cancer diagnosed under 50 with a close blood relative diagnosed with pancreatic cancer    The risks, benefits, and limitations of genetic testing were reviewed with the patient, as well as genetic discrimination laws, and possible test results (positive, negative, variants of uncertain significance) and their clinical implications  If positive for a mutation, options for managing cancer risk including increased surveillance, chemoprevention, and in some cases prophylactic surgery were discussed  Bj Villalta was informed that if a hereditary cancer syndrome was identified in her, first degree relatives (parents, siblings, and children) have a chance of also inheriting the condition  Genetic testing would allow for predictive genetic testing in other relatives, who may also be at risk depending on their degree of relation  Plan: Patient decided to proceed with testing and provided consent  Summary:     Sample Collection:  The patient's blood sample was drawn in the office on 3/30 by medical assistant Camille Agarwal    Genetic Testing Preformed: [facility] + RNA (36 genes): APC, MARIJA, AXIN2 BARD1, BRCA1, BRCA2, BRIP1, BMPR1A, CDH1, CDK4, CDKN2A, CHEK2, DICER1, EPCAM, GREM1, HOXB13, MLH1, MSH2, MSH3, MSH6, MUTYH, NBN, NF1, NTHL1, PALB2, PMS2, POLD1, POLE, PTEN, RAD51C, RAD51D, RECQL SMAD4, SMARCA4, STK11, TP53    Results take approximately 2-3 weeks to complete once test is started  We will contact Bj Villalta once results are available  Additional recommendations for surveillance/medical management will be made pending genetic test results

## 2022-03-30 NOTE — LETTER
2022     Konstantin Napoles [address] [place] [address]  700 [facility] 210  [address] [address]    Patient: Reji Garner  YOB: 1965  Date of Visit: 3/30/2022      Dear Dr Amy Garcia:    Thank you for referring Reji Garner to me for evaluation  Below are my notes for this consultation  If you have questions, please do not hesitate to call me  I look forward to following your patient along with you  Sincerely,        Paco Abdalla        CC: Rupesh Esquivel DO        Pre-Test Genetic Counseling Consult Note    Patient Name: Reji Garner   /Age: 1965/56 y o  Referring Provider: Gayatri Rowley MD    Date of Service: 3/30/2022  Genetic Counselor: Paco Abdalla MS, [facility] [place]  Interpretation Services: None  [facility]      Debbie was referred to the [address] [place] [address] and Genetic Assessment Program due to her personal history of breast cancer and family history of cancer (breast, colon, prostate, pancreas)  she presents today to discuss the possibility of a hereditary cancer syndrome, options for genetic testing, and implications for her and her family  Her mother, Mare Diamond, accompanied her to the appointment  Cancer History and Treatment:   Oncology History   Malignant neoplasm of overlapping sites of right breast in female, estrogen receptor positive ([facility] [address] )   2013 Surgery     Right breast mastectomy   Invasive breast carcinoma  Grade 2  2 foci:    MT 60/90  HER2 0/3+  Lymph nodes negative  Stage IA  Dr Billingsley Records      2014 - 2015 Chemotherapy     TCH x 6 cycles  1 year of Herceptin  Dr Amy Garcia      2015 -  Hormone Therapy     Tamoxifen daily until   Arimidex 1 mg daily since   Dr Amy Garcia     Screening Hx:   Breast:  Left mammo 21:  IMPRESSION:  No mammographic evidence of malignancy  Colon:  Colonoscopy: 10/13/20  0 polyps reported; history of polyps on previous colonoscopies   Pat's mother reports that she has had colonoscopies over the past years, since her uncle was diagnosed with colon cancer in 2003  Gynecologic:  Ovaries/Uterus intact    Skin:  Not assessed    Other screening: not assessed    Medical and Surgical History  Pertinent surgical history:   Past Surgical History:   Procedure Laterality Date    BREAST BIOPSY Right     CATARACT EXTRACTION  06/14/2021    COLONOSCOPY      Description 04/13/2016-5 yrs  Description 4 yr f/u d/t polyp and family history, onset 07/20/2012   COLONOSCOPY      ESOPHAGOGASTRODUODENOSCOPY      description-04/13/2016 gastritis with antral nodule    EYE SURGERY      for relief of intraocular pressure    INCISIONAL BREAST BIOPSY      description 2008    MASTECTOMY Left     SENTINEL LYMPH NODE BIOPSY      STRABISMUS SURGERY      description 1973, 66    UPPER GASTROINTESTINAL ENDOSCOPY  05/16/2019    Grade C erosive esophagitis    7 mm nodule in the stomach      Pertinent medical history:  Past Medical History:   Diagnosis Date    Arthritis 11/18/2021    right shoulder    Autism     Breast cancer ([facility] [address] )     Breast cyst, left     last assessed 12/30/2013    Cerebral palsy (HCC)     Chronic GERD     Depression     Fibrocystic breast disease     last assessed 06/29/2012    Gastrointestinal hemorrhage     Glaucoma     History of chemotherapy     Hydrocephalus (HCC)      SHUNT IN PLACE    Scoliosis     Scoliosis          Other History:  Height:   Ht Readings from Last 1 Encounters:   03/25/22 4' 9 5" (1 461 m)     Weight:   Wt Readings from Last 1 Encounters:   03/25/22 60 3 kg (133 lb)       Relevant Family History     Reported Ancestry: [facility], [facility] [place], no AJ    Siblings: sister (48) reportedly negative GT; brother (46)    Maternal Family History:  Uncle (76) history of colon cancer (dx 62)  Uncle (77) history of mouth cancer (dx 46) +tobacco  Female first cousin through unaffected uncle (44) history of breast cancer (dx 45)  Grandfather (d 66) history of metastatic prostate cancer    Paternal Family History:   Uncle (d 73) history of prostate and pancreatic cancer   Grandmother (d 82) history of leukemia (dx 80)    Please refer to the scanned pedigree in the Media Tab for a complete family history     *All history is reported as provided by the patient; records are not available for review, except where indicated  Assessment:  We discussed sporadic, familial and hereditary cancer  We also discussed the many factors that influence our risk for cancer such as age, environmental exposures, lifestyle choices and family history  We reviewed the indications suggestive of a hereditary predisposition to cancer  Genetic testing is indicated for Tori Rich based on the following criteria: Meets NCCN P7 9512 Testing Criteria for High-Penetrance Breast Cancer Susceptibility Genes: personal history of breast cancer diagnosed under 50 with a close blood relative diagnosed with pancreatic cancer    The risks, benefits, and limitations of genetic testing were reviewed with the patient, as well as genetic discrimination laws, and possible test results (positive, negative, variants of uncertain significance) and their clinical implications  If positive for a mutation, options for managing cancer risk including increased surveillance, chemoprevention, and in some cases prophylactic surgery were discussed  Tori Rich was informed that if a hereditary cancer syndrome was identified in her, first degree relatives (parents, siblings, and children) have a chance of also inheriting the condition  Genetic testing would allow for predictive genetic testing in other relatives, who may also be at risk depending on their degree of relation  Plan: Patient decided to proceed with testing and provided consent      Summary:     Sample Collection:  The patient's blood sample was drawn in the office on 3/30 by medical assistant Mimi Connell    Genetic Testing Preformed: CancerNext + RNA (36 genes): APC, MARIJA, AXIN2 BARD1, BRCA1, BRCA2, BRIP1, BMPR1A, CDH1, CDK4, CDKN2A, CHEK2, DICER1, EPCAM, GREM1, HOXB13, MLH1, MSH2, MSH3, MSH6, MUTYH, NBN, NF1, NTHL1, PALB2, PMS2, POLD1, POLE, PTEN, RAD51C, RAD51D, RECQL SMAD4, SMARCA4, STK11, TP53    Results take approximately 2-3 weeks to complete once test is started  We will contact Michel Melvin once results are available  Additional recommendations for surveillance/medical management will be made pending genetic test results

## 2022-03-31 LAB — MISCELLANEOUS LAB TEST RESULT: NORMAL

## 2022-04-07 ENCOUNTER — TELEPHONE (OUTPATIENT)
Dept: HEMATOLOGY ONCOLOGY | Facility: HOSPITAL | Age: 57
End: 2022-04-07

## 2022-04-07 NOTE — TELEPHONE ENCOUNTER
Patient's caregiver Jenifer Salazar came into office today to reschedule patient's f/u with the provider  Patient could not make the scheduled appointment  Rescheduled and Jenifer Salazar confirmed the new appt date and time

## 2022-04-18 ENCOUNTER — TELEPHONE (OUTPATIENT)
Dept: GENETICS | Facility: CLINIC | Age: 57
End: 2022-04-18

## 2022-04-18 NOTE — TELEPHONE ENCOUNTER
Post-Test Genetic Counseling Consult Note  Today I spoke with Meche's mother Brii Baez over the phone to review the results of her genetic test for hereditary cancer  We met previously on 3/30 for pre-test counseling  A copy of this consult note and genetic test result will be shared with the patient  SUMMARY:    Test(s): CancerNext + RNA (36 genes): APC, MARIJA, AXIN2 BARD1, BRCA1, BRCA2, BRIP1, BMPR1A, CDH1, CDK4, CDKN2A, CHEK2, DICER1, EPCAM, GREM1, HOXB13, MLH1, MSH2, MSH3, MSH6, MUTYH, NBN, NF1, NTHL1, PALB2, PMS2, POLD1, POLE, PTEN, RAD51C, RAD51D, RECQL SMAD4, SMARCA4, STK11, TP53    Result: Negative - No Clinically Significant Variants Detected      Assessment:   A negative result significantly reduces the likelihood that Brii Baez has a hereditary cancer syndrome  However, this testing is unable to completely rule out the presence of hereditary cancer  It remains possible that:  - There is a variant in an area of a gene which was not tested or there is a variant not detectable due to technical limitations of this test      - There is a variant in another gene that was not included in this test or in a gene not known to be linked to cancer or tumors  - A family member has a genetic variant that the patient did not inherit  - The cancer in the family is sporadic and is related to non-hereditary factors  Risks and Testing for Family Members:    Despite a negative result, Dons first-degree relatives may be at increased risk for the cancers based on the family history  We recommend they discuss screening and management recommendations with their healthcare providers  If Brii Baez has any affected family members with a cancer diagnosis, especially at a young age, they may still consider genetic testing   Relatives who wish to pursue genetic testing can reach out to the [phone] ([phone]) to schedule an appointment or visit www [facility] [place] to identify a local genetic counselor  Plan:   There are no additional recommendations based on Pat's negative result  she should continue cancer screening and medical management as clinically indicated and as determined appropriate by her healthcare providers  Negative Result: Darlyn Up was strongly encouraged to contact us regarding any changes in her personal or family history of cancer as these changes could alter our recommendation regarding genetic testing and/or cancer screening

## 2022-04-19 ENCOUNTER — TELEPHONE (OUTPATIENT)
Dept: GENETICS | Facility: CLINIC | Age: 57
End: 2022-04-19

## 2022-04-19 DIAGNOSIS — K21.9 GASTROESOPHAGEAL REFLUX DISEASE: ICD-10-CM

## 2022-04-19 RX ORDER — PANTOPRAZOLE SODIUM 40 MG/1
TABLET, DELAYED RELEASE ORAL
Qty: 60 TABLET | Refills: 0 | Status: SHIPPED | OUTPATIENT
Start: 2022-04-19 | End: 2022-06-09

## 2022-04-19 NOTE — TELEPHONE ENCOUNTER
----- Message from José Miguel Weinberg sent at 4/18/2022 11:41 AM EDT -----  Regarding: complete chart  GC Completed Chart     Result Type: Negative    Result Delivery: Mail- please mail 2 copies and attention to [facility]    Monthly Review: Does not need monthly review- COMPLETE

## 2022-05-02 ENCOUNTER — TELEPHONE (OUTPATIENT)
Dept: HEMATOLOGY ONCOLOGY | Facility: HOSPITAL | Age: 57
End: 2022-05-02

## 2022-05-02 NOTE — TELEPHONE ENCOUNTER
[birth year] Spoke with Catrachita pts  Nurse she stated that pt  Had recent labs and they will bring results in with them on Wednesday    Bandar Diop

## 2022-05-04 ENCOUNTER — OFFICE VISIT (OUTPATIENT)
Dept: HEMATOLOGY ONCOLOGY | Facility: HOSPITAL | Age: 57
End: 2022-05-04
Payer: MEDICARE

## 2022-05-04 VITALS
WEIGHT: 128 LBS | OXYGEN SATURATION: 93 % | HEIGHT: 58 IN | TEMPERATURE: 97.8 F | BODY MASS INDEX: 26.87 KG/M2 | RESPIRATION RATE: 16 BRPM | HEART RATE: 81 BPM | DIASTOLIC BLOOD PRESSURE: 78 MMHG | SYSTOLIC BLOOD PRESSURE: 118 MMHG

## 2022-05-04 DIAGNOSIS — Z79.811 USE OF ANASTROZOLE (ARIMIDEX): ICD-10-CM

## 2022-05-04 DIAGNOSIS — C50.811 MALIGNANT NEOPLASM OF OVERLAPPING SITES OF RIGHT BREAST IN FEMALE, ESTROGEN RECEPTOR POSITIVE (HCC): ICD-10-CM

## 2022-05-04 DIAGNOSIS — M81.0 OSTEOPOROSIS, UNSPECIFIED OSTEOPOROSIS TYPE, UNSPECIFIED PATHOLOGICAL FRACTURE PRESENCE: Primary | ICD-10-CM

## 2022-05-04 DIAGNOSIS — Z17.0 MALIGNANT NEOPLASM OF OVERLAPPING SITES OF RIGHT BREAST IN FEMALE, ESTROGEN RECEPTOR POSITIVE (HCC): ICD-10-CM

## 2022-05-04 PROCEDURE — 99215 OFFICE O/P EST HI 40 MIN: CPT | Performed by: NURSE PRACTITIONER

## 2022-06-04 DIAGNOSIS — J30.2 SEASONAL ALLERGIES: Primary | ICD-10-CM

## 2022-06-05 RX ORDER — LEVOCETIRIZINE DIHYDROCHLORIDE 5 MG/1
TABLET, FILM COATED ORAL
Qty: 31 TABLET | Refills: 0 | Status: SHIPPED | OUTPATIENT
Start: 2022-06-05

## 2022-06-06 ENCOUNTER — HOSPITAL ENCOUNTER (OUTPATIENT)
Dept: INFUSION CENTER | Facility: HOSPITAL | Age: 57
Discharge: HOME | End: 2022-06-06
Attending: INTERNAL MEDICINE
Payer: MEDICARE

## 2022-06-06 VITALS
DIASTOLIC BLOOD PRESSURE: 85 MMHG | HEIGHT: 58 IN | SYSTOLIC BLOOD PRESSURE: 149 MMHG | BODY MASS INDEX: 26.66 KG/M2 | TEMPERATURE: 97.2 F | WEIGHT: 126.98 LBS | RESPIRATION RATE: 16 BRPM | HEART RATE: 76 BPM | OXYGEN SATURATION: 96 %

## 2022-06-06 DIAGNOSIS — Z79.811 USE OF ANASTROZOLE (ARIMIDEX): Primary | ICD-10-CM

## 2022-06-06 DIAGNOSIS — M81.0 OSTEOPOROSIS, UNSPECIFIED OSTEOPOROSIS TYPE, UNSPECIFIED PATHOLOGICAL FRACTURE PRESENCE: ICD-10-CM

## 2022-06-06 DIAGNOSIS — Z17.0 MALIGNANT NEOPLASM OF OVERLAPPING SITES OF RIGHT BREAST IN FEMALE, ESTROGEN RECEPTOR POSITIVE (HCC): ICD-10-CM

## 2022-06-06 DIAGNOSIS — C50.811 MALIGNANT NEOPLASM OF OVERLAPPING SITES OF RIGHT BREAST IN FEMALE, ESTROGEN RECEPTOR POSITIVE (HCC): ICD-10-CM

## 2022-06-06 PROCEDURE — 96372 THER/PROPH/DIAG INJ SC/IM: CPT

## 2022-06-06 RX ADMIN — DENOSUMAB 60 MG: 60 INJECTION SUBCUTANEOUS at 10:53

## 2022-06-09 DIAGNOSIS — K21.9 GASTROESOPHAGEAL REFLUX DISEASE: ICD-10-CM

## 2022-06-09 RX ORDER — PANTOPRAZOLE SODIUM 40 MG/1
TABLET, DELAYED RELEASE ORAL
Qty: 60 TABLET | Refills: 0 | Status: SHIPPED | OUTPATIENT
Start: 2022-06-09 | End: 2022-07-17

## 2022-06-27 ENCOUNTER — APPOINTMENT (OUTPATIENT)
Dept: LAB | Facility: HOSPITAL | Age: 57
End: 2022-06-27
Payer: MEDICARE

## 2022-06-27 DIAGNOSIS — F31.9 BIPOLAR DISEASE, CHRONIC (HCC): ICD-10-CM

## 2022-06-27 PROCEDURE — 93005 ELECTROCARDIOGRAM TRACING: CPT

## 2022-06-28 ENCOUNTER — OFFICE VISIT (OUTPATIENT)
Dept: OBGYN CLINIC | Facility: CLINIC | Age: 57
End: 2022-06-28
Payer: MEDICARE

## 2022-06-28 VITALS
BODY MASS INDEX: 26.24 KG/M2 | SYSTOLIC BLOOD PRESSURE: 122 MMHG | DIASTOLIC BLOOD PRESSURE: 82 MMHG | HEIGHT: 58 IN | WEIGHT: 125 LBS

## 2022-06-28 DIAGNOSIS — M19.012 PRIMARY OSTEOARTHRITIS OF LEFT SHOULDER: Primary | ICD-10-CM

## 2022-06-28 PROCEDURE — 99213 OFFICE O/P EST LOW 20 MIN: CPT | Performed by: PHYSICIAN ASSISTANT

## 2022-06-28 PROCEDURE — 20610 DRAIN/INJ JOINT/BURSA W/O US: CPT | Performed by: PHYSICIAN ASSISTANT

## 2022-06-28 RX ORDER — LIDOCAINE HYDROCHLORIDE 10 MG/ML
5 INJECTION, SOLUTION INFILTRATION; PERINEURAL
Status: COMPLETED | OUTPATIENT
Start: 2022-06-28 | End: 2022-06-28

## 2022-06-28 RX ORDER — BETAMETHASONE SODIUM PHOSPHATE AND BETAMETHASONE ACETATE 3; 3 MG/ML; MG/ML
6 INJECTION, SUSPENSION INTRA-ARTICULAR; INTRALESIONAL; INTRAMUSCULAR; SOFT TISSUE
Status: COMPLETED | OUTPATIENT
Start: 2022-06-28 | End: 2022-06-28

## 2022-06-28 RX ADMIN — BETAMETHASONE SODIUM PHOSPHATE AND BETAMETHASONE ACETATE 6 MG: 3; 3 INJECTION, SUSPENSION INTRA-ARTICULAR; INTRALESIONAL; INTRAMUSCULAR; SOFT TISSUE at 13:35

## 2022-06-28 RX ADMIN — LIDOCAINE HYDROCHLORIDE 5 ML: 10 INJECTION, SOLUTION INFILTRATION; PERINEURAL at 13:35

## 2022-06-28 NOTE — PROGRESS NOTES
Assessment:     1  Primary osteoarthritis of left shoulder        Plan:     Problem List Items Addressed This Visit        Musculoskeletal and Integument    Primary osteoarthritis of left shoulder - Primary     Findings consistent with left glenohumeral osteoarthritis  Findings and treatment options were discussed with the patient  She continues to get good relief from cortisone injections  Repeat cortisone injection was given to left glenohumeral joint today  She tolerated the procedure well  Advised to apply cold compress today  Avoid repetitive overhead activities with that arm  Discussed that she can have repeat cortisone injection as soon as 3-4 months  Follow-up as needed if symptoms return  All patient's questions were answered to her satisfaction  This note is created using dictation transcription  It may contain typographical errors, grammatical errors, improperly dictated words, background noise and other errors  Relevant Medications    betamethasone acetate-betamethasone sodium phosphate (CELESTONE) injection 6 mg (Completed)    lidocaine (XYLOCAINE) 1 % injection 5 mL (Completed)    Other Relevant Orders    Large joint arthrocentesis: L glenohumeral (Completed)          Subjective:     Patient ID: Darleen Yoo is a 65 y o  female  Chief Complaint:  55 yr old female following up for left shoulder glenohumeral osteoarthritis  She is resident at [facility], she is with one care giver and her mother  Last cortisone injection was given on March 25, 2022  She gets good relief and recently pain returned  She would like to have a another cortisone injection today  She is not interested in any surgery        Allergy:  Allergies   Allergen Reactions    Bactrim [Sulfamethoxazole-Trimethoprim]     Methylphenidate Hyperactivity     Reaction Date: [id number];     Sulfa Antibiotics     Thioridazine     Amphetamines     Chlorpromazine     Fexofenadine Hives     Reaction Date: [id number];    Laurie Colin Medrogestone     Medroxyprogesterone Rash and Hives     Reaction Date: [id number];     Other Hives     Mellaril, thorazine    Trimethoprim      Medications:  all current active meds have been reviewed  Past Medical History:  Past Medical History:   Diagnosis Date    Arthritis 11/18/2021    right shoulder    Autism     [facility] ([facility] )     Breast cyst, left     last assessed 12/30/2013    Cerebral palsy (HCC)     Chronic GERD     Depression     Fibrocystic breast disease     last assessed 06/29/2012    Gastrointestinal hemorrhage     Glaucoma     History of chemotherapy     [facility]      SHUNT IN PLACE    Scoliosis     Scoliosis      Past Surgical History:  Past Surgical History:   Procedure Laterality Date    BREAST BIOPSY Right     CATARACT EXTRACTION  06/14/2021    COLONOSCOPY      Description 04/13/2016-5 yrs  Description 4 yr f/u d/t polyp and family history, onset 07/20/2012   COLONOSCOPY      ESOPHAGOGASTRODUODENOSCOPY      description-04/13/2016 gastritis with antral nodule    EYE SURGERY      for relief of intraocular pressure    INCISIONAL BREAST BIOPSY      description 2008    MASTECTOMY Left     SENTINEL LYMPH NODE BIOPSY      STRABISMUS SURGERY      description 1973, 66    UPPER GASTROINTESTINAL ENDOSCOPY  05/16/2019    Grade C erosive esophagitis    7 mm nodule in the stomach     Family History:  Family History   Problem Relation Age of Onset    Osteopenia Mother     Heart attack Father 59        Acute MI    Coronary artery disease Father     Thyroid nodules Father     Osteopenia Maternal Grandmother     Prostate cancer Maternal Grandfather     Leukemia Paternal Grandfather     Heart attack Maternal Uncle 48        acute MI, stent    Cancer Maternal Uncle         adenocarcinoma of oral cavity    Colon cancer Maternal Uncle 62    Hyperlipidemia Maternal Uncle     Pancreatic cancer Paternal Uncle     Prostate cancer Paternal Uncle     Colon cancer Family     Coronary artery disease Family     Thyroid disease Family      Social History:  Social History     Substance and Sexual Activity   Alcohol Use No     Social History     Substance and Sexual Activity   Drug Use No     Social History     Tobacco Use   Smoking Status Never Smoker   Smokeless Tobacco Never Used     Review of Systems   Constitutional: Negative for chills and fever  HENT: Negative for ear pain and sore throat  Eyes: Negative for pain and visual disturbance  Respiratory: Negative for cough and shortness of breath  Cardiovascular: Negative for chest pain and palpitations  Gastrointestinal: Negative for abdominal pain and vomiting  Genitourinary: Negative for dysuria and hematuria  Musculoskeletal: Positive for arthralgias (left shoulder)  Negative for back pain and joint swelling  Skin: Negative for color change and rash  Neurological: Negative for seizures and syncope  Psychiatric/Behavioral: Negative  All other systems reviewed and are negative  Objective:  BP Readings from Last 1 Encounters:   06/28/22 122/82      Wt Readings from Last 1 Encounters:   06/28/22 56 7 kg (125 lb)      BMI:   Estimated body mass index is 26 58 kg/m² as calculated from the following:    Height as of this encounter: 4' 9 5" (1 461 m)  Weight as of this encounter: 56 7 kg (125 lb)  BSA:   Estimated body surface area is 1 48 meters squared as calculated from the following:    Height as of this encounter: 4' 9 5" (1 461 m)  Weight as of this encounter: 56 7 kg (125 lb)  Physical Exam  Vitals and nursing note reviewed  Constitutional:       Appearance: Normal appearance  She is well-developed  HENT:      Head: Normocephalic and atraumatic  Right Ear: External ear normal       Left Ear: External ear normal    Eyes:      Extraocular Movements: Extraocular movements intact        Conjunctiva/sclera: Conjunctivae normal    Pulmonary:      Effort: Pulmonary effort is normal    Musculoskeletal:      Cervical back: Neck supple  Skin:     General: Skin is warm and dry  Neurological:      Mental Status: She is alert and oriented to person, place, and time  Deep Tendon Reflexes: Reflexes are normal and symmetric  Psychiatric:         Mood and Affect: Mood normal          Behavior: Behavior normal        Left Shoulder Exam     Tenderness   Left shoulder tenderness location: posterior  Range of Motion   Active abduction:  90 (pain) abnormal   Passive abduction: abnormal Left shoulder passive abduction: pain  Forward flexion:  90 (pain) abnormal   Internal rotation 0 degrees: abnormal     Muscle Strength   Abduction: 5/5     Tests   Drop arm: negative    Other   Erythema: absent  Scars: absent  Sensation: normal  Pulse: present     Comments:  Crepitation with motion             no new imaging to review      Large joint arthrocentesis: L glenohumeral  Universal Protocol:  Consent: Verbal consent obtained    Risks and benefits: risks, benefits and alternatives were discussed  Consent given by: patient  Patient understanding: patient states understanding of the procedure being performed    Supporting Documentation  Indications: pain   Procedure Details  Location: shoulder - L glenohumeral  Preparation: Patient was prepped and draped in the usual sterile fashion  Needle size: 22 G  Ultrasound guidance: no  Approach: posterior  Medications administered: 6 mg betamethasone acetate-betamethasone sodium phosphate 6 (3-3) mg/mL; 5 mL lidocaine 1 %    Patient tolerance: patient tolerated the procedure well with no immediate complications  Dressing:  Sterile dressing applied

## 2022-06-28 NOTE — ASSESSMENT & PLAN NOTE
Findings consistent with left glenohumeral osteoarthritis  Findings and treatment options were discussed with the patient  She continues to get good relief from cortisone injections  Repeat cortisone injection was given to left glenohumeral joint today  She tolerated the procedure well  Advised to apply cold compress today  Avoid repetitive overhead activities with that arm  Discussed that she can have repeat cortisone injection as soon as 3-4 months  Follow-up as needed if symptoms return  All patient's questions were answered to her satisfaction  This note is created using dictation transcription  It may contain typographical errors, grammatical errors, improperly dictated words, background noise and other errors

## 2022-07-01 LAB
ATRIAL RATE: 74 BPM
P AXIS: 33 DEGREES
PR INTERVAL: 124 MS
QRS AXIS: 14 DEGREES
QRSD INTERVAL: 78 MS
QT INTERVAL: 400 MS
QTC INTERVAL: 444 MS
T WAVE AXIS: 52 DEGREES
VENTRICULAR RATE: 74 BPM

## 2022-07-17 DIAGNOSIS — K21.9 GASTROESOPHAGEAL REFLUX DISEASE: ICD-10-CM

## 2022-07-17 RX ORDER — PANTOPRAZOLE SODIUM 40 MG/1
TABLET, DELAYED RELEASE ORAL
Qty: 62 TABLET | Refills: 0 | Status: SHIPPED | OUTPATIENT
Start: 2022-07-17 | End: 2022-08-19

## 2022-08-17 ENCOUNTER — OFFICE VISIT (OUTPATIENT)
Dept: GASTROENTEROLOGY | Facility: CLINIC | Age: 57
End: 2022-08-17
Payer: MEDICARE

## 2022-08-17 VITALS
DIASTOLIC BLOOD PRESSURE: 78 MMHG | HEIGHT: 58 IN | BODY MASS INDEX: 26.16 KG/M2 | SYSTOLIC BLOOD PRESSURE: 122 MMHG | HEART RATE: 88 BPM | WEIGHT: 124.6 LBS

## 2022-08-17 DIAGNOSIS — K21.9 GASTROESOPHAGEAL REFLUX DISEASE WITHOUT ESOPHAGITIS: Primary | ICD-10-CM

## 2022-08-17 PROCEDURE — 99214 OFFICE O/P EST MOD 30 MIN: CPT | Performed by: NURSE PRACTITIONER

## 2022-08-17 NOTE — PROGRESS NOTES
[address] [facility] - Outpatient Follow-up Note  Megha Villa 65 y o  female MRN: [id number]  Encounter: [id number]    ASSESSMENT AND PLAN:      1  Iron deficiency anemia  Anemia  resolving -CBC May 2022 was 13 2, normal and CV  Known large hiatal hernia thought to be source of her anemia  No active  GI bleeding and asymptomatic  No alarm symptoms  - repeat CBC in 3 months    2  Gastroesophageal reflux disease without esophagitis  3  Hiatal hernia  Symptoms well controlled on pantoprazole 40 mg b i d  patient and her mother deny breakthrough symptoms  Following GERD diet which has helped improve her symptoms  - continue pantoprazole 40 mg b i d     4  History of colon polyps  Prior colonoscopy 2020, 5 year recall recommended/2025    Followup Appointment:  4 months  ______________________________________________________________________    Chief Complaint   Patient presents with    Follow-up     routine     HPI:  Presents today in follow-up for iron deficiency anemia, GERD and hiatal hernia  She is accompanied today by her mother and caregiver from facility  Recent blood work from May 2022 showed hemoglobin 13 2, normal MCV   Desiree Villagran does report occasional GERD symptoms but her mother denies any recent complaints of heartburn or indigestion  Denies liquid reflux  Currently taking pantoprazole 40 mg b i d  Patient's mother reports that patient has significantly changed her diet and is now avoiding caffeine and chocolate  Has also decreased meal size is and has lost 9 lb intentionally as a result of dietary changes    Bowel movements are fairly regular    No recent melena or rectal bleeding    Historical Information   Past Medical History:   Diagnosis Date    Arthritis 11/18/2021    right shoulder    Autism     Breast cancer ([address] )     Breast cyst, left     last assessed 12/30/2013    Cerebral palsy (HCC)     Chronic GERD     Depression     Fibrocystic breast disease last assessed 06/29/2012    Gastrointestinal hemorrhage     Glaucoma     History of chemotherapy     Hydrocephalus (HCC)      SHUNT IN PLACE    Scoliosis     Scoliosis      Past Surgical History:   Procedure Laterality Date    BREAST BIOPSY Right     CATARACT EXTRACTION  06/14/2021    COLONOSCOPY      Description 04/13/2016-5 yrs  Description 4 yr f/u d/t polyp and family history, onset 07/20/2012   COLONOSCOPY      ESOPHAGOGASTRODUODENOSCOPY      description-04/13/2016 gastritis with antral nodule    EYE SURGERY      for relief of intraocular pressure    INCISIONAL BREAST BIOPSY      description 2008    MASTECTOMY Left     SENTINEL LYMPH NODE BIOPSY      STRABISMUS SURGERY      description 1973, 66    UPPER GASTROINTESTINAL ENDOSCOPY  05/16/2019    Grade C erosive esophagitis    7 mm nodule in the stomach     Social History     Substance and Sexual Activity   Alcohol Use No     Social History     Substance and Sexual Activity   Drug Use No     Social History     Tobacco Use   Smoking Status Never Smoker   Smokeless Tobacco Never Used     Family History   Problem Relation Age of Onset    Osteopenia Mother     Supraventricular tachycardia Mother     Heart attack Father 59        Acute MI    Coronary artery disease Father     Thyroid nodules Father     Osteopenia Maternal Grandmother     Prostate cancer Maternal Grandfather     Leukemia Paternal Grandfather     Heart attack Maternal Uncle 48        acute MI, stent    Cancer Maternal Uncle         adenocarcinoma of oral cavity    Colon cancer Maternal Uncle 62    Hyperlipidemia Maternal Uncle     Pancreatic cancer Paternal Uncle     Prostate cancer Paternal Uncle     Colon cancer Family     Coronary artery disease Family     Thyroid disease Family          Current Outpatient Medications:     acetaminophen (TYLENOL) 500 mg tablet    anastrozole (ARIMIDEX) 1 mg tablet    BANOPHEN 25 MG capsule    calamine lotion    Calcium Carbonate-Vitamin D (OYSCO 500/D PO)    carBAMazepine (TEGretol) 200 mg tablet    Carboxymethylcell-Hypromellose 0 25-0 3 % GEL    Cholecalciferol (VITAMIN D) 2000 units tablet    Dentifrices (SENSODYNE PRONAMEL DT)    Diclofenac Sodium (VOLTAREN) 1 %    diphenhydrAMINE (BENADRYL) 25 mg tablet    docusate sodium (COLACE) 100 mg capsule    haloperidol (HALDOL) 2 mg tablet    hydrocortisone (ANUSOL-HC, PROCTOSOL HC,) 2 5 % rectal cream    ibuprofen (MOTRIN) 200 mg tablet    latanoprost (XALATAN) 0 005 % ophthalmic solution    levocetirizine (XYZAL) 5 MG tablet    lithium carbonate 300 mg capsule    LORazepam (ATIVAN) 0 5 mg tablet    melatonin 1 mg    memantine (NAMENDA) 10 mg tablet    mineral oil-white petrolatum (LUBRIFRESH P M ) ophthalmic ointment    neomycin-polymyxin b-bacitracin (NEOSPORIN) 3 5-400-5,000    NON FORMULARY    NON FORMULARY    ondansetron (ZOFRAN) 4 mg tablet    pantoprazole (PROTONIX) 40 mg tablet    polyethylene glycol (MIRALAX) 17 g packet    PROCTOSOL HC 2 5 % rectal cream    sodium chloride (TAMIKA 128) 5 % hypertonic ophthalmic solution    sodium fluoride 0 275 (0 125 F) MG/DROP solution    Sunscreens (SUNBLOCK LOTION SPF30 EX)    talc (Zeasorb)    white petrolatum    bromfenac sodium (Prolensa) 0 07 % SOLN    citalopram (CeleXA) 10 mg tablet    guaiFENesin (SILTUSSIN SA PO)    ketorolac (ACULAR) 0 5 % ophthalmic solution    loperamide (IMODIUM) 2 mg capsule    ofloxacin (OCUFLOX) 0 3 % ophthalmic solution    timolol (TIMOPTIC-XE) 0 5 % ophthalmic gel-forming    tolnaftate (TINACTIN) 1 % spray  Allergies   Allergen Reactions    Bactrim [Sulfamethoxazole-Trimethoprim]     Methylphenidate Hyperactivity     Reaction Date: [id number];     Sulfa Antibiotics     Thioridazine     Amphetamines     Chlorpromazine     Fexofenadine Hives     Reaction Date: [id number];    Josh Ashley     Medroxyprogesterone Rash and Hives     Reaction Date: [id number];     Other Hives     Mellaril, thorazine    Trimethoprim      Reviewed medications and allergies and updated as indicated    PHYSICAL EXAM:    Blood pressure 122/78, pulse 88, height 4' 9 5" (1 461 m), weight 56 5 kg (124 lb 9 6 oz), not currently breastfeeding  Body mass index is 26 5 kg/m²  General Appearance: NAD  ENT:  Normocephalic, atraumatic, normal mucosa  Neck:  Supple, symmetrical, trachea midline  Resp:  Clear to auscultation bilaterally; no rales, rhonchi or wheezing; respirations unlabored   CV:  S1 S2, Regular rate and rhythm; no murmur, rub, or gallop  GI:  Soft, non-tender, non-distended; normal bowel sounds; no masses, no organomegaly   Rectal: Deferred  Musculoskeletal: No cyanosis, clubbing or edema  Normal ROM    Skin:  No jaundice, rashes, or lesions   Psych: Normal affect, good eye contact  Neuro: No gross deficits, AAOx3    Lab Results:   Lab Results   Component Value Date    WBC 9 56 01/20/2022    HGB 12 9 01/20/2022    HCT 45 2 01/20/2022    MCV 97 01/20/2022     01/20/2022     Lab Results   Component Value Date     (L) 12/18/2013    K 3 8 11/16/2021     11/16/2021    CO2 28 11/16/2021    ANIONGAP 5 12/18/2013    BUN 9 11/16/2021    CREATININE 0 62 11/16/2021    GLUCOSE 127 12/18/2013    GLUF 97 11/16/2021    CALCIUM 8 8 11/16/2021    CORRECTEDCA 9 5 11/16/2021    AST 10 11/16/2021    ALT 18 11/16/2021    ALKPHOS 75 11/16/2021    EGFR 102 11/16/2021     Lab Results   Component Value Date    IRON 13 (L) 11/16/2021    TIBC 550 (H) 11/16/2021

## 2022-08-17 NOTE — LETTER
August 17, 2022     Haider Freedman, [address] [place] [address] [address]  [facility] [facility]  [address] [facility] [address]    Patient: Shaka Woody   YOB: 1965   Date of Visit: 8/17/2022       Dear Dr Mike Nation: Thank you for referring Shaka Woody to me for evaluation  Below are my notes for this consultation  If you have questions, please do not hesitate to call me  I look forward to following your patient along with you  Sincerely,        JESSE Kincaid        CC: No Recipients  JESSE Kincaid  8/17/2022  2:20 PM  Sign when Signing Visit  [address] [facility] - Outpatient Follow-up Note  Shaka Woody 65 y o  female MRN: [id number]  Encounter: [id number]    ASSESSMENT AND PLAN:      1  Iron deficiency anemia  Anemia  resolving -CBC May 2022 was 13 2, normal and CV  Known large hiatal hernia thought to be source of her anemia  No active  GI bleeding and asymptomatic  No alarm symptoms  - repeat CBC in 3 months    2  Gastroesophageal reflux disease without esophagitis  3  Hiatal hernia  Symptoms well controlled on pantoprazole 40 mg b i d  patient and her mother deny breakthrough symptoms  Following GERD diet which has helped improve her symptoms  - continue pantoprazole 40 mg b i d     4  History of colon polyps  Prior colonoscopy 2020, 5 year recall recommended/2025    Followup Appointment:  4 months  ______________________________________________________________________    Chief Complaint   Patient presents with    Follow-up     routine     HPI:  Presents today in follow-up for iron deficiency anemia, GERD and hiatal hernia  She is accompanied today by her mother and caregiver from facility  Recent blood work from May 2022 showed hemoglobin 13 2, normal MCV   Yasmeen Tate does report occasional GERD symptoms but her mother denies any recent complaints of heartburn or indigestion  Denies liquid reflux  Currently taking pantoprazole 40 mg b i d       Patient's mother reports that patient has significantly changed her diet and is now avoiding caffeine and chocolate  Has also decreased meal size is and has lost 9 lb intentionally as a result of dietary changes    Bowel movements are fairly regular  No recent melena or rectal bleeding    Historical Information   Past Medical History:   Diagnosis Date    Arthritis 11/18/2021    right shoulder    Autism     [facility]     Breast cyst, left     last assessed 12/30/2013    Cerebral palsy (HCC)     Chronic GERD     Depression     Fibrocystic breast disease     last assessed 06/29/2012    Gastrointestinal hemorrhage     Glaucoma     History of chemotherapy     Hydro[facility] ([facility] )      SHUNT IN PLACE    Scoliosis     Scoliosis      Past Surgical History:   Procedure Laterality Date    BREAST BIOPSY Right     CATARACT EXTRACTION  06/14/2021    COLONOSCOPY      Description 04/13/2016-5 yrs  Description 4 yr f/u d/t polyp and family history, onset 07/20/2012   COLONOSCOPY      ESOPHAGOGASTRODUODENOSCOPY      description-04/13/2016 gastritis with antral nodule    EYE SURGERY      for relief of intraocular pressure    INCISIONAL BREAST BIOPSY      description 2008    MASTECTOMY Left     SENTINEL LYMPH NODE BIOPSY      STRABISMUS SURGERY      description 1973, 66    UPPER GASTROINTESTINAL ENDOSCOPY  05/16/2019    Grade C erosive esophagitis    7 mm nodule in the stomach     Social History     Substance and Sexual Activity   Alcohol Use No     Social History     Substance and Sexual Activity   Drug Use No     Social History     Tobacco Use   Smoking Status Never Smoker   Smokeless Tobacco Never Used     Family History   Problem Relation Age of Onset    Osteopenia Mother     Supraventricular tachycardia Mother     Heart attack Father 59        Acute MI    Coronary artery disease Father     Thyroid nodules Father     Osteopenia Maternal Grandmother     Prostate cancer Maternal Grandfather     Leukemia Paternal Grandfather     Heart attack Maternal Uncle 48        acute MI, stent    Cancer Maternal Uncle         adenocarcinoma of oral cavity    Colon cancer Maternal Uncle 62    Hyperlipidemia Maternal Uncle     Pancreatic cancer Paternal Uncle     Prostate cancer Paternal Uncle     Colon cancer Family     Coronary artery disease Family     Thyroid disease Family          Current Outpatient Medications:     acetaminophen (TYLENOL) 500 mg tablet    anastrozole (ARIMIDEX) 1 mg tablet    BANOPHEN 25 MG capsule    calamine lotion    Calcium Carbonate-Vitamin D (OYSCO 500/D PO)    carBAMazepine (TEGretol) 200 mg tablet    Carboxymethylcell-Hypromellose 0 25-0 3 % GEL    Cholecalciferol (VITAMIN D) 2000 units tablet    Dentifrices (SENSODYNE PRONAMEL DT)    Diclofenac Sodium (VOLTAREN) 1 %    diphenhydrAMINE (BENADRYL) 25 mg tablet    docusate sodium (COLACE) 100 mg capsule    haloperidol (HALDOL) 2 mg tablet    hydrocortisone (ANUSOL-HC, PROCTOSOL HC,) 2 5 % rectal cream    ibuprofen (MOTRIN) 200 mg tablet    latanoprost (XALATAN) 0 005 % ophthalmic solution    levocetirizine (XYZAL) 5 MG tablet    lithium carbonate 300 mg capsule    LORazepam (ATIVAN) 0 5 mg tablet    melatonin 1 mg    memantine (NAMENDA) 10 mg tablet    mineral oil-white petrolatum (LUBRIFRESH P M ) ophthalmic ointment    neomycin-polymyxin b-bacitracin (NEOSPORIN) 3 5-400-5,000    NON FORMULARY    NON FORMULARY    ondansetron (ZOFRAN) 4 mg tablet    pantoprazole (PROTONIX) 40 mg tablet    polyethylene glycol (MIRALAX) 17 g packet    PROCTOSOL HC 2 5 % rectal cream    sodium chloride (TAMIKA 128) 5 % hypertonic ophthalmic solution    sodium fluoride 0 275 (0 125 F) MG/DROP solution    Sunscreens (SUNBLOCK LOTION SPF30 EX)    talc (Zeasorb)    white petrolatum    bromfenac sodium (Prolensa) 0 07 % SOLN    citalopram (CeleXA) 10 mg tablet    guaiFENesin (SILTUSSIN SA PO)    ketorolac (ACULAR) 0 5 % ophthalmic solution    loperamide (IMODIUM) 2 mg capsule    ofloxacin (OCUFLOX) 0 3 % ophthalmic solution    timolol (TIMOPTIC-XE) 0 5 % ophthalmic gel-forming    tolnaftate (TINACTIN) 1 % spray  Allergies   Allergen Reactions    Bactrim [Sulfamethoxazole-Trimethoprim]     Methylphenidate Hyperactivity     Reaction Date: [id number];     Sulfa Antibiotics     Thioridazine     Amphetamines     Chlorpromazine     Fexofenadine Hives     Reaction Date: [id number];     Medrogestone     Medroxyprogesterone Rash and Hives     Reaction Date: [id number];     Other Hives     Mellaril, thorazine    Trimethoprim      Reviewed medications and allergies and updated as indicated    PHYSICAL EXAM:    Blood pressure 122/78, pulse 88, height 4' 9 5" (1 461 m), weight 56 5 kg (124 lb 9 6 oz), not currently breastfeeding  Body mass index is 26 5 kg/m²  General Appearance: NAD  ENT:  Normocephalic, atraumatic, normal mucosa  Neck:  Supple, symmetrical, trachea midline  Resp:  Clear to auscultation bilaterally; no rales, rhonchi or wheezing; respirations unlabored   CV:  S1 S2, Regular rate and rhythm; no murmur, rub, or gallop  GI:  Soft, non-tender, non-distended; normal bowel sounds; no masses, no organomegaly   Rectal: Deferred  Musculoskeletal: No cyanosis, clubbing or edema  Normal ROM    Skin:  No jaundice, rashes, or lesions   Psych: Normal affect, good eye contact  Neuro: No gross deficits, AAOx3    Lab Results:   Lab Results   Component Value Date    WBC 9 56 01/20/2022    HGB 12 9 01/20/2022    HCT 45 2 01/20/2022    MCV 97 01/20/2022     01/20/2022     Lab Results   Component Value Date     (L) 12/18/2013    K 3 8 11/16/2021     11/16/2021    CO2 28 11/16/2021    ANIONGAP 5 12/18/2013    BUN 9 11/16/2021    CREATININE 0 62 11/16/2021    GLUCOSE 127 12/18/2013    GLUF 97 11/16/2021    CALCIUM 8 8 11/16/2021    CORRECTEDCA 9 5 11/16/2021    AST 10 11/16/2021    ALT 18 11/16/2021    ALKPHOS 75 11/16/2021    EGFR 102 11/16/2021     Lab Results   Component Value Date    IRON 13 (L) 11/16/2021    TIBC 550 (H) 11/16/2021

## 2022-08-19 DIAGNOSIS — K21.9 GASTROESOPHAGEAL REFLUX DISEASE: ICD-10-CM

## 2022-08-19 RX ORDER — PANTOPRAZOLE SODIUM 40 MG/1
TABLET, DELAYED RELEASE ORAL
Qty: 62 TABLET | Refills: 0 | Status: SHIPPED | OUTPATIENT
Start: 2022-08-19 | End: 2022-10-02

## 2022-09-15 ENCOUNTER — HOSPITAL ENCOUNTER (EMERGENCY)
Facility: HOSPITAL | Age: 57
Discharge: HOME | End: 2022-09-15
Attending: EMERGENCY MEDICINE
Payer: MEDICARE

## 2022-09-15 ENCOUNTER — APPOINTMENT (EMERGENCY)
Dept: CT IMAGING | Facility: HOSPITAL | Age: 57
End: 2022-09-15
Payer: MEDICARE

## 2022-09-15 VITALS
DIASTOLIC BLOOD PRESSURE: 68 MMHG | WEIGHT: 128 LBS | RESPIRATION RATE: 18 BRPM | SYSTOLIC BLOOD PRESSURE: 133 MMHG | HEART RATE: 89 BPM | HEIGHT: 56 IN | TEMPERATURE: 98.2 F | OXYGEN SATURATION: 100 % | BODY MASS INDEX: 28.79 KG/M2

## 2022-09-15 DIAGNOSIS — S09.90XA MINOR CLOSED HEAD INJURY: ICD-10-CM

## 2022-09-15 DIAGNOSIS — S01.81XA FOREHEAD LACERATION: Primary | ICD-10-CM
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
## 2022-09-15 DIAGNOSIS — Q03.1 DANDY-WALKER SYNDROME (HCC): ICD-10-CM

## 2022-09-15 PROCEDURE — G1004 CDSM NDSC: HCPCS

## 2022-09-15 PROCEDURE — 99282 EMERGENCY DEPT VISIT SF MDM: CPT | Performed by: EMERGENCY MEDICINE

## 2022-09-15 PROCEDURE — 99284 EMERGENCY DEPT VISIT MOD MDM: CPT

## 2022-09-15 PROCEDURE — 12013 RPR F/E/E/N/L/M 2.6-5.0 CM: CPT | Performed by: EMERGENCY MEDICINE

## 2022-09-15 PROCEDURE — 70450 CT HEAD/BRAIN W/O DYE: CPT

## 2022-09-15 RX ORDER — LIDOCAINE HYDROCHLORIDE AND EPINEPHRINE 10; 10 MG/ML; UG/ML
1 INJECTION, SOLUTION INFILTRATION; PERINEURAL ONCE
Status: COMPLETED | OUTPATIENT
Start: 2022-09-15 | End: 2022-09-15

## 2022-09-15 RX ORDER — LIDOCAINE HYDROCHLORIDE 20 MG/ML
JELLY TOPICAL ONCE
Status: DISCONTINUED | OUTPATIENT
Start: 2022-09-15 | End: 2022-09-15 | Stop reason: HOSPADM

## 2022-09-15 RX ORDER — GINSENG 100 MG
1 CAPSULE ORAL ONCE
Status: DISCONTINUED | OUTPATIENT
Start: 2022-09-15 | End: 2022-09-15 | Stop reason: HOSPADM

## 2022-09-15 RX ADMIN — LIDOCAINE HYDROCHLORIDE,EPINEPHRINE BITARTRATE 1 ML: 10; .01 INJECTION, SOLUTION INFILTRATION; PERINEURAL at 09:17

## 2022-09-15 NOTE — ED PROVIDER NOTES
History  Chief Complaint   Patient presents with    Head Injury     Pt lives at [facility] home and this morning she fell asleep while on the toilet, unwitnessed fall, +head strike to tile floor, no thinners, abrasion and pain to left knee  Tetanus was last in 215       60-year-old female presents from the [facility] home for evaluation of a head injury sustained after an unwitnessed fall off the toilet  The patient reportedly fell sleep all the toilet  Staff found her with a laceration to her forehead at approximately 3:30 a m  this morning  The patient complains of mild headache and injury to her forehead  She has not had any subsequent vomiting  She was given Tylenol for her headache  The patient has a history of  shunt  Prior to Admission Medications   Prescriptions Last Dose Informant Patient Reported? Taking?    BANOPHEN 25 MG capsule  Outside Facility (Specify) Yes No   Sig: Take 25 mg by mouth daily at bedtime as needed    Calcium Carbonate-Vitamin D (OYSCO 500/D PO)  Outside Facility (Specify) Yes No   Sig: Take by mouth daily   Carboxymethylcell-Hypromellose 0 25-0 3 % GEL  Outside Facility (Specify) Yes No   Sig: Apply to eye    Cholecalciferol (VITAMIN D) 2000 units tablet  Outside Facility (Specify) No No   Sig: TAKE TWO TABLETS BY MOUTH (4000IU) EVERY MORNING FOR VITAMIN DIFICIENCY   Dentifrices (SENSODYNE PRONAMEL DT)  Outside Facility (Specify) Yes No   Sig: Apply to teeth    Diclofenac Sodium (VOLTAREN) 1 %  Outside Facility (Specify) Yes No   LORazepam (ATIVAN) 0 5 mg tablet  Outside Facility (Specify) Yes No   Sig: Take 1 mg by mouth 2 (two) times a day   NON FORMULARY  Outside Facility (Specify) Yes No   Sig: ACT FLUORIDE DENTAL RINSE   RINSE WITH 1 TBSP (15ML)2X A DAY   NON FORMULARY  Outside Facility (Specify) Yes No   Sig: TOLNAFTATE SPRAY 1% SPRAY  SPRAY BETWEEN TOES   PROCTOSOL HC 2 5 % rectal cream  Outside Facility (Specify) Yes No   Sunscreens (SUNBLOCK LOTION SPF30 EX)  Outside Facility (Specify) Yes No   Sig: Apply topically daily as needed   acetaminophen (TYLENOL) 500 mg tablet  Outside Facility (Specify) Yes No   Sig: Take 500 mg by mouth as needed for mild pain     anastrozole (ARIMIDEX) 1 mg tablet  Outside Facility (Specify) No No   Sig: TAKE 1 TABLET BY MOUTH AT BEDTIME DX:BREAST CANCER PREVENTION (WEAR GLOVES WHEN ADMINISTERING)   bromfenac sodium (Prolensa) 0 07 % SOLN  Outside Facility (Specify) Yes No   Sig: Apply to eye 1 DROP IN RIGHT EYE IN THE MORNING   Patient not taking: No sig reported   calamine lotion  Outside Facility (Specify) Yes No   Sig: Apply topically every 4 (four) hours as needed for itching   carBAMazepine (TEGretol) 200 mg tablet  Outside Facility (Specify) No No   Sig: Take 1 tablet (200 mg total) by mouth 2 (two) times a day   citalopram (CeleXA) 10 mg tablet  Outside Facility (Specify) Yes No   Sig: Take 10 mg by mouth daily 30 mg total    Patient not taking: No sig reported   diphenhydrAMINE (BENADRYL) 25 mg tablet  Outside Facility (Specify) Yes No   Sig: Take 25 mg by mouth daily at bedtime as needed for itching (for insomnia and allergy ** DO NOT TAKE WITH XYZAL **)    docusate sodium (COLACE) 100 mg capsule  Outside Facility (Specify) Yes No   Sig: Take 100 mg by mouth 3 (three) times a week    guaiFENesin (SILTUSSIN SA PO)  Outside Facility (Specify) Yes No   Sig: Take by mouth Take 2 teaspoonful (10 ml) PRN 6 hrs for cough   Patient not taking: No sig reported   haloperidol (HALDOL) 2 mg tablet  Outside Facility (Specify) Yes No   Sig: Take 2 mg by mouth 2 (two) times a day 1 tab am and 2 tabs qhs   hydrocortisone (ANUSOL-HC, PROCTOSOL HC,) 2 5 % rectal cream  Outside Facility (Specify) No No   Sig: Insert into the rectum 2 (two) times a day   Patient taking differently: Insert into the rectum 2 (two) times a day as needed for hemorrhoids   ibuprofen (MOTRIN) 200 mg tablet  Outside Facility (Specify) Yes No   Sig: Take 400 mg by mouth every 6 (six) hours as needed for mild pain    ketorolac (ACULAR) 0 5 % ophthalmic solution  Outside Facility (Specify) Yes No   Sig:     Patient not taking: No sig reported   latanoprost (XALATAN) 0 005 % ophthalmic solution  Outside Facility (Specify) Yes No   Sig: Apply 1 drop to eye   levocetirizine (XYZAL) 5 MG tablet   No No   Sig: TAKE ONE TABLET BY MOUTH EVERY DAY AS NEEDED FOR ALLERGIES *DO NOT TAKE WITH BENADRYL*   lithium carbonate 300 mg capsule  Outside Facility (Specify) Yes No   Sig: Take 600 mg by mouth 2 (two) times a day   loperamide (IMODIUM) 2 mg capsule  Outside Facility (Specify) Yes No   Sig: Take by mouth    Patient not taking: No sig reported   melatonin 1 mg  Outside Facility (Specify) Yes No   Sig: Take 5 mg by mouth daily at bedtime    memantine (NAMENDA) 10 mg tablet  Outside Facility (Specify) No No   Sig: Take 1 tablet (10 mg total) by mouth 2 (two) times a day   mineral oil-white petrolatum (LUBRIFRESH P M ) ophthalmic ointment  Outside Facility (Specify) Yes No   Si 5 inches    neomycin-polymyxin b-bacitracin (NEOSPORIN) 3 5-400-5,000  Outside Facility (Specify) Yes No   ofloxacin (OCUFLOX) 0 3 % ophthalmic solution  Outside Facility (Specify) Yes No   Patient not taking: No sig reported   ondansetron (ZOFRAN) 4 mg tablet  Outside Facility (Specify) Yes No   Sig: Take by mouth as needed     pantoprazole (PROTONIX) 40 mg tablet   No No   Sig: TAKE ONE TABLET BY MOUTH 2 TIMES A DAY( MORNING/ BEDTIME) (ESOPHAGITIS)   polyethylene glycol (MIRALAX) 17 g packet  Outside Facility (Specify) Yes No   Sig: Take 17 g by mouth daily as needed   sodium chloride (TAMIKA 128) 5 % hypertonic ophthalmic solution  Outside Facility (Specify) Yes No   Sig: Apply 1 drop to eye 2 (two) times a day     sodium fluoride 0 275 (0 125 F) MG/DROP solution  Outside Facility (Specify) Yes No   Sig: Take 275 mcg by mouth daily    talc (Zeasorb)   Yes No   Sig: Apply topically once as needed for irritation Sprinkle powder on affected area of skin to help with excess moisture up to 2 times a day as needed   timolol (TIMOPTIC-XE) 0 5 % ophthalmic gel-forming  Outside Facility (Specify) Yes No   Sig: Administer 1 drop into the left eye daily   Patient not taking: No sig reported   tolnaftate (TINACTIN) 1 % spray  Outside Facility (Specify) Yes No   Sig: Apply topically   Patient not taking: Reported on 8/17/2022   white petrolatum  Outside Facility (Specify) Yes No   Sig: Apply topically once Apply to hand every day prn      Facility-Administered Medications: None       Past Medical History:   Diagnosis Date    Arthritis 11/18/2021    right shoulder    Autism     Breast cancer ([facility] )     Breast cyst, left     last assessed 12/30/2013    Cerebral palsy (HCC)     Chronic GERD     Depression     Fibrocystic breast disease     last assessed 06/29/2012    Gastrointestinal hemorrhage     Glaucoma     History of chemotherapy     Hydrocephalus ([facility] )      SHUNT IN PLACE    Scoliosis     Scoliosis        Past Surgical History:   Procedure Laterality Date    BREAST BIOPSY Right     CATARACT EXTRACTION  06/14/2021    COLONOSCOPY      Description 04/13/2016-5 yrs  Description 4 yr f/u d/t polyp and family history, onset 07/20/2012   COLONOSCOPY      ESOPHAGOGASTRODUODENOSCOPY      description-04/13/2016 gastritis with antral nodule    EYE SURGERY      for relief of intraocular pressure    INCISIONAL BREAST BIOPSY      description 2008    MASTECTOMY Left     SENTINEL LYMPH NODE BIOPSY      STRABISMUS SURGERY      description 1973, 66    UPPER GASTROINTESTINAL ENDOSCOPY  05/16/2019    Grade C erosive esophagitis    7 mm nodule in the stomach       Family History   Problem Relation Age of Onset    Osteopenia Mother     Supraventricular tachycardia Mother     Heart attack Father 59        Acute MI    Coronary artery disease Father     Thyroid nodules Father     Osteopenia Maternal Grandmother     Prostate cancer Maternal Grandfather     Leukemia Paternal Grandfather     Heart attack Maternal Uncle 48        acute MI, stent    Cancer Maternal Uncle         adenocarcinoma of oral cavity    Colon cancer Maternal Uncle 62    Hyperlipidemia Maternal Uncle     Pancreatic cancer Paternal Uncle     Prostate cancer Paternal Uncle     Colon cancer Family     Coronary artery disease Family     Thyroid disease Family      I have reviewed and agree with the history as documented  E-Cigarette/Vaping    E-Cigarette Use Never User      E-Cigarette/Vaping Substances    Nicotine No     Flavoring No      Social History     Tobacco Use    Smoking status: Never Smoker    Smokeless tobacco: Never Used   Vaping Use    Vaping Use: Never used   Substance Use Topics    Alcohol use: No    Drug use: No       Review of Systems   Gastrointestinal: Negative for vomiting  Neurological: Negative for dizziness, seizures and syncope  All other systems reviewed and are negative  Physical Exam  Physical Exam  Constitutional:       General: She is not in acute distress  Appearance: She is well-developed  HENT:      Head: Laceration present  No raccoon eyes or Melo's sign  Right Ear: Tympanic membrane and external ear normal  No hemotympanum  Tympanic membrane is not perforated  Left Ear: Tympanic membrane and external ear normal  No hemotympanum  Tympanic membrane is not perforated  Eyes:      Conjunctiva/sclera: Conjunctivae normal       Pupils: Pupils are equal, round, and reactive to light  Cardiovascular:      Rate and Rhythm: Normal rate and regular rhythm  Heart sounds: Normal heart sounds  No murmur heard  Pulmonary:      Effort: Pulmonary effort is normal  No respiratory distress  Breath sounds: Normal breath sounds  Abdominal:      Palpations: Abdomen is soft  Tenderness: There is no abdominal tenderness  There is no guarding or rebound     Musculoskeletal:         General: No tenderness  Normal range of motion  Cervical back: Full passive range of motion without pain, normal range of motion and neck supple  No spinous process tenderness  Skin:     General: Skin is warm and dry  Neurological:      Mental Status: She is alert and oriented to person, place, and time  Mental status is at baseline  Cranial Nerves: No cranial nerve deficit  Vital Signs  ED Triage Vitals [09/15/22 0822]   Temperature Pulse Respirations Blood Pressure SpO2   98 2 °F (36 8 °C) 89 18 133/68 100 %      Temp Source Heart Rate Source Patient Position - Orthostatic VS BP Location FiO2 (%)   Temporal Monitor Lying Left arm --      Pain Score       2           Vitals:    09/15/22 0822   BP: 133/68   Pulse: 89   Patient Position - Orthostatic VS: Lying         Visual Acuity      ED Medications  Medications   lidocaine-epinephrine (XYLOCAINE/EPINEPHRINE) 1 %-1:100,000 injection 1 mL (1 mL Infiltration Given 9/15/22 6522)       Diagnostic Studies  Results Reviewed     None                 CT head without contrast   Final Result by Abebe Elias DO (09/15 1114)      No acute intracranial abnormality or significant interval change from previous study  Small amount of gas in the frontal scalp compatible with laceration  No calvarial fracture  Stable appearance of the ventricles with lateral shunt catheters in place  Multiple small calcifications present along the gray-white junction bilateral frontal lobes of uncertain etiology but appears to be a chronic finding  Workstation performed: [id number]                    Procedures  Laceration repair    Date/Time: 9/15/2022 10:10 AM  Performed by: Baljeet Pro DO  Authorized by: Baljeet Pro,    Consent: Verbal consent obtained    Consent given by: patient  Laceration length: 3 5 cm  Anesthesia: local infiltration    Anesthesia:  Local Anesthetic: lidocaine 1% with epinephrine and topical anesthetic  Anesthetic total: 5 mL    Sedation:  Patient sedated: no      Wound Dehiscence:  Superficial Wound Dehiscence: simple closure      Procedure Details:  Preparation: Patient was prepped and draped in the usual sterile fashion  Irrigation solution: saline  Amount of cleaning: standard  Skin closure: 6-0 nylon  Number of sutures: 5  Technique: simple  Approximation: close  Approximation difficulty: simple  Dressing: antibiotic ointment               ED Course                               SBIRT 20yo+    Flowsheet Row Most Recent Value   SBIRT (23 yo +)    In order to provide better care to our patients, we are screening all of our patients for alcohol and drug use  Would it be okay to ask you these screening questions? Yes Filed at: 09/15/2022 3409   Initial Alcohol Screen: US AUDIT-C     1  How often do you have a drink containing alcohol? 0 Filed at: 09/15/2022 0833   2  How many drinks containing alcohol do you have on a typical day you are drinking? 0 Filed at: 09/15/2022 0833   3a  Male UNDER 65: How often do you have five or more drinks on one occasion? 0 Filed at: 09/15/2022 0833   3b  FEMALE Any Age, or MALE 65+: How often do you have 4 or more drinks on one occassion? 0 Filed at: 09/15/2022 0833   Audit-C Score 0 Filed at: 09/15/2022 5492   IVETH: How many times in the past year have you    Used an illegal drug or used a prescription medication for non-medical reasons? 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[facility] )  Forehead laceration  Minor closed head injury  Diagnosis management comments: Plan is to obtain a CT the head to rule out skull fracture, subdural/epidural hematoma or other clinically significant head injury  Forehead laceration will be repaired        The patient (and any family present) verbalized understanding of the discharge instructions and warnings that would necessitate return to the Emergency Department  All questions were answered prior to discharge  Amount and/or Complexity of Data Reviewed  Tests in the radiology section of CPT®: reviewed  Obtain history from someone other than the patient: yes (Caregiver)        Disposition  Final diagnoses:   Forehead laceration   Minor closed head injury   Accidental fall from commode or toilet   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)       ED Disposition     ED Disposition   Discharge    Condition   Stable    Date/Time   Thu Sep 15, 2022 10:47 AM    Comment   Shaka Woody discharge to home/self care                 Follow-up Information     Follow up With Specialties Details Why Contact Info Additional Karl Anthony 1696, DO Internal Medicine, Family Medicine Schedule an appointment as soon as possible for a visit in 1 week [facility]  [address] [place] [address] [address] Emergency Department Emergency Medicine Go to  If symptoms worsen [address] [place], [id number]  [address] [facility] Emergency Department, [address] [facility]          Discharge Medication List as of 9/15/2022 11:18 AM      CONTINUE these medications which have NOT CHANGED    Details   acetaminophen (TYLENOL) 500 mg tablet Take 500 mg by mouth as needed for mild pain  , Historical Med      anastrozole (ARIMIDEX) 1 mg tablet TAKE 1 TABLET BY MOUTH AT BEDTIME DX:BREAST CANCER PREVENTION (WEAR GLOVES WHEN ADMINISTERING), Normal      BANOPHEN 25 MG capsule Take 25 mg by mouth daily at bedtime as needed , Starting Mon 5/11/2020, Historical Med      bromfenac sodium (Prolensa) 0 07 % SOLN Apply to eye 1 DROP IN RIGHT EYE IN THE MORNING, Historical Med      calamine lotion Apply topically every 4 (four) hours as needed for itching, Historical Med      Calcium Carbonate-Vitamin D (OYSCO 500/D PO) Take by mouth daily, Historical Med      carBAMazepine (TEGretol) 200 mg tablet Take 1 tablet (200 mg total) by mouth 2 (two) times a day, Starting Fri 4/30/2021, No Print      Carboxymethylcell-Hypromellose 0 25-0 3 % GEL Apply to eye , Historical Med      Cholecalciferol (VITAMIN D) 2000 units tablet TAKE TWO TABLETS BY MOUTH (4000IU) EVERY MORNING FOR VITAMIN DIFICIENCY, Normal      citalopram (CeleXA) 10 mg tablet Take 10 mg by mouth daily 30 mg total , Historical Med      Dentifrices (SENSODYNE PRONAMEL DT) Apply to teeth , Historical Med      Diclofenac Sodium (VOLTAREN) 1 % Starting Tue 7/13/2021, Historical Med      diphenhydrAMINE (BENADRYL) 25 mg tablet Take 25 mg by mouth daily at bedtime as needed for itching (for insomnia and allergy ** DO NOT TAKE WITH XYZAL **) , Historical Med      docusate sodium (COLACE) 100 mg capsule Take 100 mg by mouth 3 (three) times a week , Historical Med      guaiFENesin (SILTUSSIN SA PO) Take by mouth Take 2 teaspoonful (10 ml) PRN 6 hrs for cough, Historical Med      haloperidol (HALDOL) 2 mg tablet Take 2 mg by mouth 2 (two) times a day 1 tab am and 2 tabs qhs, Historical Med      hydrocortisone (ANUSOL-HC, PROCTOSOL HC,) 2 5 % rectal cream Insert into the rectum 2 (two) times a day, Starting Tue 5/15/2018, Print      ibuprofen (MOTRIN) 200 mg tablet Take 400 mg by mouth every 6 (six) hours as needed for mild pain , Historical Med      ketorolac (ACULAR) 0 5 % ophthalmic solution  , Starting Thu 4/15/2021, Historical Med      latanoprost (XALATAN) 0 005 % ophthalmic solution Apply 1 drop to eye, Historical Med      levocetirizine (XYZAL) 5 MG tablet TAKE ONE TABLET BY MOUTH EVERY DAY AS NEEDED FOR ALLERGIES *DO NOT TAKE WITH BENADRYL*, Normal      lithium carbonate 300 mg capsule Take 600 mg by mouth 2 (two) times a day, Starting Thu 10/22/2020, Historical Med      loperamide (IMODIUM) 2 mg capsule Take by mouth , Starting Thu 6/2/2011, Historical Med      LORazepam (ATIVAN) 0 5 mg tablet Take 1 mg by mouth 2 (two) times a day, Historical Med      melatonin 1 mg Take 5 mg by mouth daily at bedtime , Historical Med      memantine (NAMENDA) 10 mg tablet Take 1 tablet (10 mg total) by mouth 2 (two) times a day, Starting Wed 9/23/2020, Normal      mineral oil-white petrolatum (LUBRIFRESH P M ) ophthalmic ointment 0 5 inches , Historical Med      neomycin-polymyxin b-bacitracin (NEOSPORIN) 3 5-400-5,000 Starting Fri 6/19/2020, Historical Med      !! NON FORMULARY ACT FLUORIDE DENTAL RINSE   RINSE WITH 1 TBSP (15ML)2X A DAY, Historical Med      !! NON FORMULARY TOLNAFTATE SPRAY 1% SPRAY  SPRAY BETWEEN TOES, Historical Med      ofloxacin (OCUFLOX) 0 3 % ophthalmic solution Starting Thu 4/15/2021, Historical Med      ondansetron (ZOFRAN) 4 mg tablet Take by mouth as needed  , Historical Med      pantoprazole (PROTONIX) 40 mg tablet TAKE ONE TABLET BY MOUTH 2 TIMES A DAY( MORNING/ BEDTIME) (ESOPHAGITIS), Normal      polyethylene glycol (MIRALAX) 17 g packet Take 17 g by mouth daily as needed, Historical Med      PROCTOSOL HC 2 5 % rectal cream Starting Fri 6/19/2020, Historical Med      sodium chloride (TAMIKA 128) 5 % hypertonic ophthalmic solution Apply 1 drop to eye 2 (two) times a day  , Historical Med      sodium fluoride 0 275 (0 125 F) MG/DROP solution Take 275 mcg by mouth daily , Historical Med      Sunscreens (SUNBLOCK LOTION SPF30 EX) Apply topically daily as needed, Historical Med      talc (Zeasorb) Apply topically once as needed for irritation Sprinkle powder on affected area of skin to help with excess moisture up to 2 times a day as needed  , Historical Med      timolol (TIMOPTIC-XE) 0 5 % ophthalmic gel-forming Administer 1 drop into the left eye daily, Historical Med      tolnaftate (TINACTIN) 1 % spray Apply topically, Historical Med      white petrolatum Apply topically once Apply to hand every day prn, Historical Med       !! - Potential duplicate medications found  Please discuss with provider  No discharge procedures on file      PDMP Review     None          ED Provider  Electronically Signed by           Yaya Garcia DO  09/15/22 4899

## 2022-09-19 ENCOUNTER — OFFICE VISIT (OUTPATIENT)
Dept: FAMILY MEDICINE CLINIC | Facility: HOSPITAL | Age: 57
End: 2022-09-19
Payer: MEDICARE

## 2022-09-19 VITALS
HEART RATE: 90 BPM | DIASTOLIC BLOOD PRESSURE: 68 MMHG | TEMPERATURE: 97.8 F | HEIGHT: 56 IN | SYSTOLIC BLOOD PRESSURE: 116 MMHG | BODY MASS INDEX: 28.21 KG/M2 | WEIGHT: 125.4 LBS

## 2022-09-19 DIAGNOSIS — Z00.00 MEDICARE ANNUAL WELLNESS VISIT, SUBSEQUENT: ICD-10-CM

## 2022-09-19 DIAGNOSIS — K21.9 CHRONIC GERD: ICD-10-CM

## 2022-09-19 DIAGNOSIS — D50.8 OTHER IRON DEFICIENCY ANEMIA: ICD-10-CM

## 2022-09-19 DIAGNOSIS — F33.9 DEPRESSION, RECURRENT (HCC): ICD-10-CM

## 2022-09-19 DIAGNOSIS — S01.01XD LACERATION OF SCALP, SUBSEQUENT ENCOUNTER: Primary | ICD-10-CM

## 2022-09-19 DIAGNOSIS — Z23 ENCOUNTER FOR IMMUNIZATION: ICD-10-CM

## 2022-09-19 DIAGNOSIS — E66.3 OVERWEIGHT (BMI 25.0-29.9): ICD-10-CM

## 2022-09-19 PROCEDURE — 90682 RIV4 VACC RECOMBINANT DNA IM: CPT

## 2022-09-19 PROCEDURE — G0439 PPPS, SUBSEQ VISIT: HCPCS | Performed by: INTERNAL MEDICINE

## 2022-09-19 PROCEDURE — G0008 ADMIN INFLUENZA VIRUS VAC: HCPCS

## 2022-09-19 PROCEDURE — 99214 OFFICE O/P EST MOD 30 MIN: CPT | Performed by: INTERNAL MEDICINE

## 2022-09-19 NOTE — PROGRESS NOTES
Assessment and Plan:     Problem List Items Addressed This Visit        Digestive    Chronic GERD     Still with some intermittent symptoms, on PPI daily and just saw GI, con't f/u as per GI            Other    Depression, recurrent ([address] )     Doing well with current Lithium, con't meds and f/u as per Psych         Iron deficiency anemia, unspecified     Off iron supplement, felt to be d/t large hiatal hernia, con't labs every 6 mos, will follow           Other Visit Diagnoses     Laceration of scalp, subsequent encounter    -  Primary    healing well s/p fall from commode, will have sutures removed Thurs or Fri    Medicare annual wellness visit, subsequent        BMI 28 0-28 9,adult        Overweight (BMI 25 0-29 9)        healthy diet and keeping active encouraged        BMI Counseling: Body mass index is 28 11 kg/m²  The BMI is above normal  Nutrition recommendations include decreasing portion sizes, encouraging healthy choices of fruits and vegetables, consuming healthier snacks, moderation in carbohydrate intake, increasing intake of lean protein and reducing intake of saturated and trans fat  Exercise recommendations include exercising 3-5 times per week  No pharmacotherapy was ordered  Rationale for BMI follow-up plan is due to patient being overweight or obese  Preventive health issues were discussed with patient, and age appropriate screening tests were ordered as noted in patient's After Visit Summary  Colonoscopy 10/20 - 5 yrs    Mammo 11/21 - has order for 11/22    PAP 7/21    BW 8/22    Personalized health advice and appropriate referrals for health education or preventive services given if needed, as noted in patient's After Visit Summary       History of Present Illness:     Patient presents for a Medicare Wellness Visit    HPI Pt here for follow up appt and AWV - most of HPI via [facility] staff and pts mom d/t her intellectual disability    Pt was seen in ED 9/15/22 with a head injury after a fall off the commode  She fell asleep on the commode and slid off and hit her head  CT head was done and no acute bleed was noted  Small laceration noted frontal scalp region  Stable appearance of ventricles with lateral shunt catheters in place  She had sutures placed and they are to be removed Thurs or Fri  She notes no pain with the sutures  She has had no falls since then  Pt saw LINA Hutson) in Aug for f/u iron def and GERD - OV note reviewed  She was told to con't her Protonix and to have CBC repeated in 3 mos  Mom admits she eats quickly and will then belch and sometime regurgitate food back up  Pt con't to do well with her mood since starting Lithium  She con't to follow with psych  She states she is sleeping well  Patient Care Team:  Shira Castro DO as PCP - Noe Wyatt MD as PCP - Endocrinology (Endocrinology)  Jake Metcalf MD as Surgeon (Surgical Oncology)  Bharati Palomares (Obstetrics and Gynecology)  Liliane Walker MD (Neurology)  Lillia Sandifer, MD (Ophthalmology)  III Merle Hutton DPM (Podiatry)  Sharon Pepe MD as Medical Oncologist (Oncology)     Review of Systems:     Review of Systems   Constitutional: Negative for chills and fever  HENT: Negative for congestion and hearing loss  Eyes: Positive for visual disturbance  Negative for pain  Legally blind L eye   Respiratory: Negative for cough and shortness of breath  Cardiovascular: Negative for chest pain  Gastrointestinal: Positive for nausea  Negative for abdominal pain  Genitourinary: Negative for difficulty urinating and dysuria  Musculoskeletal: Negative for back pain and neck pain  Skin: Positive for wound  Negative for rash  Neurological: Negative for dizziness, seizures and headaches  Hematological: Does not bruise/bleed easily  Psychiatric/Behavioral: Negative for dysphoric mood and sleep disturbance          Problem List:     Patient Active Problem List Diagnosis    Vitamin D deficiency    Scoliosis    Retinal detachment    Obsessive compulsive disorder    Multiple thyroid nodules    Mild intellectual disability    Malignant neoplasm of overlapping sites of right breast in female, estrogen receptor positive ([address] )    Legal blindness [place]    Irregular menstrual cycle    Internal hemorrhoids    Impulse control disorder    Glaucoma    Dyslipidemia    Depression, recurrent (HCC)    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HCC)    Chronic gingivitis    Cerebral palsy (HCC)    Benign neoplasm of large intestine    Autistic disorder    Anxiety    Other long term (current) drug therapy    Hyperglycemia    Screening for colorectal cancer    Chronic GERD    Iron deficiency anemia, unspecified    Use of anastrozole (Arimidex)    Family history of colonic polyps    Osteoporosis    Ambulatory dysfunction    Primary osteoarthritis of left shoulder    Other specified anemias      Past Medical and Surgical History:     Past Medical History:   Diagnosis Date    Arthritis 11/18/2021    right shoulder    Autism     Breast cancer ([address] )     Breast cyst, left     last assessed 12/30/2013    Cerebral palsy (HCC)     Chronic GERD     Depression     Fibrocystic breast disease     last assessed 06/29/2012    Gastrointestinal hemorrhage     Glaucoma     History of chemotherapy     Hydrocephalus ([address] )      SHUNT IN PLACE    Scoliosis      Past Surgical History:   Procedure Laterality Date    BREAST BIOPSY Right     CATARACT EXTRACTION  06/14/2021    COLONOSCOPY      Description 04/13/2016-5 yrs  Description 4 yr f/u d/t polyp and family history, onset 07/20/2012      COLONOSCOPY      ESOPHAGOGASTRODUODENOSCOPY      description-04/13/2016 gastritis with antral nodule    EYE SURGERY      for relief of intraocular pressure    INCISIONAL BREAST BIOPSY      description 2008    MASTECTOMY Left     SENTINEL LYMPH NODE BIOPSY      STRABISMUS SURGERY description [facility] ENDOSCOPY  05/16/2019    Grade C erosive esophagitis  7 mm nodule in the stomach      Family History:     Family History   Problem Relation Age of Onset   Candy Sheikh Osteopenia Mother     Supraventricular tachycardia Mother     Heart attack Father 59        Acute MI    Coronary artery disease Father     Thyroid nodules Father     Osteopenia Maternal Grandmother     Prostate cancer Maternal Grandfather     Leukemia Paternal Grandfather     Heart attack Maternal Uncle 48        acute MI, stent    Cancer Maternal Uncle         adenocarcinoma of oral cavity    Colon cancer Maternal Uncle 62    Hyperlipidemia Maternal Uncle     Pancreatic cancer Paternal Uncle     Prostate cancer Paternal Uncle     Colon cancer Family     Coronary artery disease Family     Thyroid disease Family       Social History:     Social History     Socioeconomic History    Marital status: Single     Spouse name: None    Number of children: None    Years of education: None    Highest education level: None   Occupational History    None   Tobacco Use    Smoking status: Never Smoker    Smokeless tobacco: Never Used   Vaping Use    Vaping Use: Never used   Substance and Sexual Activity    Alcohol use: No    Drug use: No    Sexual activity: None   Other Topics Concern    None   Social History Narrative    Person living in a residential institution    Uses safety equipment-seat belts     Social Determinants of Health     Financial Resource Strain: Low Risk     Difficulty of Paying Living Expenses: Not hard at all   Food Insecurity: Not on file   Transportation Needs: No Transportation Needs    Lack of Transportation (Medical): No    Lack of Transportation (Non-Medical):  No   Physical Activity: Not on file   Stress: Not on file   Social Connections: Not on file   Intimate Partner Violence: Not on file   Housing Stability: Not on file      Medications and Allergies:     Current Outpatient Medications   Medication Sig Dispense Refill    acetaminophen (TYLENOL) 500 mg tablet Take 500 mg by mouth as needed for mild pain        anastrozole (ARIMIDEX) 1 mg tablet TAKE 1 TABLET BY MOUTH AT BEDTIME DX:BREAST CANCER PREVENTION (WEAR GLOVES WHEN ADMINISTERING) 90 tablet 3    BANOPHEN 25 MG capsule Take 25 mg by mouth daily at bedtime as needed       bromfenac sodium (Prolensa) 0 07 % SOLN Apply to eye 1 DROP IN RIGHT EYE IN THE MORNING (Patient not taking: No sig reported)      calamine lotion Apply topically every 4 (four) hours as needed for itching      Calcium Carbonate-Vitamin D (OYSCO 500/D PO) Take by mouth daily      carBAMazepine (TEGretol) 200 mg tablet Take 1 tablet (200 mg total) by mouth 2 (two) times a day      Carboxymethylcell-Hypromellose 0 25-0 3 % GEL Apply to eye       Cholecalciferol (VITAMIN D) 2000 units tablet TAKE TWO TABLETS BY MOUTH (4000IU) EVERY MORNING FOR VITAMIN DIFICIENCY 62 tablet 0    Dentifrices (SENSODYNE PRONAMEL DT) Apply to teeth       Diclofenac Sodium (VOLTAREN) 1 %       diphenhydrAMINE (BENADRYL) 25 mg tablet Take 25 mg by mouth daily at bedtime as needed for itching (for insomnia and allergy ** DO NOT TAKE WITH XYZAL **)       docusate sodium (COLACE) 100 mg capsule Take 100 mg by mouth 3 (three) times a week       guaiFENesin (SILTUSSIN SA PO) Take by mouth Take 2 teaspoonful (10 ml) PRN 6 hrs for cough (Patient not taking: No sig reported)      haloperidol (HALDOL) 2 mg tablet Take 2 mg by mouth 2 (two) times a day 1 tab am and 2 tabs qhs      hydrocortisone (ANUSOL-HC, PROCTOSOL HC,) 2 5 % rectal cream Insert into the rectum 2 (two) times a day (Patient taking differently: Insert into the rectum 2 (two) times a day as needed for hemorrhoids) 30 g 0    ibuprofen (MOTRIN) 200 mg tablet Take 400 mg by mouth every 6 (six) hours as needed for mild pain        ketorolac (ACULAR) 0 5 % ophthalmic solution   (Patient not taking: No sig reported)      latanoprost (XALATAN) 0 005 % ophthalmic solution Apply 1 drop to eye      levocetirizine (XYZAL) 5 MG tablet TAKE ONE TABLET BY MOUTH EVERY DAY AS NEEDED FOR ALLERGIES *DO NOT TAKE WITH BENADRYL* 31 tablet 0    lithium carbonate 300 mg capsule Take 600 mg by mouth 2 (two) times a day      loperamide (IMODIUM) 2 mg capsule Take by mouth  (Patient not taking: No sig reported)      LORazepam (ATIVAN) 0 5 mg tablet Take 1 mg by mouth 2 (two) times a day      melatonin 1 mg Take 5 mg by mouth daily at bedtime       memantine (NAMENDA) 10 mg tablet Take 1 tablet (10 mg total) by mouth 2 (two) times a day 60 tablet 5    mineral oil-white petrolatum (LUBRIFRESH P M ) ophthalmic ointment 0 5 inches       neomycin-polymyxin b-bacitracin (NEOSPORIN) 3 5-400-5,000       NON FORMULARY ACT FLUORIDE DENTAL RINSE   RINSE WITH 1 TBSP (15ML)2X A DAY      NON FORMULARY TOLNAFTATE SPRAY 1% SPRAY  SPRAY BETWEEN TOES      ondansetron (ZOFRAN) 4 mg tablet Take by mouth as needed        pantoprazole (PROTONIX) 40 mg tablet TAKE ONE TABLET BY MOUTH 2 TIMES A DAY( MORNING/ BEDTIME) (ESOPHAGITIS) 62 tablet 0    polyethylene glycol (MIRALAX) 17 g packet Take 17 g by mouth daily as needed      PROCTOSOL HC 2 5 % rectal cream       sodium chloride (TAMIKA 128) 5 % hypertonic ophthalmic solution Apply 1 drop to eye 2 (two) times a day        sodium fluoride 0 275 (0 125 F) MG/DROP solution Take 275 mcg by mouth daily       Sunscreens (SUNBLOCK LOTION SPF30 EX) Apply topically daily as needed      talc (Zeasorb) Apply topically once as needed for irritation Sprinkle powder on affected area of skin to help with excess moisture up to 2 times a day as needed         timolol (TIMOPTIC-XE) 0 5 % ophthalmic gel-forming Administer 1 drop into the left eye daily (Patient not taking: No sig reported)      tolnaftate (TINACTIN) 1 % spray Apply topically (Patient not taking: Reported on 8/17/2022)      white petrolatum Apply topically once Apply to hand every day prn       No current facility-administered medications for this visit  Allergies   Allergen Reactions    Bactrim [Sulfamethoxazole-Trimethoprim]     Methylphenidate Hyperactivity     Reaction Date: [id number];     Sulfa Antibiotics     Thioridazine     Amphetamines     Chlorpromazine     Fexofenadine Hives     Reaction Date: [id number];    Luis Enrique Slate Medroxyprogesterone Rash and Hives     Reaction Date: [id number];     Other Hives     Mellaril, thorazine    Trimethoprim       Immunizations:     Immunization History   Administered Date(s) Administered    COVID-19 PFIZER VACCINE 0 3 ML IM 11/12/2021, 05/11/2022    INFLUENZA 11/07/2014, 09/29/2015, 09/07/2016, 09/29/2017, 10/15/2018, 10/25/2019, 10/01/2020    IPV 1966, 1966, 09/06/1968    Influenza Quadrivalent Preservative Free 3 years and older IM 11/07/2014, 09/29/2015, 09/07/2016    Influenza Quadrivalent, 6-35 Months IM 09/29/2017    Influenza, injectable, quadrivalent, preservative free 0 5 mL 10/15/2018, 10/25/2019    Influenza, seasonal, injectable 09/25/2009, 09/23/2010, 10/02/2012, 09/16/2013    Pneumococcal Polysaccharide PPV23 09/17/2015, 05/29/2018    TD (adult) Preservative Free 02/18/2015    Td (adult), adsorbed 04/13/1993, 02/18/2005, 02/18/2015    Tetanus, adsorbed 02/18/2015    Tuberculin Skin Test-PPD Intradermal 08/01/1993, 07/01/1995, 10/01/2007, 08/16/2011, 09/16/2013, 09/15/2015, 09/19/2017, 09/16/2019, 09/15/2021      Health Maintenance:         Topic Date Due    Hepatitis C Screening  Never done    HIV Screening  Never done    Breast Cancer Screening: Mammogram  11/16/2022    Colorectal Cancer Screening  10/13/2025    Cervical Cancer Screening  07/08/2026         Topic Date Due    Influenza Vaccine (1) 09/01/2022      Medicare Screening Tests and Risk Assessments:     Denise Bowling is here for her Subsequent Wellness visit   Last Medicare Wellness visit information reviewed, patient interviewed and updates made to the record today  Historian  Patient cannot answer questions due to cognitive impairment, intelluctual disability, or expressive limitations  Information provided by: caregiver  Health Risk Assessment:   Patient rates overall health as excellent  Patient feels that their physical health rating is same  Patient is very satisfied with their life  Eyesight was rated as same  Hearing was rated as same  Patient feels that their emotional and mental health rating is same  Patients states they are sometimes angry  Patient states they are often unusually tired/fatigued  Pain experienced in the last 7 days has been some  Patient's pain rating has been 10/10  Patient states that she has experienced no weight loss or gain in last 6 months  Depression Screening:   PHQ-9 Score: 12      Fall Risk Screening: In the past year, patient has experienced: history of falling in past year    Number of falls: 1  Injured during fall?: Yes    Feels unsteady when standing or walking?: Yes    Worried about falling?: Yes      Urinary Incontinence Screening:   Patient has not leaked urine accidently in the last six months  Home Safety:  Patient has trouble with stairs inside or outside of their home  Patient has working smoke alarms and has working carbon monoxide detector  Home safety hazards include: none  Nutrition:   Current diet is Regular  Medications:   Patient is currently taking over-the-counter supplements  OTC medications include: see medication list  Patient is not able to manage medications  Activities of Daily Living (ADLs)/Instrumental Activities of Daily Living (IADLs):   Walk and transfer into and out of bed and chair?: Yes  Dress and groom yourself?: No    Bathe or shower yourself?: No    Feed yourself?  Yes  Do your laundry/housekeeping?: No  Manage your money, pay your bills and track your expenses?: No  Make your own meals?: No    Do your own shopping?: No    Previous Hospitalizations:   Any hospitalizations or ED visits within the last 12 months?: Yes    How many hospitalizations have you had in the last year?: 1-2    Hospitalization Comments: 1 ER visit    Advance Care Planning:   Living will: No    Durable POA for healthcare: Yes    Advanced directive: No      Cognitive Screening:   Provider or family/friend/caregiver concerned regarding cognition?: Yes    PREVENTIVE SCREENINGS      Cardiovascular Screening:    General: Screening Current and Risks and Benefits Discussed      Diabetes Screening:     General: Screening Current and Risks and Benefits Discussed      Colorectal Cancer Screening:     General: Screening Current      Breast Cancer Screening:     General: History Breast Cancer, Risks and Benefits Discussed and Screening Current      Cervical Cancer Screening:    General: Screening Current and Risks and Benefits Discussed      Osteoporosis Screening:    General: History Osteoporosis, Risks and Benefits Discussed and Screening Current      Abdominal Aortic Aneurysm (AAA) Screening:        General: Risks and Benefits Discussed and Screening Not Indicated      Lung Cancer Screening:     General: Screening Not Indicated and Risks and Benefits Discussed      Hepatitis C Screening:    General: Risks and Benefits Discussed and Screening Not Indicated    Screening, Brief Intervention, and Referral to Treatment (SBIRT)    Screening  Typical number of drinks in a day: 0  Typical number of drinks in a week: 0  Interpretation: Low risk drinking behavior  Single Item Drug Screening:  How often have you used an illegal drug (including marijuana) or a prescription medication for non-medical reasons in the past year? never    Single Item Drug Screen Score: 0  Interpretation: Negative screen for possible drug use disorder    Other Counseling Topics:   Regular weightbearing exercise       Vision Screening Comments: Pt unable to read eye chart Physical Exam:     /68   Pulse 90   Temp 97 8 °F (36 6 °C) (Tympanic)   Ht 4' 8" (1 422 m)   Wt 56 9 kg (125 lb 6 4 oz)   BMI 28 11 kg/m²     Physical Exam  Vitals and nursing note reviewed  Constitutional:       General: She is not in acute distress  Appearance: She is well-developed  She is not ill-appearing  HENT:      Head: Normocephalic and atraumatic  Right Ear: Tympanic membrane and external ear normal       Left Ear: Tympanic membrane and external ear normal    Eyes:      General:         Right eye: No discharge  Left eye: No discharge  Conjunctiva/sclera: Conjunctivae normal    Neck:      Thyroid: No thyromegaly  Trachea: No tracheal deviation  Cardiovascular:      Rate and Rhythm: Normal rate and regular rhythm  Heart sounds: Normal heart sounds  No murmur heard  Pulmonary:      Effort: Pulmonary effort is normal  No respiratory distress  Breath sounds: Normal breath sounds  No wheezing, rhonchi or rales  Abdominal:      General: There is no distension  Palpations: Abdomen is soft  Tenderness: There is no abdominal tenderness  There is no guarding or rebound  Musculoskeletal:         General: No deformity or signs of injury  Cervical back: Neck supple  Lymphadenopathy:      Cervical: No cervical adenopathy  Skin:     General: Skin is warm and dry  Coloration: Skin is not pale  Findings: No rash  Comments: Laceration forhead   Neurological:      General: No focal deficit present  Mental Status: She is alert  Mental status is at baseline  Motor: No abnormal muscle tone     Psychiatric:         Mood and Affect: Mood normal          Behavior: Behavior normal           Meerakalee Mishrain, DO

## 2022-09-19 NOTE — PATIENT INSTRUCTIONS
Medicare Preventive Visit Patient Instructions  Thank you for completing your Welcome to Medicare Visit or Medicare Annual Wellness Visit today  Your next wellness visit will be due in one year (9/20/2023)  The screening/preventive services that you may require over the next 5-10 years are detailed below  Some tests may not apply to you based off risk factors and/or age  Screening tests ordered at today's visit but not completed yet may show as past due  Also, please note that scanned in results may not display below  Preventive Screenings:  Service Recommendations Previous Testing/Comments   Colorectal Cancer Screening  * Colonoscopy    * Fecal Occult Blood Test (FOBT)/Fecal Immunochemical Test (FIT)  * Fecal DNA/Cologuard Test  * Flexible Sigmoidoscopy Age: 39-70 years old   Colonoscopy: every 10 years (may be performed more frequently if at higher risk)  OR  FOBT/FIT: every 1 year  OR  Cologuard: every 3 years  OR  Sigmoidoscopy: every 5 years  Screening may be recommended earlier than age 39 if at higher risk for colorectal cancer  Also, an individualized decision between you and your healthcare provider will decide whether screening between the ages of 74-80 would be appropriate  Colonoscopy: 10/13/2020  FOBT/FIT: 12/28/2020  Cologuard: Not on file  Sigmoidoscopy: Not on file    Screening Current     Breast Cancer Screening Age: 37 years old  Frequency: every 1-2 years  Not required if history of left and right mastectomy Mammogram: 11/16/2021    History Breast Cancer   Cervical Cancer Screening Between the ages of 21-29, pap smear recommended once every 3 years  Between the ages of 33-67, can perform pap smear with HPV co-testing every 5 years     Recommendations may differ for women with a history of total hysterectomy, cervical cancer, or abnormal pap smears in past  Pap Smear: 07/08/2021    Screening Current   Hepatitis C Screening Once for adults born between 1945 and 1965  More frequently in patients at high risk for Hepatitis C Hep C Antibody: Not on file        Diabetes Screening 1-2 times per year if you're at risk for diabetes or have pre-diabetes Fasting glucose: 97 mg/dL (11/16/2021)  A1C: No results in last 5 years (No results in last 5 years)  Screening Current   Cholesterol Screening Once every 5 years if you don't have a lipid disorder  May order more often based on risk factors  Lipid panel: Not on file          Other Preventive Screenings Covered by Medicare:  1  Abdominal Aortic Aneurysm (AAA) Screening: covered once if your at risk  You're considered to be at risk if you have a family history of AAA  2  Lung Cancer Screening: covers low dose CT scan once per year if you meet all of the following conditions: (1) Age 50-69; (2) No signs or symptoms of lung cancer; (3) Current smoker or have quit smoking within the last 15 years; (4) You have a tobacco smoking history of at least 20 pack years (packs per day multiplied by number of years you smoked); (5) You get a written order from a healthcare provider  3  Glaucoma Screening: covered annually if you're considered high risk: (1) You have diabetes OR (2) Family history of glaucoma OR (3)  aged 48 and older OR (3)  American aged 72 and older  3  Osteoporosis Screening: covered every 2 years if you meet one of the following conditions: (1) You're estrogen deficient and at risk for osteoporosis based off medical history and other findings; (2) Have a vertebral abnormality; (3) On glucocorticoid therapy for more than 3 months; (4) Have primary hyperparathyroidism; (5) On osteoporosis medications and need to assess response to drug therapy  · Last bone density test (DXA Scan): 10/16/2020   5  HIV Screening: covered annually if you're between the age of 15-65  Also covered annually if you are younger than 13 and older than 72 with risk factors for HIV infection   For pregnant patients, it is covered up to 3 times per pregnancy  Immunizations:  Immunization Recommendations   Influenza Vaccine Annual influenza vaccination during flu season is recommended for all persons aged >= 6 months who do not have contraindications   Pneumococcal Vaccine   * Pneumococcal conjugate vaccine = PCV13 (Prevnar 13), PCV15 (Vaxneuvance), PCV20 (Prevnar 20)  * Pneumococcal polysaccharide vaccine = PPSV23 (Pneumovax) Adults 25-60 years old: 1-3 doses may be recommended based on certain risk factors  Adults 72 years old: 1-2 doses may be recommended based off what pneumonia vaccine you previously received   Hepatitis B Vaccine 3 dose series if at intermediate or high risk (ex: diabetes, end stage renal disease, liver disease)   Tetanus (Td) Vaccine - COST NOT COVERED BY MEDICARE PART B Following completion of primary series, a booster dose should be given every 10 years to maintain immunity against tetanus  Td may also be given as tetanus wound prophylaxis  Tdap Vaccine - COST NOT COVERED BY MEDICARE PART B Recommended at least once for all adults  For pregnant patients, recommended with each pregnancy  Shingles Vaccine (Shingrix) - COST NOT COVERED BY MEDICARE PART B  2 shot series recommended in those aged 48 and above     Health Maintenance Due:      Topic Date Due    Hepatitis C Screening  Never done    HIV Screening  Never done    Breast Cancer Screening: Mammogram  11/16/2022    Colorectal Cancer Screening  10/13/2025    Cervical Cancer Screening  07/08/2026     Immunizations Due:      Topic Date Due    Influenza Vaccine (1) 09/01/2022     Advance Directives   What are advance directives? Advance directives are legal documents that state your wishes and plans for medical care  These plans are made ahead of time in case you lose your ability to make decisions for yourself  Advance directives can apply to any medical decision, such as the treatments you want, and if you want to donate organs     What are the types of advance directives? There are many types of advance directives, and each state has rules about how to use them  You may choose a combination of any of the following:  · Living will: This is a written record of the treatment you want  You can also choose which treatments you do not want, which to limit, and which to stop at a certain time  This includes surgery, medicine, IV fluid, and tube feedings  · Durable power of  for healthcare [facility]): This is a written record that states who you want to make healthcare choices for you when you are unable to make them for yourself  This person, called a proxy, is usually a family member or a friend  You may choose more than 1 proxy  · Do not resuscitate (DNR) order:  A DNR order is used in case your heart stops beating or you stop breathing  It is a request not to have certain forms of treatment, such as CPR  A DNR order may be included in other types of advance directives  · Medical directive: This covers the care that you want if you are in a coma, near death, or unable to make decisions for yourself  You can list the treatments you want for each condition  Treatment may include pain medicine, surgery, blood transfusions, dialysis, IV or tube feedings, and a ventilator (breathing machine)  · Values history: This document has questions about your views, beliefs, and how you feel and think about life  This information can help others choose the care that you would choose  Why are advance directives important? An advance directive helps you control your care  Although spoken wishes may be used, it is better to have your wishes written down  Spoken wishes can be misunderstood, or not followed  Treatments may be given even if you do not want them  An advance directive may make it easier for your family to make difficult choices about your care     Weight Management   Why it is important to manage your weight:  Being overweight increases your risk of health conditions such as heart disease, high blood pressure, type 2 diabetes, and certain types of cancer  It can also increase your risk for osteoarthritis, sleep apnea, and other respiratory problems  Aim for a slow, steady weight loss  Even a small amount of weight loss can lower your risk of health problems  How to lose weight safely:  A safe and healthy way to lose weight is to eat fewer calories and get regular exercise  You can lose up about 1 pound a week by decreasing the number of calories you eat by 500 calories each day  Healthy meal plan for weight management:  A healthy meal plan includes a variety of foods, contains fewer calories, and helps you stay healthy  A healthy meal plan includes the following:  · Eat whole-grain foods more often  A healthy meal plan should contain fiber  Fiber is the part of grains, fruits, and vegetables that is not broken down by your body  Whole-grain foods are healthy and provide extra fiber in your diet  Some examples of whole-grain foods are whole-wheat breads and pastas, oatmeal, brown rice, and bulgur  · Eat a variety of vegetables every day  Include dark, leafy greens such as spinach, kale, royce greens, and mustard greens  Eat yellow and orange vegetables such as carrots, sweet potatoes, and winter squash  · Eat a variety of fruits every day  Choose fresh or canned fruit (canned in its own juice or light syrup) instead of juice  Fruit juice has very little or no fiber  · Eat low-fat dairy foods  Drink fat-free (skim) milk or 1% milk  Eat fat-free yogurt and low-fat cottage cheese  Try low-fat cheeses such as mozzarella and other reduced-fat cheeses  · Choose meat and other protein foods that are low in fat  Choose beans or other legumes such as split peas or lentils  Choose fish, skinless poultry (chicken or turkey), or lean cuts of red meat (beef or pork)  Before you cook meat or poultry, cut off any visible fat  · Use less fat and oil    Try baking foods instead of frying them  Add less fat, such as margarine, sour cream, regular salad dressing and mayonnaise to foods  Eat fewer high-fat foods  Some examples of high-fat foods include french fries, doughnuts, ice cream, and cakes  · Eat fewer sweets  Limit foods and drinks that are high in sugar  This includes candy, cookies, regular soda, and sweetened drinks  Exercise:  Exercise at least 30 minutes per day on most days of the week  Some examples of exercise include walking, biking, dancing, and swimming  You can also fit in more physical activity by taking the stairs instead of the elevator or parking farther away from stores  Ask your healthcare provider about the best exercise plan for you  © Copyright [facility] 2018 Information is for End User's use only and may not be sold, redistributed or otherwise used for commercial purposes  All illustrations and images included in CareNotes® are the copyrighted property of [employer]  or [facility] Preventive Visit Patient Instructions  Thank you for completing your Welcome to Medicare Visit or Medicare Annual Wellness Visit today  Your next wellness visit will be due in one year (9/20/2023)  The screening/preventive services that you may require over the next 5-10 years are detailed below  Some tests may not apply to you based off risk factors and/or age  Screening tests ordered at today's visit but not completed yet may show as past due  Also, please note that scanned in results may not display below    Preventive Screenings:  Service Recommendations Previous Testing/Comments   Colorectal Cancer Screening  * Colonoscopy    * Fecal Occult Blood Test (FOBT)/Fecal Immunochemical Test (FIT)  * Fecal DNA/Cologuard Test  * Flexible Sigmoidoscopy Age: 39-70 years old   Colonoscopy: every 10 years (may be performed more frequently if at higher risk)  OR  FOBT/FIT: every 1 year  OR  Cologuard: every 3 years  OR  Sigmoidoscopy: every 5 years  Screening may be recommended earlier than age 39 if at higher risk for colorectal cancer  Also, an individualized decision between you and your healthcare provider will decide whether screening between the ages of 74-80 would be appropriate  Colonoscopy: 10/13/2020  FOBT/FIT: 12/28/2020  Cologuard: Not on file  Sigmoidoscopy: Not on file    Screening Current     Breast Cancer Screening Age: 36 years old  Frequency: every 1-2 years  Not required if history of left and right mastectomy Mammogram: 11/16/2021    History Breast Cancer   Cervical Cancer Screening Between the ages of 21-29, pap smear recommended once every 3 years  Between the ages of 33-67, can perform pap smear with HPV co-testing every 5 years  Recommendations may differ for women with a history of total hysterectomy, cervical cancer, or abnormal pap smears in past  Pap Smear: 07/08/2021    Screening Current   Hepatitis C Screening Once for adults born between 1945 and 1965  More frequently in patients at high risk for Hepatitis C Hep C Antibody: Not on file        Diabetes Screening 1-2 times per year if you're at risk for diabetes or have pre-diabetes Fasting glucose: 97 mg/dL (11/16/2021)  A1C: No results in last 5 years (No results in last 5 years)  Screening Current   Cholesterol Screening Once every 5 years if you don't have a lipid disorder  May order more often based on risk factors  Lipid panel: Not on file          Other Preventive Screenings Covered by Medicare:  6  Abdominal Aortic Aneurysm (AAA) Screening: covered once if your at risk  You're considered to be at risk if you have a family history of AAA    7  Lung Cancer Screening: covers low dose CT scan once per year if you meet all of the following conditions: (1) Age 50-69; (2) No signs or symptoms of lung cancer; (3) Current smoker or have quit smoking within the last 15 years; (4) You have a tobacco smoking history of at least 20 pack years (packs per day multiplied by number of years you smoked); (5) You get a written order from a healthcare provider  8  Glaucoma Screening: covered annually if you're considered high risk: (1) You have diabetes OR (2) Family history of glaucoma OR (3)  aged 48 and older OR (3)  American aged 72 and older  5  Osteoporosis Screening: covered every 2 years if you meet one of the following conditions: (1) You're estrogen deficient and at risk for osteoporosis based off medical history and other findings; (2) Have a vertebral abnormality; (3) On glucocorticoid therapy for more than 3 months; (4) Have primary hyperparathyroidism; (5) On osteoporosis medications and need to assess response to drug therapy  · Last bone density test (DXA Scan): 10/16/2020  10  HIV Screening: covered annually if you're between the age of 12-76  Also covered annually if you are younger than 13 and older than 72 with risk factors for HIV infection  For pregnant patients, it is covered up to 3 times per pregnancy  Immunizations:  Immunization Recommendations   Influenza Vaccine Annual influenza vaccination during flu season is recommended for all persons aged >= 6 months who do not have contraindications   Pneumococcal Vaccine   * Pneumococcal conjugate vaccine = PCV13 (Prevnar 13), PCV15 (Vaxneuvance), PCV20 (Prevnar 20)  * Pneumococcal polysaccharide vaccine = PPSV23 (Pneumovax) Adults 25-60 years old: 1-3 doses may be recommended based on certain risk factors  Adults 72 years old: 1-2 doses may be recommended based off what pneumonia vaccine you previously received   Hepatitis B Vaccine 3 dose series if at intermediate or high risk (ex: diabetes, end stage renal disease, liver disease)   Tetanus (Td) Vaccine - COST NOT COVERED BY MEDICARE PART B Following completion of primary series, a booster dose should be given every 10 years to maintain immunity against tetanus  Td may also be given as tetanus wound prophylaxis     Tdap Vaccine - COST NOT COVERED BY MEDICARE PART B Recommended at least once for all adults  For pregnant patients, recommended with each pregnancy  Shingles Vaccine (Shingrix) - COST NOT COVERED BY MEDICARE PART B  2 shot series recommended in those aged 48 and above     Health Maintenance Due:      Topic Date Due    Hepatitis C Screening  Never done    HIV Screening  Never done    Breast Cancer Screening: Mammogram  11/16/2022    Colorectal Cancer Screening  10/13/2025    Cervical Cancer Screening  07/08/2026     Immunizations Due:      Topic Date Due    Influenza Vaccine (1) 09/01/2022     Advance Directives   What are advance directives? Advance directives are legal documents that state your wishes and plans for medical care  These plans are made ahead of time in case you lose your ability to make decisions for yourself  Advance directives can apply to any medical decision, such as the treatments you want, and if you want to donate organs  What are the types of advance directives? There are many types of advance directives, and each state has rules about how to use them  You may choose a combination of any of the following:  · Living will: This is a written record of the treatment you want  You can also choose which treatments you do not want, which to limit, and which to stop at a certain time  This includes surgery, medicine, IV fluid, and tube feedings  · Durable power of  for healthcare [place] [facility]): This is a written record that states who you want to make healthcare choices for you when you are unable to make them for yourself  This person, called a proxy, is usually a family member or a friend  You may choose more than 1 proxy  · Do not resuscitate (DNR) order:  A DNR order is used in case your heart stops beating or you stop breathing  It is a request not to have certain forms of treatment, such as CPR  A DNR order may be included in other types of advance directives  · Medical directive:   This covers the care that you want if you are in a coma, near death, or unable to make decisions for yourself  You can list the treatments you want for each condition  Treatment may include pain medicine, surgery, blood transfusions, dialysis, IV or tube feedings, and a ventilator (breathing machine)  · Values history: This document has questions about your views, beliefs, and how you feel and think about life  This information can help others choose the care that you would choose  Why are advance directives important? An advance directive helps you control your care  Although spoken wishes may be used, it is better to have your wishes written down  Spoken wishes can be misunderstood, or not followed  Treatments may be given even if you do not want them  An advance directive may make it easier for your family to make difficult choices about your care  Weight Management   Why it is important to manage your weight:  Being overweight increases your risk of health conditions such as heart disease, high blood pressure, type 2 diabetes, and certain types of cancer  It can also increase your risk for osteoarthritis, sleep apnea, and other respiratory problems  Aim for a slow, steady weight loss  Even a small amount of weight loss can lower your risk of health problems  How to lose weight safely:  A safe and healthy way to lose weight is to eat fewer calories and get regular exercise  You can lose up about 1 pound a week by decreasing the number of calories you eat by 500 calories each day  Healthy meal plan for weight management:  A healthy meal plan includes a variety of foods, contains fewer calories, and helps you stay healthy  A healthy meal plan includes the following:  · Eat whole-grain foods more often  A healthy meal plan should contain fiber  Fiber is the part of grains, fruits, and vegetables that is not broken down by your body  Whole-grain foods are healthy and provide extra fiber in your diet   Some examples of whole-grain foods are whole-wheat breads and pastas, oatmeal, brown rice, and bulgur  · Eat a variety of vegetables every day  Include dark, leafy greens such as spinach, kale, royce greens, and mustard greens  Eat yellow and orange vegetables such as carrots, sweet potatoes, and winter squash  · Eat a variety of fruits every day  Choose fresh or canned fruit (canned in its own juice or light syrup) instead of juice  Fruit juice has very little or no fiber  · Eat low-fat dairy foods  Drink fat-free (skim) milk or 1% milk  Eat fat-free yogurt and low-fat cottage cheese  Try low-fat cheeses such as mozzarella and other reduced-fat cheeses  · Choose meat and other protein foods that are low in fat  Choose beans or other legumes such as split peas or lentils  Choose fish, skinless poultry (chicken or turkey), or lean cuts of red meat (beef or pork)  Before you cook meat or poultry, cut off any visible fat  · Use less fat and oil  Try baking foods instead of frying them  Add less fat, such as margarine, sour cream, regular salad dressing and mayonnaise to foods  Eat fewer high-fat foods  Some examples of high-fat foods include french fries, doughnuts, ice cream, and cakes  · Eat fewer sweets  Limit foods and drinks that are high in sugar  This includes candy, cookies, regular soda, and sweetened drinks  Exercise:  Exercise at least 30 minutes per day on most days of the week  Some examples of exercise include walking, biking, dancing, and swimming  You can also fit in more physical activity by taking the stairs instead of the elevator or parking farther away from stores  Ask your healthcare provider about the best exercise plan for you  © Copyright [employer] 2018 Information is for End User's use only and may not be sold, redistributed or otherwise used for commercial purposes   All illustrations and images included in CareNotes® are the copyrighted property of [employer]  or [address] [place] [address] [employer]

## 2022-09-22 ENCOUNTER — OFFICE VISIT (OUTPATIENT)
Dept: FAMILY MEDICINE CLINIC | Facility: HOSPITAL | Age: 57
End: 2022-09-22
Payer: MEDICARE

## 2022-09-22 VITALS
HEART RATE: 85 BPM | WEIGHT: 125.6 LBS | SYSTOLIC BLOOD PRESSURE: 138 MMHG | OXYGEN SATURATION: 94 % | HEIGHT: 56 IN | TEMPERATURE: 98.8 F | DIASTOLIC BLOOD PRESSURE: 94 MMHG | BODY MASS INDEX: 28.25 KG/M2

## 2022-09-22 DIAGNOSIS — S01.01XD LACERATION OF SCALP, SUBSEQUENT ENCOUNTER: Primary | ICD-10-CM

## 2022-09-22 PROBLEM — F70 MILD INTELLECTUAL DISABILITIES: Status: ACTIVE | Noted: 2022-09-22

## 2022-09-22 PROCEDURE — 99213 OFFICE O/P EST LOW 20 MIN: CPT | Performed by: NURSE PRACTITIONER

## 2022-09-22 RX ORDER — CALCIUM CARBONATE/VITAMIN D3 500MG-5MCG
TABLET ORAL
COMMUNITY
Start: 2022-09-21

## 2022-09-22 NOTE — PROGRESS NOTES
Suture removal    Date/Time: 9/22/2022 3:28 PM  Performed by: JESSE Smith  Authorized by: JESSE Smith   Universal Protocol:  Procedure performed by: (NP student)  Consent: Verbal consent obtained  Consent given by: patient  Timeout called at: 9/22/2022 3:28 PM   Patient understanding: patient states understanding of the procedure being performed        Patient location:  Clinic  Location:     Location:  [address] [facility] location:  Forehead  Procedure details: Tools used:  Scissors and tweezers    Wound appearance:  No sign(s) of infection, good wound healing and clean  Post-procedure details:     Post-removal:  No dressing applied    Patient tolerance of procedure:   Tolerated well, no immediate complications

## 2022-09-28 ENCOUNTER — SOCIAL WORK (OUTPATIENT)
Dept: BEHAVIORAL/MENTAL HEALTH CLINIC | Facility: CLINIC | Age: 57
End: 2022-09-28
Payer: MEDICARE

## 2022-09-28 DIAGNOSIS — F70 MILD INTELLECTUAL DISABILITIES: Primary | ICD-10-CM

## 2022-09-28 PROCEDURE — 90834 PSYTX W PT 45 MINUTES: CPT | Performed by: COUNSELOR

## 2022-09-28 NOTE — PSYCH
Psychotherapy Provided: Individual Psychotherapy 45 minutes     Length of time in session: 45 minutes, follow up in 2 months  Start time:  10:00am  End time:  10:45am    Encounter Diagnosis     ICD-10-CM    1  Mild intellectual disabilities  F70        Goals addressed in session: Goal 1     Pain:      none    0    Current suicide risk : Low     D:  Dustin Giordano was seen today in the office with [facility] staff "Natee Mom "  She was able to identify negative behaviors saying she stayed over night at her parents house last weekend and got up through the night because she couldn't sleep  She said she was "yelling and screaming" for her parents to get up out of bed and said "they got upset with me "  Dustin Giordano shared that she had a good paycheck from working at [employer] and said she knows she should continue to focus on her work and not "tease" others  A:  Dustin Giordano had full affect  No SI/HI/SA was reported  She sang her cat song which was "happy birthday" in our session that she plans to sing today to Ford Ho who is a housemate at [facility] where she lives  P:  Dustin Giordano said she wants to remember "the 5 C's" when she treats others poorly "caring, compassion   "  She also said praying, folding her hands helps her calm herself too  Behavioral Health Treatment Plan [facility] Pending sale to [facility]: Diagnosis and Treatment Plan explained to Teresa Chun relates understanding diagnosis and is agreeable to Treatment Plan   Yes

## 2022-10-01 DIAGNOSIS — K21.9 GASTROESOPHAGEAL REFLUX DISEASE: ICD-10-CM

## 2022-10-02 RX ORDER — PANTOPRAZOLE SODIUM 40 MG/1
TABLET, DELAYED RELEASE ORAL
Qty: 62 TABLET | Refills: 0 | Status: SHIPPED | OUTPATIENT
Start: 2022-10-02

## 2022-10-11 ENCOUNTER — OFFICE VISIT (OUTPATIENT)
Dept: OBGYN CLINIC | Facility: CLINIC | Age: 57
End: 2022-10-11
Payer: MEDICARE

## 2022-10-11 VITALS — HEIGHT: 56 IN | BODY MASS INDEX: 27.67 KG/M2 | WEIGHT: 123 LBS

## 2022-10-11 DIAGNOSIS — M19.012 PRIMARY OSTEOARTHRITIS OF LEFT SHOULDER: Primary | ICD-10-CM

## 2022-10-11 PROCEDURE — 20610 DRAIN/INJ JOINT/BURSA W/O US: CPT | Performed by: ORTHOPAEDIC SURGERY

## 2022-10-11 PROCEDURE — 99213 OFFICE O/P EST LOW 20 MIN: CPT | Performed by: ORTHOPAEDIC SURGERY

## 2022-10-11 RX ORDER — BETAMETHASONE SODIUM PHOSPHATE AND BETAMETHASONE ACETATE 3; 3 MG/ML; MG/ML
6 INJECTION, SUSPENSION INTRA-ARTICULAR; INTRALESIONAL; INTRAMUSCULAR; SOFT TISSUE
Status: COMPLETED | OUTPATIENT
Start: 2022-10-11 | End: 2022-10-11

## 2022-10-11 RX ADMIN — BETAMETHASONE SODIUM PHOSPHATE AND BETAMETHASONE ACETATE 6 MG: 3; 3 INJECTION, SUSPENSION INTRA-ARTICULAR; INTRALESIONAL; INTRAMUSCULAR; SOFT TISSUE at 11:35

## 2022-10-11 NOTE — PROGRESS NOTES
Assessment:     1  Primary osteoarthritis of left shoulder        Plan:     Problem List Items Addressed This Visit        Musculoskeletal and Integument    Primary osteoarthritis of left shoulder - Primary     Findings consistent with severe left glenohumeral osteoarthritis  Findings and treatment options were discussed with the patient  Repeat cortisone injection was given to left glenohumeral joint today  She tolerated the procedure well  Advised to apply cold compress today  Avoid repetitive overhead activities with that arm  Discussed that she can have repeat cortisone injection as soon as 3-4 months  Follow-up as needed if symptoms return  All patient's questions were answered to their satisfaction  This note is created using dictation transcription  It may contain typographical errors, grammatical errors, improperly dictated words, background noise and other errors  Relevant Medications    betamethasone acetate-betamethasone sodium phosphate (CELESTONE) injection 6 mg (Completed)    Other Relevant Orders    Large joint arthrocentesis: L glenohumeral (Completed)          Subjective:     Patient ID: Lynda Crooks is a 65 y o  female  Chief Complaint:  65 yr old female following up for left shoulder glenohumeral osteoarthritis  She is resident at [facility], she is with one care giver and her mother  Last cortisone injection was given on 6/28/22  She gets good relief and recently pain returned  She would like to have a another cortisone injection today  She is not interested in any surgery      Allergy:  Allergies   Allergen Reactions   • Bactrim [Sulfamethoxazole-Trimethoprim]    • Methylphenidate Hyperactivity     Reaction Date: [id number];    • Sulfa Antibiotics    • Thioridazine    • Amphetamines    • Chlorpromazine    • Fexofenadine Hives     Reaction Date: [id number];    • Medrogestone    • Medroxyprogesterone Rash and Hives     Reaction Date: [id number];    • Other Hives     Mellaril, thorazine   • Trimethoprim      Medications:  all current active meds have been reviewed  Past Medical History:  Past Medical History:   Diagnosis Date   • Arthritis 11/18/2021    right shoulder   • Autism    • Breast cancer [facility])    • Breast cyst, left     last assessed 12/30/2013   • Cerebral palsy (HCC)    • Chronic GERD    • Depression    • Fibrocystic breast disease     last assessed 06/29/2012   • Gastrointestinal hemorrhage    • Glaucoma    • History of chemotherapy    • Hydrocephalus ([address] )      SHUNT IN PLACE   • Scoliosis      Past Surgical History:  Past Surgical History:   Procedure Laterality Date   • BREAST BIOPSY Right    • CATARACT EXTRACTION  06/14/2021   • COLONOSCOPY      Description 04/13/2016-5 yrs  Description 4 yr f/u d/t polyp and family history, onset 07/20/2012  • COLONOSCOPY     • ESOPHAGOGASTRODUODENOSCOPY      description-04/13/2016 gastritis with antral nodule   • EYE SURGERY      for relief of intraocular pressure   • INCISIONAL BREAST BIOPSY      description 2008   • MASTECTOMY Left    • SENTINEL LYMPH NODE BIOPSY     • STRABISMUS SURGERY      description 1973, 1974   • UPPER GASTROINTESTINAL ENDOSCOPY  05/16/2019    Grade C erosive esophagitis    7 mm nodule in the stomach     Family History:  Family History   Problem Relation Age of Onset   • Osteopenia Mother    • Supraventricular tachycardia Mother    • Heart attack Father 59        Acute MI   • Coronary artery disease Father    • Thyroid nodules Father    • Osteopenia Maternal Grandmother    • Prostate cancer Maternal Grandfather    • Leukemia Paternal Grandfather    • Heart attack Maternal Uncle 48        acute MI, stent   • Cancer Maternal Uncle         adenocarcinoma of oral cavity   • Colon cancer Maternal Uncle 62   • Hyperlipidemia Maternal Uncle    • Pancreatic cancer Paternal Uncle    • Prostate cancer Paternal Uncle    • Colon cancer Family    • Coronary artery disease Family    • Thyroid disease Family      Social History:  Social History     Substance and Sexual Activity   Alcohol Use No     Social History     Substance and Sexual Activity   Drug Use No     Social History     Tobacco Use   Smoking Status Never Smoker   Smokeless Tobacco Never Used     Review of Systems   Constitutional: Negative for chills and fever  HENT: Negative for ear pain and sore throat  Eyes: Negative for pain and visual disturbance  Respiratory: Negative for cough and shortness of breath  Cardiovascular: Negative for chest pain and palpitations  Gastrointestinal: Negative for abdominal pain and vomiting  Genitourinary: Negative for dysuria and hematuria  Musculoskeletal: Positive for arthralgias (Left shoulder)  Negative for back pain and neck pain  Skin: Negative for color change and rash  Neurological: Negative for seizures and syncope  Psychiatric/Behavioral: Negative  All other systems reviewed and are negative  Objective:  BP Readings from Last 1 Encounters:   09/22/22 138/94      Wt Readings from Last 1 Encounters:   10/11/22 55 8 kg (123 lb)      BMI:   Estimated body mass index is 27 58 kg/m² as calculated from the following:    Height as of this encounter: 4' 8" (1 422 m)  Weight as of this encounter: 55 8 kg (123 lb)  BSA:   Estimated body surface area is 1 44 meters squared as calculated from the following:    Height as of this encounter: 4' 8" (1 422 m)  Weight as of this encounter: 55 8 kg (123 lb)  Physical Exam  Vitals and nursing note reviewed  Constitutional:       Appearance: Normal appearance  She is well-developed  HENT:      Head: Normocephalic and atraumatic  Right Ear: External ear normal       Left Ear: External ear normal    Eyes:      Extraocular Movements: Extraocular movements intact  Conjunctiva/sclera: Conjunctivae normal    Pulmonary:      Effort: Pulmonary effort is normal    Musculoskeletal:         General: Tenderness (left shoulder arthralgia ) present  Cervical back: Neck supple  Skin:     General: Skin is warm and dry  Neurological:      Mental Status: She is alert and oriented to person, place, and time  Deep Tendon Reflexes: Reflexes are normal and symmetric  Psychiatric:         Mood and Affect: Mood normal          Behavior: Behavior normal        Left Shoulder Exam     Tenderness   Left shoulder tenderness location: posterior     Range of Motion   Active abduction: 90 (pain)   Forward flexion: 90 (pain)   Internal rotation 0 degrees: abnormal     Muscle Strength   Abduction: 5/5     Tests   Drop arm: negative    Other   Erythema: absent  Scars: absent  Sensation: normal  Pulse: present             no new imaging to review      Large joint arthrocentesis: L glenohumeral  Universal Protocol:  Consent: Verbal consent obtained    Risks and benefits: risks, benefits and alternatives were discussed  Consent given by: patient  Patient understanding: patient states understanding of the procedure being performed  Site marked: the operative site was marked  Patient identity confirmed: verbally with patient    Supporting Documentation  Indications: pain   Procedure Details  Location: shoulder - L glenohumeral  Preparation: Patient was prepped and draped in the usual sterile fashion  Needle size: 22 G  Ultrasound guidance: no  Approach: posterolateral  Medications administered: 6 mg betamethasone acetate-betamethasone sodium phosphate 6 (3-3) mg/mL (5 ML 0 5% marcaine injection intra articular )    Patient tolerance: patient tolerated the procedure well with no immediate complications  Dressing:  Sterile dressing applied        Scribe Attestation    I,:  Luz Agrawal am acting as a scribe while in the presence of the attending physician :       I,:  Ronda Sanders MD personally performed the services described in this documentation    as scribed in my presence :

## 2022-10-11 NOTE — ASSESSMENT & PLAN NOTE
Findings consistent with severe left glenohumeral osteoarthritis  Findings and treatment options were discussed with the patient  Repeat cortisone injection was given to left glenohumeral joint today  She tolerated the procedure well  Advised to apply cold compress today  Avoid repetitive overhead activities with that arm  Discussed that she can have repeat cortisone injection as soon as 3-4 months  Follow-up as needed if symptoms return  All patient's questions were answered to their satisfaction  This note is created using dictation transcription  It may contain typographical errors, grammatical errors, improperly dictated words, background noise and other errors

## 2022-10-17 ENCOUNTER — HOSPITAL ENCOUNTER (OUTPATIENT)
Dept: BONE DENSITY | Facility: CLINIC | Age: 57
Discharge: HOME | End: 2022-10-17
Payer: MEDICARE

## 2022-10-17 DIAGNOSIS — M81.0 OSTEOPOROSIS, UNSPECIFIED OSTEOPOROSIS TYPE, UNSPECIFIED PATHOLOGICAL FRACTURE PRESENCE: ICD-10-CM

## 2022-10-17 DIAGNOSIS — Z79.811 USE OF ANASTROZOLE (ARIMIDEX): ICD-10-CM

## 2022-10-17 DIAGNOSIS — Z17.0 MALIGNANT NEOPLASM OF OVERLAPPING SITES OF RIGHT BREAST IN FEMALE, ESTROGEN RECEPTOR POSITIVE (HCC): ICD-10-CM

## 2022-10-17 DIAGNOSIS — C50.811 MALIGNANT NEOPLASM OF OVERLAPPING SITES OF RIGHT BREAST IN FEMALE, ESTROGEN RECEPTOR POSITIVE (HCC): ICD-10-CM

## 2022-10-17 PROCEDURE — 77080 DXA BONE DENSITY AXIAL: CPT

## 2022-11-09 ENCOUNTER — HOSPITAL ENCOUNTER (OUTPATIENT)
Dept: ULTRASOUND IMAGING | Facility: HOSPITAL | Age: 57
Discharge: HOME | End: 2022-11-09
Attending: INTERNAL MEDICINE

## 2022-11-09 DIAGNOSIS — K21.9 CHRONIC GERD: Primary | ICD-10-CM

## 2022-11-09 DIAGNOSIS — E04.2 MULTIPLE THYROID NODULES: ICD-10-CM

## 2022-11-09 RX ORDER — POLYETHYLENE GLYCOL 3350 17 G/17G
POWDER ORAL
Qty: 510 G | Refills: 0 | Status: SHIPPED | OUTPATIENT
Start: 2022-11-09

## 2022-11-10 LAB — HBA1C MFR BLD HPLC: 5 %

## 2022-11-14 ENCOUNTER — TELEPHONE (OUTPATIENT)
Dept: HEMATOLOGY ONCOLOGY | Facility: CLINIC | Age: 57
End: 2022-11-14

## 2022-11-14 ENCOUNTER — TELEPHONE (OUTPATIENT)
Dept: ENDOCRINOLOGY | Facility: CLINIC | Age: 57
End: 2022-11-14

## 2022-11-14 DIAGNOSIS — K21.9 GASTROESOPHAGEAL REFLUX DISEASE: ICD-10-CM

## 2022-11-14 RX ORDER — PANTOPRAZOLE SODIUM 40 MG/1
TABLET, DELAYED RELEASE ORAL
Qty: 60 TABLET | Refills: 0 | Status: SHIPPED | OUTPATIENT
Start: 2022-11-14

## 2022-11-14 NOTE — TELEPHONE ENCOUNTER
Appointment Confirmation (to confirm pre existing appointments - ONLY)  No need to route   Appointment with  Dr Zandra Cooks   Appointment date & time  1/11/23   Location [facility]   Patient verbilized Understanding  Yes

## 2022-11-17 ENCOUNTER — HOSPITAL ENCOUNTER (OUTPATIENT)
Dept: MAMMOGRAPHY | Facility: IMAGING CENTER | Age: 57
Discharge: HOME | End: 2022-11-17

## 2022-11-17 VITALS — HEIGHT: 56 IN | WEIGHT: 123 LBS | BODY MASS INDEX: 27.67 KG/M2

## 2022-11-17 DIAGNOSIS — Z12.31 SCREENING MAMMOGRAM, ENCOUNTER FOR: ICD-10-CM

## 2022-11-21 ENCOUNTER — OFFICE VISIT (OUTPATIENT)
Dept: ENDOCRINOLOGY | Facility: CLINIC | Age: 57
End: 2022-11-21

## 2022-11-21 VITALS
OXYGEN SATURATION: 96 % | BODY MASS INDEX: 27.17 KG/M2 | HEART RATE: 101 BPM | SYSTOLIC BLOOD PRESSURE: 126 MMHG | WEIGHT: 121.2 LBS | DIASTOLIC BLOOD PRESSURE: 70 MMHG

## 2022-11-21 DIAGNOSIS — M81.0 OSTEOPOROSIS, UNSPECIFIED OSTEOPOROSIS TYPE, UNSPECIFIED PATHOLOGICAL FRACTURE PRESENCE: ICD-10-CM

## 2022-11-21 DIAGNOSIS — E04.2 MULTIPLE THYROID NODULES: Primary | ICD-10-CM

## 2022-11-21 NOTE — PROGRESS NOTES
Geraldo Pugh 65 y o  female MRN: [id number]    Encounter: [id number]      Assessment/Plan     Problem List Items Addressed This Visit        Endocrine    Multiple thyroid nodules - Primary     Thyroid ultrasound reviewed with patient   Mother and caretaker  Left sided thyroid nodule has incraesed in size so will set up for sono guided FNAB left sided thyroid nodule with option for Afirma   Further management will depend on labs          Relevant Orders    US guided thyroid biopsy with [facility]       Musculoskeletal and Integument    Osteoporosis     CC:   Thyroid nodules     History of Present Illness     HPI:  56-year-old female with history of Dandy-Walker syndrome, congenital hydrocephalus, breast cancer, osteoporosis and thyroid nodule seen in follow-up  She is accompanied by her mother and care taker   She underwent fine-needle aspiration biopsy of bilateral thyroid nodules in 2016 which was negative malignancy  Currently denies any obstructive symptoms     History of breast cancer-status post mastectomy in 2018 was initially on tamoxifen then on Arimidex  For osteoporosis she was on oral bisphosphonates which was stopped in March by PCP and she was started on Prolia by Oncology-received 1st dose in June    Review of Systems    Historical Information   Past Medical History:   Diagnosis Date   • Arthritis 11/18/2021    right shoulder   • Autism    • Breast cancer [facility])    • Breast cyst, left     last assessed 12/30/2013   • Cerebral palsy (HCC)    • Chronic GERD    • Depression    • Fibrocystic breast disease     last assessed 06/29/2012   • Gastrointestinal hemorrhage    • Glaucoma    • History of chemotherapy    • Hydrocephalus ([address] )      SHUNT IN PLACE   • Scoliosis      Past Surgical History:   Procedure Laterality Date   • BREAST BIOPSY Right    • CATARACT EXTRACTION  06/14/2021   • COLONOSCOPY      Description 04/13/2016-5 yrs  Description 4 yr f/u d/t polyp and family history, onset 07/20/2012  • COLONOSCOPY     • ESOPHAGOGASTRODUODENOSCOPY      description-04/13/2016 gastritis with antral nodule   • EYE SURGERY      for relief of intraocular pressure   • INCISIONAL BREAST BIOPSY      description 2008   • MASTECTOMY Right    • SENTINEL LYMPH NODE BIOPSY     • STRABISMUS SURGERY      description 1973, 1974   • UPPER GASTROINTESTINAL ENDOSCOPY  05/16/2019    Grade C erosive esophagitis    7 mm nodule in the stomach     Social History   Social History     Substance and Sexual Activity   Alcohol Use No     Social History     Substance and Sexual Activity   Drug Use No     Social History     Tobacco Use   Smoking Status Never   Smokeless Tobacco Never     Family History:   Family History   Problem Relation Age of Onset   • Osteopenia Mother    • Supraventricular tachycardia Mother    • Heart attack Father 59        Acute MI   • Coronary artery disease Father    • Thyroid nodules Father    • Osteopenia Maternal Grandmother    • Prostate cancer Maternal Grandfather    • Leukemia Paternal Grandfather    • Heart attack Maternal Uncle 48        acute MI, stent   • Cancer Maternal Uncle         adenocarcinoma of oral cavity   • Colon cancer Maternal Uncle 62   • Hyperlipidemia Maternal Uncle    • Pancreatic cancer Paternal Uncle    • Prostate cancer Paternal Uncle    • Colon cancer Family    • Coronary artery disease Family    • Thyroid disease Family        Meds/Allergies   Current Outpatient Medications   Medication Sig Dispense Refill   • acetaminophen (TYLENOL) 500 mg tablet Take 500 mg by mouth as needed for mild pain       • anastrozole (ARIMIDEX) 1 mg tablet TAKE 1 TABLET BY MOUTH AT BEDTIME DX:BREAST CANCER PREVENTION (WEAR GLOVES WHEN ADMINISTERING) 90 tablet 3   • BANOPHEN 25 MG capsule Take 25 mg by mouth daily at bedtime as needed      • calamine lotion Apply topically every 4 (four) hours as needed for itching     • Calcium Carbonate-Vitamin D (OYSCO 500/D PO) Take by mouth daily     • carBAMazepine (TEGretol) 200 mg tablet Take 1 tablet (200 mg total) by mouth 2 (two) times a day     • Cholecalciferol (VITAMIN D) 2000 units tablet TAKE TWO TABLETS BY MOUTH (4000IU) EVERY MORNING FOR VITAMIN DIFICIENCY 62 tablet 0   • Dentifrices (SENSODYNE PRONAMEL DT) Apply to teeth      • Diclofenac Sodium (VOLTAREN) 1 %      • diphenhydrAMINE (BENADRYL) 25 mg tablet Take 25 mg by mouth daily at bedtime as needed for itching (for insomnia and allergy ** DO NOT TAKE WITH XYZAL **)      • docusate sodium (COLACE) 100 mg capsule Take 100 mg by mouth 3 (three) times a week      • haloperidol (HALDOL) 2 mg tablet Take 2 mg by mouth 2 (two) times a day 1 tab am and 2 tabs qhs     • hydrocortisone (ANUSOL-HC, PROCTOSOL HC,) 2 5 % rectal cream Insert into the rectum 2 (two) times a day (Patient taking differently: Insert into the rectum 2 (two) times a day as needed for hemorrhoids) 30 g 0   • ibuprofen (MOTRIN) 200 mg tablet Take 400 mg by mouth every 6 (six) hours as needed for mild pain       • latanoprost (XALATAN) 0 005 % ophthalmic solution Administer 1 drop to both eyes     • levocetirizine (XYZAL) 5 MG tablet TAKE ONE TABLET BY MOUTH EVERY DAY AS NEEDED FOR ALLERGIES *DO NOT TAKE WITH BENADRYL* 31 tablet 0   • lithium carbonate 300 mg capsule Take 600 mg by mouth 2 (two) times a day     • LORazepam (ATIVAN) 0 5 mg tablet Take 1 mg by mouth daily at bedtime     • melatonin 1 mg Take 5 mg by mouth daily at bedtime      • memantine (NAMENDA) 10 mg tablet Take 1 tablet (10 mg total) by mouth 2 (two) times a day 60 tablet 5   • mineral oil-white petrolatum (LUBRIFRESH P M ) ophthalmic ointment 0 5 inches      • neomycin-polymyxin b-bacitracin (NEOSPORIN) 3 5-400-5,000      • NON FORMULARY ACT FLUORIDE DENTAL RINSE   RINSE WITH 1 TBSP (15ML)2X A DAY     • NON FORMULARY TOLNAFTATE SPRAY 1% SPRAY  SPRAY BETWEEN TOES     • ondansetron (ZOFRAN) 4 mg tablet Take by mouth as needed       • OYSCO 500 + D 500-200 MG-UNIT per tablet      • pantoprazole (PROTONIX) 40 mg tablet TAKE ONE TABLET BY MOUTH 2 TIMES A DAY( MORNING/ BEDTIME) (ESOPHAGITIS) 60 tablet 0   • polyethylene glycol (GLYCOLAX) 17 GM/SCOOP MIX 1 CAPFUL (17 GRAMS) IN 4- 8OZ OF LIQUID AND DRINK BY MOUTH ONCE DAILY AS NEEDED FOR CONSTIPATION 510 g 0   • polyethylene glycol (MIRALAX) 17 g packet Take 17 g by mouth daily as needed     • PROCTOSOL HC 2 5 % rectal cream      • sodium chloride (TAMIKA 128) 5 % hypertonic ophthalmic solution Apply 1 drop to eye 2 (two) times a day       • sodium fluoride 0 275 (0 125 F) MG/DROP solution Take 275 mcg by mouth daily      • Sunscreens (SUNBLOCK LOTION SPF30 EX) Apply topically daily as needed     • talc (Zeasorb) Apply topically once as needed for irritation Sprinkle powder on affected area of skin to help with excess moisture up to 2 times a day as needed   • white petrolatum Apply topically once Apply to hand every day prn     • bromfenac sodium (Prolensa) 0 07 % SOLN Apply to eye 1 DROP IN RIGHT EYE IN THE MORNING (Patient not taking: Reported on 11/18/2021)     • Carboxymethylcell-Hypromellose 0 25-0 3 % GEL Apply to eye      • guaiFENesin (SILTUSSIN SA PO) Take by mouth Take 2 teaspoonful (10 ml) PRN 6 hrs for cough (Patient not taking: No sig reported)     • ketorolac (ACULAR) 0 5 % ophthalmic solution   (Patient not taking: No sig reported)     • loperamide (IMODIUM) 2 mg capsule Take by mouth  (Patient not taking: Reported on 5/4/2022)     • timolol (TIMOPTIC-XE) 0 5 % ophthalmic gel-forming Administer 1 drop into the left eye daily (Patient not taking: Reported on 5/4/2022)     • tolnaftate (TINACTIN) 1 % spray Apply topically (Patient not taking: Reported on 8/17/2022)       No current facility-administered medications for this visit       Allergies   Allergen Reactions   • Bactrim [Sulfamethoxazole-Trimethoprim]    • Methylphenidate Hyperactivity     Reaction Date: [id number];    • Sulfa Antibiotics    • Thioridazine    • Amphetamines • Chlorpromazine    • Fexofenadine Hives     Reaction Date: [id number];    • Medrogestone    • Medroxyprogesterone Rash and Hives     Reaction Date: [id number];    • Other Hives     Mellaril, thorazine   • Trimethoprim        Objective   Vitals: Blood pressure 126/70, pulse 101, weight 55 kg (121 lb 3 2 oz), SpO2 96 %, not currently breastfeeding  Physical Exam  Vitals reviewed  Constitutional:       General: She is not in acute distress  Appearance: Normal appearance  She is not ill-appearing, toxic-appearing or diaphoretic  HENT:      Head: Normocephalic and atraumatic  Eyes:      General: No scleral icterus  Extraocular Movements: Extraocular movements intact  Neck:      Comments: + thyromegaly  Cardiovascular:      Rate and Rhythm: Normal rate and regular rhythm  Pulmonary:      Effort: Pulmonary effort is normal  No respiratory distress  Breath sounds: Normal breath sounds  No wheezing or rales  Abdominal:      Palpations: Abdomen is soft  Musculoskeletal:      Cervical back: Neck supple  Right lower leg: No edema  Left lower leg: No edema  Lymphadenopathy:      Cervical: No cervical adenopathy  Skin:     General: Skin is warm and dry  Neurological:      General: No focal deficit present  Mental Status: She is alert  The history was obtained from the review of the chart, patient and family  Lab Results:     11/10/22- tsh 1 82     Imaging Studies:   Results for orders placed during the hospital encounter of 11/09/22     thyroid    Addendum 11/18/2022 10:37 AM  ADDENDUM:    Dominant right interpolar nodule and dominant left lower pole nodule were biopsied in 2016 with benign pathology  Right interpolar nodule is unchanged in size since 2016  Recommend continued annual surveillance  Left lower pole nodule has  significantly increased in size since 2016, repeat FNA is recommended      Impression  The following meet current ACR criteria for recommending ultrasound guided biopsy: Right mid gland nodule, left lower pole nodule  Reference: ACR Thyroid Imaging, Reporting and Data System (TI-RADS): White Paper of the [employer]  J AM Mariam Radiol 2237;00:[id number]  (additional recommendations based on American Thyroid Association 2015 guidelines )      The study was marked in EPIC for significant notification  Workstation performed: [id number]      I have personally reviewed pertinent reports  Portions of the record may have been created with voice recognition software  Occasional wrong word or "sound a like" substitutions may have occurred due to the inherent limitations of voice recognition software  Read the chart carefully and recognize, using context, where substitutions have occurred

## 2022-11-22 NOTE — ASSESSMENT & PLAN NOTE
Thyroid ultrasound reviewed with patient   Mother and caretaker  Left sided thyroid nodule has incraesed in size so will set up for sono guided FNAB left sided thyroid nodule with option for Afirma     Further management will depend on labs

## 2022-11-23 DIAGNOSIS — Z79.811 USE OF ANASTROZOLE (ARIMIDEX): ICD-10-CM

## 2022-11-23 DIAGNOSIS — C50.811 MALIGNANT NEOPLASM OF OVERLAPPING SITES OF RIGHT BREAST IN FEMALE, ESTROGEN RECEPTOR POSITIVE (HCC): ICD-10-CM

## 2022-11-23 DIAGNOSIS — M81.0 OSTEOPOROSIS, UNSPECIFIED OSTEOPOROSIS TYPE, UNSPECIFIED PATHOLOGICAL FRACTURE PRESENCE: Primary | ICD-10-CM

## 2022-11-23 DIAGNOSIS — Z17.0 MALIGNANT NEOPLASM OF OVERLAPPING SITES OF RIGHT BREAST IN FEMALE, ESTROGEN RECEPTOR POSITIVE (HCC): ICD-10-CM

## 2022-11-29 ENCOUNTER — SOCIAL WORK (OUTPATIENT)
Dept: BEHAVIORAL/MENTAL HEALTH CLINIC | Facility: CLINIC | Age: 57
End: 2022-11-29

## 2022-11-29 DIAGNOSIS — F70 MILD INTELLECTUAL DISABILITIES: Primary | ICD-10-CM

## 2022-11-29 NOTE — BH TREATMENT PLAN
Ronnell Alexander  1965       Date of Initial Treatment Plan: 11/29/22   Date of Current Treatment Plan: 11/29/22    Treatment Plan Number 1     Strengths/Personal Resources for Self Care: Helpful and offers help to others  Practices good hygiene independently  Shows happy emotions to others  Diagnosis:   1  Mild intellectual disabilities            Area of Needs: Have good behaviors at home and at [facility] [address] Term Goal 1: Be nice to other people    Target Date: 5-29-23  Completion Date: TBD         Short Term Objectives for Goal 1: Think first before I speak to others and how others feel    Long Term Goal 2: Think about others and what they want too and compromise about things  Target Date: 5-28-23  Completion Date: TBD    Short Term Objectives for Goal 2:  Learn to share more and be more considerate of other people's feelings  Long Term Goal # 3:  Don't wrongly accuse others      Target Date: 5-28-23  Completion Date: TBD    Short Term Objectives for Goal 3: Be more mindful and think before I speak  GOAL 1: Modality: Individual 1x per month   Completion Date TBD    GOAL 2: Modality: Individual 1x per month   Completion Date TBD     GOAL 3: Modality: Individual 1x per month   Completion Date TBD      Behavioral Health Treatment Plan [facility]: Diagnosis and Treatment Plan explained to Alexander Familia relates understanding diagnosis and is agreeable to Treatment Plan

## 2022-12-02 ENCOUNTER — HOSPITAL ENCOUNTER (OUTPATIENT)
Dept: INFUSION CENTER | Facility: HOSPITAL | Age: 57
End: 2022-12-02
Attending: INTERNAL MEDICINE

## 2022-12-05 NOTE — PROGRESS NOTES
Hematology/Oncology Outpatient Follow- up Note  Deondre Valdivia, 1965, [id number]  2022        Chief Complaint   Patient presents with   • Follow-up       HPI:  Deondre Valdivia is a 64 yo autistic female with a history of cerebral palsy and stage IA ER/KS positive, HER2 positive breast cancer s/p right mastectomy in   She was treated adjuvant chemotherapy with [facility] followed by Herceptin for one year, completed in 2015  She was then started on Tamoxifen, this was discontinued in 2018 when she was noted to be post-menopause  She was started on Arimidex and is currently maintained on 1 mg daily with goal to complete total of 10 years of therapy (combination of tamoxifen and Arimidex)      She also has a history of iron deficiency anemia and has been treated with IV Venofer in the past        Previous Hematologic/ Oncologic History:    Oncology History   Malignant neoplasm of overlapping sites of right breast in female, estrogen receptor positive ([facility] [address] )    Surgery    Right breast mastectomy   Invasive breast carcinoma  Grade 2  2 foci:    KS 60/90  HER2 0/3+  Lymph nodes negative  Stage IA  Dr Jessica David     2014 - 2015 Chemotherapy    TCH x 6 cycles  1 year of Herceptin  Dr Dexter Bamberger     2015 -  Hormone Therapy    Tamoxifen daily until 2018  Arimidex 1 mg daily since   Dr Dexter Bamberger     Patient is status post a mastectomy on the right side  Chemotherapy with TCH  She had 6 cycles  Her treatment started in 2014  Then got one year of Herceptin which ended in 2015     Tamoxifen started in  which was then converted to Arimidex in      Venofer 200mg IV weekly x 6 doses    Current Hematologic/ Oncologic Treatment:    Arimidex 1 milligram daily  Prolia every 6 months    ECO - Symptomatic but completely ambulatory    Interval History:   The patient presents for routine follow up along with her mother and personal care nurse    She was last seen on 2022  Mammogram on 11/17/2022 did show some abnormality in the left breath most consistent with fat necrosis  She is scheduled for left breast ultrasound on 12/15 for further evaluation  DEXA scan completed on 10/17/2022 was consistent with osteopenia, this is improved from prior DEXA scan completed in 2020  She is receiving Prolia every 6 months    Patient is in good spirits today  She denies any pain or concerning symptoms  Most recent blood work is in normal limits  No evidence of anemia  She is continue to take Arimidex daily without reported side effects  Cancer Staging:  Cancer Staging  No matching staging information was found for the patient  Molecular Testing:         Test Results:    Imaging: No results found  Labs:   Lab Results   Component Value Date    WBC 9 56 01/20/2022    HGB 12 9 01/20/2022    HCT 45 2 01/20/2022    MCV 97 01/20/2022     01/20/2022     Lab Results   Component Value Date     (L) 12/18/2013    K 3 8 11/16/2021     11/16/2021    CO2 28 11/16/2021    ANIONGAP 5 12/18/2013    BUN 9 11/16/2021    CREATININE 0 62 11/16/2021    GLUCOSE 127 12/18/2013    GLUF 97 11/16/2021    CALCIUM 8 8 11/16/2021    CORRECTEDCA 9 5 11/16/2021    AST 10 11/16/2021    ALT 18 11/16/2021    ALKPHOS 75 11/16/2021    EGFR 102 11/16/2021           Review of Systems   Musculoskeletal: Positive for arthralgias (Shoulder)  All other systems reviewed and are negative          Active Problems:   Patient Active Problem List   Diagnosis   • Vitamin D deficiency   • Scoliosis   • Retinal detachment   • Obsessive compulsive disorder   • Multiple thyroid nodules   • Mild intellectual disability   • Malignant neoplasm of overlapping sites of right breast in female, estrogen receptor positive ([address] )   • Legal blindness [place]   • Irregular menstrual cycle   • Internal hemorrhoids   • Impulse control disorder   • Glaucoma   • Dyslipidemia   • Depression, recurrent ([address] )   • 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[address] )   • Chronic gingivitis   • Cerebral palsy (HCC)   • Benign neoplasm of large intestine   • Autistic disorder   • Anxiety   • Other long term (current) drug therapy   • Hyperglycemia   • Screening for colorectal cancer   • Chronic GERD   • Iron deficiency anemia, unspecified   • Use of anastrozole (Arimidex)   • Family history of colonic polyps   • Osteoporosis   • Ambulatory dysfunction   • Primary osteoarthritis of left shoulder   • Other specified anemias   • Mild intellectual disabilities       Past Medical History:   Past Medical History:   Diagnosis Date   • Arthritis 11/18/2021    right shoulder   • Autism    • Breast cancer ([facility] [address] )    • Breast cyst, left     last assessed 12/30/2013   • Cerebral palsy (HCC)    • Chronic GERD    • Depression    • Fibrocystic breast disease     last assessed 06/29/2012   • Gastrointestinal hemorrhage    • Glaucoma    • History of chemotherapy    • Hydrocephalus ([facility] [address] )      SHUNT IN PLACE   • Scoliosis        Surgical History:   Past Surgical History:   Procedure Laterality Date   • BREAST BIOPSY Right    • CATARACT EXTRACTION  06/14/2021   • COLONOSCOPY      Description 04/13/2016-5 yrs  Description 4 yr f/u d/t polyp and family history, onset 07/20/2012  • COLONOSCOPY     • ESOPHAGOGASTRODUODENOSCOPY      description-04/13/2016 gastritis with antral nodule   • EYE SURGERY      for relief of intraocular pressure   • INCISIONAL BREAST BIOPSY      description 2008   • MASTECTOMY Right    • SENTINEL LYMPH NODE BIOPSY     • STRABISMUS SURGERY      description 1973, 1974   • UPPER GASTROINTESTINAL ENDOSCOPY  05/16/2019    Grade C erosive esophagitis    7 mm nodule in the stomach       Family History:    Family History   Problem Relation Age of Onset   • Osteopenia Mother    • Supraventricular tachycardia Mother    • Heart attack Father 59        Acute MI   • Coronary artery disease Father    • Thyroid nodules Father    • Osteopenia Maternal Grandmother    • Prostate cancer Maternal Grandfather    • Leukemia Paternal Grandfather    • Heart attack Maternal Uncle 48        acute MI, stent   • Cancer Maternal Uncle         adenocarcinoma of oral cavity   • Colon cancer Maternal Uncle 62   • Hyperlipidemia Maternal Uncle    • Pancreatic cancer Paternal Uncle    • Prostate cancer Paternal Uncle    • Colon cancer Family    • Coronary artery disease Family    • Thyroid disease Family        Cancer-related family history includes Cancer in her maternal uncle; Colon cancer in her family; Colon cancer (age of onset: 62) in her maternal uncle; Pancreatic cancer in her paternal uncle; Prostate cancer in her maternal grandfather and paternal uncle  Social History:   Social History     Socioeconomic History   • Marital status: Single     Spouse name: Not on file   • Number of children: Not on file   • Years of education: Not on file   • Highest education level: Not on file   Occupational History   • Not on file   Tobacco Use   • Smoking status: Never   • Smokeless tobacco: Never   Vaping Use   • Vaping Use: Never used   Substance and Sexual Activity   • Alcohol use: No   • Drug use: No   • Sexual activity: Not on file   Other Topics Concern   • Not on file   Social History Narrative    Person living in a residential institution    Uses safety equipment-seat belts     Social Determinants of Health     Financial Resource Strain: Low Risk    • Difficulty of Paying Living Expenses: Not hard at all   Food Insecurity: Not on file   Transportation Needs: No Transportation Needs   • Lack of Transportation (Medical): No   • Lack of Transportation (Non-Medical):  No   Physical Activity: Not on file   Stress: Not on file   Social Connections: Not on file   Intimate Partner Violence: Not on file   Housing Stability: Not on file       Current Medications:   Current Outpatient Medications   Medication Sig Dispense Refill   • acetaminophen (TYLENOL) 500 mg tablet Take 500 mg by mouth as needed for mild pain       • anastrozole (ARIMIDEX) 1 mg tablet Take 1 tablet (1 mg total) by mouth daily 90 tablet 3   • BANOPHEN 25 MG capsule Take 25 mg by mouth daily at bedtime as needed      • calamine lotion Apply topically every 4 (four) hours as needed for itching     • Calcium Carbonate-Vitamin D (OYSCO 500/D PO) Take by mouth daily     • carBAMazepine (TEGretol) 200 mg tablet Take 1 tablet (200 mg total) by mouth 2 (two) times a day     • Carboxymethylcell-Hypromellose 0 25-0 3 % GEL Apply to eye      • Cholecalciferol (VITAMIN D) 2000 units tablet TAKE TWO TABLETS BY MOUTH (4000IU) EVERY MORNING FOR VITAMIN DIFICIENCY 62 tablet 0   • Dentifrices (SENSODYNE PRONAMEL DT) Apply to teeth      • Diclofenac Sodium (VOLTAREN) 1 %      • diphenhydrAMINE (BENADRYL) 25 mg tablet Take 25 mg by mouth daily at bedtime as needed for itching (for insomnia and allergy ** DO NOT TAKE WITH XYZAL **)      • docusate sodium (COLACE) 100 mg capsule Take 100 mg by mouth 3 (three) times a week      • haloperidol (HALDOL) 2 mg tablet Take 2 mg by mouth 2 (two) times a day 1 tab am and 2 tabs qhs     • hydrocortisone (ANUSOL-HC, PROCTOSOL HC,) 2 5 % rectal cream Insert into the rectum 2 (two) times a day (Patient taking differently: Insert into the rectum 2 (two) times a day as needed for hemorrhoids) 30 g 0   • ibuprofen (MOTRIN) 200 mg tablet Take 400 mg by mouth every 6 (six) hours as needed for mild pain       • ketorolac (ACULAR) 0 5 % ophthalmic solution       • latanoprost (XALATAN) 0 005 % ophthalmic solution Administer 1 drop to both eyes     • levocetirizine (XYZAL) 5 MG tablet TAKE ONE TABLET BY MOUTH EVERY DAY AS NEEDED FOR ALLERGIES *DO NOT TAKE WITH BENADRYL* 31 tablet 0   • lithium carbonate 300 mg capsule Take 600 mg by mouth 2 (two) times a day     • LORazepam (ATIVAN) 0 5 mg tablet Take 1 mg by mouth daily at bedtime     • melatonin 1 mg Take 5 mg by mouth daily at bedtime      • memantine (NAMENDA) 10 mg tablet Take 1 tablet (10 mg total) by mouth 2 (two) times a day 60 tablet 5   • mineral oil-white petrolatum (LUBRIFRESH P M ) ophthalmic ointment 0 5 inches      • neomycin-polymyxin b-bacitracin (NEOSPORIN) 3 5-400-5,000      • NON FORMULARY ACT FLUORIDE DENTAL RINSE   RINSE WITH 1 TBSP (15ML)2X A DAY     • NON FORMULARY TOLNAFTATE SPRAY 1% SPRAY  SPRAY BETWEEN TOES     • ondansetron (ZOFRAN) 4 mg tablet Take by mouth as needed       • OYSCO 500 + D 500-200 MG-UNIT per tablet      • pantoprazole (PROTONIX) 40 mg tablet TAKE ONE TABLET BY MOUTH 2 TIMES A DAY( MORNING/ BEDTIME) (ESOPHAGITIS) 60 tablet 0   • polyethylene glycol (GLYCOLAX) 17 GM/SCOOP MIX 1 CAPFUL (17 GRAMS) IN 4- 8OZ OF LIQUID AND DRINK BY MOUTH ONCE DAILY AS NEEDED FOR CONSTIPATION 510 g 0   • polyethylene glycol (MIRALAX) 17 g packet Take 17 g by mouth daily as needed     • PROCTOSOL HC 2 5 % rectal cream      • sodium chloride (TAMIKA 128) 5 % hypertonic ophthalmic solution Apply 1 drop to eye 2 (two) times a day       • sodium fluoride 0 275 (0 125 F) MG/DROP solution Take 275 mcg by mouth daily      • Sunscreens (SUNBLOCK LOTION SPF30 EX) Apply topically daily as needed     • talc (Zeasorb) Apply topically once as needed for irritation Sprinkle powder on affected area of skin to help with excess moisture up to 2 times a day as needed        • timolol (TIMOPTIC-XE) 0 5 % ophthalmic gel-forming Administer 1 drop into the left eye daily     • white petrolatum Apply topically once Apply to hand every day prn     • bromfenac sodium (Prolensa) 0 07 % SOLN Apply to eye 1 DROP IN RIGHT EYE IN THE MORNING (Patient not taking: Reported on 11/18/2021)     • guaiFENesin (SILTUSSIN SA PO) Take by mouth Take 2 teaspoonful (10 ml) PRN 6 hrs for cough (Patient not taking: Reported on 5/4/2022)     • loperamide (IMODIUM) 2 mg capsule Take by mouth  (Patient not taking: Reported on 5/4/2022)     • tolnaftate (TINACTIN) 1 % spray Apply topically (Patient not taking: Reported on 8/17/2022)       No current facility-administered medications for this visit  Allergies: Allergies   Allergen Reactions   • Bactrim [Sulfamethoxazole-Trimethoprim]    • Methylphenidate Hyperactivity     Reaction Date: [id number];    • Sulfa Antibiotics    • Thioridazine    • Amphetamines    • Chlorpromazine    • Fexofenadine Hives     Reaction Date: [id number];    • Medrogestone    • Medroxyprogesterone Rash and Hives     Reaction Date: [id number];    • Other Hives     Mellaril, thorazine   • Trimethoprim        Physical Exam:  /78 (BP Location: Left arm, Patient Position: Sitting, Cuff Size: Adult)   Pulse 80   Temp 98 4 °F (36 9 °C) (Tympanic)   Resp 16   Ht 4' 8" (1 422 m)   Wt 54 9 kg (121 lb)   SpO2 95%   BMI 27 13 kg/m²   Body surface area is 1 43 meters squared  Wt Readings from Last 3 Encounters:   12/07/22 54 9 kg (121 lb)   11/21/22 55 kg (121 lb 3 2 oz)   11/17/22 55 8 kg (123 lb)           Physical Exam  Constitutional:       General: She is not in acute distress  Appearance: Normal appearance  HENT:      Head: Normocephalic and atraumatic  Eyes:      General: No scleral icterus  Right eye: No discharge  Left eye: No discharge  Conjunctiva/sclera: Conjunctivae normal    Cardiovascular:      Rate and Rhythm: Normal rate and regular rhythm  Pulmonary:      Effort: Pulmonary effort is normal  No respiratory distress  Breath sounds: Normal breath sounds  Abdominal:      General: Bowel sounds are normal  There is no distension  Palpations: Abdomen is soft  There is no mass  Tenderness: There is no abdominal tenderness  Musculoskeletal:         General: Normal range of motion  Lymphadenopathy:      Cervical: No cervical adenopathy  Upper Body:      Right upper body: No supraclavicular, axillary or pectoral adenopathy  Left upper body: No supraclavicular, axillary or pectoral adenopathy  Skin:     General: Skin is warm and dry     Neurological: General: No focal deficit present  Mental Status: She is alert and oriented to person, place, and time  Mental status is at baseline  Psychiatric:         Mood and Affect: Mood normal          Behavior: Behavior normal          Assessment / Plan:    1  Malignant neoplasm of overlapping sites of right breast in female, estrogen receptor positive ([facility] )    2  Malignant neoplasm of right female breast, unspecified estrogen receptor status, unspecified site of breast ([facility] )    3  Use of anastrozole (Arimidex)      The patient is a 66 yo autistic female with a history of cerebral palsy and stage IA ER/MA positive, HER2 positive breast cancer s/p right mastectomy in 2013  She was treated adjuvant chemotherapy with [facility] followed by Herceptin for one year, completed in 2/2015  She was then started on Tamoxifen, this was discontinued in 2018 when she was noted to be post-menopause  She was started on Arimidex and is currently maintained on 1 mg daily with goal to complete total of 10 years of therapy (combination of tamoxifen and Arimidex)      She also has a history of iron deficiency anemia and has been treated with IV Venofer in the past   Most recent blood work demonstrates no evidence of anemia or iron deficiency  We no longer follow tumor marker as it has been unreliable in the past       Patient is tolerating Arimidex well and has no breast complaints  She had her yearly follow-up visit with Dr Chitra Fabian on 1/10/2022  Breast exam was benign  Most recent mammogram on 11/17/22 showed an oval mass in the left breast likely representing fat necrosis  She is scheduled for ultrasound of the breast on 12/15/22  Dexa scan on 10/17/22 was consistent with osteopenia  She is receiving Prolia every 6 months for osteoporosis prevention and is scheduled to receive this next week  She will continue on Arimidex without change  I will see her back in 6 months with repeat blood work    We will coordinate her Prolia shot with follow-up visit in 6 months  Patient, her mother, and nurse verbalized understanding and are in agreement with plan of care  They are instructed to call at any time with questions or concerns prior to the next visit    Goals and Barriers:  Current Goal:  Prolong Survival from breast cancer  Barriers: None  Patient's Capacity to Self Care:  Patient needs assistance to self care  Portions of the record may have been created with voice recognition software  Occasional wrong word or "sound a like" substitutions may have occurred due to the inherent limitations of voice recognition software  Read the chart carefully and recognize, using context, where substitutions have occurred

## 2022-12-07 ENCOUNTER — OFFICE VISIT (OUTPATIENT)
Dept: HEMATOLOGY ONCOLOGY | Facility: HOSPITAL | Age: 57
End: 2022-12-07

## 2022-12-07 VITALS
OXYGEN SATURATION: 95 % | RESPIRATION RATE: 16 BRPM | HEIGHT: 56 IN | HEART RATE: 80 BPM | DIASTOLIC BLOOD PRESSURE: 78 MMHG | WEIGHT: 121 LBS | SYSTOLIC BLOOD PRESSURE: 106 MMHG | BODY MASS INDEX: 27.22 KG/M2 | TEMPERATURE: 98.4 F

## 2022-12-07 DIAGNOSIS — Z17.0 MALIGNANT NEOPLASM OF OVERLAPPING SITES OF RIGHT BREAST IN FEMALE, ESTROGEN RECEPTOR POSITIVE (HCC): Primary | ICD-10-CM

## 2022-12-07 DIAGNOSIS — C50.811 MALIGNANT NEOPLASM OF OVERLAPPING SITES OF RIGHT BREAST IN FEMALE, ESTROGEN RECEPTOR POSITIVE (HCC): Primary | ICD-10-CM

## 2022-12-07 DIAGNOSIS — Z79.811 USE OF ANASTROZOLE (ARIMIDEX): ICD-10-CM

## 2022-12-07 DIAGNOSIS — C50.911 MALIGNANT NEOPLASM OF RIGHT FEMALE BREAST, UNSPECIFIED ESTROGEN RECEPTOR STATUS, UNSPECIFIED SITE OF BREAST (HCC): ICD-10-CM

## 2022-12-07 RX ORDER — ANASTROZOLE 1 MG/1
1 TABLET ORAL DAILY
Qty: 90 TABLET | Refills: 3 | Status: SHIPPED | OUTPATIENT
Start: 2022-12-07

## 2022-12-08 DIAGNOSIS — F70 MILD INTELLECTUAL DISABILITIES: Primary | ICD-10-CM

## 2022-12-09 RX ORDER — LORAZEPAM 1 MG/1
TABLET ORAL
Qty: 5 TABLET | Refills: 0 | Status: SHIPPED | OUTPATIENT
Start: 2022-12-09

## 2022-12-13 ENCOUNTER — HOSPITAL ENCOUNTER (OUTPATIENT)
Dept: ULTRASOUND IMAGING | Facility: HOSPITAL | Age: 57
Discharge: HOME | End: 2022-12-13
Attending: INTERNAL MEDICINE

## 2022-12-13 DIAGNOSIS — Z17.0 MALIGNANT NEOPLASM OF OVERLAPPING SITES OF RIGHT BREAST IN FEMALE, ESTROGEN RECEPTOR POSITIVE (HCC): ICD-10-CM

## 2022-12-13 DIAGNOSIS — C50.811 MALIGNANT NEOPLASM OF OVERLAPPING SITES OF RIGHT BREAST IN FEMALE, ESTROGEN RECEPTOR POSITIVE (HCC): ICD-10-CM

## 2022-12-13 DIAGNOSIS — M81.0 OSTEOPOROSIS, UNSPECIFIED OSTEOPOROSIS TYPE, UNSPECIFIED PATHOLOGICAL FRACTURE PRESENCE: Primary | ICD-10-CM

## 2022-12-13 DIAGNOSIS — E04.2 MULTIPLE THYROID NODULES: ICD-10-CM

## 2022-12-13 DIAGNOSIS — Z79.811 USE OF ANASTROZOLE (ARIMIDEX): ICD-10-CM

## 2022-12-13 RX ORDER — LIDOCAINE HYDROCHLORIDE 10 MG/ML
5 INJECTION, SOLUTION EPIDURAL; INFILTRATION; INTRACAUDAL; PERINEURAL ONCE
Status: COMPLETED | OUTPATIENT
Start: 2022-12-13 | End: 2022-12-13

## 2022-12-13 RX ADMIN — LIDOCAINE HYDROCHLORIDE 2.5 ML: 10 INJECTION, SOLUTION EPIDURAL; INFILTRATION; INTRACAUDAL; PERINEURAL at 14:30

## 2022-12-15 ENCOUNTER — TELEPHONE (OUTPATIENT)
Dept: ENDOCRINOLOGY | Facility: CLINIC | Age: 57
End: 2022-12-15

## 2022-12-15 NOTE — TELEPHONE ENCOUNTER
Called [facility] lab spoke with  Jamey Bedolla The case was sign out today the specimen (Afirma) it will be sent today or tomorrow , it will take up to 2 weeks for the results

## 2022-12-15 NOTE — TELEPHONE ENCOUNTER
----- Message from Ferny Hill MD sent at 12/15/2022 10:11 AM EST -----  I had ordered biopsy with Afirma - can you please check if Afirma was sent out ? I cant tell from report and it usually says it   If not then why not ?

## 2022-12-15 NOTE — RESULT ENCOUNTER NOTE
Please call the patient regarding biopsy - showed some atypia and slides are being sent out for further testing which can take up to 2 weeks -  results should be back before the f/u in evita

## 2022-12-16 ENCOUNTER — HOSPITAL ENCOUNTER (OUTPATIENT)
Dept: INFUSION CENTER | Facility: HOSPITAL | Age: 57
End: 2022-12-16
Attending: INTERNAL MEDICINE

## 2022-12-16 VITALS
RESPIRATION RATE: 18 BRPM | SYSTOLIC BLOOD PRESSURE: 147 MMHG | TEMPERATURE: 98.3 F | HEIGHT: 56 IN | DIASTOLIC BLOOD PRESSURE: 74 MMHG | HEART RATE: 85 BPM | BODY MASS INDEX: 26.83 KG/M2 | WEIGHT: 119.27 LBS | OXYGEN SATURATION: 96 %

## 2022-12-16 DIAGNOSIS — Z17.0 MALIGNANT NEOPLASM OF OVERLAPPING SITES OF RIGHT BREAST IN FEMALE, ESTROGEN RECEPTOR POSITIVE (HCC): ICD-10-CM

## 2022-12-16 DIAGNOSIS — M81.0 OSTEOPOROSIS, UNSPECIFIED OSTEOPOROSIS TYPE, UNSPECIFIED PATHOLOGICAL FRACTURE PRESENCE: ICD-10-CM

## 2022-12-16 DIAGNOSIS — Z79.811 USE OF ANASTROZOLE (ARIMIDEX): Primary | ICD-10-CM

## 2022-12-16 DIAGNOSIS — C50.811 MALIGNANT NEOPLASM OF OVERLAPPING SITES OF RIGHT BREAST IN FEMALE, ESTROGEN RECEPTOR POSITIVE (HCC): ICD-10-CM

## 2022-12-16 RX ADMIN — DENOSUMAB 60 MG: 60 INJECTION SUBCUTANEOUS at 09:29

## 2022-12-16 NOTE — PROGRESS NOTES
Pt received prolia injection today without difficulty  CrCl is wnl and pt is taking supplements as instructed  No recent dental work  Next appt scheduled  Pt left ambulatory with steady gait for d/c

## 2022-12-19 ENCOUNTER — SOCIAL WORK (OUTPATIENT)
Dept: BEHAVIORAL/MENTAL HEALTH CLINIC | Facility: CLINIC | Age: 57
End: 2022-12-19

## 2022-12-19 DIAGNOSIS — F70 MILD INTELLECTUAL DISABILITY: ICD-10-CM

## 2022-12-19 DIAGNOSIS — F42.9 OBSESSIVE-COMPULSIVE DISORDER, UNSPECIFIED TYPE: Primary | ICD-10-CM

## 2022-12-19 NOTE — PSYCH
Psychotherapy Provided: Individual Psychotherapy 45 minutes     Length of time in session: 45 minutes, follow up in 1 5 months  Encounter Diagnosis     ICD-10-CM    1  Obsessive-compulsive disorder, unspecified type  F42 9       2  Mild intellectual disability  F70           Goals addressed in session: Goal 1     Pain:      none    0    Current suicide risk : Low     D:   Dm Jefferson was present with [facility] home manager, [address] shared that Meche's behaviors are "getting worse "  KRISTAN said she doesn't know if it's because of the holiday coming and/or change to cold darker weather but "Dm Jefferson is rude, negative, bullying, engocentric and perseverating more on her appointments  Dm Jefferson has mild intellectual disabilities but said she "knows" she could do better and "I don't know why I keep doing negative things "    A:  Dm Jefferson had full affect  No SI/HI/SA  Dm Jefferson was able to identify her negative behaviors and said she wants to change  P:  We dicussed her behavioral specialist updating her plan as KRISTAN said it's not been updated for some time now  She said they will also talk to her psychiatrist about a possible medication adjustment but wants to wait till after the holiday to see if the holiday is causing some behavioral changes  P:  KRISTAN will talk to [facility] staff about a new behavioral plan for Dm Jefferson and have the BS work more closely with her  Behavioral Health Treatment Plan [facility]: Diagnosis and Treatment Plan explained to Rosa Eliazar relates understanding diagnosis and is agreeable to Treatment Plan   Yes     Visit start and stop times:    12/19/22  Start Time: 1000  Stop Time: 1045  Total Visit Time: 45 minutes

## 2022-12-22 ENCOUNTER — OFFICE VISIT (OUTPATIENT)
Dept: GASTROENTEROLOGY | Facility: CLINIC | Age: 57
End: 2022-12-22

## 2022-12-22 VITALS
HEIGHT: 56 IN | WEIGHT: 118 LBS | SYSTOLIC BLOOD PRESSURE: 116 MMHG | DIASTOLIC BLOOD PRESSURE: 72 MMHG | BODY MASS INDEX: 26.54 KG/M2

## 2022-12-22 DIAGNOSIS — D50.8 OTHER IRON DEFICIENCY ANEMIA: ICD-10-CM

## 2022-12-22 DIAGNOSIS — Z86.010 HISTORY OF COLON POLYPS: ICD-10-CM

## 2022-12-22 DIAGNOSIS — K44.9 HIATAL HERNIA: ICD-10-CM

## 2022-12-22 DIAGNOSIS — K21.9 GASTROESOPHAGEAL REFLUX DISEASE WITHOUT ESOPHAGITIS: Primary | ICD-10-CM

## 2022-12-22 NOTE — PROGRESS NOTES
[id number] - Outpatient Follow-up Note  Dusty Padron 65 y o  female MRN: [id number]  Encounter: [id number]    ASSESSMENT AND PLAN:      1  Gastroesophageal reflux disease without esophagitis  Appears to be asymptomatic  Previous EGD excluded Ugalde's  She has a known hiatal hernia continue Protonix    2  Other iron deficiency anemia  Reviewed blood work from November 2022 normal hemoglobin  3  Hiatal hernia  History of this    4  History of colon polyps   up-to-date on colonoscopy last done 2020      Followup Appointment: 1 year  ______________________________________________________________________    No chief complaint on file  HPI: Patient is seen in the office accompanied by caregiver from the home  She is doing well no GI symptoms no trouble swallowing weight is stable  Appetite appears to be good    Historical Information   Past Medical History:   Diagnosis Date   • Arthritis 11/18/2021    right shoulder   • Autism    • Breast cancer [facility])    • Breast cyst, left     last assessed 12/30/2013   • Cerebral palsy (HCC)    • Chronic GERD    • Depression    • Fibrocystic breast disease     last assessed 06/29/2012   • Gastrointestinal hemorrhage    • Glaucoma    • History of chemotherapy    • Hydrocephalus ([address] )      SHUNT IN [address]ACE   • Scoliosis      Past Surgical History:   Procedure Laterality Date   • BREAST BIOPSY Right    • CATARACT EXTRACTION  06/14/2021   • COLONOSCOPY      Description 04/13/2016-5 yrs  Description 4 yr f/u d/t polyp and family history, onset 07/20/2012     • COLONOSCOPY     • ESOPHAGOGASTRODUODENOSCOPY      description-04/13/2016 gastritis with antral nodule   • EYE SURGERY      for relief of intraocular pressure   • INCISIONAL BREAST BIOPSY      description 2008   • MASTECTOMY Right    • SENTINEL LYMPH NODE BIOPSY     • STRABISMUS SURGERY      description 1973, 1974   • UPPER GASTROINTESTINAL ENDOSCOPY  05/16/2019    Grade C erosive esophagitis    7 mm nodule in the stomach   • US GUIDED THYROID BIOPSY  12/13/2022     Social History     Substance and Sexual Activity   Alcohol Use No     Social History     Substance and Sexual Activity   Drug Use No     Social History     Tobacco Use   Smoking Status Never   Smokeless Tobacco Never     Family History   Problem Relation Age of Onset   • Osteopenia Mother    • Supraventricular tachycardia Mother    • Heart attack Father 59        Acute MI   • Coronary artery disease Father    • Thyroid nodules Father    • Osteopenia Maternal Grandmother    • Prostate cancer Maternal Grandfather    • Leukemia Paternal Grandfather    • Heart attack Maternal Uncle 48        acute MI, stent   • Cancer Maternal Uncle         adenocarcinoma of oral cavity   • Colon cancer Maternal Uncle 62   • Hyperlipidemia Maternal Uncle    • Pancreatic cancer Paternal Uncle    • Prostate cancer Paternal Uncle    • Colon cancer Family    • Coronary artery disease Family    • Thyroid disease Family          Current Outpatient Medications:   •  acetaminophen (TYLENOL) 500 mg tablet  •  anastrozole (ARIMIDEX) 1 mg tablet  •  BANOPHEN 25 MG capsule  •  calamine lotion  •  Calcium Carbonate-Vitamin D (OYSCO 500/D PO)  •  carBAMazepine (TEGretol) 200 mg tablet  •  Carboxymethylcell-Hypromellose 0 25-0 3 % GEL  •  Cholecalciferol (VITAMIN D) 2000 units tablet  •  Dentifrices (SENSODYNE PRONAMEL DT)  •  Diclofenac Sodium (VOLTAREN) 1 %  •  diphenhydrAMINE (BENADRYL) 25 mg tablet  •  docusate sodium (COLACE) 100 mg capsule  •  haloperidol (HALDOL) 2 mg tablet  •  hydrocortisone (ANUSOL-HC, PROCTOSOL HC,) 2 5 % rectal cream  •  ibuprofen (MOTRIN) 200 mg tablet  •  ketorolac (ACULAR) 0 5 % ophthalmic solution  •  latanoprost (XALATAN) 0 005 % ophthalmic solution  •  levocetirizine (XYZAL) 5 MG tablet  •  lithium carbonate 300 mg capsule  •  LORazepam (ATIVAN) 0 5 mg tablet  •  LORazepam (ATIVAN) 1 mg tablet  •  melatonin 1 mg  •  memantine (NAMENDA) 10 mg tablet  •  mineral oil-white petrolatum (LUBRIFRESH P M ) ophthalmic ointment  •  neomycin-polymyxin b-bacitracin (NEOSPORIN) 3 5-400-5,000  •  NON FORMULARY  •  NON FORMULARY  •  ondansetron (ZOFRAN) 4 mg tablet  •  OYSCO 500 + D 500-200 MG-UNIT per tablet  •  pantoprazole (PROTONIX) 40 mg tablet  •  polyethylene glycol (GLYCOLAX) 17 GM/SCOOP  •  polyethylene glycol (MIRALAX) 17 g packet  •  PROCTOSOL HC 2 5 % rectal cream  •  sodium chloride (TAMIKA 128) 5 % hypertonic ophthalmic solution  •  sodium fluoride 0 275 (0 125 F) MG/DROP solution  •  Sunscreens (SUNBLOCK LOTION SPF30 EX)  •  talc (Zeasorb)  •  timolol (TIMOPTIC-XE) 0 5 % ophthalmic gel-forming  •  white petrolatum  •  bromfenac sodium (Prolensa) 0 07 % SOLN  •  guaiFENesin (SILTUSSIN SA PO)  •  loperamide (IMODIUM) 2 mg capsule  •  tolnaftate (TINACTIN) 1 % spray  Allergies   Allergen Reactions   • Bactrim [Sulfamethoxazole-Trimethoprim]    • Methylphenidate Hyperactivity     Reaction Date: [id number];    • Sulfa Antibiotics    • Thioridazine    • Amphetamines    • Chlorpromazine    • Fexofenadine Hives     Reaction Date: [id number];    • Medrogestone    • Medroxyprogesterone Rash and Hives     Reaction Date: [id number];    • Other Hives     Mellaril, thorazine   • Trimethoprim      Reviewed medications and allergies and updated as indicated    PHYSICAL EXAM:    Blood pressure 116/72, height 4' 8" (1 422 m), weight 53 5 kg (118 lb), not currently breastfeeding  Body mass index is 26 46 kg/m²  General Appearance: NAD, cooperative, alert  Eyes: Anicteric, PERRLA, EOMI  ENT:  Normocephalic, atraumatic, normal mucosa  Neck:  Supple, symmetrical, trachea midline  Resp:  Clear to auscultation bilaterally; no rales, rhonchi or wheezing; respirations unlabored   CV:  S1 S2, Regular rate and rhythm; no murmur, rub, or gallop    GI:  Soft, non-tender, non-distended; normal bowel sounds; no masses, no organomegaly   Rectal: Deferred  Musculoskeletal: No cyanosis, clubbing or edema  Normal ROM  Skin:  No jaundice, rashes, or lesions   Heme/Lymph: No palpable cervical lymphadenopathy  Psych: Normal affect, good eye contact  Neuro: No gross deficits, AAOx3    Lab Results:   Lab Results   Component Value Date    WBC 9 56 01/20/2022    HGB 12 9 01/20/2022    HCT 45 2 01/20/2022    MCV 97 01/20/2022     01/20/2022     Lab Results   Component Value Date     (L) 12/18/2013    K 3 8 11/16/2021     11/16/2021    CO2 28 11/16/2021    ANIONGAP 5 12/18/2013    BUN 9 11/16/2021    CREATININE 0 62 11/16/2021    GLUCOSE 127 12/18/2013    GLUF 97 11/16/2021    CALCIUM 8 8 11/16/2021    CORRECTEDCA 9 5 11/16/2021    AST 10 11/16/2021    ALT 18 11/16/2021    ALKPHOS 75 11/16/2021    EGFR 102 11/16/2021     Lab Results   Component Value Date    IRON 13 (L) 11/16/2021    TIBC 550 (H) 11/16/2021     No results found for: LIPASE    Radiology Results:   [place] [facility] [place]: 12/13/2022  Narrative: ULTRASOUND-GUIDED THYROID BIOPSY WITH AFRIMA HISTORY: 65year-old with history of recent ultrasound demonstrating thyroid nodules  Patient presents with prescription for ultrasound-guided thyroid nodule biopsy with Afrima sampling  COMPARISON: Multiple ultrasound thyroid studies most recent 11/9/2022  Ultrasound guided thyroid nodule biopsy 1/15/2016  FINDINGS: Prior ultrasound images were reviewed  As per order the previous described Nodule #4  Image 40 series 1  LEFT lower pole nodule measuring 3 7 x 2 9 x 2 7  was targeted for today's biopsy  The procedure was explained to the patient, including risks of hemorrhage, infection and local injury  Informed consent was freely obtained  The patient verbalized expressed understanding of the above risks and wished to proceed with the procedure  Final standard "time-out" procedure performed  PROCEDURE: The neck was prepped and draped in normal sterile fashion   Under real-time ultrasound guidance and local anesthesia 5 passes with a 25 gauge needle were made through the left lower pole thyroid nodule today measuring 2 6 x 2 0 x 2 9 cm  Cytopathology was present and deemed the specimens adequate for evaluation  The patient tolerated the procedure well  Postprocedure instructions were provided for the patient  I asked the patient to call us with any questions, concerns, or acute problems  The patient expressed understanding of the above  Impression: Status post successful ultrasound-guided thyroid biopsy  Procedure was performed by Yash Rivera, JEMIMA BROWNE under the direct supervision of Dr Shwetha Lea   Workstation performed: [id number]

## 2023-01-05 ENCOUNTER — HOSPITAL ENCOUNTER (OUTPATIENT)
Dept: ULTRASOUND IMAGING | Facility: CLINIC | Age: 58
Discharge: HOME | End: 2023-01-05

## 2023-01-05 DIAGNOSIS — R92.8 ABNORMAL MAMMOGRAM: ICD-10-CM

## 2023-01-09 ENCOUNTER — TELEPHONE (OUTPATIENT)
Dept: HEMATOLOGY ONCOLOGY | Facility: CLINIC | Age: 58
End: 2023-01-09

## 2023-01-09 NOTE — TELEPHONE ENCOUNTER
LM for patient at the [facility] regarding her 1 yr f/u appointment with Dr Meliton Walden scheduled for 1/11/23  Dr Meliton Walden is no longer seeing patients for 1 yr f/u appointments and is referring patient to see Ata Angeles or Ijeoma Alford  Asked office to call back to reschedule appointment

## 2023-01-10 ENCOUNTER — OFFICE VISIT (OUTPATIENT)
Dept: ENDOCRINOLOGY | Facility: CLINIC | Age: 58
End: 2023-01-10

## 2023-01-10 ENCOUNTER — OFFICE VISIT (OUTPATIENT)
Dept: OBGYN CLINIC | Facility: CLINIC | Age: 58
End: 2023-01-10

## 2023-01-10 ENCOUNTER — TELEPHONE (OUTPATIENT)
Dept: HEMATOLOGY ONCOLOGY | Facility: CLINIC | Age: 58
End: 2023-01-10

## 2023-01-10 VITALS
WEIGHT: 113 LBS | SYSTOLIC BLOOD PRESSURE: 130 MMHG | DIASTOLIC BLOOD PRESSURE: 82 MMHG | HEIGHT: 56 IN | BODY MASS INDEX: 25.42 KG/M2

## 2023-01-10 VITALS
OXYGEN SATURATION: 97 % | HEIGHT: 56 IN | WEIGHT: 119.2 LBS | DIASTOLIC BLOOD PRESSURE: 68 MMHG | SYSTOLIC BLOOD PRESSURE: 128 MMHG | HEART RATE: 86 BPM | BODY MASS INDEX: 26.82 KG/M2

## 2023-01-10 DIAGNOSIS — E04.2 MULTIPLE THYROID NODULES: Primary | ICD-10-CM

## 2023-01-10 DIAGNOSIS — M19.012 PRIMARY OSTEOARTHRITIS OF LEFT SHOULDER: Primary | ICD-10-CM

## 2023-01-10 RX ORDER — BETAMETHASONE SODIUM PHOSPHATE AND BETAMETHASONE ACETATE 3; 3 MG/ML; MG/ML
6 INJECTION, SUSPENSION INTRA-ARTICULAR; INTRALESIONAL; INTRAMUSCULAR; SOFT TISSUE
Status: COMPLETED | OUTPATIENT
Start: 2023-01-10 | End: 2023-01-10

## 2023-01-10 RX ADMIN — BETAMETHASONE SODIUM PHOSPHATE AND BETAMETHASONE ACETATE 6 MG: 3; 3 INJECTION, SUSPENSION INTRA-ARTICULAR; INTRALESIONAL; INTRAMUSCULAR; SOFT TISSUE at 11:49

## 2023-01-10 NOTE — PROGRESS NOTES
Established Patient Progress Note       CC: Multiple thyroid nodules    History of Present Illness:     Viviane Wyatt is a 65 y o  female with a history of Dandy-Walker syndrome, congenital hydrocephalus, breast cancer, osteoporosis, and thyroid nodule seen in follow up  She is accompanied by her mother and nurse  In 2016, she underwent fine-needle aspiration biopsy of bilateral thyroid nodules which was negative for malignancy  In December 2022, she underwent fine-needle aspiration biopsy of thyroid nodules which was benign on AFIRMA  Currently denies obstructive symptoms  Of note, she has a history of breast cancer and is s/p mastectomy in 2018 currently on arimidex, initially on tamoxifen  Osteoporosis managed by Oncology  Oral bisphosphonates stopped in March and started on Prolia in June 2022       Patient Active Problem List   Diagnosis   • Vitamin D deficiency   • Scoliosis   • Retinal detachment   • Obsessive compulsive disorder   • Multiple thyroid nodules   • Mild intellectual disability   • Malignant neoplasm of overlapping sites of right breast in female, estrogen receptor positive ([address] )   • Legal blindness [place]   • Irregular menstrual cycle   • Internal hemorrhoids   • Impulse control disorder   • Glaucoma   • Dyslipidemia   • Depression, recurrent ([address] )   • 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HCC)   • Chronic gingivitis   • Cerebral palsy ([address] )   • Benign neoplasm of large intestine   • Autistic disorder   • Anxiety   • Other long term (current) drug therapy   • Hyperglycemia   • Screening for colorectal cancer   • Chronic GERD   • Iron deficiency anemia, unspecified   • Use of anastrozole (Arimidex)   • Family history of colonic polyps   • Osteoporosis   • Ambulatory dysfunction   • Primary osteoarthritis of left shoulder   • Other specified anemias   • Mild intellectual disabilities      Past Medical History:   Diagnosis Date   • Arthritis 11/18/2021    right shoulder   • Autism    • Breast cancer ([address] ) • Breast cyst, left     last assessed 12/30/2013   • Cerebral palsy [facility])    • Chronic GERD    • Depression    • Fibrocystic breast disease     last assessed 06/29/2012   • Gastrointestinal hemorrhage    • Glaucoma    • History of chemotherapy    • Hydrocephalus ([address] )      SHUNT IN PLACE   • Scoliosis       Past Surgical History:   Procedure Laterality Date   • BREAST BIOPSY Right    • CATARACT EXTRACTION  06/14/2021   • COLONOSCOPY      Description 04/13/2016-5 yrs  Description 4 yr f/u d/t polyp and family history, onset 07/20/2012  • COLONOSCOPY     • ESOPHAGOGASTRODUODENOSCOPY      description-04/13/2016 gastritis with antral nodule   • EYE SURGERY      for relief of intraocular pressure   • INCISIONAL BREAST BIOPSY      description 2008   • MASTECTOMY Right    • SENTINEL LYMPH NODE BIOPSY     • STRABISMUS SURGERY      description 1973, 1974   • UPPER GASTROINTESTINAL ENDOSCOPY  05/16/2019    Grade C erosive esophagitis    7 mm nodule in the stomach   • US GUIDED THYROID BIOPSY  12/13/2022      Family History   Problem Relation Age of Onset   • Osteopenia Mother    • Supraventricular tachycardia Mother    • Heart attack Father 59        Acute MI   • Coronary artery disease Father    • Thyroid nodules Father    • Osteopenia Maternal Grandmother    • Prostate cancer Maternal Grandfather    • Leukemia Paternal Grandfather    • Heart attack Maternal Uncle 48        acute MI, stent   • Cancer Maternal Uncle         adenocarcinoma of oral cavity   • Colon cancer Maternal Uncle 62   • Hyperlipidemia Maternal Uncle    • Pancreatic cancer Paternal Uncle    • Prostate cancer Paternal Uncle    • Colon cancer Family    • Coronary artery disease Family    • Thyroid disease Family      Social History     Tobacco Use   • Smoking status: Never   • Smokeless tobacco: Never   Substance Use Topics   • Alcohol use: No     Allergies   Allergen Reactions   • Bactrim [Sulfamethoxazole-Trimethoprim]    • Methylphenidate Hyperactivity     Reaction Date: [id number];    • Sulfa Antibiotics    • Thioridazine    • Amphetamines    • Chlorpromazine    • Fexofenadine Hives     Reaction Date: [id number];    • Medrogestone    • Medroxyprogesterone Rash and Hives     Reaction Date: [id number];    • Other Hives     Mellaril, thorazine   • Trimethoprim        Current Outpatient Medications:   •  acetaminophen (TYLENOL) 500 mg tablet, Take 500 mg by mouth as needed for mild pain  , Disp: , Rfl:   •  anastrozole (ARIMIDEX) 1 mg tablet, Take 1 tablet (1 mg total) by mouth daily, Disp: 90 tablet, Rfl: 3  •  BANOPHEN 25 MG capsule, Take 25 mg by mouth daily at bedtime as needed , Disp: , Rfl:   •  calamine lotion, Apply topically every 4 (four) hours as needed for itching, Disp: , Rfl:   •  Calcium Carbonate-Vitamin D (OYSCO 500/D PO), Take by mouth daily, Disp: , Rfl:   •  carBAMazepine (TEGretol) 200 mg tablet, Take 1 tablet (200 mg total) by mouth 2 (two) times a day, Disp: , Rfl:   •  Carboxymethylcell-Hypromellose 0 25-0 3 % GEL, Apply to eye , Disp: , Rfl:   •  Cholecalciferol (VITAMIN D) 2000 units tablet, TAKE TWO TABLETS BY MOUTH (4000IU) EVERY MORNING FOR VITAMIN DIFICIENCY, Disp: 62 tablet, Rfl: 0  •  Dentifrices (SENSODYNE PRONAMEL DT), Apply to teeth , Disp: , Rfl:   •  Diclofenac Sodium (VOLTAREN) 1 %, , Disp: , Rfl:   •  diphenhydrAMINE (BENADRYL) 25 mg tablet, Take 25 mg by mouth daily at bedtime as needed for itching (for insomnia and allergy ** DO NOT TAKE WITH XYZAL **) , Disp: , Rfl:   •  docusate sodium (COLACE) 100 mg capsule, Take 100 mg by mouth 3 (three) times a week , Disp: , Rfl:   •  haloperidol (HALDOL) 2 mg tablet, Take 2 mg by mouth 2 (two) times a day 1 tab am and 2 tabs qhs, Disp: , Rfl:   •  hydrocortisone (ANUSOL-HC, PROCTOSOL HC,) 2 5 % rectal cream, Insert into the rectum 2 (two) times a day (Patient taking differently: Insert into the rectum 2 (two) times a day as needed for hemorrhoids), Disp: 30 g, Rfl: 0  • ibuprofen (MOTRIN) 200 mg tablet, Take 400 mg by mouth every 6 (six) hours as needed for mild pain , Disp: , Rfl:   •  ketorolac (ACULAR) 0 5 % ophthalmic solution,  , Disp: , Rfl:   •  latanoprost (XALATAN) 0 005 % ophthalmic solution, Administer 1 drop to both eyes, Disp: , Rfl:   •  levocetirizine (XYZAL) 5 MG tablet, TAKE ONE TABLET BY MOUTH EVERY DAY AS NEEDED FOR ALLERGIES *DO NOT TAKE WITH BENADRYL*, Disp: 31 tablet, Rfl: 0  •  lithium carbonate 300 mg capsule, Take 600 mg by mouth 2 (two) times a day, Disp: , Rfl:   •  LORazepam (ATIVAN) 0 5 mg tablet, Take 1 mg by mouth daily at bedtime, Disp: , Rfl:   •  LORazepam (ATIVAN) 1 mg tablet, TAKE ONE TABLET BY MOUTH 30 MINUTES PRIOR TO PROCEDURE (SUCH AS: GYN, DENTAL, IMAGING, SKIN PROCEDURES, SUCH AS MOLE REMOVAL) FOR ANXIETY, Disp: 5 tablet, Rfl: 0  •  melatonin 1 mg, Take 5 mg by mouth daily at bedtime , Disp: , Rfl:   •  memantine (NAMENDA) 10 mg tablet, Take 1 tablet (10 mg total) by mouth 2 (two) times a day, Disp: 60 tablet, Rfl: 5  •  mineral oil-white petrolatum (LUBRIFRESH P M ) ophthalmic ointment, 0 5 inches , Disp: , Rfl:   •  neomycin-polymyxin b-bacitracin (NEOSPORIN) 3 5-400-5,000, , Disp: , Rfl:   •  NON FORMULARY, ACT FLUORIDE DENTAL RINSE  RINSE WITH 1 TBSP (15ML)2X A DAY, Disp: , Rfl:   •  NON FORMULARY, TOLNAFTATE SPRAY 1% SPRAY SPRAY BETWEEN TOES, Disp: , Rfl:   •  ondansetron (ZOFRAN) 4 mg tablet, Take by mouth as needed  , Disp: , Rfl:   •  OYSCO 500 + D 500-200 MG-UNIT per tablet, , Disp: , Rfl:   •  pantoprazole (PROTONIX) 40 mg tablet, TAKE ONE TABLET BY MOUTH 2 TIMES A DAY( MORNING/ BEDTIME) (ESOPHAGITIS), Disp: 60 tablet, Rfl: 0  •  polyethylene glycol (GLYCOLAX) 17 GM/SCOOP, MIX 1 CAPFUL (17 GRAMS) IN 4- 8OZ OF LIQUID AND DRINK BY MOUTH ONCE DAILY AS NEEDED FOR CONSTIPATION, Disp: 510 g, Rfl: 0  •  polyethylene glycol (MIRALAX) 17 g packet, Take 17 g by mouth daily as needed, Disp: , Rfl:   •  PROCTOSOL HC 2 5 % rectal cream, , Disp: , Rfl:   •  sodium chloride (TAMIKA 128) 5 % hypertonic ophthalmic solution, Apply 1 drop to eye 2 (two) times a day  , Disp: , Rfl:   •  sodium fluoride 0 275 (0 125 F) MG/DROP solution, Take 275 mcg by mouth daily , Disp: , Rfl:   •  Sunscreens (SUNBLOCK LOTION SPF30 EX), Apply topically daily as needed, Disp: , Rfl:   •  talc (Zeasorb), Apply topically once as needed for irritation Sprinkle powder on affected area of skin to help with excess moisture up to 2 times a day as needed   , Disp: , Rfl:   •  timolol (TIMOPTIC-XE) 0 5 % ophthalmic gel-forming, Administer 1 drop into the left eye daily, Disp: , Rfl:   •  white petrolatum, Apply topically once Apply to hand every day prn, Disp: , Rfl:   •  bromfenac sodium (Prolensa) 0 07 % SOLN, Apply to eye 1 DROP IN RIGHT EYE IN THE MORNING (Patient not taking: Reported on 11/18/2021), Disp: , Rfl:   •  guaiFENesin (SILTUSSIN SA PO), Take by mouth Take 2 teaspoonful (10 ml) PRN 6 hrs for cough (Patient not taking: Reported on 5/4/2022), Disp: , Rfl:   •  loperamide (IMODIUM) 2 mg capsule, Take by mouth  (Patient not taking: Reported on 5/4/2022), Disp: , Rfl:   •  tolnaftate (TINACTIN) 1 % spray, Apply topically (Patient not taking: Reported on 8/17/2022), Disp: , Rfl:   No current facility-administered medications for this visit  Review of Systems   Unable to perform ROS: Other (ROS assisted by mother and nurse  Denies trouble swallowing, changes in voice, or difficulty breathing )       Physical Exam:  Body mass index is 26 72 kg/m²  /68 (BP Location: Left arm, Patient Position: Sitting, Cuff Size: Standard)   Pulse 86   Ht 4' 8" (1 422 m)   Wt 54 1 kg (119 lb 3 2 oz)   SpO2 97%   BMI 26 72 kg/m²    Wt Readings from Last 3 Encounters:   01/10/23 54 1 kg (119 lb 3 2 oz)   01/10/23 51 3 kg (113 lb)   12/22/22 53 5 kg (118 lb)       Physical Exam  Vitals reviewed  Constitutional:       Appearance: Normal appearance     Neck:      Comments: + thyromegaly  Cardiovascular:      Rate and Rhythm: Normal rate and regular rhythm  Pulses: Normal pulses  Heart sounds: Normal heart sounds  Pulmonary:      Effort: Pulmonary effort is normal    Skin:     General: Skin is warm and dry  Capillary Refill: Capillary refill takes less than 2 seconds  Neurological:      Mental Status: She is alert  Mental status is at baseline  Labs:       Lab Results   Component Value Date    CREATININE 0 62 11/16/2021    CREATININE 0 42 (L) 10/09/2020    CREATININE 0 59 (L) 12/18/2013    BUN 9 11/16/2021     (L) 12/18/2013    K 3 8 11/16/2021     11/16/2021    CO2 28 11/16/2021     eGFR   Date Value Ref Range Status   11/16/2021 102 ml/min/1 73sq m Final       No results found for: CHOL, HDL, TRIG, CHOLHDL    Lab Results   Component Value Date    ALT 18 11/16/2021    AST 10 11/16/2021    ALKPHOS 75 11/16/2021       No results found for: FREET4, TSI        Imaging:   THYROID ULTRASOUND 11/9/2022     INDICATION:    E04 2: Nontoxic multinodular goiter      COMPARISON:  11/4/2021     TECHNIQUE:   Ultrasound of the thyroid was performed with a high frequency linear transducer in transverse and sagittal planes including volumetric imaging sweeps as well as traditional still imaging technique      FINDINGS:  Normal homogeneous smooth echotexture  Reporting is performed using ACR thyroid imaging reporting and data system (TIRADS)  If multiple nodules are present, the four highest scoring nodules are reported       Right lobe: 5 3 x 2 6 x 2 5 cm  Volume 16 7 mL  Left lobe:  6 4 x 2 9 x 2 6 cm  Volume 22 5 mL  Isthmus: 0 5  cm      Nodule #1  Image 7 series 1  RIGHT midgland nodule measuring 2 9 x 2 1 x 2 4 cm  Given differences in measuring technique, no significant change from prior  COMPOSITION:  2 points, solid or almost completely solid   ECHOGENICITY:  1 point, hyperechoic or isoechoic  SHAPE:  0 points, wider-than-tall      MARGIN: 0 points, ill-defined  ECHOGENIC FOCI:  0 points, none or large comet-tail artifacts  TI-RADS Classification: TR 3 (3 points), FNA if >2 5 cm  Follow if >1 5 cm      Nodule #2  Image 11 series 52  RIGHT lower pole nodule measuring 1 8 x 1 3 x 1 4 cm  Unchanged from prior  COMPOSITION:  2 points, solid or almost completely solid   ECHOGENICITY:  2 points, hypoechoic  SHAPE:  0 points, wider-than-tall  MARGIN: 0 points, ill-defined  ECHOGENIC FOCI:  0 points, none or large comet-tail artifacts  TI-RADS Classification: TR 4 (4-6 points), FNA if > 1 5 cm  Follow if > 1cm      Nodule #3  Image 33 series 1  LEFT midgland nodule measuring 1 3 x 1 x 1 3 cm  COMPOSITION:  2 points, solid or almost completely solid   ECHOGENICITY:  2 points, hypoechoic  SHAPE:  0 points, wider-than-tall  MARGIN: 0 points, ill-defined  ECHOGENIC FOCI:  0 points, none or large comet-tail artifacts  TI-RADS Classification: TR 4 (4-6 points), FNA if > 1 5 cm  Follow if > 1cm      Nodule #4  Image 40 series 1  LEFT lower pole nodule measuring 3 7 x 2 9 x 2 7 cm  Unchanged from prior  COMPOSITION:  2 points, solid or almost completely solid   ECHOGENICITY:  1 point, hyperechoic or isoechoic  SHAPE:  3 points, taller-than-wide  MARGIN: 0 points, ill-defined  ECHOGENIC FOCI:  0 points, none or large comet-tail artifacts  TI-RADS Classification: TR 4 (4-6 points), FNA if > 1 5 cm  Follow if > 1cm      There are additional nodules of lesser size and/or TI-RADS score including a spongiform left interpolar nodule image 36 series 1 and mixed solid and cystic thyroid isthmus nodule image 26 series 1  At the time of follow-up for the above described   dominant nodules, these will be reevaluated in detailed at that time if needed       IMPRESSION:     The following meet current ACR criteria for recommending ultrasound guided biopsy: Right mid gland nodule, left lower pole nodule      Dominant right interpolar nodule and dominant left lower pole nodule were biopsied in 2016 with benign pathology  Right interpolar nodule is unchanged in size since 2016  Recommend continued annual surveillance  Left lower pole nodule has   significantly increased in size since 2016, repeat FNA is recommended  Impression & Plan:    Problem List Items Addressed This Visit        Endocrine    Multiple thyroid nodules - Primary     AFIRMA from ultrasound guided biopsy 12/2022 was benign  Repeat thyroid ultrasounds and thyroid function tests in one year  Follow up in one year  Relevant Orders    US thyroid    TSH, 3rd generation Lab Collect    T4, free- Lab Collect         Discussed with the patient and all questioned fully answered  She will call me if any problems arise  Follow-up appointment in 12 months       Counseled patient on diagnostic results, prognosis, risk and benefit of treatment options, instruction for management, importance of treatment compliance, Risk  factor reduction and impressions      JESSE Helms

## 2023-01-10 NOTE — ASSESSMENT & PLAN NOTE
Findings today are consistent withsevere left glenohumeral osteoarthritis   Imaging and prognosis was reviewed with the patient today  Cortisone steroid injection was administered today to left glenohumeral joint  Patient should avoid strenuous activities for the next 1-2 days  Patient should avoid vaccines for the next 2 weeks if possible  Can apply cold compress for soreness  If patient feels relief with the cortisone injections, procedure can be repeated every 3-4 months  Patient can follow up in 3-4 months as needed  All patient's questions were answered to their satisfaction  This note is created using dictation transcription  It may contain typographical errors, grammatical errors, improperly dictated words, background noise and other errors

## 2023-01-10 NOTE — ASSESSMENT & PLAN NOTE
AFIRMA from ultrasound guided biopsy 12/2022 was benign  Repeat thyroid ultrasounds and thyroid function tests in one year  Follow up in one year

## 2023-01-10 NOTE — TELEPHONE ENCOUNTER
Appointment Cancellation Or Reschedule     Person calling in [place]    If other than patient calling, are they listed on the communication consent form? N/A - Staff in patients group home   Provider Dr Karry Phoenix and Sheldon Leslie, [facility]   Office Visit Date and Time  01/11/23 11:15AM Dr Papo Ford   Did patient want to reschedule their office appointment? If so, when was it scheduled to? Yes 01/11/23 11:30AM Sheldon Leslie   Did you have STAR scheduled for this appointment? No   Do you need STAR set up for your new appointment? If yes, please send to "PATIENT RIDESHARE" pool for STAR rescheduling N/A   If you are cancelling appointment, can we notify STAR to cancel ride? If yes, please send to "PATIENT RIDESHARE" pool for STAR to cancel service N/A   Is this patient calling to reschedule an infusion appointment? No   When is their next infusion appointment? N/A   Is this patient a Chemo patient? No   Reason for Cancellation or Reschedule Provider requested patient reschedule with Cara Boas, or Sheldon Leslie  If the patient is a treatment patient, please route this to the office nurse  If the patient is not on treatment, please route to the office MA  If the patient is a surgical oncology patient, please route to surg/onc clinical pool

## 2023-01-10 NOTE — PROGRESS NOTES
Assessment:     1  Primary osteoarthritis of left shoulder        Plan:     Problem List Items Addressed This Visit        Musculoskeletal and Integument    Primary osteoarthritis of left shoulder - Primary     Findings today are consistent withsevere left glenohumeral osteoarthritis   Imaging and prognosis was reviewed with the patient today  Cortisone steroid injection was administered today to left glenohumeral joint  Patient should avoid strenuous activities for the next 1-2 days  Patient should avoid vaccines for the next 2 weeks if possible  Can apply cold compress for soreness  If patient feels relief with the cortisone injections, procedure can be repeated every 3-4 months  Patient can follow up in 3-4 months as needed  All patient's questions were answered to their satisfaction  This note is created using dictation transcription  It may contain typographical errors, grammatical errors, improperly dictated words, background noise and other errors  Relevant Medications    betamethasone acetate-betamethasone sodium phosphate (CELESTONE) injection 6 mg (Completed)    Other Relevant Orders    Large joint arthrocentesis: L glenohumeral (Completed)      Subjective:     Patient ID: Angélica Abebe is a 65 y o  female  Chief Complaint:  65year old female patient presents today for a follow up left shoulder pain-severe glenohumeral osteoarthritis  She is resident at [facility], she is with one care giver and her mother  Patient was last seen on 10/11/2022 and was given a CSI to the glenohumeral joint  She states she received good relief  She states that her shoulder has started bothering her over the last couple of weeks  Patient is interested in a repeat injection today  She is not interested in surgery        Allergy:  Allergies   Allergen Reactions   • Bactrim [Sulfamethoxazole-Trimethoprim]    • Methylphenidate Hyperactivity     Reaction Date: [id number];    • Sulfa Antibiotics    • Thioridazine    • Amphetamines    • Chlorpromazine    • Fexofenadine Hives     Reaction Date: [id number];    • Medrogestone    • Medroxyprogesterone Rash and Hives     Reaction Date: [id number];    • Other Hives     Mellaril, thorazine   • Trimethoprim      Medications:  all current active meds have been reviewed  Past Medical History:  Past Medical History:   Diagnosis Date   • Arthritis 11/18/2021    right shoulder   • Autism    • Breast cancer [facility])    • Breast cyst, left     last assessed 12/30/2013   • Cerebral palsy (HCC)    • Chronic GERD    • Depression    • Fibrocystic breast disease     last assessed 06/29/2012   • Gastrointestinal hemorrhage    • Glaucoma    • History of chemotherapy    • Hydrocephalus ([address] )      SHUNT IN PLACE   • Scoliosis      Past Surgical History:  Past Surgical History:   Procedure Laterality Date   • BREAST BIOPSY Right    • CATARACT EXTRACTION  06/14/2021   • COLONOSCOPY      Description 04/13/2016-5 yrs  Description 4 yr f/u d/t polyp and family history, onset 07/20/2012  • COLONOSCOPY     • ESOPHAGOGASTRODUODENOSCOPY      description-04/13/2016 gastritis with antral nodule   • EYE SURGERY      for relief of intraocular pressure   • INCISIONAL BREAST BIOPSY      description 2008   • MASTECTOMY Right    • SENTINEL LYMPH NODE BIOPSY     • STRABISMUS SURGERY      description 1973, 1974   • UPPER GASTROINTESTINAL ENDOSCOPY  05/16/2019    Grade C erosive esophagitis    7 mm nodule in the stomach   • US GUIDED THYROID BIOPSY  12/13/2022     Family History:  Family History   Problem Relation Age of Onset   • Osteopenia Mother    • Supraventricular tachycardia Mother    • Heart attack Father 59        Acute MI   • Coronary artery disease Father    • Thyroid nodules Father    • Osteopenia Maternal Grandmother    • Prostate cancer Maternal Grandfather    • Leukemia Paternal Grandfather    • Heart attack Maternal Uncle 48        acute MI, stent   • Cancer Maternal Uncle         adenocarcinoma of oral cavity   • Colon cancer Maternal Uncle 62   • Hyperlipidemia Maternal Uncle    • Pancreatic cancer Paternal Uncle    • Prostate cancer Paternal Uncle    • Colon cancer Family    • Coronary artery disease Family    • Thyroid disease Family      Social History:  Social History     Substance and Sexual Activity   Alcohol Use No     Social History     Substance and Sexual Activity   Drug Use No     Social History     Tobacco Use   Smoking Status Never   Smokeless Tobacco Never     Review of Systems   Constitutional: Negative for chills and fever  HENT: Negative for ear pain and sore throat  Eyes: Negative for pain and visual disturbance  Respiratory: Negative for cough and shortness of breath  Cardiovascular: Negative for chest pain and palpitations  Gastrointestinal: Negative for abdominal pain and vomiting  Genitourinary: Negative for dysuria and hematuria  Musculoskeletal: Positive for arthralgias (left shoulder)  Negative for back pain and joint swelling  Skin: Negative for color change and rash  Neurological: Negative for seizures and syncope  Psychiatric/Behavioral: Negative  All other systems reviewed and are negative  Objective:  BP Readings from Last 1 Encounters:   01/10/23 130/82      Wt Readings from Last 1 Encounters:   01/10/23 51 3 kg (113 lb)      BMI:   Estimated body mass index is 25 33 kg/m² as calculated from the following:    Height as of this encounter: 4' 8" (1 422 m)  Weight as of this encounter: 51 3 kg (113 lb)  BSA:   Estimated body surface area is 1 39 meters squared as calculated from the following:    Height as of this encounter: 4' 8" (1 422 m)  Weight as of this encounter: 51 3 kg (113 lb)  Physical Exam  Vitals and nursing note reviewed  Constitutional:       Appearance: Normal appearance  She is well-developed  HENT:      Head: Normocephalic and atraumatic        Right Ear: External ear normal       Left Ear: External ear normal    Eyes: Extraocular Movements: Extraocular movements intact  Conjunctiva/sclera: Conjunctivae normal    Pulmonary:      Effort: Pulmonary effort is normal    Musculoskeletal:      Cervical back: Neck supple  Skin:     General: Skin is warm and dry  Neurological:      Mental Status: She is alert and oriented to person, place, and time  Deep Tendon Reflexes: Reflexes are normal and symmetric  Psychiatric:         Mood and Affect: Mood normal          Behavior: Behavior normal        Left Shoulder Exam     Tenderness   The patient is experiencing no tenderness  Range of Motion   Active abduction: 90 (Pain)   Left shoulder passive abduction: pain  Forward flexion: 90 (Pain)     Muscle Strength   Abduction: 4/5   Internal rotation: 5/5   External rotation: 4/5   Biceps: 5/5     Tests   Cross arm: negative  Drop arm: negative    Other   Erythema: absent  Scars: absent  Sensation: normal  Pulse: present     Comments:  Crepitation with shoulder range of motion            Large joint arthrocentesis: L glenohumeral  Universal Protocol:  Consent: Verbal consent obtained    Risks and benefits: risks, benefits and alternatives were discussed  Consent given by: patient  Patient understanding: patient states understanding of the procedure being performed  Site marked: the operative site was marked  Patient identity confirmed: verbally with patient    Supporting Documentation  Indications: pain   Procedure Details  Location: shoulder - L glenohumeral  Preparation: Patient was prepped and draped in the usual sterile fashion  Needle size: 22 G  Ultrasound guidance: no  Approach: posterolateral  Medications administered: 6 mg betamethasone acetate-betamethasone sodium phosphate 6 (3-3) mg/mL (5 mL Naropin 0 5%)    Patient tolerance: patient tolerated the procedure well with no immediate complications  Dressing:  Sterile dressing applied        No new imaging was obtained today     [place] Attestation    I,:  Marian Frost am acting as a scribe while in the presence of the attending physician :       I,:  Marylin Lassiter MD personally performed the services described in this documentation    as scribed in my presence :

## 2023-01-11 ENCOUNTER — OFFICE VISIT (OUTPATIENT)
Dept: SURGICAL ONCOLOGY | Facility: CLINIC | Age: 58
End: 2023-01-11

## 2023-01-11 VITALS
WEIGHT: 115 LBS | TEMPERATURE: 97.8 F | RESPIRATION RATE: 16 BRPM | HEART RATE: 72 BPM | SYSTOLIC BLOOD PRESSURE: 132 MMHG | BODY MASS INDEX: 25.87 KG/M2 | DIASTOLIC BLOOD PRESSURE: 74 MMHG | HEIGHT: 56 IN | OXYGEN SATURATION: 99 %

## 2023-01-11 DIAGNOSIS — Z12.31 VISIT FOR SCREENING MAMMOGRAM: ICD-10-CM

## 2023-01-11 DIAGNOSIS — Z17.0 MALIGNANT NEOPLASM OF OVERLAPPING SITES OF RIGHT BREAST IN FEMALE, ESTROGEN RECEPTOR POSITIVE (HCC): Primary | ICD-10-CM

## 2023-01-11 DIAGNOSIS — R92.8 ABNORMAL FINDING ON BREAST IMAGING: ICD-10-CM

## 2023-01-11 DIAGNOSIS — C50.811 MALIGNANT NEOPLASM OF OVERLAPPING SITES OF RIGHT BREAST IN FEMALE, ESTROGEN RECEPTOR POSITIVE (HCC): Primary | ICD-10-CM

## 2023-01-11 NOTE — PROGRESS NOTES
Surgical Oncology Follow Up       [address]  [facility]  [address] [facility] [place] [address]    Mellody Holiday  1965  [id number]  [address] [facility]  [facility] [place]  [address]   [place] [address]    Diagnoses and all orders for this visit:    Malignant neoplasm of overlapping sites of right breast in female, estrogen receptor positive ([address] )    Abnormal finding on breast imaging  -     US breast left limited (diagnostic); Future    Visit for screening mammogram  -     Mammo screening left w 3d & cad; Future        Chief Complaint   Patient presents with   • Breast Cancer       Return in about 1 year (around 1/11/2024) for Office Visit, Imaging - See orders  Oncology History   Malignant neoplasm of overlapping sites of right breast in female, estrogen receptor positive ([facility] )   2013 Surgery    Right breast mastectomy   Invasive breast carcinoma  Grade 2  2 foci:    LA 60/90  HER2 0/3+  Lymph nodes negative  Stage IA  Dr Tere Bell     2/2014 - 2/2015 Chemotherapy    TCH x 6 cycles  1 year of Herceptin  Dr Azeb Storey     2/2015 -  Hormone Therapy    Tamoxifen daily until 2018  Arimidex 1 mg daily since 2018  Dr Azeb Storey     4/2022 Genetic Testing    A total of 36 genes were evaluated, including: MARIJA, BRCA1, BRCA2, CDH1, CHEK2, PALB2, PTEN, STK11, TP53  Negative result  No pathogenic sequence variants identified  Ambry           History of Present Illness: The patient returns to the office today in follow-up for her history of multifocal right breast cancer in 2013  She is currently MARKUS at 10 years and doing well  She continues on Arimidex without difficulty  She is accompanied by her mother and caregiver today, who deny any new issues or concerns  They denies any SOB, cough, new bone or back pain, headaches or dizziness    They have noticed any new lumps or masses, and the patient offers no complaints at this time  Her most recent screening mammogram performed in November of 2022 showed an area of abnormality at the 2:00 position of the left breast   Subsequent diagnostic US showed an area consistent with fat necrosis, but nothing concerning for recurrence  Review of Systems   Constitutional: Negative for activity change, appetite change, fatigue and unexpected weight change  HENT: Negative  Respiratory: Negative  Negative for cough and shortness of breath  Cardiovascular: Negative  Gastrointestinal: Negative  Negative for abdominal pain, nausea and vomiting  Musculoskeletal: Negative  Skin: Negative  Negative for color change  Neurological: Negative  Negative for dizziness and headaches  Hematological: Negative  Negative for adenopathy  Psychiatric/Behavioral: Negative              Patient Active Problem List   Diagnosis   • Vitamin D deficiency   • Scoliosis   • Retinal detachment   • Obsessive compulsive disorder   • Multiple thyroid nodules   • Mild intellectual disability   • Malignant neoplasm of overlapping sites of right breast in female, estrogen receptor positive ([address] )   • Legal blindness [place]   • Irregular menstrual cycle   • Internal hemorrhoids   • Impulse control disorder   • Glaucoma   • Dyslipidemia   • Depression, recurrent ([address] )   • 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HCC)   • Chronic gingivitis   • Cerebral palsy ([address] )   • Benign neoplasm of large intestine   • Autistic disorder   • Anxiety   • Other long term (current) drug therapy   • Hyperglycemia   • Screening for colorectal cancer   • Chronic GERD   • Iron deficiency anemia, unspecified   • Use of anastrozole (Arimidex)   • Family history of colonic polyps   • Osteoporosis   • Ambulatory dysfunction   • Primary osteoarthritis of left shoulder   • Other specified anemias   • Mild intellectual disabilities     Past Medical History:   Diagnosis Date   • Arthritis 11/18/2021    right shoulder   • Autism    • Breast cancer ([facility] )    • Breast cyst, left     last assessed 12/30/2013   • Cerebral palsy [facility])    • Chronic GERD    • Depression    • Fibrocystic breast disease     last assessed 06/29/2012   • Gastrointestinal hemorrhage    • Glaucoma    • History of chemotherapy    • [facility] )      SHUNT IN PLACE   • Scoliosis      Past Surgical History:   Procedure Laterality Date   • BREAST BIOPSY Right    • CATARACT EXTRACTION  06/14/2021   • COLONOSCOPY      Description 04/13/2016-5 yrs  Description 4 yr f/u d/t polyp and family history, onset 07/20/2012  • COLONOSCOPY     • ESOPHAGOGASTRODUODENOSCOPY      description-04/13/2016 gastritis with antral nodule   • EYE SURGERY      for relief of intraocular pressure   • INCISIONAL BREAST BIOPSY      description 2008   • MASTECTOMY Right    • SENTINEL LYMPH NODE BIOPSY     • STRABISMUS SURGERY      description 1973, 1974   • UPPER GASTROINTESTINAL ENDOSCOPY  05/16/2019    Grade C erosive esophagitis    7 mm nodule in the stomach   • US GUIDED THYROID BIOPSY  12/13/2022     Family History   Problem Relation Age of Onset   • Osteopenia Mother    • Supraventricular tachycardia Mother    • Heart attack Father 59        Acute MI   • Coronary artery disease Father    • Thyroid nodules Father    • Osteopenia Maternal Grandmother    • Prostate cancer Maternal Grandfather    • Leukemia Paternal Grandfather    • Heart attack Maternal Uncle 48        acute MI, stent   • Cancer Maternal Uncle         adenocarcinoma of oral cavity   • Colon cancer Maternal Uncle 62   • Hyperlipidemia Maternal Uncle    • Pancreatic cancer Paternal Uncle    • Prostate cancer Paternal Uncle    • Colon cancer Family    • Coronary artery disease Family    • Thyroid disease Family      Social History     Socioeconomic History   • Marital status: Single     Spouse name: Not on file   • Number of children: Not on file   • Years of education: Not on file   • Highest education level: Not on file Occupational History   • Not on file   Tobacco Use   • Smoking status: Never   • Smokeless tobacco: Never   Vaping Use   • Vaping Use: Never used   Substance and Sexual Activity   • Alcohol use: No   • Drug use: No   • Sexual activity: Not on file   Other Topics Concern   • Not on file   Social History Narrative    Person living in a residential institution    Uses safety equipment-seat belts     Social Determinants of Health     Financial Resource Strain: Low Risk    • Difficulty of Paying Living Expenses: Not hard at all   Food Insecurity: Not on file   Transportation Needs: No Transportation Needs   • Lack of Transportation (Medical): No   • Lack of Transportation (Non-Medical):  No   Physical Activity: Not on file   Stress: Not on file   Social Connections: Not on file   Intimate Partner Violence: Not on file   Housing Stability: Not on file       Current Outpatient Medications:   •  acetaminophen (TYLENOL) 500 mg tablet, Take 500 mg by mouth as needed for mild pain  , Disp: , Rfl:   •  anastrozole (ARIMIDEX) 1 mg tablet, Take 1 tablet (1 mg total) by mouth daily, Disp: 90 tablet, Rfl: 3  •  BANOPHEN 25 MG capsule, Take 25 mg by mouth daily at bedtime as needed , Disp: , Rfl:   •  Calcium Carbonate-Vitamin D (OYSCO 500/D PO), Take by mouth daily, Disp: , Rfl:   •  Cholecalciferol (VITAMIN D) 2000 units tablet, TAKE TWO TABLETS BY MOUTH (4000IU) EVERY MORNING FOR VITAMIN DIFICIENCY, Disp: 62 tablet, Rfl: 0  •  Dentifrices (SENSODYNE PRONAMEL DT), Apply to teeth , Disp: , Rfl:   •  Diclofenac Sodium (VOLTAREN) 1 %, , Disp: , Rfl:   •  diphenhydrAMINE (BENADRYL) 25 mg tablet, Take 25 mg by mouth daily at bedtime as needed for itching (for insomnia and allergy ** DO NOT TAKE WITH XYZAL **) , Disp: , Rfl:   •  docusate sodium (COLACE) 100 mg capsule, Take 100 mg by mouth 3 (three) times a week , Disp: , Rfl:   •  guaiFENesin (SILTUSSIN SA PO), Take by mouth Take 2 teaspoonful (10 ml) PRN 6 hrs for cough, Disp: , Rfl:   •  haloperidol (HALDOL) 2 mg tablet, Take 2 mg by mouth 2 (two) times a day 1 tab am and 2 tabs qhs, Disp: , Rfl:   •  hydrocortisone (ANUSOL-HC, PROCTOSOL HC,) 2 5 % rectal cream, Insert into the rectum 2 (two) times a day (Patient taking differently: Insert into the rectum 2 (two) times a day as needed for hemorrhoids), Disp: 30 g, Rfl: 0  •  latanoprost (XALATAN) 0 005 % ophthalmic solution, Administer 1 drop to both eyes, Disp: , Rfl:   •  levocetirizine (XYZAL) 5 MG tablet, TAKE ONE TABLET BY MOUTH EVERY DAY AS NEEDED FOR ALLERGIES *DO NOT TAKE WITH BENADRYL*, Disp: 31 tablet, Rfl: 0  •  lithium carbonate 300 mg capsule, Take 600 mg by mouth 2 (two) times a day, Disp: , Rfl:   •  loperamide (IMODIUM) 2 mg capsule, Take by mouth, Disp: , Rfl:   •  LORazepam (ATIVAN) 0 5 mg tablet, Take 1 mg by mouth daily at bedtime, Disp: , Rfl:   •  LORazepam (ATIVAN) 1 mg tablet, TAKE ONE TABLET BY MOUTH 30 MINUTES PRIOR TO PROCEDURE (SUCH AS: GYN, DENTAL, IMAGING, SKIN PROCEDURES, SUCH AS MOLE REMOVAL) FOR ANXIETY, Disp: 5 tablet, Rfl: 0  •  melatonin 1 mg, Take 5 mg by mouth daily at bedtime , Disp: , Rfl:   •  memantine (NAMENDA) 10 mg tablet, Take 1 tablet (10 mg total) by mouth 2 (two) times a day, Disp: 60 tablet, Rfl: 5  •  NON FORMULARY, ACT FLUORIDE DENTAL RINSE  RINSE WITH 1 TBSP (15ML)2X A DAY, Disp: , Rfl:   •  NON FORMULARY, TOLNAFTATE SPRAY 1% SPRAY SPRAY BETWEEN TOES, Disp: , Rfl:   •  ondansetron (ZOFRAN) 4 mg tablet, Take by mouth as needed  , Disp: , Rfl:   •  OYSCO 500 + D 500-200 MG-UNIT per tablet, , Disp: , Rfl:   •  pantoprazole (PROTONIX) 40 mg tablet, TAKE ONE TABLET BY MOUTH 2 TIMES A DAY( MORNING/ BEDTIME) (ESOPHAGITIS), Disp: 60 tablet, Rfl: 0  •  polyethylene glycol (GLYCOLAX) 17 GM/SCOOP, MIX 1 CAPFUL (17 GRAMS) IN 4- 8OZ OF LIQUID AND DRINK BY MOUTH ONCE DAILY AS NEEDED FOR CONSTIPATION, Disp: 510 g, Rfl: 0  •  polyethylene glycol (MIRALAX) 17 g packet, Take 17 g by mouth daily as needed, Disp: , Rfl:   •  PROCTOSOL HC 2 5 % rectal cream, , Disp: , Rfl:   •  sodium chloride (TAMIKA 128) 5 % hypertonic ophthalmic solution, Apply 1 drop to eye 2 (two) times a day  , Disp: , Rfl:   •  sodium fluoride 0 275 (0 125 F) MG/DROP solution, Take 275 mcg by mouth daily , Disp: , Rfl:   •  Sunscreens (SUNBLOCK LOTION SPF30 EX), Apply topically daily as needed, Disp: , Rfl:   •  white petrolatum, Apply topically once Apply to hand every day prn, Disp: , Rfl:   •  bromfenac sodium (Prolensa) 0 07 % SOLN, Apply to eye 1 DROP IN RIGHT EYE IN THE MORNING (Patient not taking: Reported on 11/18/2021), Disp: , Rfl:   •  calamine lotion, Apply topically every 4 (four) hours as needed for itching (Patient not taking: Reported on 1/11/2023), Disp: , Rfl:   •  carBAMazepine (TEGretol) 200 mg tablet, Take 1 tablet (200 mg total) by mouth 2 (two) times a day (Patient not taking: Reported on 1/11/2023), Disp: , Rfl:   •  Carboxymethylcell-Hypromellose 0 25-0 3 % GEL, Apply to eye  (Patient not taking: Reported on 1/11/2023), Disp: , Rfl:   •  ibuprofen (MOTRIN) 200 mg tablet, Take 400 mg by mouth every 6 (six) hours as needed for mild pain  (Patient not taking: Reported on 1/11/2023), Disp: , Rfl:   •  ketorolac (ACULAR) 0 5 % ophthalmic solution,   (Patient not taking: Reported on 1/11/2023), Disp: , Rfl:   •  mineral oil-white petrolatum (LUBRIFRESH P M ) ophthalmic ointment, 0 5 inches  (Patient not taking: Reported on 1/11/2023), Disp: , Rfl:   •  neomycin-polymyxin b-bacitracin (NEOSPORIN) 3 5-400-5,000, , Disp: , Rfl:   •  talc (Zeasorb), Apply topically once as needed for irritation Sprinkle powder on affected area of skin to help with excess moisture up to 2 times a day as needed    (Patient not taking: Reported on 1/11/2023), Disp: , Rfl:   •  timolol (TIMOPTIC-XE) 0 5 % ophthalmic gel-forming, Administer 1 drop into the left eye daily (Patient not taking: Reported on 1/11/2023), Disp: , Rfl:   •  tolnaftate ([place] Lillie) 1 % spray, Apply topically (Patient not taking: Reported on 1/11/2023), Disp: , Rfl:   Allergies   Allergen Reactions   • Bactrim [Sulfamethoxazole-Trimethoprim]    • Methylphenidate Hyperactivity     Reaction Date: [id number];    • Sulfa Antibiotics    • Thioridazine    • Amphetamines    • Chlorpromazine    • Fexofenadine Hives     Reaction Date: [id number];    • Medrogestone    • Medroxyprogesterone Rash and Hives     Reaction Date: [id number];    • Other Hives     Mellaril, thorazine   • Trimethoprim      Vitals:    01/11/23 1121   BP: 132/74   Pulse: 72   Resp: 16   Temp: 97 8 °F (36 6 °C)   SpO2: 99%       Physical Exam  Vitals reviewed  Constitutional:       General: She is not in acute distress  Appearance: She is normal weight  She is not ill-appearing or toxic-appearing  Eyes:      General: No scleral icterus  Extraocular Movements: Extraocular movements intact  Conjunctiva/sclera: Conjunctivae normal       Pupils: Pupils are equal, round, and reactive to light  Cardiovascular:      Rate and Rhythm: Normal rate  Pulmonary:      Effort: Pulmonary effort is normal       Breath sounds: Normal breath sounds  Chest:      Chest wall: No mass  Breasts:     Right: Absent  No mass or skin change  Left: Normal  No inverted nipple, mass, skin change or tenderness  Comments: Right breast is surgically absent  Surgical scar and chest wall are smooth, and there is no evidence of mass, nodularity or skin changes  Left breast is smooth to palpation  No mass or adenopathy is appreciated today  There is no palpable correlate for the finding on imaging  Musculoskeletal:         General: No deformity (cerebral palsy)  Comments: At baseline   Lymphadenopathy:      Cervical: No cervical adenopathy  Upper Body:      Right upper body: No supraclavicular, axillary or pectoral adenopathy  Left upper body: No supraclavicular, axillary or pectoral adenopathy     Skin:     General: Skin is warm and dry  Coloration: Skin is not jaundiced  Neurological:      General: No focal deficit present  Mental Status: She is alert and oriented to person, place, and time  Psychiatric:         Mood and Affect: Mood normal          Behavior: Behavior normal          Thought Content: Thought content normal          Judgment: Judgment normal              Imaging  US breast left limited (diagnostic)    Result Date: 1/5/2023  Narrative: DIAGNOSIS: Abnormal mammogram TECHNIQUE: Ultrasound of the left breast(s) was performed  COMPARISONS: Prior breast imaging dated: 11/17/2022 RELEVANT HISTORY: Family Breast Cancer History: No known family history of breast cancer  Family Medical History: Family medical history includes colon cancer in 2 relatives (family, maternal uncle)  Personal History: Hormone history includes tamoxifen and other  Surgical history includes breast biopsy and mastectomy  Medical history includes fibrocystic breast, breast cancer, and history of chemotherapy  RISK ASSESSMENT: [facility] suppressed due to the patient's history and/or demographic factors  INDICATION: Jessica Zarate is a 65 y o  female presenting for abnormal mammogram   Mastectomy  Patient was recalled from her last screening mammogram for additional evaluation of a mass  FINDINGS: The screening mammogram was reviewed  The patient does have some characteristic oil cysts in her left breast   The mass at o'clock does contain fat  LEFT B) MASS: There is a 15 mm x 10 mm x 13 mm oval, parallel, heterogeneous mass seen in the left breast at 2 o'clock in the posterior depth  The mass correlates with the finding seen on the mammogram   This is probably benign and likely developing fat necrosis  6-month follow-up ultrasound is recommended  Impression:  Left breast finding is probably benign, likely due to fat necrosis  6-month follow-up ultrasound is recommended    The patient's facility, aide and mother were notified of the recommendation for follow-up imaging  ASSESSMENT/BI-RADS CATEGORY: Left: 3 - Probably Benign Overall: 3 - Probably Benign RECOMMENDATION:      - Ultrasound in 6 months for the left breast  Workstation ID: [id number]      Mammo screening left w 3d & cad  11/17/2022  Narrative & Impression   DIAGNOSIS: Screening mammogram, encounter for      TECHNIQUE:  Digital screening mammography was performed  Computer Aided Detection (CAD) analyzed all applicable images  COMPARISONS: Prior breast imaging dated: 11/16/2021, 07/17/2020, 05/28/2019, 05/22/2018, 11/21/2017, and 11/18/2016     RELEVANT HISTORY:   Family Breast Cancer History: No known family history of breast cancer  Family Medical History: Family medical history includes colon cancer in 2 relatives (family, maternal uncle)  Personal History: Hormone history includes tamoxifen and other  Surgical history includes breast biopsy and mastectomy  Medical history includes fibrocystic breast, breast cancer, and history of chemotherapy      The patient is scheduled in a reminder system for screening mammography      8-10% of cancers will be missed on mammography  Management of a palpable abnormality must be based on clinical grounds  Patients will be notified of their results via letter from our facility  Accredited by [employer] of Radiology and FDA      RISK ASSESSMENT:   [facility] risk assessment reporting was suppressed due to the patient's history and/or demographic factors      TISSUE DENSITY:   There are scattered areas of fibroglandular density      INDICATION: Napolean Boast is a 65 y o  female presenting for screening mammography      FINDINGS:   LEFT  B) MASS: There is a fat containing, oval mass seen in the left breast at 2 o'clock in the posterior depth     This likely represents oil cyst/fat necrosis      IMPRESSION:  Targeted ultrasound recommended for what is likely fat necrosis in the left breast      ASSESSMENT/BI-RADS CATEGORY:  Left: 0 - Incomplete: Needs Additional Imaging Evaluation  Overall: 0 - Incomplete: Needs Additional Imaging Evaluation     RECOMMENDATION:       - Ultrasound at the current time for the left breast        I reviewed the above imaging data  Discussion/Summary: This is a very pleasant 64 y/o female who presents today for continued breast cancer surveillance, accompanied by her mother and caregiver  There is no evidence of disease recurrence on physical exam   Imaging of the left breast had showed an area of concern at the 2:00 position, but this appears to be fat necrosis  We will continue to watch this closely, and we will repeat an US in 6 months  I will call the patient's mother with those results when available  Assuming this is stable, I will see her again in 1 year after repeat screening mammogram   They are agreeable to the plan, all questions have been answered today

## 2023-02-01 ENCOUNTER — SOCIAL WORK (OUTPATIENT)
Dept: BEHAVIORAL/MENTAL HEALTH CLINIC | Facility: CLINIC | Age: 58
End: 2023-02-01

## 2023-02-01 DIAGNOSIS — F41.9 ANXIETY: ICD-10-CM

## 2023-02-01 DIAGNOSIS — F70 MILD INTELLECTUAL DISABILITY: Primary | ICD-10-CM

## 2023-02-01 DIAGNOSIS — F84.0 AUTISTIC DISORDER: ICD-10-CM

## 2023-02-01 NOTE — PSYCH
Behavioral Health Psychotherapy Progress Note    Psychotherapy Provided: Individual Psychotherapy     1  Mild intellectual disability        2  Autistic disorder        3  Anxiety            Goals addressed in session: Goal 1     DATA:   Client goes by the nickname of David Garcia  She was present with [facility] staff 'nurse Sangeeta Forward reported that there is some improvement in Meche's behaviors since the holidays has passed  David Garcia said she feels things are going well but said "I'm not being very good with remembering my 5-C's "  Meche's 5-C's are care, compassion, consideration and so on  Madiha Cotter reported that they have moved David Garcia over to [facility] where she does more crafts and can talk more versus her being at the [facility] workshop which was more difficult for David Garcia said she likes being at [facility] and once a week they bring work for her to do other there so she still gets a paycheck  Today we reinforced what positive behaviors are and gave praise that David Garcia is improving  During this session, this clinician used the following therapeutic modalities: Supportive Psychotherapy    Substance Abuse was not addressed during this session  If the client is diagnosed with a co-occurring substance use disorder, please indicate any changes in the frequency or amount of use: N/A  Stage of change for addressing substance use diagnoses: No substance use/Not applicable    ASSESSMENT:  Amie Villeda presents with a Euthymic/ normal mood  her affect is Normal range and intensity, which is congruent, with her mood and the content of the session  The client has made progress on their goals  Madiha Cotter said that recently David Garcia was seen for some medical testing and all was good  And recently she saw her psychiatrist, Dr Ramya Kelly, in [place] at the clinic and said he did not change any of Meche's psychiatric medication because she seems to be stable at this time     Amie Villeda presents with a none risk of suicide, none risk of self-harm, and none risk of harm to others  For any risk assessment that surpasses a "low" rating, a safety plan must be developed  A safety plan was indicated: no  If yes, describe in detail     PLAN: Between sessions, Ana Paula Modi will continue with her daily activities and try to be open if [facility] staff needs to redirect her in any way  Ayan Kera said she wants to remember her 5-C's to help with good behavior  At the next session, the therapist will use Supportive Psychotherapy to address any issues/concerns Meche and/or [facility] staff have  Behavioral Health Treatment Plan and Discharge Planning: Ana Paula Modi is aware of and agrees to continue to work on their treatment plan  They have identified and are working toward their discharge goals   yes    Visit start and stop times:    02/01/23  Start Time: 0900  Stop Time: 0945  Total Visit Time: 45 minutes

## 2023-02-17 DIAGNOSIS — K21.9 GASTROESOPHAGEAL REFLUX DISEASE: ICD-10-CM

## 2023-02-17 RX ORDER — PANTOPRAZOLE SODIUM 40 MG/1
TABLET, DELAYED RELEASE ORAL
Qty: 56 TABLET | Refills: 0 | Status: SHIPPED | OUTPATIENT
Start: 2023-02-17

## 2023-03-06 ENCOUNTER — SOCIAL WORK (OUTPATIENT)
Dept: BEHAVIORAL/MENTAL HEALTH CLINIC | Facility: CLINIC | Age: 58
End: 2023-03-06

## 2023-03-06 DIAGNOSIS — F70 MILD INTELLECTUAL DISABILITIES: Primary | ICD-10-CM

## 2023-03-06 NOTE — PSYCH
Behavioral Health Psychotherapy Progress Note    Psychotherapy Provided: Individual Psychotherapy     1  Mild intellectual disabilities            Goals addressed in session: Goal 1     DATA:  Aracely Paetl was present today with nurse Catrachita from [place], where Aracely Patel lives  Today we updated Crisis Plan and copy was given to [facility] and crisis phone numbers were reviewed  For the rest of the session, [facility] bought up some negative behaviors that Aracely Patel has toward one of the [address] [place] [address] staff and Aracely Patel agreed that she is this way  We discussed ways to change this, especially when she's redirected by this staff person, and Aracely Patel said she wants to work on this  During this session, this clinician used the following therapeutic modalities: Solution-Focused Therapy    Substance Abuse was not addressed during this session  If the client is diagnosed with a co-occurring substance use disorder, please indicate any changes in the frequency or amount of use: N/A  Stage of change for addressing substance use diagnoses: No substance use/Not applicable    ASSESSMENT:  Kim Benavides presents with a Euthymic/ normal mood  her affect is Normal range and intensity, which is congruent, with her mood and the content of the session  The client has made progress on their goals  Kim Benavides presents with a none risk of suicide, none risk of self-harm, and none risk of harm to others  For any risk assessment that surpasses a "low" rating, a safety plan must be developed  A safety plan was indicated: no  If yes, describe in detail N/A    PLAN: Between sessions, Kim Benavides will try to tell this particular staff person how she feels with "I feel" statements  At the next session, the therapist will use Solution-Focused Therapy to address if she was able to make some changes with how she is treating this staff person      Behavioral Health Treatment Plan and Discharge Planning: Kim Benavides is aware of and agrees to continue to work on their treatment plan  They have identified and are working toward their discharge goals   yes    Visit start and stop times:    03/06/23  Start Time: 1000  Stop Time: 1045  Total Visit Time: 45 minutes

## 2023-03-15 ENCOUNTER — RA CDI HCC (OUTPATIENT)
Dept: OTHER | Facility: HOSPITAL | Age: 58
End: 2023-03-15

## 2023-03-15 NOTE — PROGRESS NOTES
[address]  coding opportunities       Chart reviewed, no opportunity found: CHART REVIEWED, NO OPPORTUNITY FOUND        Patients Insurance     Medicare Insurance: Medicare

## 2023-03-21 ENCOUNTER — OFFICE VISIT (OUTPATIENT)
Dept: FAMILY MEDICINE CLINIC | Facility: HOSPITAL | Age: 58
End: 2023-03-21

## 2023-03-21 VITALS
WEIGHT: 112 LBS | SYSTOLIC BLOOD PRESSURE: 106 MMHG | BODY MASS INDEX: 25.19 KG/M2 | DIASTOLIC BLOOD PRESSURE: 72 MMHG | TEMPERATURE: 98.3 F | HEIGHT: 56 IN | HEART RATE: 80 BPM

## 2023-03-21 DIAGNOSIS — Z17.0 MALIGNANT NEOPLASM OF OVERLAPPING SITES OF RIGHT BREAST IN FEMALE, ESTROGEN RECEPTOR POSITIVE (HCC): ICD-10-CM

## 2023-03-21 DIAGNOSIS — F70 MILD INTELLECTUAL DISABILITIES: ICD-10-CM

## 2023-03-21 DIAGNOSIS — G80.9 CEREBRAL PALSY, UNSPECIFIED TYPE (HCC): ICD-10-CM

## 2023-03-21 DIAGNOSIS — C50.811 MALIGNANT NEOPLASM OF OVERLAPPING SITES OF RIGHT BREAST IN FEMALE, ESTROGEN RECEPTOR POSITIVE (HCC): ICD-10-CM

## 2023-03-21 DIAGNOSIS — E04.2 MULTIPLE THYROID NODULES: Primary | ICD-10-CM

## 2023-03-21 DIAGNOSIS — M81.0 OSTEOPOROSIS, UNSPECIFIED OSTEOPOROSIS TYPE, UNSPECIFIED PATHOLOGICAL FRACTURE PRESENCE: ICD-10-CM

## 2023-03-21 DIAGNOSIS — Q03.1 DANDY-WALKER SYNDROME (HCC): ICD-10-CM

## 2023-03-21 DIAGNOSIS — R63.4 WEIGHT LOSS: ICD-10-CM

## 2023-03-21 DIAGNOSIS — F33.9 DEPRESSION, RECURRENT (HCC): ICD-10-CM

## 2023-03-21 DIAGNOSIS — K21.9 CHRONIC GERD: ICD-10-CM

## 2023-03-21 NOTE — ASSESSMENT & PLAN NOTE
On Namenda and following with Neuro, call with sudden new/worse symptoms Ambulatory/Wheelchair/Stroller

## 2023-03-21 NOTE — ASSESSMENT & PLAN NOTE
Following with psych, [facility] reports no recent behavioral issues, con't meds and f/u as per psych

## 2023-03-21 NOTE — PROGRESS NOTES
Name: Lee Bass      : 1965      MRN: [id number]  Encounter Provider: Luis Miguel Fuentes DO  Encounter Date: 3/21/2023   Encounter department: [facility]      1  Multiple thyroid nodules  Assessment & Plan:  S/p US with resulting biopsy and Afirma test - c/w benign etiology, con't annual US/labs and f/u as per Endo       2  Malignant neoplasm of overlapping sites of right breast in female, estrogen receptor positive ([facility] )  Assessment & Plan:  NO current s/sx of reoccurrence, on Arimedex and Prolia, Mammo every Nov - order in [facility], con't imaging and f/u as per medical onc and surgical onc, call with breast concerns      3  Osteoporosis, unspecified osteoporosis type, unspecified pathological fracture presence  Assessment & Plan: On Prolia, UTD on Dexa, will follow      4  Chronic GERD  Assessment & Plan:  No current symptoms reported, pharmacy requesting decrease in PPI from bid - advised [facility] to check with GI but if symptoms controlled it is reasonable to try and decrease Protonix from 40 mg bid to q day but will defer to GI, neg for Barretts on EGD, call with recurrent/new/worse symptoms      5  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[facility])  Assessment & Plan:  Just saw Neuro, no current issues, cont f/u and labs/imaging as per Neuro      6  Mild intellectual disabilities  Assessment & Plan:  On Namenda and following with Neuro, call with sudden new/worse symptoms      7  Cerebral palsy, unspecified type [facility])  Assessment & Plan:  Just saw Neuro, con't meds/labs and f/u as per Neuro      8  Depression, recurrent ([facility] )  Assessment & Plan:  Following with psych, [facility] reports no recent behavioral issues, con't meds and f/u as per psych      9  Weight loss  Comments:  No red flag symtpoms present and pt UTD on cancer screenings, BMI 25 11, will con't current diet for now and montior - call w/any new GI symp/red flag symp      Med list reviewed today and updates made  Colonoscopy 10/20- 5 yrs    Mammo 11/22    PAP 7/21    BW 8/22 and 11/22    [facility] forms filled out and copy made for chart    I have spent a total time of 45 minutes on 03/21/23 in caring for this patient including Diagnostic results, Prognosis, Risks and benefits of tx options, Instructions for management, Patient and family education, Impressions, Documenting in the medical record, Reviewing / ordering tests, medicine, procedures  , and Obtaining or reviewing history  Subjective      HPI Pt here for follow up appt    Pt saw Endo (Dr Lorraine Verma) in Nov 22 and the Endo NP in Jan 23 - OV notes reviewed  Her thyroid US was done and a subsequent thyroid bx and some atypia was noted  Further testing with Afirma was done  Afirma was c/w likely benign findings  At appt in Jan 23 she was told to f/u with thyroid US/TFT's and appt in 1 yr  Pt saw Hem/Onc NP Miranda Ward) in Dec 22 and Surgical Onc NP Renee Ying) in Jan 23 for f/u h/o breast CA - OV notes reviewed  She is on Arimidex w/o SE  She had her mammo in Nov and had f/u diagnostic mammo d/t an oval mass - felt to be fat necrosis  She is scheduled for a f/u L breast US and screening mammo for the fall  She con't to have her Prolia infusions  Pt saw GI (Dr Aditi Villa) in Dec for f/u GERD and h/o iron def anemia - OV note reviewed  She was told to con't with Protonix bid and f/u in 1 yr  Pt currently w/o any reflux symptoms then some rare regurgitation with belching  Pt saw Neuro PA (Javier Danielle) in March 23 for f/u Dandy Walker/CP and intellectual disability - OV note reviewed  She was told to con't Namenda and f/u in 6 mos  Wgt down 13 lbs from Sept 22  Review of Systems   Constitutional: Positive for unexpected weight change  Negative for chills and fever  HENT: Negative for congestion and trouble swallowing  Eyes: Positive for visual disturbance  Negative for pain     Respiratory: Negative for cough and shortness of breath  Cardiovascular: Negative for leg swelling  Gastrointestinal: Negative for blood in stool, constipation, diarrhea, nausea and vomiting  Genitourinary: Negative for difficulty urinating and dysuria  Musculoskeletal: Positive for arthralgias  Negative for back pain and neck pain  Skin: Negative for rash and wound  Neurological: Negative for dizziness and headaches  Hematological: Does not bruise/bleed easily  Psychiatric/Behavioral: Negative for behavioral problems and sleep disturbance  Current Outpatient Medications on File Prior to Visit   Medication Sig   • calamine lotion Apply topically every 4 (four) hours as needed for itching   • carBAMazepine (TEGretol) 200 mg tablet Take 1 tablet (200 mg total) by mouth 2 (two) times a day   • mineral oil-white petrolatum (LUBRIFRESH P M ) ophthalmic ointment 0 5 inches   • talc (Zeasorb) Apply topically once as needed for irritation Sprinkle powder on affected area of skin to help with excess moisture up to 2 times a day as needed      • tolnaftate (TINACTIN) 1 % spray Apply topically   • acetaminophen (TYLENOL) 500 mg tablet Take 500 mg by mouth as needed for mild pain     • anastrozole (ARIMIDEX) 1 mg tablet Take 1 tablet (1 mg total) by mouth daily   • BANOPHEN 25 MG capsule Take 25 mg by mouth daily at bedtime as needed    • Calcium Carbonate-Vitamin D (OYSCO 500/D PO) Take by mouth daily   • Cholecalciferol (VITAMIN D) 2000 units tablet TAKE TWO TABLETS BY MOUTH (4000IU) EVERY MORNING FOR VITAMIN DIFICIENCY   • Dentifrices (SENSODYNE PRONAMEL DT) Apply to teeth    • Diclofenac Sodium (VOLTAREN) 1 %    • diphenhydrAMINE (BENADRYL) 25 mg tablet Take 25 mg by mouth daily at bedtime as needed for itching (for insomnia and allergy ** DO NOT TAKE WITH XYZAL **)    • docusate sodium (COLACE) 100 mg capsule Take 100 mg by mouth 3 (three) times a week    • guaiFENesin (SILTUSSIN SA PO) Take by mouth Take 2 teaspoonful (10 ml) PRN 6 hrs for cough • haloperidol (HALDOL) 2 mg tablet Take 2 mg by mouth 2 (two) times a day 1 tab am and 2 tabs qhs   • latanoprost (XALATAN) 0 005 % ophthalmic solution Administer 1 drop to both eyes   • levocetirizine (XYZAL) 5 MG tablet TAKE ONE TABLET BY MOUTH EVERY DAY AS NEEDED FOR ALLERGIES *DO NOT TAKE WITH BENADRYL*   • lithium carbonate 300 mg capsule Take 600 mg by mouth 2 (two) times a day   • loperamide (IMODIUM) 2 mg capsule Take by mouth   • LORazepam (ATIVAN) 0 5 mg tablet Take 1 mg by mouth daily at bedtime   • LORazepam (ATIVAN) 1 mg tablet TAKE ONE TABLET BY MOUTH 30 MINUTES PRIOR TO PROCEDURE (SUCH AS: GYN, DENTAL, IMAGING, SKIN PROCEDURES, SUCH AS MOLE REMOVAL) FOR ANXIETY   • melatonin 1 mg Take 5 mg by mouth daily at bedtime    • memantine (NAMENDA) 10 mg tablet Take 1 tablet (10 mg total) by mouth 2 (two) times a day   • NON FORMULARY ACT FLUORIDE DENTAL RINSE   RINSE WITH 1 TBSP (15ML)2X A DAY   • NON FORMULARY TOLNAFTATE SPRAY 1% SPRAY  SPRAY BETWEEN TOES   • ondansetron (ZOFRAN) 4 mg tablet Take by mouth as needed     • OYSCO 500 + D 500-200 MG-UNIT per tablet    • pantoprazole (PROTONIX) 40 mg tablet TAKE ONE TABLET BY MOUTH 2 TIMES A DAY( MORNING/ BEDTIME) (ESOPHAGITIS)   • polyethylene glycol (GLYCOLAX) 17 GM/SCOOP MIX 1 CAPFUL (17 GRAMS) IN 4- 8OZ OF LIQUID AND DRINK BY MOUTH ONCE DAILY AS NEEDED FOR CONSTIPATION   • polyethylene glycol (MIRALAX) 17 g packet Take 17 g by mouth daily as needed   • sodium chloride (TAMIKA 128) 5 % hypertonic ophthalmic solution Apply 1 drop to eye 2 (two) times a day     • sodium fluoride 0 275 (0 125 F) MG/DROP solution Take 275 mcg by mouth daily    • Sunscreens (SUNBLOCK LOTION SPF30 EX) Apply topically daily as needed   • white petrolatum Apply topically once Apply to hand every day prn   • [DISCONTINUED] bromfenac sodium (Prolensa) 0 07 % SOLN Apply to eye 1 DROP IN RIGHT EYE IN THE MORNING (Patient not taking: Reported on 11/18/2021)   • [DISCONTINUED] Carboxymethylcell-Hypromellose 0 25-0 3 % GEL Apply to eye  (Patient not taking: Reported on 1/11/2023)   • [DISCONTINUED] hydrocortisone (ANUSOL-HC, PROCTOSOL HC,) 2 5 % rectal cream Insert into the rectum 2 (two) times a day (Patient taking differently: Insert into the rectum 2 (two) times a day as needed for hemorrhoids)   • [DISCONTINUED] ibuprofen (MOTRIN) 200 mg tablet Take 400 mg by mouth every 6 (six) hours as needed for mild pain  (Patient not taking: Reported on 1/11/2023)   • [DISCONTINUED] ketorolac (ACULAR) 0 5 % ophthalmic solution   (Patient not taking: Reported on 1/11/2023)   • [DISCONTINUED] neomycin-polymyxin b-bacitracin (NEOSPORIN) 3 5-400-5,000  (Patient not taking: Reported on 1/11/2023)   • [DISCONTINUED] PROCTOSOL HC 2 5 % rectal cream    • [DISCONTINUED] timolol (TIMOPTIC-XE) 0 5 % ophthalmic gel-forming Administer 1 drop into the left eye daily (Patient not taking: Reported on 1/11/2023)       Objective     /72   Pulse 80   Temp 98 3 °F (36 8 °C) (Tympanic)   Ht 4' 8" (1 422 m)   Wt 50 8 kg (112 lb)   BMI 25 11 kg/m²     Physical Exam  Vitals and nursing note reviewed  Constitutional:       General: She is not in acute distress  Appearance: She is well-developed  She is not ill-appearing  HENT:      Head: Normocephalic and atraumatic  Eyes:      General:         Right eye: No discharge  Left eye: No discharge  Conjunctiva/sclera: Conjunctivae normal    Neck:      Trachea: No tracheal deviation  Cardiovascular:      Rate and Rhythm: Normal rate and regular rhythm  Heart sounds: Normal heart sounds  No murmur heard  No friction rub  Pulmonary:      Effort: Pulmonary effort is normal  No respiratory distress  Breath sounds: Normal breath sounds  No wheezing, rhonchi or rales  Abdominal:      General: There is no distension  Palpations: Abdomen is soft  Tenderness: There is no abdominal tenderness  There is no guarding or rebound  Musculoskeletal:         General: No deformity or signs of injury  Cervical back: Neck supple  Skin:     General: Skin is warm  Coloration: Skin is not pale  Findings: No rash  Neurological:      Mental Status: She is alert  Mental status is at baseline  Motor: No abnormal muscle tone     Psychiatric:         Mood and Affect: Mood normal          Behavior: Behavior normal        Ricky Downy, DO

## 2023-03-21 NOTE — ASSESSMENT & PLAN NOTE
NO current s/sx of reoccurrence, on Arimedex and Prolia, Mammo every Nov - order in [facility], con't imaging and f/u as per medical onc and surgical onc, call with breast concerns

## 2023-03-21 NOTE — ASSESSMENT & PLAN NOTE
No current symptoms reported, pharmacy requesting decrease in PPI from bid - advised [facility] to check with GI but if symptoms controlled it is reasonable to try and decrease Protonix from 40 mg bid to q day but will defer to GI, neg for Barretts on EGD, call with recurrent/new/worse symptoms

## 2023-03-21 NOTE — ASSESSMENT & PLAN NOTE
S/p US with resulting biopsy and Afirma test - c/w benign etiology, con't annual US/labs and f/u as per Endo

## 2023-04-04 ENCOUNTER — SOCIAL WORK (OUTPATIENT)
Dept: BEHAVIORAL/MENTAL HEALTH CLINIC | Facility: CLINIC | Age: 58
End: 2023-04-04

## 2023-04-04 DIAGNOSIS — F33.9 DEPRESSION, RECURRENT (HCC): ICD-10-CM

## 2023-04-04 DIAGNOSIS — F84.0 AUTISTIC DISORDER: Primary | ICD-10-CM

## 2023-04-04 NOTE — PSYCH
"Behavioral Health Psychotherapy Progress Note    Psychotherapy Provided: Individual Psychotherapy     1  Autistic disorder        2  Depression, recurrent ([address] )            Goals addressed in session: Goal 1     DATA:  Adama Diallo was present for today's session with [facility] staff '[address]  \"  Adama Diallo said she is trying to use \"nice words\" when talking to others  She said she is trying not to 'pick on' another [facility] staff 'Brenda  \"  We reviewed having different opinions with others and that that's okay but not to yell and curse or get aggressive in any way  We also talked about using 'I feel' statements to express feelings and opinions  Adama Diallo had a birthday on [birth year] and shared a little about being over with her parents to celebrate her special day  During this session, this clinician used the following therapeutic modalities: Solution-Focused Therapy    Substance Abuse was not addressed during this session  If the client is diagnosed with a co-occurring substance use disorder, please indicate any changes in the frequency or amount of use: N/A  Stage of change for addressing substance use diagnoses: No substance use/Not applicable    ASSESSMENT:  Lianne Schilder presents with a Euthymic/ normal mood  her affect is Normal range and intensity, which is congruent, with her mood and the content of the session  The client has made progress on their goals  Lianne Schilder presents with a none risk of suicide, none risk of self-harm, and none risk of harm to others  For any risk assessment that surpasses a \"low\" rating, a safety plan must be developed  A safety plan was indicated: no  If yes, describe in detail N/A    PLAN: Between sessions, Lianne Schilder will work on having good behavior at her [address] with housemates and when at [employer]  At the next session, the therapist will use Solution-Focused Therapy to address goals/needs/concerns      Behavioral Health Treatment Plan and Discharge Planning: Lianne Schilder is aware of and " agrees to continue to work on their treatment plan  They have identified and are working toward their discharge goals   yes    Visit start and stop times:    04/04/23  Start Time: 1000  Stop Time: 1045  Total Visit Time: 45 minutes

## 2023-05-15 ENCOUNTER — SOCIAL WORK (OUTPATIENT)
Dept: BEHAVIORAL/MENTAL HEALTH CLINIC | Facility: CLINIC | Age: 58
End: 2023-05-15

## 2023-05-15 DIAGNOSIS — F84.0 AUTISTIC DISORDER: ICD-10-CM

## 2023-05-15 DIAGNOSIS — F70 MILD INTELLECTUAL DISABILITIES: Primary | ICD-10-CM

## 2023-05-15 DIAGNOSIS — F33.9 DEPRESSION, RECURRENT (HCC): ICD-10-CM

## 2023-05-15 NOTE — PSYCH
"Behavioral Health Psychotherapy Progress Note    Psychotherapy Provided: Individual Psychotherapy     1  Mild intellectual disabilities        2  Autistic disorder        3  Depression, recurrent ([address] )            Goals addressed in session: Goal 1     DATA:  Greg Maxwell was present today with [address]  Greg Maxwell said she is trying to have better behaviors toward her [facility] staff and housemates  She talked about things she is doing like trying to be kinder and not using bad language  Hailey Kinney said she thinks Greg Maxwell is doing better too  Greg Maxwell shared that she was at her parents house this past weekend for Mother's Day  She said she had a nice time with her mother and father and enjoyed the food they had  During this session, this clinician used the following therapeutic modalities: Supportive Psychotherapy    Substance Abuse was not addressed during this session  If the client is diagnosed with a co-occurring substance use disorder, please indicate any changes in the frequency or amount of use: N/A  Stage of change for addressing substance use diagnoses: No substance use/Not applicable    ASSESSMENT:  Cynthia Trujillo presents with a Euthymic/ normal mood  her affect is Normal range and intensity, which is congruent, with her mood and the content of the session  The client has made progress on their goals  Cynthia Trujillo presents with a none risk of suicide, none risk of self-harm, and none risk of harm to others  For any risk assessment that surpasses a \"low\" rating, a safety plan must be developed  A safety plan was indicated: no  If yes, describe in detail N/A    PLAN: Between sessions, Cynthia Trujillo will focus on continuing to have good behavior with housemates, at [facility] and with staff  At the next session, the therapist will use Supportive Psychotherapy to address goals/needs/concerns      Behavioral Health Treatment Plan and Discharge Planning: Cynthia Trujillo is aware of and agrees to continue to work on " their treatment plan  They have identified and are working toward their discharge goals   yes    Visit start and stop times:    05/15/23  Start Time: 1400  Stop Time: 1445  Total Visit Time: 45 minutes

## 2023-06-15 ENCOUNTER — TELEPHONE (OUTPATIENT)
Dept: HEMATOLOGY ONCOLOGY | Facility: CLINIC | Age: 58
End: 2023-06-15

## 2023-06-15 NOTE — PROGRESS NOTES
HEMATOLOGY / [address] FOLLOW UP NOTE    Primary Care Provider: Alexi Gustafson DO  Referring Provider:    MRN: [id number]  : 1965    Reason for Encounter: Follow-up breast cancer       Oncology History   Malignant neoplasm of overlapping sites of right breast in female, estrogen receptor positive ([facility] [address] )    Surgery    Right breast mastectomy   Invasive breast carcinoma  Grade 2  2 foci:    NM 60/90  HER2 0/3+  Lymph nodes negative  Stage IA  Dr Zoie Woods     2014 - 2015 Chemotherapy    TCH x 6 cycles  1 year of Herceptin  Dr Paul Anne     2015 -  Hormone Therapy    Tamoxifen daily until   Arimidex 1 mg daily since   Dr Paul Anne     2022 Genetic Testing    A total of 36 genes were evaluated, including: MARIJA, BRCA1, BRCA2, CDH1, CHEK2, PALB2, PTEN, STK11, TP53  Negative result  No pathogenic sequence variants identified  Ambry         Interval History: Patient returns for 6-month follow-up visit along with her mother and personal care nurse  Her mammogram in November showed an area of abnormality at the 2 o'clock position of the left breast   Subsequent diagnostic ultrasound showed this to be an area consistent with fat necrosis, nothing concerning for recurrence  She is scheduled for repeat ultrasound on 2023  CMP remains in acceptable range  She received a dose of Prolia today  No recent CBC, but prior CBC was within normal limits  Overall, patient appears to be at baseline  She is in good spirits and excited to be going home with her parents for the weekend    Her nurse denies any concerns at this time  Per review of MAR, she is taking her Arimidex daily         REVIEW OF SYSTEMS:  Please note that a 14-point review of systems was performed to include Constitutional, HEENT, Respiratory, CVS, GI, , Musculoskeletal, Integumentary, Neurologic, Rheumatologic, Endocrinologic, Psychiatric, Lymphatic, and Hematologic/Oncologic systems were reviewed and are negative unless otherwise stated in HPI  Positive and negative findings pertinent to this evaluation are incorporated into the history of present illness  ECOG PS: 0    PROBLEM LIST:  Patient Active Problem List   Diagnosis   • Vitamin D deficiency   • Scoliosis   • Retinal detachment   • Obsessive compulsive disorder   • Multiple thyroid nodules   • Mild intellectual disability   • Malignant neoplasm of overlapping sites of right breast in female, estrogen receptor positive ([address] )   • Legal blindness [place]   • Irregular menstrual cycle   • Internal hemorrhoids   • Impulse control disorder   • Glaucoma   • Dyslipidemia   • Depression, recurrent (HCC)   • 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HCC)   • Chronic gingivitis   • Cerebral palsy (HCC)   • Benign neoplasm of large intestine   • Autistic disorder   • Anxiety   • Other long term (current) drug therapy   • Hyperglycemia   • Screening for colorectal cancer   • Chronic GERD   • Use of anastrozole (Arimidex)   • Family history of colonic polyps   • Osteoporosis   • Ambulatory dysfunction   • Primary osteoarthritis of left shoulder   • Other specified anemias   • Mild intellectual disabilities       Assessment / Plan:  1  Malignant neoplasm of overlapping sites of right breast in female, estrogen receptor positive [facility])      The patient is a 62 yo autistic female with a history of cerebral palsy and stage IA ER/[place] positive, HER2 positive breast cancer s/p right mastectomy in 2013  She was treated adjuvant chemotherapy with [facility] followed by Herceptin for one year, completed in 2/2015  She was then started on Tamoxifen, this was discontinued in 2018 when she was noted to be post-menopause  She was started on Arimidex and is currently maintained on 1 mg daily with goal to complete total of 10 years of therapy (combination of tamoxifen and Arimidex)  Overall, she is doing well without any concerning findings on exam today  Blood work remains in acceptable range    She will continue on her current regimen without change of Arimidex daily and Prolia every 6 months  She will complete recommended 10 years of adjuvant AI in February 2025  She is already scheduled for follow-up ultrasound and yearly mammogram   I will see her back in 6 months with repeat blood work  Patient, her mother and personal care nurse were all in agreement with plan of care  They are instructed to call anytime with questions or concerns    I spent 30 minutes on chart review, face to face counseling time, coordination of care and documentation  Past Medical History:   has a past medical history of Arthritis (11/18/2021), Autism, Breast cancer ([facility] [address] ), Breast cyst, left, Cerebral palsy ([facility] [address] ), Chronic GERD, Depression, Fibrocystic breast disease, Gastrointestinal hemorrhage, Glaucoma, History of chemotherapy, Hydrocephalus ([facility] [address] ), and Scoliosis  PAST SURGICAL HISTORY:   has a past surgical history that includes Esophagogastroduodenoscopy; Incisional breast biopsy; Crawford lymph node biopsy; Strabismus surgery; Eye surgery; Breast biopsy (Right); Colonoscopy; Upper gastrointestinal endoscopy (05/16/2019); Colonoscopy; Cataract extraction (06/14/2021); Mastectomy (Right); and US guided thyroid biopsy (12/13/2022)      CURRENT MEDICATIONS  Current Outpatient Medications   Medication Sig Dispense Refill   • acetaminophen (TYLENOL) 500 mg tablet Take 500 mg by mouth as needed for mild pain       • anastrozole (ARIMIDEX) 1 mg tablet Take 1 tablet (1 mg total) by mouth daily 90 tablet 3   • BANOPHEN 25 MG capsule Take 25 mg by mouth daily at bedtime as needed      • calamine lotion Apply topically every 4 (four) hours as needed for itching     • Calcium Carbonate-Vitamin D (OYSCO 500/D PO) Take by mouth daily     • carBAMazepine (TEGretol) 200 mg tablet Take 1 tablet (200 mg total) by mouth 2 (two) times a day     • Cholecalciferol (VITAMIN D) 2000 units tablet TAKE TWO TABLETS BY MOUTH (4000IU) EVERY MORNING FOR VITAMIN DIFICIENCY 62 tablet 0   • Dentifrices (SENSODYNE PRONAMEL DT) Apply to teeth      • Diclofenac Sodium (VOLTAREN) 1 %      • diphenhydrAMINE (BENADRYL) 25 mg tablet Take 25 mg by mouth daily at bedtime as needed for itching (for insomnia and allergy ** DO NOT TAKE WITH XYZAL **)      • docusate sodium (COLACE) 100 mg capsule Take 100 mg by mouth 3 (three) times a week      • guaiFENesin (SILTUSSIN SA PO) Take by mouth Take 2 teaspoonful (10 ml) PRN 6 hrs for cough     • haloperidol (HALDOL) 2 mg tablet Take 2 mg by mouth 2 (two) times a day 1 tab am and 2 tabs qhs     • latanoprost (XALATAN) 0 005 % ophthalmic solution Administer 1 drop to both eyes     • levocetirizine (XYZAL) 5 MG tablet TAKE ONE TABLET BY MOUTH EVERY DAY AS NEEDED FOR ALLERGIES *DO NOT TAKE WITH BENADRYL* 31 tablet 0   • lithium carbonate 300 mg capsule Take 600 mg by mouth 2 (two) times a day     • loperamide (IMODIUM) 2 mg capsule Take by mouth     • LORazepam (ATIVAN) 0 5 mg tablet Take 1 mg by mouth daily at bedtime     • LORazepam (ATIVAN) 1 mg tablet TAKE ONE TABLET BY MOUTH 30 MINUTES PRIOR TO PROCEDURE (SUCH AS: GYN, DENTAL, IMAGING, SKIN PROCEDURES, SUCH AS MOLE REMOVAL) FOR ANXIETY 5 tablet 0   • melatonin 1 mg Take 5 mg by mouth daily at bedtime      • memantine (NAMENDA) 10 mg tablet Take 1 tablet (10 mg total) by mouth 2 (two) times a day 60 tablet 5   • mineral oil-white petrolatum (LUBRIFRESH P M ) ophthalmic ointment 0 5 inches     • NON FORMULARY ACT FLUORIDE DENTAL RINSE   RINSE WITH 1 TBSP (15ML)2X A DAY     • NON FORMULARY TOLNAFTATE SPRAY 1% SPRAY  SPRAY BETWEEN TOES     • ondansetron (ZOFRAN) 4 mg tablet Take by mouth as needed       • OYSCO 500 + D 500-200 MG-UNIT per tablet      • pantoprazole (PROTONIX) 40 mg tablet TAKE ONE TABLET BY MOUTH 2 TIMES A DAY( MORNING/ BEDTIME) (ESOPHAGITIS) 56 tablet 0   • polyethylene glycol (GLYCOLAX) 17 GM/SCOOP MIX 1 CAPFUL (17 GRAMS) IN 4- 8OZ OF LIQUID AND DRINK BY MOUTH ONCE DAILY AS NEEDED FOR CONSTIPATION 510 g 0   • polyethylene glycol (MIRALAX) 17 g packet Take 17 g by mouth daily as needed     • sodium chloride (TAMIKA 128) 5 % hypertonic ophthalmic solution Apply 1 drop to eye 2 (two) times a day       • sodium fluoride 0 275 (0 125 F) MG/DROP solution Take 275 mcg by mouth daily      • Sunscreens (SUNBLOCK LOTION SPF30 EX) Apply topically daily as needed     • talc (Zeasorb) Apply topically once as needed for irritation Sprinkle powder on affected area of skin to help with excess moisture up to 2 times a day as needed   • tolnaftate (TINACTIN) 1 % spray Apply topically     • white petrolatum Apply topically once Apply to hand every day prn       No current facility-administered medications for this visit  [unfilled]    SOCIAL HISTORY:   reports that she has never smoked  She has never used smokeless tobacco  She reports that she does not drink alcohol and does not use drugs  FAMILY HISTORY:  family history includes Cancer in her maternal uncle; Colon cancer in her family; Colon cancer (age of onset: 62) in her maternal uncle; Coronary artery disease in her family and father; Heart attack (age of onset: 48) in her maternal uncle; Heart attack (age of onset: 59) in her father; Hyperlipidemia in her maternal uncle; Leukemia in her paternal grandfather; Osteopenia in her maternal grandmother and mother; Pancreatic cancer in her paternal uncle; Prostate cancer in her maternal grandfather and paternal uncle; Supraventricular tachycardia in her mother; Thyroid disease in her family; Thyroid nodules in her father  ALLERGIES:  is allergic to bactrim [sulfamethoxazole-trimethoprim], methylphenidate, sulfa antibiotics, thioridazine, amphetamines, chlorpromazine, fexofenadine, medrogestone, medroxyprogesterone, other, and trimethoprim        Physical Exam:  Vital Signs:   Visit Vitals  /80 (BP Location: Left arm, Patient Position: Sitting, Cuff Size: Adult)   Pulse 76   Temp 98 °F (36 7 °C) (Temporal)   Resp 16 "  Ht 4' 7\" (1 397 m)   Wt 50 8 kg (112 lb)   SpO2 96%   BMI 26 03 kg/m²   OB Status Postmenopausal   Smoking Status Never   BSA 1 37 m²     Body mass index is 26 03 kg/m²  Body surface area is 1 37 meters squared  Physical Exam  Constitutional:       General: She is not in acute distress  Appearance: Normal appearance  HENT:      Head: Normocephalic and atraumatic  Eyes:      General: No scleral icterus  Right eye: No discharge  Left eye: No discharge  Conjunctiva/sclera: Conjunctivae normal    Cardiovascular:      Rate and Rhythm: Normal rate and regular rhythm  Pulmonary:      Effort: Pulmonary effort is normal  No respiratory distress  Breath sounds: Normal breath sounds  Abdominal:      General: Bowel sounds are normal  There is no distension  Palpations: Abdomen is soft  There is no mass  Tenderness: There is no abdominal tenderness  Musculoskeletal:         General: Normal range of motion  Lymphadenopathy:      Cervical: No cervical adenopathy  Upper Body:      Right upper body: No supraclavicular, axillary or pectoral adenopathy  Left upper body: No supraclavicular, axillary or pectoral adenopathy  Skin:     General: Skin is warm and dry  Neurological:      General: No focal deficit present  Mental Status: She is alert and oriented to person, place, and time     Psychiatric:         Mood and Affect: Mood normal          Behavior: Behavior normal          Labs:  Lab Results   Component Value Date    WBC 9 56 01/20/2022    HGB 12 9 01/20/2022    HCT 45 2 01/20/2022    MCV 97 01/20/2022     01/20/2022     Lab Results   Component Value Date     (L) 12/18/2013    SODIUM 139 11/16/2021    K 3 8 11/16/2021     11/16/2021    CO2 28 11/16/2021    ANIONGAP 5 12/18/2013    AGAP 8 11/16/2021    BUN 9 11/16/2021    CREATININE 0 62 11/16/2021    GLUC 100 10/09/2020    GLUF 97 11/16/2021    CALCIUM 8 8 11/16/2021    AST 10 11/16/2021 " ALT 18 11/16/2021    ALKPHOS 75 11/16/2021    TP 6 7 11/16/2021    TBILI 0 10 (L) 11/16/2021    EGFR 102 11/16/2021

## 2023-06-15 NOTE — TELEPHONE ENCOUNTER
Spoke with patient nurse asking if she had labs completed prior to appt,  Yajaira Jarrell stated she was able to do cmp, and cbc  [facility] refused to do the Cancer antigen she will be bringing results with her to the appt

## 2023-06-16 ENCOUNTER — TELEPHONE (OUTPATIENT)
Age: 58
End: 2023-06-16

## 2023-06-16 ENCOUNTER — HOSPITAL ENCOUNTER (OUTPATIENT)
Dept: INFUSION CENTER | Facility: HOSPITAL | Age: 58
End: 2023-06-16
Attending: INTERNAL MEDICINE
Payer: MEDICARE

## 2023-06-16 ENCOUNTER — OFFICE VISIT (OUTPATIENT)
Age: 58
End: 2023-06-16
Payer: MEDICARE

## 2023-06-16 VITALS
RESPIRATION RATE: 16 BRPM | SYSTOLIC BLOOD PRESSURE: 118 MMHG | HEIGHT: 55 IN | WEIGHT: 112 LBS | TEMPERATURE: 98 F | BODY MASS INDEX: 25.92 KG/M2 | OXYGEN SATURATION: 96 % | DIASTOLIC BLOOD PRESSURE: 80 MMHG | HEART RATE: 76 BPM

## 2023-06-16 VITALS
RESPIRATION RATE: 16 BRPM | SYSTOLIC BLOOD PRESSURE: 131 MMHG | HEART RATE: 73 BPM | WEIGHT: 113.32 LBS | BODY MASS INDEX: 25.49 KG/M2 | HEIGHT: 56 IN | TEMPERATURE: 96.6 F | DIASTOLIC BLOOD PRESSURE: 80 MMHG

## 2023-06-16 DIAGNOSIS — Z17.0 MALIGNANT NEOPLASM OF OVERLAPPING SITES OF RIGHT BREAST IN FEMALE, ESTROGEN RECEPTOR POSITIVE (HCC): Primary | ICD-10-CM

## 2023-06-16 DIAGNOSIS — Z79.811 USE OF ANASTROZOLE (ARIMIDEX): ICD-10-CM

## 2023-06-16 DIAGNOSIS — C50.811 MALIGNANT NEOPLASM OF OVERLAPPING SITES OF RIGHT BREAST IN FEMALE, ESTROGEN RECEPTOR POSITIVE (HCC): ICD-10-CM

## 2023-06-16 DIAGNOSIS — C50.811 MALIGNANT NEOPLASM OF OVERLAPPING SITES OF RIGHT BREAST IN FEMALE, ESTROGEN RECEPTOR POSITIVE (HCC): Primary | ICD-10-CM

## 2023-06-16 DIAGNOSIS — M81.0 OSTEOPOROSIS, UNSPECIFIED OSTEOPOROSIS TYPE, UNSPECIFIED PATHOLOGICAL FRACTURE PRESENCE: Primary | ICD-10-CM

## 2023-06-16 DIAGNOSIS — Z17.0 MALIGNANT NEOPLASM OF OVERLAPPING SITES OF RIGHT BREAST IN FEMALE, ESTROGEN RECEPTOR POSITIVE (HCC): ICD-10-CM

## 2023-06-16 PROBLEM — D50.9 IRON DEFICIENCY ANEMIA, UNSPECIFIED: Status: RESOLVED | Noted: 2019-09-04 | Resolved: 2023-06-16

## 2023-06-16 PROCEDURE — 99214 OFFICE O/P EST MOD 30 MIN: CPT | Performed by: NURSE PRACTITIONER

## 2023-06-16 PROCEDURE — 96372 THER/PROPH/DIAG INJ SC/IM: CPT

## 2023-06-16 RX ADMIN — DENOSUMAB 60 MG: 60 INJECTION SUBCUTANEOUS at 09:41

## 2023-06-16 NOTE — PROGRESS NOTES
CrCl 51  Pt tolerated Prolia inj without incident  No further appts to schedule at this time, pt has follow up appt with ordering provider today who will contact us for more appts  AVS provided  Pt ambulatory off unit with caregiver

## 2023-06-16 NOTE — TELEPHONE ENCOUNTER
Please schedule Prolia on 12/13/23 at 10:30  Patient is seeing Tank Conner on 12/13/2023 at 11:00 and would like the appointments together

## 2023-06-21 ENCOUNTER — SOCIAL WORK (OUTPATIENT)
Dept: BEHAVIORAL/MENTAL HEALTH CLINIC | Facility: CLINIC | Age: 58
End: 2023-06-21
Payer: MEDICARE

## 2023-06-21 DIAGNOSIS — F41.9 ANXIETY: ICD-10-CM

## 2023-06-21 DIAGNOSIS — F70 MILD INTELLECTUAL DISABILITIES: Primary | ICD-10-CM

## 2023-06-21 DIAGNOSIS — F84.0 AUTISTIC DISORDER: ICD-10-CM

## 2023-06-21 DIAGNOSIS — F33.9 DEPRESSION, RECURRENT (HCC): ICD-10-CM

## 2023-06-21 PROCEDURE — 90834 PSYTX W PT 45 MINUTES: CPT | Performed by: COUNSELOR

## 2023-06-21 NOTE — BH TREATMENT PLAN
"[id number]  1965     Date of Initial Psychotherapy Assessment: Some years ago   Date of Current Treatment Plan: 06/21/23  Treatment Plan Target Date: 12-21-23  Treatment Plan Expiration Date: 12-21-23    Diagnosis:   1  Mild intellectual disabilities        2  Autistic disorder        3  Depression, recurrent ([facility] [address] )        4  Anxiety            Area(s) of Need: Mild Intellectual Disability, Behavioral issues at times  Long Term Goal 1 (in the client's own words): Be more respectful of others    Stage of Change: Action    Target Date for completion: 12-21-23     Anticipated therapeutic modalities: Behavioral therapy     People identified to complete this goal: Client and [facility] Staff      Objective 1: (identify the means of measuring success in meeting the objective): Let other people finish what they are saying, Know when to not say anything to others and quiet self down  I will not be saying negative things to others  Objective 2: (identify the means of measuring success in meeting the objective): I will be using \"I feel\" statements to express my feelings without yelling or cursing  Long Term Goal 2 (in the client's own words):  Ask for help when I need it when doing chores and I find difficulty with them  Stage of Change: Action    Target Date for completion: 12-21-23     Anticipated therapeutic modalities: Behavioral     People identified to complete this goal: Client and [address] [place] [address] staff      Objective 1: (identify the means of measuring success in meeting the objective): I will asking [address] [place] [address] staff for help when I need it and not just ignore the chore or say I don't want to do it        Objective 2: (identify the means of measuring success in meeting the objective): N/A     Long Term Goal 3 (in the client's own words): N/A    Stage of Change: N/A    Target Date for completion: N/A     Anticipated therapeutic modalities:  N/A     People " identified to complete this goal: N/A      Objective 1: (identify the means of measuring success in meeting the objective): N/A      Objective 2: (identify the means of measuring success in meeting the objective): N/A     I am currently under the care of a [facility] psychiatric provider: no    My [facility] psychiatric provider is: Sees doctor outside of [facility]    I am currently taking psychiatric medications: Yes, as prescribed    I feel that I will be ready for discharge from mental health care when I reach the following (measurable goal/objective): When behavior is consistently better  For children and adults who have a legal guardian:   Has there been any change to custody orders and/or guardianship status? NA  If yes, attach updated documentation  I have created my Crisis Plan and have been offered a copy of this plan    [address]: Diagnosis and Treatment Plan explained to [facility] acknowledges an understanding of their diagnosis  Aman Hernandez agrees to this treatment plan  I have been offered a copy of this Treatment Plan   no

## 2023-06-21 NOTE — PSYCH
"Behavioral Health Psychotherapy Progress Note    Psychotherapy Provided: Individual Psychotherapy     1  Mild intellectual disabilities        2  Autistic disorder        3  Depression, recurrent ([address] )        4  Anxiety            Goals addressed in session: Goal 1     DATA: Joyce Weems was present with her nurse from [facility] for today's session  We updated goals and Joyce Weems said she would like to work on being [employer] to others  \"  She said sometimes she interrupts people when talking or sayings \"not such nice things\" to them  Joyce Weems said she would also like to ask [facility] staff for help when she's doing a chore that she finds difficult with rather than just not doing it or putting it off  Nurse Niki Johnson commented that Joyce Wemes is making some improvements but could do better and Joyce Weems agreed  We dicussed \"Stop and Pause\" if she feels over stimulated at [facility] and tell staff she needs to take a little break  During this session, this clinician used the following therapeutic modalities: Solution-Focused Therapy    Substance Abuse was not addressed during this session  If the client is diagnosed with a co-occurring substance use disorder, please indicate any changes in the frequency or amount of use: N/A  Stage of change for addressing substance use diagnoses: No substance use/Not applicable    ASSESSMENT:  Steven Lu presents with a Euthymic/ normal mood  her affect is Normal range and intensity, which is congruent, with her mood and the content of the session  The client has made progress on their goals  Steven Lu presents with a none risk of suicide, none risk of self-harm, and none risk of harm to others  For any risk assessment that surpasses a \"low\" rating, a safety plan must be developed      A safety plan was indicated: no  If yes, describe in detail N/A    PLAN: Between sessions, Steven Lu will try to implement some of things discussed in our session today for better behavior and when " needing help  At the next session, the therapist will use Solution-Focused Therapy to address goals  Behavioral Health Treatment Plan and Discharge Planning: Violet  is aware of and agrees to continue to work on their treatment plan  They have identified and are working toward their discharge goals   yes    Visit start and stop times:    06/21/23  Start Time: 1000  Stop Time: 1048  Total Visit Time: 48 minutes

## 2023-07-12 ENCOUNTER — OFFICE VISIT (OUTPATIENT)
Dept: OBGYN CLINIC | Facility: CLINIC | Age: 58
End: 2023-07-12
Payer: MEDICARE

## 2023-07-12 VITALS
WEIGHT: 109 LBS | DIASTOLIC BLOOD PRESSURE: 80 MMHG | SYSTOLIC BLOOD PRESSURE: 138 MMHG | HEIGHT: 55 IN | BODY MASS INDEX: 25.22 KG/M2

## 2023-07-12 DIAGNOSIS — M19.012 PRIMARY OSTEOARTHRITIS OF LEFT SHOULDER: Primary | ICD-10-CM

## 2023-07-12 PROCEDURE — 20610 DRAIN/INJ JOINT/BURSA W/O US: CPT | Performed by: ORTHOPAEDIC SURGERY

## 2023-07-12 PROCEDURE — 99214 OFFICE O/P EST MOD 30 MIN: CPT | Performed by: ORTHOPAEDIC SURGERY

## 2023-07-12 RX ORDER — BUPIVACAINE HYDROCHLORIDE 2.5 MG/ML
4 INJECTION, SOLUTION INFILTRATION; PERINEURAL
Status: COMPLETED | OUTPATIENT
Start: 2023-07-12 | End: 2023-07-12

## 2023-07-12 RX ORDER — BETAMETHASONE SODIUM PHOSPHATE AND BETAMETHASONE ACETATE 3; 3 MG/ML; MG/ML
6 INJECTION, SUSPENSION INTRA-ARTICULAR; INTRALESIONAL; INTRAMUSCULAR; SOFT TISSUE
Status: COMPLETED | OUTPATIENT
Start: 2023-07-12 | End: 2023-07-12

## 2023-07-12 RX ADMIN — BETAMETHASONE SODIUM PHOSPHATE AND BETAMETHASONE ACETATE 6 MG: 3; 3 INJECTION, SUSPENSION INTRA-ARTICULAR; INTRALESIONAL; INTRAMUSCULAR; SOFT TISSUE at 14:15

## 2023-07-12 RX ADMIN — BUPIVACAINE HYDROCHLORIDE 4 ML: 2.5 INJECTION, SOLUTION INFILTRATION; PERINEURAL at 14:15

## 2023-07-12 NOTE — PROGRESS NOTES
Assessment:     1. Primary osteoarthritis of left shoulder        Plan:     Problem List Items Addressed This Visit        Musculoskeletal and Integument    Primary osteoarthritis of left shoulder - Primary     Findings today are consistent with severe left glenohumeral osteoarthritis . Findings and treatment options were discussed with the patient and her family. Repeat cortisone injection was given to the left glenohumeral joint today. She tolerated the procedure well. Advised to apply cold compress today. Follow-up as needed if symptoms return. Advised patient as soon as she can have another cortisone injection is in 3 to 4 months. All patient's questions were answered to their satisfaction. This note is created using dictation transcription. It may contain typographical errors, grammatical errors, improperly dictated words, background noise and other errors. Relevant Medications    betamethasone acetate-betamethasone sodium phosphate (CELESTONE) injection 6 mg (Completed)    bupivacaine (MARCAINE) 0.25 % injection 4 mL (Completed)    Other Relevant Orders    Large joint arthrocentesis: L glenohumeral (Completed)      Subjective:     Patient ID: Katherine Steinberg is a 63 y.o. female. Chief Complaint:  63year old female patient presents today for a follow up left shoulder pain-severe glenohumeral osteoarthritis. She is resident at [facility], she is with one care giver and her mother. Patient was last seen on 4/12/23 and was given a CSI to the glenohumeral joint. She did get relief from the last injection and that has recently worn off. She denies any new injury. She would like a repeat cortisone injection today.      Allergy:  Allergies   Allergen Reactions   • Bactrim [Sulfamethoxazole-Trimethoprim]    • Methylphenidate Hyperactivity     Reaction Date: [id number];    • Sulfa Antibiotics    • Thioridazine    • Amphetamines    • Chlorpromazine    • Fexofenadine Hives     Reaction Date: [id number];    • Medrogestone    • Medroxyprogesterone Rash and Hives     Reaction Date: [id number];    • Other Hives     Mellaril, thorazine   • Trimethoprim      Medications:  all current active meds have been reviewed  Past Medical History:  Past Medical History:   Diagnosis Date   • Arthritis 11/18/2021    right shoulder   • Autism    • Breast cancer [facility])    • Breast cyst, left     last assessed 12/30/2013   • Cerebral palsy (HCC)    • Chronic GERD    • Depression    • Fibrocystic breast disease     last assessed 06/29/2012   • Gastrointestinal hemorrhage    • Glaucoma    • History of chemotherapy    • Hydrocephalus ([address])      SHUNT IN PLACE   • Scoliosis      Past Surgical History:  Past Surgical History:   Procedure Laterality Date   • BREAST BIOPSY Right    • CATARACT EXTRACTION  06/14/2021   • COLONOSCOPY      Description 04/13/2016-5 yrs. Description 4 yr f/u d/t polyp and family history, onset 07/20/2012. • COLONOSCOPY     • ESOPHAGOGASTRODUODENOSCOPY      description-04/13/2016 gastritis with antral nodule   • EYE SURGERY      for relief of intraocular pressure   • INCISIONAL BREAST BIOPSY      description 2008   • MASTECTOMY Right    • SENTINEL LYMPH NODE BIOPSY     • STRABISMUS SURGERY      description 1973, 1974   • UPPER GASTROINTESTINAL ENDOSCOPY  05/16/2019    Grade C erosive esophagitis.   7 mm nodule in the stomach   • US GUIDED THYROID BIOPSY  12/13/2022     Family History:  Family History   Problem Relation Age of Onset   • Osteopenia Mother    • Supraventricular tachycardia Mother    • Heart attack Father 59        Acute MI   • Coronary artery disease Father    • Thyroid nodules Father    • Osteopenia Maternal Grandmother    • Prostate cancer Maternal Grandfather    • Leukemia Paternal Grandfather    • Heart attack Maternal Uncle 48        acute MI, stent   • Cancer Maternal Uncle         adenocarcinoma of oral cavity   • Colon cancer Maternal Uncle 57   • Hyperlipidemia Maternal Uncle    • Pancreatic cancer Paternal Uncle    • Prostate cancer Paternal Uncle    • Colon cancer Family    • Coronary artery disease Family    • Thyroid disease Family      Social History:  Social History     Substance and Sexual Activity   Alcohol Use No     Social History     Substance and Sexual Activity   Drug Use No     Social History     Tobacco Use   Smoking Status Never   Smokeless Tobacco Never     Review of Systems   Constitutional: Negative for chills and fever. HENT: Negative for ear pain and sore throat. Eyes: Negative for pain and visual disturbance. Respiratory: Negative for cough and shortness of breath. Cardiovascular: Negative for chest pain and palpitations. Gastrointestinal: Negative for abdominal pain and vomiting. Genitourinary: Negative for dysuria and hematuria. Musculoskeletal: Positive for arthralgias (left shoulder). Negative for back pain and joint swelling. Skin: Negative for color change and rash. Neurological: Negative for seizures and syncope. Psychiatric/Behavioral: Negative. All other systems reviewed and are negative. Objective:  BP Readings from Last 1 Encounters:   07/12/23 138/80      Wt Readings from Last 1 Encounters:   07/12/23 49.4 kg (109 lb)      BMI:   Estimated body mass index is 25.33 kg/m² as calculated from the following:    Height as of this encounter: 4' 7" (1.397 m). Weight as of this encounter: 49.4 kg (109 lb). BSA:   Estimated body surface area is 1.35 meters squared as calculated from the following:    Height as of this encounter: 4' 7" (1.397 m). Weight as of this encounter: 49.4 kg (109 lb). Physical Exam  Vitals and nursing note reviewed. Constitutional:       Appearance: Normal appearance. She is well-developed. HENT:      Head: Normocephalic and atraumatic. Right Ear: External ear normal.      Left Ear: External ear normal.   Eyes:      Extraocular Movements: Extraocular movements intact.       Conjunctiva/sclera: Conjunctivae normal.   Pulmonary:      Effort: Pulmonary effort is normal.   Musculoskeletal:      Cervical back: Neck supple. Skin:     General: Skin is warm and dry. Neurological:      Mental Status: She is alert and oriented to person, place, and time. Deep Tendon Reflexes: Reflexes are normal and symmetric. Psychiatric:         Mood and Affect: Mood normal.         Behavior: Behavior normal.       Left Shoulder Exam     Tenderness   The patient is experiencing no tenderness. Range of Motion   Active abduction: 90 (Pain)   Left shoulder passive abduction: pain. Forward flexion: 90 (Pain)   Internal rotation 0 degrees: Sacrum     Muscle Strength   Abduction: 4/5   Internal rotation: 5/5   External rotation: 4/5   Biceps: 5/5     Tests   Cross arm: negative  Drop arm: negative    Other   Erythema: absent  Scars: absent  Sensation: normal  Pulse: present     Comments:  Crepitation with shoulder range of motion            Large joint arthrocentesis: L glenohumeral  Universal Protocol:  Consent: Verbal consent obtained.   Risks and benefits: risks, benefits and alternatives were discussed  Consent given by: patient  Patient understanding: patient states understanding of the procedure being performed    Supporting Documentation  Indications: pain   Procedure Details  Location: shoulder - L glenohumeral  Preparation: Patient was prepped and draped in the usual sterile fashion  Needle size: 22 G  Ultrasound guidance: no  Approach: posterior  Medications administered: 6 mg betamethasone acetate-betamethasone sodium phosphate 6 (3-3) mg/mL; 4 mL bupivacaine 0.25 %    Patient tolerance: patient tolerated the procedure well with no immediate complications  Dressing:  Sterile dressing applied        No new imaging was obtained today     Scribe Attestation    I,:  Lupe Qureshi PA-C am acting as a scribe while in the presence of the attending physician.:       I,:  Seferino Palomares MD personally performed the services described in this documentation    as scribed in my presence.:

## 2023-07-14 ENCOUNTER — HOSPITAL ENCOUNTER (OUTPATIENT)
Dept: ULTRASOUND IMAGING | Facility: CLINIC | Age: 58
Discharge: HOME | End: 2023-07-14
Payer: MEDICARE

## 2023-07-14 DIAGNOSIS — R92.8 ABNORMAL FINDING ON BREAST IMAGING: ICD-10-CM

## 2023-07-14 PROCEDURE — 76642 ULTRASOUND BREAST LIMITED: CPT

## 2023-07-26 ENCOUNTER — SOCIAL WORK (OUTPATIENT)
Dept: BEHAVIORAL/MENTAL HEALTH CLINIC | Facility: CLINIC | Age: 58
End: 2023-07-26
Payer: MEDICARE

## 2023-07-26 DIAGNOSIS — F41.9 ANXIETY: ICD-10-CM

## 2023-07-26 DIAGNOSIS — F70 MILD INTELLECTUAL DISABILITIES: Primary | ICD-10-CM

## 2023-07-26 PROCEDURE — 90834 PSYTX W PT 45 MINUTES: CPT | Performed by: COUNSELOR

## 2023-07-26 NOTE — PSYCH
Behavioral Health Psychotherapy Progress Note    Psychotherapy Provided: Individual psychotherapy    1. Mild intellectual disabilities        2. Anxiety            Goals addressed in session: All goals     DATA: Janna Shipman was present with her [facility] staff Rochelle Hernandez. Sharri Lund reported that she is trying to have better behaviors and is asking for help when she needs it but said she does try on her own first.  She said she had a bigger paycheck this past week because she had more work today. She said she will be going to see her mother and father this coming weekend and seems to enjoy her visits with the. We reviewed all the positive things Sharri Lund is doing to try to improve her behaviors and Rochelle Hernandez commented "Sharri Lund has good days and bad days but we're trying to have more good days than bad days" to which Sharri Lund agreed. We reviewed positive coping skills for communication like using "I feel" statements to express how she is feeling to staff and housemates and others. We practiced deep breathing and ways to calm and soothe self. During this session, this clinician used the following therapeutic modalities: Behavioral    Substance Abuse was not addressed during this session. If the client is diagnosed with a co-occurring substance use disorder, please indicate any changes in the frequency or amount of use: N/A. Stage of change for addressing substance use diagnoses: No substance use or abuse. ASSESSMENT:  Mahendra Mercado presents with a euthymic/normal mood. her affect is normal range and intensity, which is congruent, with her mood and the content of the session. The client has made progress on their goals. Mahendra Mercado presents with a none risk of suicide, none risk of self-harm, and none risk of harm to others. For any risk assessment that surpasses a "low" rating, a safety plan must be developed.     A safety plan was indicated: No   If yes, describe in detail N/A    PLAN: Between sessions, Mahendra Mercado will continue to work on positive behaviors. At the next session, the therapist will use behavioral to address goals/needs/concerns. Behavioral Health Treatment Plan and Discharge Planning: Jayla Zimmer is aware of and agrees to continue to work on their treatment plan. They have identified and are working toward their discharge goals. Yes.     Visit start and stop times:    07/26/23  Start Time: 1200  Stop Time: 1245  Total Visit Time: 45 minutes

## 2023-09-06 DIAGNOSIS — R92.8 ABNORMAL FINDING ON BREAST IMAGING: Primary | ICD-10-CM

## 2023-09-20 ENCOUNTER — SOCIAL WORK (OUTPATIENT)
Dept: BEHAVIORAL/MENTAL HEALTH CLINIC | Facility: CLINIC | Age: 58
End: 2023-09-20
Payer: MEDICARE

## 2023-09-20 DIAGNOSIS — F70 MILD INTELLECTUAL DISABILITIES: Primary | ICD-10-CM

## 2023-09-20 PROCEDURE — 90834 PSYTX W PT 45 MINUTES: CPT | Performed by: COUNSELOR

## 2023-09-20 NOTE — PSYCH
Behavioral Health Psychotherapy Progress Note    Psychotherapy Provided: Individual Psychotherapy     1. Mild intellectual disabilities            Goals addressed in session: Goal 1     DATA: Juanito Hwang was pre sent with [facility] staff (nurse) Christiano Miles' for today's session. Juanito Hwang talked about positive behaviors she has but also things she would like to change that are more negative. Christiano Miles said she feels Juanito Hwang is doing well overall has is making good progress with not acting out as much and said "house-staff reported the same."  Juanito Hwang will bring up things that happened years ago that have upset or bothered her and talks about them like they are happening in the present. She said it is helpful when she is made aware of this and redirected to talk more about present stressors. During this session, this clinician used the following therapeutic modalities: Solution-Focused Therapy    Substance Abuse was not addressed during this session. If the client is diagnosed with a co-occurring substance use disorder, please indicate any changes in the frequency or amount of use: N/A. Stage of change for addressing substance use diagnoses: No substance use/Not applicable    ASSESSMENT:  Kim Camilo presents with a Euthymic/ normal mood. her affect is Normal range and intensity, which is congruent, with her mood and the content of the session. The client has made progress on their goals. Kim Camilo presents with a none risk of suicide, none risk of self-harm, and none risk of harm to others. For any risk assessment that surpasses a "low" rating, a safety plan must be developed. A safety plan was indicated: no  If yes, describe in detail N/A    PLAN: Between sessions, Kim Camilo will focus on the 'five C's' that she knows when interacting with other people. At the next session, the therapist will use Solution-Focused Therapy to address goals/needs/concerns.     Behavioral Health Treatment Plan and Discharge Planning: Yaima Watson is aware of and agrees to continue to work on their treatment plan. They have identified and are working toward their discharge goals.  yes    Visit start and stop times:    09/20/23  Start Time: 0800  Stop Time: 0849  Total Visit Time: 49 minutes

## 2023-09-22 ENCOUNTER — OFFICE VISIT (OUTPATIENT)
Dept: FAMILY MEDICINE CLINIC | Facility: HOSPITAL | Age: 58
End: 2023-09-22
Payer: MEDICARE

## 2023-09-22 VITALS
SYSTOLIC BLOOD PRESSURE: 132 MMHG | TEMPERATURE: 97.8 F | HEIGHT: 55 IN | BODY MASS INDEX: 25.41 KG/M2 | HEART RATE: 80 BPM | DIASTOLIC BLOOD PRESSURE: 96 MMHG | WEIGHT: 109.8 LBS

## 2023-09-22 DIAGNOSIS — Z23 ENCOUNTER FOR IMMUNIZATION: ICD-10-CM

## 2023-09-22 DIAGNOSIS — Z00.00 MEDICARE ANNUAL WELLNESS VISIT, SUBSEQUENT: Primary | ICD-10-CM

## 2023-09-22 DIAGNOSIS — Z11.1 ENCOUNTER FOR PPD TEST: ICD-10-CM

## 2023-09-22 PROCEDURE — G0008 ADMIN INFLUENZA VIRUS VAC: HCPCS

## 2023-09-22 PROCEDURE — 90686 IIV4 VACC NO PRSV 0.5 ML IM: CPT

## 2023-09-22 PROCEDURE — G0439 PPPS, SUBSEQ VISIT: HCPCS | Performed by: INTERNAL MEDICINE

## 2023-09-22 PROCEDURE — 86580 TB INTRADERMAL TEST: CPT

## 2023-09-22 NOTE — PROGRESS NOTES
Assessment and Plan:     Problem List Items Addressed This Visit    None  Visit Diagnoses     Medicare annual wellness visit, subsequent    -  Primary    BMI 25.0-25.9,adult        healthy diet and regular exercise encouraged    Encounter for PPD test        Relevant Orders    TB Skin Test (Completed)        BMI Counseling: Body mass index is 25.52 kg/m². The BMI is above normal. Nutrition recommendations include encouraging healthy choices of fruits and vegetables, moderation in carbohydrate intake, increasing intake of lean protein, reducing intake of saturated and trans fat and reducing intake of cholesterol. Exercise recommendations include exercising 3-5 times per week. No pharmacotherapy was ordered. Rationale for BMI follow-up plan is due to patient being overweight or obese. Preventive health issues were discussed with patient, and age appropriate screening tests were ordered as noted in patient's After Visit Summary. Personalized health advice and appropriate referrals for health education or preventive services given if needed, as noted in patient's After Visit Summary. [facility] forms filled out and copy made for chart    PPD and flu vaccine given today       History of Present Illness:     Patient presents for a Medicare Wellness Visit    HPI Pt here for AWV with [facility] staff and family    Has has some breast US and f/u with medical oncology. She has diagnostic mammo and US scheduled for f/u already. She is on her Anastrazole daily. BMI reviewed - is not a big vegetable eater but she likes her fruits. She is walking and stretching for exercise.      Colonoscopy 10/20 - 5 yrs    Mammo 11/22    PAP 7/21        Patient Care Team:  Josh Chatman DO as PCP - General  Phoebe Condon MD as Surgeon (Surgical Oncology)  Julissa Adamson (Obstetrics and Gynecology)  Cally Oquendo MD (Neurology)  Tatyana Nicole MD (Ophthalmology)  DUSTIN Nolan DPM (Podiatry)  Daphne Chavez MD as Medical Oncologist (Oncology)  Ailyn Christian MD (Endocrinology)     Review of Systems:     Review of Systems   Constitutional: Negative for chills and fever. Eyes:        Legally blind   Respiratory: Negative for cough. Cardiovascular: Negative for leg swelling. Gastrointestinal: Negative for constipation and diarrhea. Genitourinary: Negative for difficulty urinating. Musculoskeletal: Negative for gait problem and joint swelling. Skin: Negative for rash and wound. Neurological: Negative for seizures and headaches. Psychiatric/Behavioral: Negative for behavioral problems. All other systems reviewed and are negative.        Problem List:     Patient Active Problem List   Diagnosis   • Vitamin D deficiency   • Scoliosis   • Retinal detachment   • Obsessive compulsive disorder   • Multiple thyroid nodules   • Malignant neoplasm of overlapping sites of right breast in female, estrogen receptor positive ([address])   • Legal blindness [place]   • Irregular menstrual cycle   • Internal hemorrhoids   • Impulse control disorder   • Glaucoma   • Dyslipidemia   • Depression, recurrent ([address])   • 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HCC)   • Chronic gingivitis   • Cerebral palsy ([address])   • Benign neoplasm of large intestine   • Autistic disorder   • Anxiety   • Other long term (current) drug therapy   • Hyperglycemia   • Screening for colorectal cancer   • Chronic GERD   • Use of anastrozole (Arimidex)   • Family history of colonic polyps   • Osteoporosis   • Ambulatory dysfunction   • Primary osteoarthritis of left shoulder   • Other specified anemias   • Mild intellectual disabilities      Past Medical and Surgical History:     Past Medical History:   Diagnosis Date   • Breast cyst, left     last assessed 12/30/2013   • Cerebral palsy ([address])    • Chronic GERD    • Fibrocystic breast disease     last assessed 06/29/2012   • Gastrointestinal hemorrhage    • Glaucoma    • History of chemotherapy    • Hydrocephalus ([address])      SHUNT IN PLACE • Scoliosis      Past Surgical History:   Procedure Laterality Date   • BREAST BIOPSY Right    • CATARACT EXTRACTION  06/14/2021   • COLONOSCOPY      Description 04/13/2016-5 yrs. Description 4 yr f/u d/t polyp and family history, onset 07/20/2012. • COLONOSCOPY     • ESOPHAGOGASTRODUODENOSCOPY      description-04/13/2016 gastritis with antral nodule   • EYE SURGERY      for relief of intraocular pressure   • INCISIONAL BREAST BIOPSY      description 2008   • MASTECTOMY Right    • SENTINEL LYMPH NODE BIOPSY     • STRABISMUS SURGERY      description 1973, 1974   • UPPER GASTROINTESTINAL ENDOSCOPY  05/16/2019    Grade C erosive esophagitis.   7 mm nodule in the stomach   • US GUIDED THYROID BIOPSY  12/13/2022      Family History:     Family History   Problem Relation Age of Onset   • Osteopenia Mother    • Supraventricular tachycardia Mother    • Heart attack Father 59        Acute MI   • Coronary artery disease Father    • Thyroid nodules Father    • Osteopenia Maternal Grandmother    • Prostate cancer Maternal Grandfather    • Leukemia Paternal Grandfather    • Heart attack Maternal Uncle 48        acute MI, stent   • Cancer Maternal Uncle         adenocarcinoma of oral cavity   • Colon cancer Maternal Uncle 62   • Hyperlipidemia Maternal Uncle    • Pancreatic cancer Paternal Uncle    • Prostate cancer Paternal Uncle    • Colon cancer Family    • Coronary artery disease Family    • Thyroid disease Family       Social History:     Social History     Socioeconomic History   • Marital status: Single     Spouse name: None   • Number of children: None   • Years of education: None   • Highest education level: None   Occupational History   • None   Tobacco Use   • Smoking status: Never   • Smokeless tobacco: Never   Vaping Use   • Vaping Use: Never used   Substance and Sexual Activity   • Alcohol use: No   • Drug use: No   • Sexual activity: None   Other Topics Concern   • None   Social History Narrative    Person living in a residential institution    Uses safety equipment-seat belts     Social Determinants of Health     Financial Resource Strain: Low Risk  (9/22/2023)    Overall Financial Resource Strain (CARDIA)    • Difficulty of Paying Living Expenses: Not hard at all   Food Insecurity: Not on file   Transportation Needs: No Transportation Needs (9/22/2023)    PRAPARE - Transportation    • Lack of Transportation (Medical): No    • Lack of Transportation (Non-Medical):  No   Physical Activity: Not on file   Stress: Not on file   Social Connections: Not on file   Intimate Partner Violence: Not on file   Housing Stability: Not on file      Medications and Allergies:     Current Outpatient Medications   Medication Sig Dispense Refill   • acetaminophen (TYLENOL) 500 mg tablet Take 500 mg by mouth as needed for mild pain       • anastrozole (ARIMIDEX) 1 mg tablet Take 1 tablet (1 mg total) by mouth daily 90 tablet 3   • BANOPHEN 25 MG capsule Take 25 mg by mouth daily at bedtime as needed      • calamine lotion Apply topically every 4 (four) hours as needed for itching     • Calcium Carbonate-Vitamin D (OYSCO 500/D PO) Take by mouth daily     • carBAMazepine (TEGretol) 200 mg tablet Take 1 tablet (200 mg total) by mouth 2 (two) times a day     • Cholecalciferol (VITAMIN D) 2000 units tablet TAKE TWO TABLETS BY MOUTH (4000IU) EVERY MORNING FOR VITAMIN DIFICIENCY 62 tablet 0   • Dentifrices (SENSODYNE PRONAMEL DT) Apply to teeth      • Diclofenac Sodium (VOLTAREN) 1 %      • diphenhydrAMINE (BENADRYL) 25 mg tablet Take 25 mg by mouth daily at bedtime as needed for itching (for insomnia and allergy ** DO NOT TAKE WITH XYZAL **)      • docusate sodium (COLACE) 100 mg capsule Take 100 mg by mouth 3 (three) times a week      • guaiFENesin (SILTUSSIN SA PO) Take by mouth Take 2 teaspoonful (10 ml) PRN 6 hrs for cough     • haloperidol (HALDOL) 2 mg tablet Take 2 mg by mouth 2 (two) times a day 1 tab am and 2 tabs qhs     • latanoprost (XALATAN) 0.005 % ophthalmic solution Administer 1 drop to both eyes     • levocetirizine (XYZAL) 5 MG tablet TAKE ONE TABLET BY MOUTH EVERY DAY AS NEEDED FOR ALLERGIES *DO NOT TAKE WITH BENADRYL* 31 tablet 0   • lithium carbonate 300 mg capsule Take 600 mg by mouth 2 (two) times a day     • loperamide (IMODIUM) 2 mg capsule Take by mouth     • LORazepam (ATIVAN) 0.5 mg tablet Take 1 mg by mouth daily at bedtime     • LORazepam (ATIVAN) 1 mg tablet TAKE ONE TABLET BY MOUTH 30 MINUTES PRIOR TO PROCEDURE (SUCH AS: GYN, DENTAL, IMAGING, SKIN PROCEDURES, SUCH AS MOLE REMOVAL) FOR ANXIETY 5 tablet 0   • melatonin 1 mg Take 5 mg by mouth daily at bedtime      • memantine (NAMENDA) 10 mg tablet Take 1 tablet (10 mg total) by mouth 2 (two) times a day 60 tablet 5   • mineral oil-white petrolatum (LUBRIFRESH P.M.) ophthalmic ointment 0.5 inches     • NON FORMULARY ACT FLUORIDE DENTAL RINSE   RINSE WITH 1 TBSP (15ML)2X A DAY     • NON FORMULARY TOLNAFTATE SPRAY 1% SPRAY  SPRAY BETWEEN TOES     • ondansetron (ZOFRAN) 4 mg tablet Take by mouth as needed       • OYSCO 500 + D 500-200 MG-UNIT per tablet      • pantoprazole (PROTONIX) 40 mg tablet TAKE ONE TABLET BY MOUTH 2 TIMES A DAY( MORNING/ BEDTIME) (ESOPHAGITIS) (Patient taking differently: 40 mg daily) 56 tablet 0   • polyethylene glycol (GLYCOLAX) 17 GM/SCOOP MIX 1 CAPFUL (17 GRAMS) IN 4- 8OZ OF LIQUID AND DRINK BY MOUTH ONCE DAILY AS NEEDED FOR CONSTIPATION 510 g 0   • polyethylene glycol (MIRALAX) 17 g packet Take 17 g by mouth daily as needed     • sodium chloride (TAMIKA 128) 5 % hypertonic ophthalmic solution Apply 1 drop to eye 2 (two) times a day       • sodium fluoride 0.275 (0.125 F) MG/DROP solution Take 275 mcg by mouth daily      • Sunscreens (SUNBLOCK LOTION SPF30 EX) Apply topically daily as needed     • talc (Zeasorb) Apply topically once as needed for irritation Sprinkle powder on affected area of skin to help with excess moisture up to 2 times a day as needed . • tolnaftate (TINACTIN) 1 % spray Apply topically     • white petrolatum Apply topically once Apply to hand every day prn       No current facility-administered medications for this visit.      Allergies   Allergen Reactions   • Bactrim [Sulfamethoxazole-Trimethoprim]    • Methylphenidate Hyperactivity     Reaction Date: [id number];    • Sulfa Antibiotics    • Thioridazine    • Amphetamines    • Chlorpromazine    • Fexofenadine Hives     Reaction Date: [id number];    • Medrogestone    • Medroxyprogesterone Rash and Hives     Reaction Date: [id number];    • Other Hives     Mellaril, thorazine   • Trimethoprim       Immunizations:     Immunization History   Administered Date(s) Administered   • COVID-19 PFIZER VACCINE 0.3 ML IM 11/12/2021, 05/11/2022   • INFLUENZA 11/07/2014, 09/29/2015, 09/07/2016, 09/29/2017, 10/15/2018, 10/25/2019, 10/01/2020, 09/19/2022   • IPV 1966, 1966, 09/06/1968   • Influenza Quadrivalent Preservative Free 3 years and older IM 11/07/2014, 09/29/2015, 09/07/2016   • Influenza Quadrivalent, 6-35 Months IM 09/29/2017   • Influenza, injectable, quadrivalent, preservative free 0.5 mL 10/15/2018, 10/25/2019   • Influenza, recombinant, quadrivalent,injectable, preservative free 09/19/2022   • Influenza, seasonal, injectable 09/25/2009, 09/23/2010, 10/02/2012, 09/16/2013   • Pneumococcal Polysaccharide PPV23 09/17/2015, 05/29/2018   • TD (adult) Preservative Free 02/18/2015   • Td (adult), adsorbed 04/13/1993, 02/18/2005, 02/18/2015   • Tetanus, adsorbed 02/18/2015   • Tuberculin Skin Test-PPD Intradermal 08/01/1993, 07/01/1995, 10/01/2007, 08/16/2011, 09/16/2013, 09/15/2015, 09/19/2017, 09/16/2019, 09/15/2021, 09/22/2023   • Zoster Vaccine Recombinant 10/28/2022, 03/17/2023      Health Maintenance:         Topic Date Due   • Hepatitis C Screening  Never done   • HIV Screening  Never done   • Breast Cancer Screening: Mammogram  11/17/2023   • Colorectal Cancer Screening 10/13/2025   • Cervical Cancer Screening  07/08/2026         Topic Date Due   • COVID-19 Vaccine (3 - Pfizer series) 07/06/2022   • Influenza Vaccine (1) 09/01/2023      Medicare Screening Tests and Risk Assessments:     Sobeida Rocha is here for her Subsequent Wellness visit. Last Medicare Wellness visit information reviewed, patient interviewed and updates made to the record today. Historian  Patient cannot answer questions due to cognitive impairment, intelluctual disability, or expressive limitations. Information provided by: caregiver and family. Health Risk Assessment:   Patient rates overall health as very good. Patient feels that their physical health rating is same. Patient is very satisfied with their life. Eyesight was rated as same. Hearing was rated as same. Patient feels that their emotional and mental health rating is slightly worse. Patients states they are often angry. Patient states they are often unusually tired/fatigued. Pain experienced in the last 7 days has been a lot. Patient's pain rating has been 10/10. Patient states that she has experienced no weight loss or gain in last 6 months. Fall Risk Screening: In the past year, patient has experienced: no history of falling in past year      Urinary Incontinence Screening:   Patient has not leaked urine accidently in the last six months. Home Safety:  Patient has trouble with stairs inside or outside of their home. Patient has working smoke alarms and has working carbon monoxide detector. Home safety hazards include: none. Nutrition:   Current diet is Regular. Medications:   Patient is currently taking over-the-counter supplements. OTC medications include: see medication list. Patient is not able to manage medications.      Activities of Daily Living (ADLs)/Instrumental Activities of Daily Living (IADLs):   Walk and transfer into and out of bed and chair?: Yes  Dress and groom yourself?: Yes    Bathe or shower yourself?: No Feed yourself? Yes  Do your laundry/housekeeping?: No  Manage your money, pay your bills and track your expenses?: No  Make your own meals?: No    Do your own shopping?: No    Previous Hospitalizations:   Any hospitalizations or ED visits within the last 12 months?: No      Advance Care Planning:   Living will: No    Durable POA for healthcare: No    Advanced directive: No      PREVENTIVE SCREENINGS        Diabetes Screening:     General: Screening Current      Colorectal Cancer Screening:     General: Screening Current      Breast Cancer Screening:     General: History Breast Cancer      Cervical Cancer Screening:    General: Screening Current      Osteoporosis Screening:    General: Screening Not Indicated and History Osteoporosis      Lung Cancer Screening:     General: Screening Not Indicated    Screening, Brief Intervention, and Referral to Treatment (SBIRT)    Screening  Typical number of drinks in a day: 0  Typical number of drinks in a week: 0  Interpretation: Low risk drinking behavior. Single Item Drug Screening:  How often have you used an illegal drug (including marijuana) or a prescription medication for non-medical reasons in the past year? never    Single Item Drug Screen Score: 0  Interpretation: Negative screen for possible drug use disorder    Vision Screening - Comments[de-identified] Pt unable tor read chart     Physical Exam:     /96   Pulse 80   Temp 97.8 °F (36.6 °C) (Tympanic)   Ht 4' 7" (1.397 m)   Wt 49.8 kg (109 lb 12.8 oz)   BMI 25.52 kg/m²     Physical Exam  Vitals and nursing note reviewed. Constitutional:       General: She is not in acute distress. Appearance: She is well-developed. She is not ill-appearing. HENT:      Head: Normocephalic and atraumatic. Right Ear: Tympanic membrane and external ear normal. There is no impacted cerumen. Left Ear: Tympanic membrane and external ear normal. There is no impacted cerumen.       Mouth/Throat:      Mouth: Mucous membranes are moist.      Pharynx: Oropharynx is clear. No oropharyngeal exudate. Eyes:      General:         Right eye: No discharge. Left eye: No discharge. Conjunctiva/sclera: Conjunctivae normal.   Neck:      Thyroid: No thyromegaly. Trachea: No tracheal deviation. Cardiovascular:      Rate and Rhythm: Normal rate and regular rhythm. Heart sounds: Normal heart sounds. No murmur heard. Pulmonary:      Effort: Pulmonary effort is normal. No respiratory distress. Breath sounds: Normal breath sounds. No wheezing, rhonchi or rales. Abdominal:      Palpations: Abdomen is soft. Tenderness: There is no abdominal tenderness. There is no guarding or rebound. Musculoskeletal:         General: No deformity or signs of injury. Cervical back: Neck supple. Lymphadenopathy:      Cervical: No cervical adenopathy. Skin:     General: Skin is warm and dry. Coloration: Skin is not pale. Findings: No bruising or rash. Neurological:      Mental Status: She is alert. Mental status is at baseline. Motor: No abnormal muscle tone. Gait: Gait normal.   Psychiatric:         Behavior: Behavior normal.         Thought Content:  Thought content normal.          Augustus Blush, DO

## 2023-09-25 ENCOUNTER — CLINICAL SUPPORT (OUTPATIENT)
Dept: FAMILY MEDICINE CLINIC | Facility: HOSPITAL | Age: 58
End: 2023-09-25

## 2023-09-25 DIAGNOSIS — Z11.1 ENCOUNTER FOR PPD SKIN TEST READING: Primary | ICD-10-CM

## 2023-09-25 LAB
INDURATION: 0 MM
TB SKIN TEST: NEGATIVE

## 2023-09-25 PROCEDURE — 99024 POSTOP FOLLOW-UP VISIT: CPT

## 2023-09-26 ENCOUNTER — HOSPITAL ENCOUNTER (OUTPATIENT)
Dept: ULTRASOUND IMAGING | Facility: HOSPITAL | Age: 58
Discharge: HOME | End: 2023-09-26
Payer: MEDICARE

## 2023-09-26 DIAGNOSIS — R35.0 URINARY FREQUENCY: ICD-10-CM

## 2023-09-26 DIAGNOSIS — C50.919 MALIGNANT NEOPLASM OF FEMALE BREAST, UNSPECIFIED ESTROGEN RECEPTOR STATUS, UNSPECIFIED LATERALITY, UNSPECIFIED SITE OF BREAST (HCC): ICD-10-CM

## 2023-09-26 PROCEDURE — 76856 US EXAM PELVIC COMPLETE: CPT

## 2023-09-27 LAB — HBA1C MFR BLD HPLC: 5 %

## 2023-10-12 ENCOUNTER — OFFICE VISIT (OUTPATIENT)
Dept: OBGYN CLINIC | Facility: CLINIC | Age: 58
End: 2023-10-12
Payer: MEDICARE

## 2023-10-12 VITALS
BODY MASS INDEX: 25.92 KG/M2 | SYSTOLIC BLOOD PRESSURE: 128 MMHG | WEIGHT: 112 LBS | DIASTOLIC BLOOD PRESSURE: 80 MMHG | HEIGHT: 55 IN

## 2023-10-12 DIAGNOSIS — M19.012 PRIMARY OSTEOARTHRITIS OF LEFT SHOULDER: Primary | ICD-10-CM

## 2023-10-12 PROCEDURE — 20610 DRAIN/INJ JOINT/BURSA W/O US: CPT | Performed by: ORTHOPAEDIC SURGERY

## 2023-10-12 PROCEDURE — 99213 OFFICE O/P EST LOW 20 MIN: CPT | Performed by: ORTHOPAEDIC SURGERY

## 2023-10-12 RX ORDER — LIDOCAINE HYDROCHLORIDE 10 MG/ML
5 INJECTION, SOLUTION INFILTRATION; PERINEURAL
Status: COMPLETED | OUTPATIENT
Start: 2023-10-12 | End: 2023-10-12

## 2023-10-12 RX ORDER — BETAMETHASONE SODIUM PHOSPHATE AND BETAMETHASONE ACETATE 3; 3 MG/ML; MG/ML
6 INJECTION, SUSPENSION INTRA-ARTICULAR; INTRALESIONAL; INTRAMUSCULAR; SOFT TISSUE
Status: COMPLETED | OUTPATIENT
Start: 2023-10-12 | End: 2023-10-12

## 2023-10-12 RX ADMIN — LIDOCAINE HYDROCHLORIDE 5 ML: 10 INJECTION, SOLUTION INFILTRATION; PERINEURAL at 14:45

## 2023-10-12 RX ADMIN — BETAMETHASONE SODIUM PHOSPHATE AND BETAMETHASONE ACETATE 6 MG: 3; 3 INJECTION, SUSPENSION INTRA-ARTICULAR; INTRALESIONAL; INTRAMUSCULAR; SOFT TISSUE at 14:45

## 2023-10-12 NOTE — PROGRESS NOTES
Assessment:     1. Primary osteoarthritis of left shoulder        Plan:     Problem List Items Addressed This Visit          Musculoskeletal and Integument    Primary osteoarthritis of left shoulder - Primary     Findings today are consistent with severe left glenohumeral osteoarthritis. Findings and treatment options were discussed with the patient and her family. Repeat cortisone injection was given to the left glenohumeral joint today. She tolerated the procedure well. Advised to apply cold compress today. Follow-up as needed if symptoms return. Advised patient as soon as she can have another cortisone injection is in 3 to 4 months. All patient's questions were answered to their satisfaction. This note is created using dictation transcription. It may contain typographical errors, grammatical errors, improperly dictated words, background noise and other errors. Relevant Medications    lidocaine (XYLOCAINE) 1 % injection 5 mL (Completed)    betamethasone acetate-betamethasone sodium phosphate (CELESTONE) injection 6 mg (Completed)    Other Relevant Orders    Large joint arthrocentesis: L glenohumeral (Completed)      Subjective:     Patient ID: Madina Anton is a 63 y.o. female. Chief Complaint:  63year old female patient presents today for a follow up left shoulder pain-severe glenohumeral osteoarthritis. She is resident at [facility], she is with one care giver and her mother. Patient was last seen on 7/12/23 and was given a CSI to the glenohumeral joint. She did get relief from the last injection which only lasted for about 6 weeks. She denies any new injury. She would like a repeat cortisone injection today.        Allergy:  Allergies   Allergen Reactions    Bactrim [Sulfamethoxazole-Trimethoprim]     Methylphenidate Hyperactivity     Reaction Date: [id number];     Sulfa Antibiotics     Thioridazine     Amphetamines     Chlorpromazine     Fexofenadine Hives     Reaction Date: [id number]; Medrogestone     Medroxyprogesterone Rash and Hives     Reaction Date: [id number]; Other Hives     Mellaril, thorazine    Trimethoprim      Medications:  all current active meds have been reviewed  Past Medical History:  Past Medical History:   Diagnosis Date    Breast cyst, left     last assessed 12/30/2013    Cerebral palsy ([address])     Chronic GERD     Fibrocystic breast disease     last assessed 06/29/2012    Gastrointestinal hemorrhage     Glaucoma     History of chemotherapy     Hydrocephalus ([address])      SHUNT IN PLACE    Scoliosis      Past Surgical History:  Past Surgical History:   Procedure Laterality Date    BREAST BIOPSY Right     CATARACT EXTRACTION  06/14/2021    COLONOSCOPY      Description 04/13/2016-5 yrs. Description 4 yr f/u d/t polyp and family history, onset 07/20/2012. COLONOSCOPY      ESOPHAGOGASTRODUODENOSCOPY      description-04/13/2016 gastritis with antral nodule    EYE SURGERY      for relief of intraocular pressure    INCISIONAL BREAST BIOPSY      description 2008    MASTECTOMY Right     SENTINEL LYMPH NODE BIOPSY      STRABISMUS SURGERY      description 1973, 1974    UPPER GASTROINTESTINAL ENDOSCOPY  05/16/2019    Grade C erosive esophagitis.   7 mm nodule in the stomach    US GUIDED THYROID BIOPSY  12/13/2022     Family History:  Family History   Problem Relation Age of Onset    Osteopenia Mother     Supraventricular tachycardia Mother     Heart attack Father 59        Acute MI    Coronary artery disease Father     Thyroid nodules Father     Osteopenia Maternal Grandmother     Prostate cancer Maternal Grandfather     Leukemia Paternal Grandfather     Heart attack Maternal Uncle 48        acute MI, stent    Cancer Maternal Uncle         adenocarcinoma of oral cavity    Colon cancer Maternal Uncle 62    Hyperlipidemia Maternal Uncle     Pancreatic cancer Paternal Uncle     Prostate cancer Paternal Uncle     Colon cancer Family     Coronary artery disease Family     Thyroid disease Family      Social History:  Social History     Substance and Sexual Activity   Alcohol Use No     Social History     Substance and Sexual Activity   Drug Use No     Social History     Tobacco Use   Smoking Status Never   Smokeless Tobacco Never     Review of Systems   Constitutional:  Negative for chills and fever. HENT:  Negative for ear pain and sore throat. Eyes:  Negative for pain and visual disturbance. Respiratory:  Negative for cough and shortness of breath. Cardiovascular:  Negative for chest pain and palpitations. Gastrointestinal:  Negative for abdominal pain and vomiting. Genitourinary:  Negative for dysuria and hematuria. Musculoskeletal:  Positive for arthralgias (left shoulder). Negative for back pain and joint swelling. Skin:  Negative for color change and rash. Neurological:  Negative for seizures and syncope. Psychiatric/Behavioral: Negative. All other systems reviewed and are negative. Objective:  BP Readings from Last 1 Encounters:   10/12/23 128/80      Wt Readings from Last 1 Encounters:   10/12/23 50.8 kg (112 lb)      BMI:   Estimated body mass index is 26.03 kg/m² as calculated from the following:    Height as of this encounter: 4' 7" (1.397 m). Weight as of this encounter: 50.8 kg (112 lb). BSA:   Estimated body surface area is 1.37 meters squared as calculated from the following:    Height as of this encounter: 4' 7" (1.397 m). Weight as of this encounter: 50.8 kg (112 lb). Physical Exam  Vitals and nursing note reviewed. Constitutional:       Appearance: Normal appearance. She is well-developed. HENT:      Head: Normocephalic and atraumatic. Right Ear: External ear normal.      Left Ear: External ear normal.   Eyes:      Extraocular Movements: Extraocular movements intact. Conjunctiva/sclera: Conjunctivae normal.   Pulmonary:      Effort: Pulmonary effort is normal.   Musculoskeletal:      Cervical back: Neck supple.    Skin: General: Skin is warm and dry. Neurological:      Mental Status: She is alert and oriented to person, place, and time. Deep Tendon Reflexes: Reflexes are normal and symmetric. Psychiatric:         Mood and Affect: Mood normal.         Behavior: Behavior normal.       Left Shoulder Exam     Tenderness   The patient is experiencing no tenderness. Range of Motion   Active abduction:  90 (Pain)   Left shoulder passive abduction: pain. Forward flexion:  90 (Pain)   Internal rotation 0 degrees:  Sacrum     Muscle Strength   Abduction: 4/5   Internal rotation: 5/5   External rotation: 4/5   Biceps: 5/5     Tests   Cross arm: negative  Drop arm: negative    Other   Erythema: absent  Scars: absent  Sensation: normal  Pulse: present     Comments:  Crepitation with shoulder range of motion            Large joint arthrocentesis: L glenohumeral  Universal Protocol:  Consent: Verbal consent obtained.   Risks and benefits: risks, benefits and alternatives were discussed  Consent given by: patient  Patient understanding: patient states understanding of the procedure being performed  Supporting Documentation  Indications: pain   Procedure Details  Location: shoulder - L glenohumeral  Preparation: Patient was prepped and draped in the usual sterile fashion  Needle size: 22 G  Ultrasound guidance: no  Approach: posterior  Medications administered: 6 mg betamethasone acetate-betamethasone sodium phosphate 6 (3-3) mg/mL; 5 mL lidocaine 1 %    Patient tolerance: patient tolerated the procedure well with no immediate complications  Dressing:  Sterile dressing applied          No new imaging was obtained today      Scribe Attestation      I,:  Alejandro Chahal PA-C am acting as a scribe while in the presence of the attending physician.:       I,:  Gabrielle Grove MD personally performed the services described in this documentation    as scribed in my presence.:

## 2023-10-12 NOTE — ASSESSMENT & PLAN NOTE
Findings today are consistent with severe left glenohumeral osteoarthritis. Findings and treatment options were discussed with the patient and her family. Repeat cortisone injection was given to the left glenohumeral joint today. She tolerated the procedure well. Advised to apply cold compress today. Follow-up as needed if symptoms return. Advised patient as soon as she can have another cortisone injection is in 3 to 4 months. All patient's questions were answered to their satisfaction. This note is created using dictation transcription. It may contain typographical errors, grammatical errors, improperly dictated words, background noise and other errors.

## 2023-11-06 ENCOUNTER — TELEPHONE (OUTPATIENT)
Dept: BEHAVIORAL/MENTAL HEALTH CLINIC | Facility: CLINIC | Age: 58
End: 2023-11-06

## 2023-11-06 NOTE — TELEPHONE ENCOUNTER
Sent email at 5:12JR to Ysabel Kevin at [facility], caregiver to client, regarding the appt that Ba Rios has with me today at 2pm.  Reminded her that Meche's next appt is 12-11-23 @10am.

## 2023-12-04 ENCOUNTER — HOSPITAL ENCOUNTER (OUTPATIENT)
Dept: MAMMOGRAPHY | Facility: CLINIC | Age: 58
Discharge: HOME | End: 2023-12-04
Payer: MEDICARE

## 2023-12-04 ENCOUNTER — HOSPITAL ENCOUNTER (OUTPATIENT)
Dept: ULTRASOUND IMAGING | Facility: CLINIC | Age: 58
Discharge: HOME | End: 2023-12-04
Payer: MEDICARE

## 2023-12-04 VITALS — WEIGHT: 111.99 LBS | HEIGHT: 55 IN | BODY MASS INDEX: 25.92 KG/M2

## 2023-12-04 DIAGNOSIS — R92.8 ABNORMAL FINDING ON BREAST IMAGING: ICD-10-CM

## 2023-12-04 PROCEDURE — G0279 TOMOSYNTHESIS, MAMMO: HCPCS

## 2023-12-04 PROCEDURE — 76642 ULTRASOUND BREAST LIMITED: CPT

## 2023-12-04 PROCEDURE — 77065 DX MAMMO INCL CAD UNI: CPT

## 2023-12-05 ENCOUNTER — APPOINTMENT (OUTPATIENT)
Dept: RADIOLOGY | Facility: HOSPITAL | Age: 58
End: 2023-12-05
Payer: MEDICARE

## 2023-12-05 ENCOUNTER — HOSPITAL ENCOUNTER (OUTPATIENT)
Dept: ULTRASOUND IMAGING | Facility: HOSPITAL | Age: 58
Discharge: HOME | End: 2023-12-05
Payer: MEDICARE

## 2023-12-05 DIAGNOSIS — R93.89 ABNORMAL FINDINGS ON DIAGNOSTIC IMAGING OF OTHER SPECIFIED BODY STRUCTURES: ICD-10-CM

## 2023-12-05 PROCEDURE — 76856 US EXAM PELVIC COMPLETE: CPT

## 2023-12-06 ENCOUNTER — OFFICE VISIT (OUTPATIENT)
Dept: GASTROENTEROLOGY | Facility: CLINIC | Age: 58
End: 2023-12-06
Payer: MEDICARE

## 2023-12-06 VITALS
BODY MASS INDEX: 24.76 KG/M2 | SYSTOLIC BLOOD PRESSURE: 128 MMHG | DIASTOLIC BLOOD PRESSURE: 80 MMHG | WEIGHT: 107 LBS | HEIGHT: 55 IN

## 2023-12-06 DIAGNOSIS — Z86.010 HISTORY OF COLON POLYPS: ICD-10-CM

## 2023-12-06 DIAGNOSIS — K21.9 GASTROESOPHAGEAL REFLUX DISEASE WITHOUT ESOPHAGITIS: Primary | ICD-10-CM

## 2023-12-06 PROCEDURE — 99213 OFFICE O/P EST LOW 20 MIN: CPT | Performed by: INTERNAL MEDICINE

## 2023-12-06 NOTE — PROGRESS NOTES
[facility] - Outpatient Follow-up Note  Tamanna Servin 63 y.o. female MRN: [id number]  Encounter: [id number]    ASSESSMENT AND PLAN:      1. Gastroesophageal reflux disease without esophagitis  Stable continue Protonix 40 mg daily. 2. History of colon polyps  Last colonoscopy 2020 negative      Followup Appointment: 1 year  ______________________________________________________________________    Chief Complaint   Patient presents with    Follow-up     No issues     HPI: Patient is a very pleasant 15-year-old female with a history of cerebral palsy. We follow her for reflux. Staff accompanies her reports she is doing well eating without difficulty no complaints of pain bowel movements are regular and normal.  The patient is somewhat verbal and seems to deny any discomfort. Historical Information   Past Medical History:   Diagnosis Date    Breast cyst, left     last assessed 12/30/2013    Cerebral palsy ([address])     Chronic GERD     Fibrocystic breast disease     last assessed 06/29/2012    Gastrointestinal hemorrhage     Glaucoma     History of chemotherapy     Hydrocephalus ([address])      SHUNT IN PLACE    Scoliosis      Past Surgical History:   Procedure Laterality Date    BREAST BIOPSY Right     CATARACT EXTRACTION  06/14/2021    COLONOSCOPY      Description 04/13/2016-5 yrs. Description 4 yr f/u d/t polyp and family history, onset 07/20/2012. COLONOSCOPY      ESOPHAGOGASTRODUODENOSCOPY      description-04/13/2016 gastritis with antral nodule    EYE SURGERY      for relief of intraocular pressure    INCISIONAL BREAST BIOPSY      description 2008    MASTECTOMY Right     SENTINEL LYMPH NODE BIOPSY      STRABISMUS SURGERY      description 1973, 1974    UPPER GASTROINTESTINAL ENDOSCOPY  05/16/2019    Grade C erosive esophagitis.   7 mm nodule in the stomach    US GUIDED THYROID BIOPSY  12/13/2022     Social History     Substance and Sexual Activity   Alcohol Use No     Social History     Substance and Sexual Activity   Drug Use No     Social History     Tobacco Use   Smoking Status Never   Smokeless Tobacco Never     Family History   Problem Relation Age of Onset    Osteopenia Mother     Supraventricular tachycardia Mother     Heart attack Father 59        Acute MI    Coronary artery disease Father     Thyroid nodules Father     Osteopenia Maternal Grandmother     Prostate cancer Maternal Grandfather     Leukemia Paternal Grandfather     Heart attack Maternal Uncle 48        acute MI, stent    Cancer Maternal Uncle         adenocarcinoma of oral cavity    Colon cancer Maternal Uncle 62    Hyperlipidemia Maternal Uncle     Pancreatic cancer Paternal Uncle     Prostate cancer Paternal Uncle     Colon cancer Family     Coronary artery disease Family     Thyroid disease Family          Current Outpatient Medications:     acetaminophen (TYLENOL) 500 mg tablet    anastrozole (ARIMIDEX) 1 mg tablet    BANOPHEN 25 MG capsule    calamine lotion    Calcium Carbonate-Vitamin D (OYSCO 500/D PO)    carBAMazepine (TEGretol) 200 mg tablet    Cholecalciferol (VITAMIN D) 2000 units tablet    Dentifrices (SENSODYNE PRONAMEL DT)    Diclofenac Sodium (VOLTAREN) 1 %    diphenhydrAMINE (BENADRYL) 25 mg tablet    docusate sodium (COLACE) 100 mg capsule    guaiFENesin (SILTUSSIN SA PO)    haloperidol (HALDOL) 2 mg tablet    latanoprost (XALATAN) 0.005 % ophthalmic solution    levocetirizine (XYZAL) 5 MG tablet    lithium carbonate 300 mg capsule    loperamide (IMODIUM) 2 mg capsule    LORazepam (ATIVAN) 0.5 mg tablet    LORazepam (ATIVAN) 1 mg tablet    melatonin 1 mg    memantine (NAMENDA) 10 mg tablet    mineral oil-white petrolatum (LUBRIFRESH P.M.) ophthalmic ointment    NON FORMULARY    NON FORMULARY    ondansetron (ZOFRAN) 4 mg tablet    OYSCO 500 + D 500-200 MG-UNIT per tablet    pantoprazole (PROTONIX) 40 mg tablet    polyethylene glycol (GLYCOLAX) 17 GM/SCOOP    polyethylene glycol (MIRALAX) 17 g packet    sodium chloride (TAMIKA 128) 5 % hypertonic ophthalmic solution    sodium fluoride 0.275 (0.125 F) MG/DROP solution    Sunscreens (SUNBLOCK LOTION SPF30 EX)    talc (Zeasorb)    tolnaftate (TINACTIN) 1 % spray    white petrolatum  Allergies   Allergen Reactions    Bactrim [Sulfamethoxazole-Trimethoprim]     Methylphenidate Hyperactivity     Reaction Date: [id number];     Sulfa Antibiotics     Thioridazine     Amphetamines     Chlorpromazine     Fexofenadine Hives     Reaction Date: [id number];     Medrogestone     Medroxyprogesterone Rash and Hives     Reaction Date: [id number]; Other Hives     Mellaril, thorazine    Trimethoprim      Reviewed medications and allergies and updated as indicated    PHYSICAL EXAM:    Blood pressure 128/80, height 4' 7" (1.397 m), weight 48.5 kg (107 lb), not currently breastfeeding. Body mass index is 24.87 kg/m². General Appearance: NAD, cooperative, alert  Eyes: Anicteric, PERRLA, EOMI  ENT:  Normocephalic, atraumatic, normal mucosa. Neck:  Supple, symmetrical, trachea midline  Resp:  Clear to auscultation bilaterally; no rales, rhonchi or wheezing; respirations unlabored   CV:  S1 S2, Regular rate and rhythm; no murmur, rub, or gallop. GI:  Soft, non-tender, non-distended; normal bowel sounds; no masses, no organomegaly   Rectal: Deferred  Musculoskeletal: No cyanosis, clubbing or edema. Normal ROM.   Skin:  No jaundice, rashes, or lesions   Heme/Lymph: No palpable cervical lymphadenopathy  Psych: Normal affect, good eye contact  Neuro: No gross deficits, AAOx3    Lab Results:   Lab Results   Component Value Date    WBC 9.56 01/20/2022    HGB 12.9 01/20/2022    HCT 45.2 01/20/2022    MCV 97 01/20/2022     01/20/2022     Lab Results   Component Value Date     (L) 12/18/2013    K 3.8 11/16/2021     11/16/2021    CO2 28 11/16/2021    ANIONGAP 5 12/18/2013    BUN 9 11/16/2021    CREATININE 0.62 11/16/2021    GLUCOSE 127 12/18/2013    GLUF 97 11/16/2021 CALCIUM 8.8 11/16/2021    CORRECTEDCA 9.5 11/16/2021    AST 10 11/16/2021    ALT 18 11/16/2021    ALKPHOS 75 11/16/2021    EGFR 102 11/16/2021     Lab Results   Component Value Date    IRON 13 (L) 11/16/2021    TIBC 550 (H) 11/16/2021     No results found for: "LIPASE"    Radiology Results:   Mammo diagnostic left w 3d & cad, US breast left limited (diagnostic)    Result Date: 12/4/2023  Narrative: DIAGNOSIS: Abnormal finding on breast imaging TECHNIQUE: Digital diagnostic mammography was performed. Computer Aided Detection (CAD) analyzed all applicable images. Left breast ultrasound was performed. COMPARISONS: Prior breast imaging dated: 07/14/2023, 01/05/2023, 11/17/2022, 11/16/2021, 08/19/2021, 07/17/2020, 05/28/2019, 05/22/2018, 05/22/2018, 11/21/2017, 11/21/2017, and 11/18/2016 RELEVANT HISTORY: Family Breast Cancer History: No known family history of breast cancer. Family Medical History: Family medical history includes colon cancer in 2 relatives (family, maternal uncle). Personal History: Hormone history includes tamoxifen and other. Surgical history includes breast biopsy and mastectomy. Medical history includes fibrocystic breast and history of chemotherapy. RISK ASSESSMENT: 5 Year Tyrer-Cuzick: 1.96 % 10 Year Tyrer-Cuzick: 4.31 % Lifetime Tyrer-Cuzick: 12.32 % TISSUE DENSITY: The breasts are heterogeneously dense, which may obscure small masses. INDICATION: Ann-Marie Camacho is a 63 y.o. female presenting for follow-up left breast finding. [facility] FINDINGS: LEFT 1) MASS [B]: There are a few oil cyst in the left lateral breast on mammography. This includes the oil cyst at 2:00 11 cm from the nipple on ultrasound measuring 12 x 9 x 13 mm. Elsewhere in the left breast on mammography, there are no suspicious masses, grouped microcalcifications or areas of unexplained architectural distortion. The skin and nipple areolar complex are unremarkable. Impression: Benign findings.  ASSESSMENT/BI-RADS CATEGORY: Left: 2 - Benign Overall: 2 - Benign RECOMMENDATION:      - Routine screening mammogram in 1 year for the left breast. Workstation ID: [id number]

## 2023-12-11 ENCOUNTER — TELEPHONE (OUTPATIENT)
Dept: FAMILY MEDICINE CLINIC | Facility: HOSPITAL | Age: 58
End: 2023-12-11

## 2023-12-11 ENCOUNTER — SOCIAL WORK (OUTPATIENT)
Dept: BEHAVIORAL/MENTAL HEALTH CLINIC | Facility: CLINIC | Age: 58
End: 2023-12-11
Payer: MEDICARE

## 2023-12-11 DIAGNOSIS — F70 MILD INTELLECTUAL DISABILITIES: Primary | ICD-10-CM

## 2023-12-11 PROCEDURE — 90832 PSYTX W PT 30 MINUTES: CPT | Performed by: COUNSELOR

## 2023-12-11 NOTE — BH TREATMENT PLAN
Outpatient [facility]  1965     Date of Initial Psychotherapy Assessment: Years ago   Date of Current Treatment Plan: 12/11/23  Treatment Plan Target Date: 6-11-24  Treatment Plan Expiration Date: 6-11-24    Diagnosis:   1. Mild intellectual disabilities            Area(s) of Need: Behavioral issues at times/Mild intellectual disabilities    Long Term Goal 1 (in the client's own words): To be more respectful to other. Client feels she has not made progress with this goal.    Stage of Change: Action    Target Date for completion: TBD     Anticipated therapeutic modalities: Behavioral/social     People identified to complete this goal: Client and [address] staff      Objective 1: (identify the means of measuring success in meeting the objective): I will not demand things of others and say what I need/want in a  nicer and kinder way      Objective 2: (identify the means of measuring success in meeting the objective): I will  use "I feel" statements when I want to share my feelings/needs/concerns      Long Term Goal 2 (in the client's own words): To eat more vegetables and not make myself get sick. Stage of Change: Preparation    Target Date for completion: TBD     Anticipated therapeutic modalities: Motivational Interviewing     People identified to complete this goal: Client and [address] staff      Objective 1: (identify the means of measuring success in meeting the objective): I will be open to trying new things and not be so negative with other's suggestions to try other foods. Objective 2: (identify the means of measuring success in meeting the objective): N/A     Long Term Goal 3 (in the client's own words):  To be more positive in general    Stage of Change: Preparation    Target Date for completion: TBD     Anticipated therapeutic modalities: Motivational Interviewing     People identified to complete this goal: Client and [address] staff      Objective 1: (identify the means of measuring success in meeting the objective): I will think about nice things that are positive. Objective 2: (identify the means of measuring success in meeting the objective): I will not be so nasty to others. I am currently under the care of a [facility] psychiatric provider: no    My [facility] psychiatric provider is: N/A    I am currently taking psychiatric medications: Yes, as prescribed by a [facility] psychiatrist.     I feel that I will be ready for discharge from mental health care when I reach the following (measurable goal/objective): When I reach my goals    For children and adults who have a legal guardian:   Has there been any change to custody orders and/or guardianship status? NA. If yes, attach updated documentation. I have created my Crisis Plan and have been offered a copy of this plan    [address]: Diagnosis and Treatment Plan explained to [facility] acknowledges an understanding of their diagnosis. Jose Juan Benito agrees to this treatment plan. I have been offered a copy of this Treatment Plan.  no

## 2023-12-11 NOTE — PSYCH
Behavioral Health Psychotherapy Progress Note    Psychotherapy Provided: Individual Psychotherapy     1. Mild intellectual disabilities            Goals addressed in session: Goal 1 and goal 2    DATA: Ramya Perry was present with [facility] staff, Nurse Lamar Severe. Lamar Severe said that Ramya Perry is feeling really tired today and said she's not feeling well so the session ended early today. We were able to complete goals and talk about them. Ramya Perry said she doesn't feel she is making progress with "respecting others."  Nurse Lamar Severe said she observes that there are times Ramya Perry does a little better with this but then slips back. Ramya Perry said she would like to also work on being more positive and also be more open to try eating vegetables as Nurse Lamar Severe said that Ramya Perry will "make herself throw up" after trying something vegetable wise. During this session, this clinician used the following therapeutic modalities: Solution-Focused Therapy    Substance Abuse was not addressed during this session. If the client is diagnosed with a co-occurring substance use disorder, please indicate any changes in the frequency or amount of use: N/A. Stage of change for addressing substance use diagnoses: No substance use/Not applicable    ASSESSMENT:  Maryse Alberto presents with a tired and sluggish mood today due to not feeling well.     her affect is Normal range and intensity, which is congruent, with her mood and the content of the session. The client has made progress on their goals. Maryse Alberto presents with a none risk of suicide, none risk of self-harm, and none risk of harm to others. For any risk assessment that surpasses a "low" rating, a safety plan must be developed. A safety plan was indicated: no  If yes, describe in detail N/A    PLAN: Between sessions, Maryse Alberto will try to work on some of the goals she identified today in her treatment plan.  At the next session, the therapist will use Solution-Focused Therapy to address goals/needs/concerns. Behavioral Health Treatment Plan and Discharge Planning: Alyson Denver is aware of and agrees to continue to work on their treatment plan. They have identified and are working toward their discharge goals.  yes    Visit start and stop times:    12/11/23  Start Time: 1000  Stop Time: 1035  Total Visit Time: 35 minutes

## 2023-12-11 NOTE — TELEPHONE ENCOUNTER
Patient has a cough off/on for 2 weeks. Jose L Kolb has checked her a couple times - lungs are clear, o2 is 96%, vitals stable, no fever, bp 102/70. Patient was at home w/ her mom for some time. Mom was sick and on abx. Mom wants patient seen. No 40 min spots w/ dr Angela Anthony.     Pls advise

## 2023-12-11 NOTE — PROGRESS NOTES
HEMATOLOGY / [facility] FOLLOW UP NOTE    Primary Care Provider: Ana María Sullivan DO  Referring Provider:    MRN: [id number]  : 1965    Reason for Encounter: Follow-up breast cancer       Oncology History   Malignant neoplasm of overlapping sites of right breast in female, estrogen receptor positive     Surgery    Right breast mastectomy   Invasive breast carcinoma  Grade 2  2 foci:    OH 60/90  HER2 0/3+  Lymph nodes negative  Stage IA  Dr. Enma Hung     2014 - 2015 Chemotherapy    TCH x 6 cycles  1 year of Herceptin  Dr. Colette Reyes     2015 -  Hormone Therapy    Tamoxifen daily until   Arimidex 1 mg daily since   Dr. Colette Reyes     2022 Genetic Testing    A total of 36 genes were evaluated, including: MARIJA, BRCA1, BRCA2, CDH1, CHEK2, PALB2, PTEN, STK11, TP53  Negative result. No pathogenic sequence variants identified  [place]         Interval History: Patient returns for 6-month follow-up visit along with her mother and personal care nurse. She had a 6-month diagnostic mammo of left breast along with ultrasound done on 2023 and findings demonstrated a few oil cysts on left lateral breast.  Overall benign. Blood work remains in normal range. She is taking anastrozole daily. No reported side effects. Overall, patient is at baseline. She is in good spirits today and excited for the  holiday. REVIEW OF SYSTEMS:  Please note that a 14-point review of systems was performed to include Constitutional, HEENT, Respiratory, CVS, GI, , Musculoskeletal, Integumentary, Neurologic, Rheumatologic, Endocrinologic, Psychiatric, Lymphatic, and Hematologic/Oncologic systems were reviewed and are negative unless otherwise stated in HPI. Positive and negative findings pertinent to this evaluation are incorporated into the history of present illness.       ECOG PS: 0    PROBLEM LIST:  Patient Active Problem List   Diagnosis    Vitamin D deficiency    Scoliosis    Retinal detachment    Obsessive compulsive disorder    Multiple thyroid nodules    Malignant neoplasm of overlapping sites of right breast in female, estrogen receptor positive     Legal blindness [place]    Irregular menstrual cycle    Internal hemorrhoids    Impulse control disorder    Glaucoma    Dyslipidemia    Depression, recurrent (HCC)    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HCC)    Chronic gingivitis    Cerebral palsy (HCC)    Benign neoplasm of large intestine    Autistic disorder    Anxiety    Other long term (current) drug therapy    Hyperglycemia    Screening for colorectal cancer    Chronic GERD    Use of anastrozole (Arimidex)    Family history of colonic polyps    Osteoporosis    Ambulatory dysfunction    Primary osteoarthritis of left shoulder    Other specified anemias    Mild intellectual disabilities       Assessment / Plan:  1. Malignant neoplasm of right female breast, unspecified estrogen receptor status, unspecified site of breast ([address])    2. Malignant neoplasm of overlapping sites of right breast in female, estrogen receptor positive     3. Use of anastrozole (Arimidex)      The patient is a 64 yo autistic female with a history of cerebral palsy and stage IA ER/KY positive, HER2 positive breast cancer s/p right mastectomy in 2013. She was treated adjuvant chemotherapy with [facility] followed by Herceptin for one year, completed in 2/2015. She was then started on Tamoxifen, this was discontinued in 2018 when she was noted to be post-menopause. She was started on Arimidex and is currently maintained on 1 mg daily with goal to complete total of 10 years of therapy (combination of tamoxifen and Arimidex). Overall, she is doing well without any concerning findings on exam today. Blood work remains in acceptable range. Recent mammogram and follow-up ultrasound resulted with benign findings. She will be due for routine screening mammogram in 1 year.   She will continue on her current regimen without change of Arimidex daily and Prolia every 6 months. Refill provided for anastrozole today  She will complete recommended 10 years of adjuvant AI in February 2025    I will see her back in 6 months with repeat blood work. We will coordinate her Prolia injection on day of visit. I spent 30 minutes on chart review, face to face counseling time, coordination of care and documentation. Past Medical History:   has a past medical history of Breast cyst, left, Cerebral palsy ([address]), Chronic GERD, Fibrocystic breast disease, Gastrointestinal hemorrhage, Glaucoma, History of chemotherapy, Hydrocephalus ([address]), and Scoliosis. PAST SURGICAL HISTORY:   has a past surgical history that includes Esophagogastroduodenoscopy; Incisional breast biopsy; Adams lymph node biopsy; Strabismus surgery; Eye surgery; Breast biopsy (Right); Colonoscopy; Upper gastrointestinal endoscopy (05/16/2019); Colonoscopy; Cataract extraction (06/14/2021); Mastectomy (Right); and US guided thyroid biopsy (12/13/2022).     CURRENT MEDICATIONS  Current Outpatient Medications   Medication Sig Dispense Refill    acetaminophen (TYLENOL) 500 mg tablet Take 500 mg by mouth as needed for mild pain        anastrozole (ARIMIDEX) 1 mg tablet Take 1 tablet (1 mg total) by mouth daily 90 tablet 3    BANOPHEN 25 MG capsule Take 25 mg by mouth daily at bedtime as needed       calamine lotion Apply topically every 4 (four) hours as needed for itching      Cholecalciferol (VITAMIN D) 2000 units tablet TAKE TWO TABLETS BY MOUTH (4000IU) EVERY MORNING FOR VITAMIN DIFICIENCY 62 tablet 0    Dentifrices (SENSODYNE PRONAMEL DT) Apply to teeth       Diclofenac Sodium (VOLTAREN) 1 %       diphenhydrAMINE (BENADRYL) 25 mg tablet Take 25 mg by mouth daily at bedtime as needed for itching (for insomnia and allergy ** DO NOT TAKE WITH XYZAL **)       docusate sodium (COLACE) 100 mg capsule Take 100 mg by mouth 3 (three) times a week       guaiFENesin (SILTUSSIN SA PO) Take by mouth Take 2 teaspoonful (10 ml) PRN 6 hrs for cough      haloperidol (HALDOL) 2 mg tablet Take 2 mg by mouth 2 (two) times a day 1 tab am and 2 tabs qhs      latanoprost (XALATAN) 0.005 % ophthalmic solution Administer 1 drop to both eyes      levocetirizine (XYZAL) 5 MG tablet TAKE ONE TABLET BY MOUTH EVERY DAY AS NEEDED FOR ALLERGIES *DO NOT TAKE WITH BENADRYL* 31 tablet 0    lithium carbonate 300 mg capsule Take 600 mg by mouth 2 (two) times a day      loperamide (IMODIUM) 2 mg capsule Take by mouth      LORazepam (ATIVAN) 0.5 mg tablet Take 1 mg by mouth daily at bedtime      LORazepam (ATIVAN) 1 mg tablet TAKE ONE TABLET BY MOUTH 30 MINUTES PRIOR TO PROCEDURE (SUCH AS: GYN, DENTAL, IMAGING, SKIN PROCEDURES, SUCH AS MOLE REMOVAL) FOR ANXIETY 5 tablet 0    melatonin 1 mg Take 5 mg by mouth daily at bedtime       memantine (NAMENDA) 10 mg tablet Take 1 tablet (10 mg total) by mouth 2 (two) times a day 60 tablet 5    ondansetron (ZOFRAN) 4 mg tablet Take by mouth as needed        OYSCO 500 + D 500-200 MG-UNIT per tablet       pantoprazole (PROTONIX) 40 mg tablet TAKE ONE TABLET BY MOUTH 2 TIMES A DAY( MORNING/ BEDTIME) (ESOPHAGITIS) (Patient taking differently: 40 mg daily) 56 tablet 0    polyethylene glycol (MIRALAX) 17 g packet Take 17 g by mouth daily as needed      sodium chloride (TAMIKA 128) 5 % hypertonic ophthalmic solution Apply 1 drop to eye 2 (two) times a day        talc (Zeasorb) Apply topically once as needed for irritation Sprinkle powder on affected area of skin to help with excess moisture up to 2 times a day as needed .       white petrolatum Apply topically once Apply to hand every day prn      Calcium Carbonate-Vitamin D (OYSCO 500/D PO) Take by mouth daily      carBAMazepine (TEGretol) 200 mg tablet Take 1 tablet (200 mg total) by mouth 2 (two) times a day      mineral oil-white petrolatum (LUBRIFRESH P.M.) ophthalmic ointment 0.5 inches      NON FORMULARY ACT FLUORIDE DENTAL RINSE   RINSE WITH 1 TBSP (15ML)2X A DAY      NON FORMULARY TOLNAFTATE SPRAY 1% SPRAY  SPRAY BETWEEN TOES      polyethylene glycol (GLYCOLAX) 17 GM/SCOOP MIX 1 CAPFUL (17 GRAMS) IN 4- 8OZ OF LIQUID AND DRINK BY MOUTH ONCE DAILY AS NEEDED FOR CONSTIPATION 510 g 0    sodium fluoride 0.275 (0.125 F) MG/DROP solution Take 275 mcg by mouth daily       Sunscreens (SUNBLOCK LOTION SPF30 EX) Apply topically daily as needed      tolnaftate (TINACTIN) 1 % spray Apply topically       No current facility-administered medications for this visit. [unfilled]    SOCIAL HISTORY:   reports that she has never smoked. She has never used smokeless tobacco. She reports that she does not drink alcohol and does not use drugs. FAMILY HISTORY:  family history includes Cancer in her maternal uncle; Colon cancer in her family; Colon cancer (age of onset: 62) in her maternal uncle; Coronary artery disease in her family and father; Heart attack (age of onset: 48) in her maternal uncle; Heart attack (age of onset: 59) in her father; Hyperlipidemia in her maternal uncle; Leukemia in her paternal grandfather; Osteopenia in her maternal grandmother and mother; Pancreatic cancer in her paternal uncle; Prostate cancer in her maternal grandfather and paternal uncle; Supraventricular tachycardia in her mother; Thyroid disease in her family; Thyroid nodules in her father. ALLERGIES:  is allergic to bactrim [sulfamethoxazole-trimethoprim], methylphenidate, sulfa antibiotics, thioridazine, amphetamines, chlorpromazine, fexofenadine, medrogestone, medroxyprogesterone, other, and trimethoprim. Physical Exam:  Vital Signs:   Visit Vitals  /90 (BP Location: Left arm, Patient Position: Sitting, Cuff Size: Standard)   Pulse 75   Temp 97.9 °F (36.6 °C) (Temporal)   Resp 16   Ht 4' 9.5" (1.461 m)   Wt 49.4 kg (109 lb)   SpO2 97%   BMI 23.18 kg/m²   OB Status Postmenopausal   Smoking Status Never   BSA 1.4 m²     Body mass index is 23.18 kg/m². Body surface area is 1.4 meters squared.     Physical Exam  Constitutional:       General: She is not in acute distress. Appearance: Normal appearance. HENT:      Head: Normocephalic and atraumatic. Eyes:      General: No scleral icterus. Right eye: No discharge. Left eye: No discharge. Conjunctiva/sclera: Conjunctivae normal.   Cardiovascular:      Rate and Rhythm: Normal rate and regular rhythm. Pulmonary:      Effort: Pulmonary effort is normal. No respiratory distress. Breath sounds: Normal breath sounds. Abdominal:      General: Bowel sounds are normal. There is no distension. Palpations: Abdomen is soft. There is no mass. Tenderness: There is no abdominal tenderness. Musculoskeletal:         General: Normal range of motion. Lymphadenopathy:      Cervical: No cervical adenopathy. Upper Body:      Right upper body: No supraclavicular, axillary or pectoral adenopathy. Left upper body: No supraclavicular, axillary or pectoral adenopathy. Skin:     General: Skin is warm and dry. Neurological:      General: No focal deficit present. Mental Status: She is alert and oriented to person, place, and time. Mental status is at baseline.    Psychiatric:         Mood and Affect: Mood normal.         Behavior: Behavior normal.         Labs:  Lab Results   Component Value Date    WBC 9.56 01/20/2022    HGB 12.9 01/20/2022    HCT 45.2 01/20/2022    MCV 97 01/20/2022     01/20/2022     Lab Results   Component Value Date     (L) 12/18/2013    SODIUM 139 11/16/2021    K 3.8 11/16/2021     11/16/2021    CO2 28 11/16/2021    ANIONGAP 5 12/18/2013    AGAP 8 11/16/2021    BUN 9 11/16/2021    CREATININE 0.62 11/16/2021    GLUC 100 10/09/2020    GLUF 97 11/16/2021    CALCIUM 8.8 11/16/2021    AST 10 11/16/2021    ALT 18 11/16/2021    ALKPHOS 75 11/16/2021    TP 6.7 11/16/2021    TBILI 0.10 (L) 11/16/2021    EGFR 102 11/16/2021

## 2023-12-13 ENCOUNTER — OFFICE VISIT (OUTPATIENT)
Age: 58
End: 2023-12-13
Payer: MEDICARE

## 2023-12-13 ENCOUNTER — HOSPITAL ENCOUNTER (OUTPATIENT)
Dept: INFUSION CENTER | Facility: HOSPITAL | Age: 58
Discharge: HOME | End: 2023-12-13
Attending: INTERNAL MEDICINE
Payer: MEDICARE

## 2023-12-13 VITALS
WEIGHT: 109.5 LBS | SYSTOLIC BLOOD PRESSURE: 147 MMHG | DIASTOLIC BLOOD PRESSURE: 86 MMHG | HEIGHT: 58 IN | OXYGEN SATURATION: 94 % | HEART RATE: 81 BPM | BODY MASS INDEX: 22.99 KG/M2 | RESPIRATION RATE: 16 BRPM | TEMPERATURE: 98 F

## 2023-12-13 VITALS
HEART RATE: 75 BPM | SYSTOLIC BLOOD PRESSURE: 130 MMHG | WEIGHT: 109 LBS | TEMPERATURE: 97.9 F | HEIGHT: 58 IN | OXYGEN SATURATION: 97 % | BODY MASS INDEX: 22.88 KG/M2 | DIASTOLIC BLOOD PRESSURE: 90 MMHG | RESPIRATION RATE: 16 BRPM

## 2023-12-13 DIAGNOSIS — Z79.811 USE OF ANASTROZOLE (ARIMIDEX): ICD-10-CM

## 2023-12-13 DIAGNOSIS — C50.911 MALIGNANT NEOPLASM OF RIGHT FEMALE BREAST, UNSPECIFIED ESTROGEN RECEPTOR STATUS, UNSPECIFIED SITE OF BREAST (HCC): Primary | ICD-10-CM

## 2023-12-13 DIAGNOSIS — Z17.0 MALIGNANT NEOPLASM OF OVERLAPPING SITES OF RIGHT BREAST IN FEMALE, ESTROGEN RECEPTOR POSITIVE: ICD-10-CM

## 2023-12-13 DIAGNOSIS — C50.811 MALIGNANT NEOPLASM OF OVERLAPPING SITES OF RIGHT BREAST IN FEMALE, ESTROGEN RECEPTOR POSITIVE: ICD-10-CM

## 2023-12-13 DIAGNOSIS — M81.0 OSTEOPOROSIS, UNSPECIFIED OSTEOPOROSIS TYPE, UNSPECIFIED PATHOLOGICAL FRACTURE PRESENCE: Primary | ICD-10-CM

## 2023-12-13 PROCEDURE — 99214 OFFICE O/P EST MOD 30 MIN: CPT | Performed by: NURSE PRACTITIONER

## 2023-12-13 RX ORDER — ANASTROZOLE 1 MG/1
1 TABLET ORAL DAILY
Qty: 90 TABLET | Refills: 3 | Status: SHIPPED | OUTPATIENT
Start: 2023-12-13

## 2023-12-13 RX ADMIN — DENOSUMAB 60 MG: 60 INJECTION SUBCUTANEOUS at 10:56

## 2023-12-13 NOTE — PROGRESS NOTES
Pt here for Prolia injection. Most recent labs were from 11/16, 23. Pt unable to get newer labs. Insurance refuses to pay. Prolia given SQ JOSE, javed applied. Disch amb to next appt. Another appt made.

## 2023-12-14 ENCOUNTER — OFFICE VISIT (OUTPATIENT)
Dept: FAMILY MEDICINE CLINIC | Facility: HOSPITAL | Age: 58
End: 2023-12-14
Payer: MEDICARE

## 2023-12-14 VITALS
OXYGEN SATURATION: 97 % | BODY MASS INDEX: 23.18 KG/M2 | HEART RATE: 72 BPM | SYSTOLIC BLOOD PRESSURE: 152 MMHG | TEMPERATURE: 98.7 F | DIASTOLIC BLOOD PRESSURE: 84 MMHG | HEIGHT: 58 IN | WEIGHT: 110.4 LBS

## 2023-12-14 DIAGNOSIS — R05.1 ACUTE COUGH: Primary | ICD-10-CM

## 2023-12-14 PROCEDURE — 99213 OFFICE O/P EST LOW 20 MIN: CPT | Performed by: NURSE PRACTITIONER

## 2023-12-14 NOTE — PROGRESS NOTES
Assessment/Plan:     Cough is likely viral.   Continue with cough med prn. Discussed cough can last 4-8 weeks. Call with any worsening, fever > 101, sob or wheezing. Diagnoses and all orders for this visit:    Acute cough          Subjective:     Patient ID: Layton Jaramillo is a 63 y.o. female. Barc patient. Here with nurse from [place]. Dry cough for 2 weeks. Parents have been sick. No sob or wheezing. No fever or chills. Mild nasal congestion. Taking cough syrup. Review of Systems   Constitutional:  Negative for chills and fever. HENT:  Positive for congestion. Negative for ear pain and sore throat. Respiratory:  Positive for cough. Negative for shortness of breath and wheezing. The following portions of the patient's history were reviewed and updated as appropriate: allergies, current medications, past family history, past medical history, past social history, past surgical history and problem list.    Objective:  Vitals:    12/14/23 1510   BP: 152/84   Pulse: 72   Temp: 98.7 °F (37.1 °C)   SpO2: 97%      Physical Exam  Vitals reviewed. Constitutional:       General: She is not in acute distress. Appearance: Normal appearance. She is not ill-appearing. HENT:      Right Ear: Tympanic membrane, ear canal and external ear normal.      Left Ear: Tympanic membrane, ear canal and external ear normal.      Mouth/Throat:      Mouth: Mucous membranes are moist.      Pharynx: Oropharynx is clear. Cardiovascular:      Rate and Rhythm: Normal rate and regular rhythm. Heart sounds: Normal heart sounds. No murmur heard. Pulmonary:      Effort: Pulmonary effort is normal.      Breath sounds: Normal breath sounds. Comments: No cough during visit  Lymphadenopathy:      Cervical: No cervical adenopathy. Skin:     General: Skin is warm and dry. Neurological:      Mental Status: She is alert. Mental status is at baseline.    Psychiatric:         Mood and Affect: Mood normal. Behavior: Behavior normal.         Thought Content:  Thought content normal.         Judgment: Judgment normal.

## 2023-12-18 ENCOUNTER — HOSPITAL ENCOUNTER (OUTPATIENT)
Dept: ULTRASOUND IMAGING | Facility: HOSPITAL | Age: 58
Discharge: HOME | End: 2023-12-18
Payer: MEDICARE

## 2023-12-18 DIAGNOSIS — E04.2 MULTIPLE THYROID NODULES: ICD-10-CM

## 2023-12-18 PROCEDURE — 76536 US EXAM OF HEAD AND NECK: CPT

## 2024-01-10 ENCOUNTER — OFFICE VISIT (OUTPATIENT)
Dept: ENDOCRINOLOGY | Facility: CLINIC | Age: 59
End: 2024-01-10
Payer: MEDICARE

## 2024-01-10 VITALS
OXYGEN SATURATION: 98 % | HEART RATE: 87 BPM | HEIGHT: 58 IN | WEIGHT: 118.8 LBS | DIASTOLIC BLOOD PRESSURE: 80 MMHG | BODY MASS INDEX: 24.94 KG/M2 | SYSTOLIC BLOOD PRESSURE: 128 MMHG

## 2024-01-10 DIAGNOSIS — E04.2 MULTIPLE THYROID NODULES: Primary | ICD-10-CM

## 2024-01-10 DIAGNOSIS — M81.0 OSTEOPOROSIS, UNSPECIFIED OSTEOPOROSIS TYPE, UNSPECIFIED PATHOLOGICAL FRACTURE PRESENCE: ICD-10-CM

## 2024-01-10 PROCEDURE — 99214 OFFICE O/P EST MOD 30 MIN: CPT | Performed by: INTERNAL MEDICINE

## 2024-01-10 NOTE — ASSESSMENT & PLAN NOTE
Stable on imaging-we will continue to monitor and repeat a thyroid ultrasound in 2 years.  Thyroid function tests are normal.  Discussed with the mother and caregiver if she seems to be having difficulty swallowing/choking can consider barium swallow to evaluate further

## 2024-01-10 NOTE — LETTER
January 10, 2024     Wendy Thomas DO  [address] [place] [id number]    Patient: Pat Callahan   YOB: 1965   Date of Visit: 1/10/2024       Dear Dr. Thomas:    Thank you for referring Pat Callahan to me for evaluation. Below are my notes for this consultation.    If you have questions, please do not hesitate to call me. I look forward to following your patient along with you.         Sincerely,        Cony Barragan MD        CC: No Recipients    Cony Barragan MD  1/10/2024  2:44 PM  Sign when Signing Visit   Pat Callahan 58 y.o. female MRN: [id number]    Encounter: [id number]      Assessment/Plan    Problem List Items Addressed This Visit          Endocrine    Multiple thyroid nodules - Primary     Stable on imaging-we will continue to monitor and repeat a thyroid ultrasound in 2 years.  Thyroid function tests are normal.  Discussed with the mother and caregiver if she seems to be having difficulty swallowing/choking can consider barium swallow to evaluate further         Relevant Orders    US thyroid    T4, free    TSH, 3rd generation    TSH, 3rd generation    T4, free       Musculoskeletal and Integument    Osteoporosis     Currently on Prolia-being managed by PCP and oncology             CC:   Multiple thyroid nodules    History of Present Illness    HPI:  58 y.o. female with a history of Dandy-Walker syndrome, congenital hydrocephalus, breast cancer, osteoporosis, and thyroid nodule seen in follow up.   Underwent fine-needle aspiration biopsy of bilateral thyroid nodules in 2016 which were negative for malignancy-December 2022 underwent fine-needle aspiration biopsy of a left lower pole nodule which had increased in size-that was also benign    Denies any family history of thyroid cancer, no history of radiation to her neck  Denies any neck pressure or difficulty swallowing-occasionally gags on soft food however according to the mother and caregiver it is better than  before and sometimes it is the food that she does not like    Review of Systems    Historical Information  Past Medical History:   Diagnosis Date   • Breast cyst, left     last assessed 12/30/2013   • Cerebral palsy (HCC)    • Chronic GERD    • Fibrocystic breast disease     last assessed 06/29/2012   • Gastrointestinal hemorrhage    • Glaucoma    • History of chemotherapy    • Hydrocephalus (HCC)      SHUNT IN PLACE   • Scoliosis      Past Surgical History:   Procedure Laterality Date   • BREAST BIOPSY Right    • CATARACT EXTRACTION  06/14/2021   • COLONOSCOPY      Description 04/13/2016-5 yrs.  Description 4 yr f/u d/t polyp and family history, onset 07/20/2012.   • COLONOSCOPY     • ESOPHAGOGASTRODUODENOSCOPY      description-04/13/2016 gastritis with antral nodule   • EYE SURGERY      for relief of intraocular pressure   • INCISIONAL BREAST BIOPSY      description 2008   • MASTECTOMY Right    • SENTINEL LYMPH NODE BIOPSY     • STRABISMUS SURGERY      description 1973, 1974   • UPPER GASTROINTESTINAL ENDOSCOPY  05/16/2019    Grade C erosive esophagitis.  7 mm nodule in the stomach   • US GUIDED THYROID BIOPSY  12/13/2022     Social History  Social History     Substance and Sexual Activity   Alcohol Use No     Social History     Substance and Sexual Activity   Drug Use No     Social History     Tobacco Use   Smoking Status Never   Smokeless Tobacco Never     Family History:   Family History   Problem Relation Age of Onset   • Osteopenia Mother    • Supraventricular tachycardia Mother    • Heart attack Father 64        Acute MI   • Coronary artery disease Father    • Thyroid nodules Father    • Osteopenia Maternal Grandmother    • Prostate cancer Maternal Grandfather    • Leukemia Paternal Grandfather    • Heart attack Maternal Uncle 50        acute MI, stent   • Cancer Maternal Uncle         adenocarcinoma of oral cavity   • Colon cancer Maternal Uncle 57   • Hyperlipidemia Maternal Uncle    • Pancreatic cancer  Paternal Uncle    • Prostate cancer Paternal Uncle    • Colon cancer Family    • Coronary artery disease Family    • Thyroid disease Family        Meds/Allergies  Current Outpatient Medications   Medication Sig Dispense Refill   • acetaminophen (TYLENOL) 500 mg tablet Take 500 mg by mouth as needed for mild pain       • anastrozole (ARIMIDEX) 1 mg tablet Take 1 tablet (1 mg total) by mouth daily 90 tablet 3   • BANOPHEN 25 MG capsule Take 25 mg by mouth daily at bedtime as needed      • calamine lotion Apply topically every 4 (four) hours as needed for itching     • Calcium Carbonate-Vitamin D (OYSCO 500/D PO) Take by mouth daily     • carBAMazepine (TEGretol) 200 mg tablet Take 1 tablet (200 mg total) by mouth 2 (two) times a day     • Cholecalciferol (VITAMIN D) 2000 units tablet TAKE TWO TABLETS BY MOUTH (4000IU) EVERY MORNING FOR VITAMIN DIFICIENCY 62 tablet 0   • Dentifrices (SENSODYNE PRONAMEL DT) Apply to teeth      • Diclofenac Sodium (VOLTAREN) 1 %      • diphenhydrAMINE (BENADRYL) 25 mg tablet Take 25 mg by mouth daily at bedtime as needed for itching (for insomnia and allergy ** DO NOT TAKE WITH XYZAL **)      • docusate sodium (COLACE) 100 mg capsule Take 100 mg by mouth 3 (three) times a week      • Emollient (Vaseline Intensive Care) LOTN      • guaiFENesin (SILTUSSIN SA PO) Take by mouth Take 2 teaspoonful (10 ml) PRN 6 hrs for cough     • haloperidol (HALDOL) 2 mg tablet Take 2 mg by mouth 2 (two) times a day 1 tab am and 2 tabs qhs     • latanoprost (XALATAN) 0.005 % ophthalmic solution Administer 1 drop to both eyes     • levocetirizine (XYZAL) 5 MG tablet TAKE ONE TABLET BY MOUTH EVERY DAY AS NEEDED FOR ALLERGIES *DO NOT TAKE WITH BENADRYL* 31 tablet 0   • lithium carbonate 300 mg capsule Take 600 mg by mouth 2 (two) times a day     • loperamide (IMODIUM) 2 mg capsule Take by mouth     • LORazepam (ATIVAN) 0.5 mg tablet Take 1 mg by mouth daily at bedtime     • LORazepam (ATIVAN) 1 mg tablet TAKE  ONE TABLET BY MOUTH 30 MINUTES PRIOR TO PROCEDURE (SUCH AS: GYN, DENTAL, IMAGING, SKIN PROCEDURES, SUCH AS MOLE REMOVAL) FOR ANXIETY 5 tablet 0   • melatonin 1 mg Take 5 mg by mouth daily at bedtime      • memantine (NAMENDA) 10 mg tablet Take 1 tablet (10 mg total) by mouth 2 (two) times a day 60 tablet 5   • mineral oil-white petrolatum (LUBRIFRESH P.M.) ophthalmic ointment 0.5 inches     • NON FORMULARY ACT FLUORIDE DENTAL RINSE   RINSE WITH 1 TBSP (15ML)2X A DAY     • NON FORMULARY TOLNAFTATE SPRAY 1% SPRAY  SPRAY BETWEEN TOES     • ondansetron (ZOFRAN) 4 mg tablet Take by mouth as needed       • OYSCO 500 + D 500-200 MG-UNIT per tablet      • pantoprazole (PROTONIX) 40 mg tablet TAKE ONE TABLET BY MOUTH 2 TIMES A DAY( MORNING/ BEDTIME) (ESOPHAGITIS) (Patient taking differently: 40 mg daily) 56 tablet 0   • polyethylene glycol (GLYCOLAX) 17 GM/SCOOP MIX 1 CAPFUL (17 GRAMS) IN 4- 8OZ OF LIQUID AND DRINK BY MOUTH ONCE DAILY AS NEEDED FOR CONSTIPATION 510 g 0   • sodium chloride (TAMIKA 128) 5 % hypertonic ophthalmic solution Apply 1 drop to eye 2 (two) times a day       • Sunscreens (SUNBLOCK LOTION SPF30 EX) Apply topically daily as needed     • talc (Zeasorb) Apply topically once as needed for irritation Sprinkle powder on affected area of skin to help with excess moisture up to 2 times a day as needed .     • Diclofenac Sodium (VOLTAREN) 1 %  (Patient not taking: Reported on 1/10/2024)       No current facility-administered medications for this visit.     Allergies   Allergen Reactions   • Bactrim [Sulfamethoxazole-Trimethoprim]    • Methylphenidate Hyperactivity     Reaction Date: 13May2011;    • Sulfa Antibiotics    • Thioridazine    • Amphetamines    • Chlorpromazine    • Fexofenadine Hives     Reaction Date: 13May2011;    • Medrogestone    • Medroxyprogesterone Rash and Hives     Reaction Date: 13May2011;    • Other Hives     Mellaril, thorazine   • Trimethoprim        Objective  Vitals: Blood pressure  "128/80, pulse 87, height 4' 9.52\" (1.461 m), weight 53.9 kg (118 lb 12.8 oz), SpO2 98%, not currently breastfeeding.    Physical Exam  Vitals reviewed.   Constitutional:       General: She is not in acute distress.     Appearance: Normal appearance. She is not ill-appearing, toxic-appearing or diaphoretic.   HENT:      Head: Normocephalic and atraumatic.   Eyes:      General: No scleral icterus.     Extraocular Movements: Extraocular movements intact.   Cardiovascular:      Rate and Rhythm: Normal rate and regular rhythm.      Heart sounds: Normal heart sounds. No murmur heard.  Pulmonary:      Effort: Pulmonary effort is normal. No respiratory distress.      Breath sounds: Normal breath sounds. No wheezing or rales.   Abdominal:      General: There is no distension.      Palpations: Abdomen is soft.      Tenderness: There is no abdominal tenderness.   Musculoskeletal:      Cervical back: Neck supple.      Right lower leg: No edema.      Left lower leg: No edema.   Lymphadenopathy:      Cervical: No cervical adenopathy.   Skin:     General: Skin is warm and dry.   Neurological:      General: No focal deficit present.      Mental Status: She is alert and oriented to person, place, and time.      Gait: Gait normal.         The history was obtained from the review of the chart, patient and family.    Lab Results:        Imaging Studies:   Results for orders placed during the hospital encounter of 12/18/23    US thyroid    Impression  Stable thyroid nodules, including nodules with prior negative biopsy. Follow-up ultrasound is recommended in 2 years.        Reference: ACR Thyroid Imaging, Reporting and Data System (TI-RADS): White Paper of the ACR TI-RADS Committee. J AM Mariam Radiol 2017;14:587-595. (additional recommendations based on American Thyroid Association 2015 guidelines.)      Workstation performed: [id number]      I have personally reviewed pertinent reports.      Portions of the record may have been created " "with voice recognition software. Occasional wrong word or \"sound a like\" substitutions may have occurred due to the inherent limitations of voice recognition software. Read the chart carefully and recognize, using context, where substitutions have occurred.    "

## 2024-01-10 NOTE — PROGRESS NOTES
Pat Callahan 58 y.o. female MRN: [id number]    Encounter: [id number]      Assessment/Plan     Problem List Items Addressed This Visit          Endocrine    Multiple thyroid nodules - Primary     Stable on imaging-we will continue to monitor and repeat a thyroid ultrasound in 2 years.  Thyroid function tests are normal.  Discussed with the mother and caregiver if she seems to be having difficulty swallowing/choking can consider barium swallow to evaluate further         Relevant Orders    US thyroid    T4, free    TSH, 3rd generation    TSH, 3rd generation    T4, free       Musculoskeletal and Integument    Osteoporosis     Currently on Prolia-being managed by PCP and oncology             CC:   Multiple thyroid nodules    History of Present Illness     HPI:  58 y.o. female with a history of Dandy-Walker syndrome, congenital hydrocephalus, breast cancer, osteoporosis, and thyroid nodule seen in follow up.   Underwent fine-needle aspiration biopsy of bilateral thyroid nodules in 2016 which were negative for malignancy-December 2022 underwent fine-needle aspiration biopsy of a left lower pole nodule which had increased in size-that was also benign    Denies any family history of thyroid cancer, no history of radiation to her neck  Denies any neck pressure or difficulty swallowing-occasionally gags on soft food however according to the mother and caregiver it is better than before and sometimes it is the food that she does not like    Review of Systems    Historical Information   Past Medical History:   Diagnosis Date    Breast cyst, left     last assessed 12/30/2013    Cerebral palsy (HCC)     Chronic GERD     Fibrocystic breast disease     last assessed 06/29/2012    Gastrointestinal hemorrhage     Glaucoma     History of chemotherapy     Hydrocephalus (HCC)      SHUNT IN PLACE    Scoliosis      Past Surgical History:   Procedure Laterality Date    BREAST BIOPSY Right     CATARACT EXTRACTION  06/14/2021     COLONOSCOPY      Description 04/13/2016-5 yrs.  Description 4 yr f/u d/t polyp and family history, onset 07/20/2012.    COLONOSCOPY      ESOPHAGOGASTRODUODENOSCOPY      description-04/13/2016 gastritis with antral nodule    EYE SURGERY      for relief of intraocular pressure    INCISIONAL BREAST BIOPSY      description 2008    MASTECTOMY Right     SENTINEL LYMPH NODE BIOPSY      STRABISMUS SURGERY      description 1973, 1974    UPPER GASTROINTESTINAL ENDOSCOPY  05/16/2019    Grade C erosive esophagitis.  7 mm nodule in the stomach    US GUIDED THYROID BIOPSY  12/13/2022     Social History   Social History     Substance and Sexual Activity   Alcohol Use No     Social History     Substance and Sexual Activity   Drug Use No     Social History     Tobacco Use   Smoking Status Never   Smokeless Tobacco Never     Family History:   Family History   Problem Relation Age of Onset    Osteopenia Mother     Supraventricular tachycardia Mother     Heart attack Father 64        Acute MI    Coronary artery disease Father     Thyroid nodules Father     Osteopenia Maternal Grandmother     Prostate cancer Maternal Grandfather     Leukemia Paternal Grandfather     Heart attack Maternal Uncle 50        acute MI, stent    Cancer Maternal Uncle         adenocarcinoma of oral cavity    Colon cancer Maternal Uncle 57    Hyperlipidemia Maternal Uncle     Pancreatic cancer Paternal Uncle     Prostate cancer Paternal Uncle     Colon cancer Family     Coronary artery disease Family     Thyroid disease Family        Meds/Allergies   Current Outpatient Medications   Medication Sig Dispense Refill    acetaminophen (TYLENOL) 500 mg tablet Take 500 mg by mouth as needed for mild pain        anastrozole (ARIMIDEX) 1 mg tablet Take 1 tablet (1 mg total) by mouth daily 90 tablet 3    BANOPHEN 25 MG capsule Take 25 mg by mouth daily at bedtime as needed       calamine lotion Apply topically every 4 (four) hours as needed for itching      Calcium  Carbonate-Vitamin D (OYSCO 500/D PO) Take by mouth daily      carBAMazepine (TEGretol) 200 mg tablet Take 1 tablet (200 mg total) by mouth 2 (two) times a day      Cholecalciferol (VITAMIN D) 2000 units tablet TAKE TWO TABLETS BY MOUTH (4000IU) EVERY MORNING FOR VITAMIN DIFICIENCY 62 tablet 0    Dentifrices (SENSODYNE PRONAMEL DT) Apply to teeth       Diclofenac Sodium (VOLTAREN) 1 %       diphenhydrAMINE (BENADRYL) 25 mg tablet Take 25 mg by mouth daily at bedtime as needed for itching (for insomnia and allergy ** DO NOT TAKE WITH XYZAL **)       docusate sodium (COLACE) 100 mg capsule Take 100 mg by mouth 3 (three) times a week       Emollient (Vaseline Intensive Care) LOTN       guaiFENesin (SILTUSSIN SA PO) Take by mouth Take 2 teaspoonful (10 ml) PRN 6 hrs for cough      haloperidol (HALDOL) 2 mg tablet Take 2 mg by mouth 2 (two) times a day 1 tab am and 2 tabs qhs      latanoprost (XALATAN) 0.005 % ophthalmic solution Administer 1 drop to both eyes      levocetirizine (XYZAL) 5 MG tablet TAKE ONE TABLET BY MOUTH EVERY DAY AS NEEDED FOR ALLERGIES *DO NOT TAKE WITH BENADRYL* 31 tablet 0    lithium carbonate 300 mg capsule Take 600 mg by mouth 2 (two) times a day      loperamide (IMODIUM) 2 mg capsule Take by mouth      LORazepam (ATIVAN) 0.5 mg tablet Take 1 mg by mouth daily at bedtime      LORazepam (ATIVAN) 1 mg tablet TAKE ONE TABLET BY MOUTH 30 MINUTES PRIOR TO PROCEDURE (SUCH AS: GYN, DENTAL, IMAGING, SKIN PROCEDURES, SUCH AS MOLE REMOVAL) FOR ANXIETY 5 tablet 0    melatonin 1 mg Take 5 mg by mouth daily at bedtime       memantine (NAMENDA) 10 mg tablet Take 1 tablet (10 mg total) by mouth 2 (two) times a day 60 tablet 5    mineral oil-white petrolatum (LUBRIFRESH P.M.) ophthalmic ointment 0.5 inches      NON FORMULARY ACT FLUORIDE DENTAL RINSE   RINSE WITH 1 TBSP (15ML)2X A DAY      NON FORMULARY TOLNAFTATE SPRAY 1% SPRAY  SPRAY BETWEEN TOES      ondansetron (ZOFRAN) 4 mg tablet Take by mouth as needed     "    OYSCO 500 + D 500-200 MG-UNIT per tablet       pantoprazole (PROTONIX) 40 mg tablet TAKE ONE TABLET BY MOUTH 2 TIMES A DAY( MORNING/ BEDTIME) (ESOPHAGITIS) (Patient taking differently: 40 mg daily) 56 tablet 0    polyethylene glycol (GLYCOLAX) 17 GM/SCOOP MIX 1 CAPFUL (17 GRAMS) IN 4- 8OZ OF LIQUID AND DRINK BY MOUTH ONCE DAILY AS NEEDED FOR CONSTIPATION 510 g 0    sodium chloride (TAMIKA 128) 5 % hypertonic ophthalmic solution Apply 1 drop to eye 2 (two) times a day        Sunscreens (SUNBLOCK LOTION SPF30 EX) Apply topically daily as needed      talc (Zeasorb) Apply topically once as needed for irritation Sprinkle powder on affected area of skin to help with excess moisture up to 2 times a day as needed .      Diclofenac Sodium (VOLTAREN) 1 %  (Patient not taking: Reported on 1/10/2024)       No current facility-administered medications for this visit.     Allergies   Allergen Reactions    Bactrim [Sulfamethoxazole-Trimethoprim]     Methylphenidate Hyperactivity     Reaction Date: 13May2011;     Sulfa Antibiotics     Thioridazine     Amphetamines     Chlorpromazine     Fexofenadine Hives     Reaction Date: 13May2011;     Medrogestone     Medroxyprogesterone Rash and Hives     Reaction Date: 13May2011;     Other Hives     Mellaril, thorazine    Trimethoprim        Objective   Vitals: Blood pressure 128/80, pulse 87, height 4' 9.52\" (1.461 m), weight 53.9 kg (118 lb 12.8 oz), SpO2 98%, not currently breastfeeding.    Physical Exam  Vitals reviewed.   Constitutional:       General: She is not in acute distress.     Appearance: Normal appearance. She is not ill-appearing, toxic-appearing or diaphoretic.   HENT:      Head: Normocephalic and atraumatic.   Eyes:      General: No scleral icterus.     Extraocular Movements: Extraocular movements intact.   Cardiovascular:      Rate and Rhythm: Normal rate and regular rhythm.      Heart sounds: Normal heart sounds. No murmur heard.  Pulmonary:      Effort: Pulmonary effort " "is normal. No respiratory distress.      Breath sounds: Normal breath sounds. No wheezing or rales.   Abdominal:      General: There is no distension.      Palpations: Abdomen is soft.      Tenderness: There is no abdominal tenderness.   Musculoskeletal:      Cervical back: Neck supple.      Right lower leg: No edema.      Left lower leg: No edema.   Lymphadenopathy:      Cervical: No cervical adenopathy.   Skin:     General: Skin is warm and dry.   Neurological:      General: No focal deficit present.      Mental Status: She is alert and oriented to person, place, and time.      Gait: Gait normal.         The history was obtained from the review of the chart, patient and family.    Lab Results:        Imaging Studies:   Results for orders placed during the hospital encounter of 12/18/23    US thyroid    Impression  Stable thyroid nodules, including nodules with prior negative biopsy. Follow-up ultrasound is recommended in 2 years.        Reference: ACR Thyroid Imaging, Reporting and Data System (TI-RADS): White Paper of the ACR TI-RADS Committee. J AM Mariam Radiol 2017;14:587-595. (additional recommendations based on American Thyroid Association 2015 guidelines.)      Workstation performed: [id number]      I have personally reviewed pertinent reports.      Portions of the record may have been created with voice recognition software. Occasional wrong word or \"sound a like\" substitutions may have occurred due to the inherent limitations of voice recognition software. Read the chart carefully and recognize, using context, where substitutions have occurred.    "

## 2024-01-11 ENCOUNTER — OFFICE VISIT (OUTPATIENT)
Dept: SURGICAL ONCOLOGY | Facility: CLINIC | Age: 59
End: 2024-01-11
Payer: MEDICARE

## 2024-01-11 VITALS
HEART RATE: 81 BPM | SYSTOLIC BLOOD PRESSURE: 132 MMHG | BODY MASS INDEX: 23.41 KG/M2 | OXYGEN SATURATION: 94 % | HEIGHT: 58 IN | DIASTOLIC BLOOD PRESSURE: 86 MMHG | WEIGHT: 111.5 LBS

## 2024-01-11 DIAGNOSIS — Z12.31 VISIT FOR SCREENING MAMMOGRAM: ICD-10-CM

## 2024-01-11 DIAGNOSIS — Z17.0 MALIGNANT NEOPLASM OF OVERLAPPING SITES OF RIGHT BREAST IN FEMALE, ESTROGEN RECEPTOR POSITIVE: Primary | ICD-10-CM

## 2024-01-11 DIAGNOSIS — C50.811 MALIGNANT NEOPLASM OF OVERLAPPING SITES OF RIGHT BREAST IN FEMALE, ESTROGEN RECEPTOR POSITIVE: Primary | ICD-10-CM

## 2024-01-11 PROCEDURE — 99213 OFFICE O/P EST LOW 20 MIN: CPT

## 2024-01-11 NOTE — PROGRESS NOTES
Surgical Oncology Follow Up       [facility] [place]  [address]  [place] [address]    Pat Callahan  1965  [id number]  [facility] [place]  [address]  [facility] [address]    Diagnoses and all orders for this visit:    Malignant neoplasm of overlapping sites of right breast in female, estrogen receptor positive     Visit for screening mammogram  -     Cancel: Mammo screening bilateral w 3d & cad; Future  -     Mammo screening left w 3d & cad; Future        Chief Complaint   Patient presents with    Follow-up     1 Year Follow Up  - Right Breast Cancer       Return in about 1 year (around 1/11/2025) for Office Visit, Imaging - See orders.      Oncology History   Malignant neoplasm of overlapping sites of right breast in female, estrogen receptor positive    2013 Surgery    Right breast mastectomy   Invasive breast carcinoma  Grade 2  2 foci:    DC 60/90  HER2 0/3+  Lymph nodes negative  Stage IA  Dr. Nazario     12/17/2013 -  Cancer Staged    Staging form: Breast, AJCC 7th Edition  - Pathologic stage from 12/17/2013: Stage IA (T1c(m), N0, cM0) - Signed by JESSE Light on 12/13/2023  Stage prefix: Initial diagnosis  Laterality: Right  Multiple tumors: Yes  Histologic grade (G): G2  Estrogen receptor status: Positive  Progesterone receptor status: Positive       2/2014 - 2/2015 Chemotherapy    TCH x 6 cycles  1 year of Herceptin  Dr. Guy     2/2015 -  Hormone Therapy    Tamoxifen daily until 2018  Arimidex 1 mg daily since 2018  Dr. Guy     4/2022 Genetic Testing    A total of 36 genes were evaluated, including: MARIJA, BRCA1, BRCA2, CDH1, CHEK2, PALB2, PTEN, STK11, TP53  Negative result. No pathogenic sequence variants identified  Ambry           History of Present Illness: The patient returns to the office today in follow-up for her history of right breast  cancer.  She is currently MARKUS at 11 years and doing well.  She continues on Arimidex with approximately one year of therapy left.  She denies any new breast lumps, skin changes or tenderness.  Mammogram and US were performed on December 4, BIRADS-2.  This did reveal stable oil cysts, but there were no concerning findings.  I have reviewed the report and discussed it with the patient and her family today.       Review of Systems   Constitutional:  Negative for activity change, appetite change, fatigue and unexpected weight change.   HENT: Negative.     Respiratory: Negative.  Negative for cough and shortness of breath.    Cardiovascular: Negative.    Gastrointestinal: Negative.  Negative for abdominal pain.   Musculoskeletal: Negative.    Skin: Negative.  Negative for color change and wound.   Neurological: Negative.  Negative for dizziness and headaches.   Hematological: Negative.  Negative for adenopathy.   Psychiatric/Behavioral: Negative.               Patient Active Problem List   Diagnosis    Vitamin D deficiency    Scoliosis    Retinal detachment    Obsessive compulsive disorder    Multiple thyroid nodules    Malignant neoplasm of overlapping sites of right breast in female, estrogen receptor positive     Legal blindness [place]    Irregular menstrual cycle    Internal hemorrhoids    Impulse control disorder    Glaucoma    Dyslipidemia    Depression, recurrent (HCC)    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HCC)    Chronic gingivitis    Cerebral palsy (HCC)    Benign neoplasm of large intestine    Autistic disorder    Anxiety    Other long term (current) drug therapy    Hyperglycemia    Screening for colorectal cancer    Chronic GERD    Use of anastrozole (Arimidex)    Family history of colonic polyps    Osteoporosis    Ambulatory dysfunction    Primary osteoarthritis of left shoulder    Other specified anemias    Mild intellectual disabilities     Past Medical History:   Diagnosis Date    Breast cyst, left     last assessed  12/30/2013    Cerebral palsy (HCC)     Chronic GERD     Fibrocystic breast disease     last assessed 06/29/2012    Gastrointestinal hemorrhage     Glaucoma     History of chemotherapy     Hydrocephalus (HCC)      SHUNT IN PLACE    Scoliosis      Past Surgical History:   Procedure Laterality Date    BREAST BIOPSY Right     CATARACT EXTRACTION  06/14/2021    COLONOSCOPY      Description 04/13/2016-5 yrs.  Description 4 yr f/u d/t polyp and family history, onset 07/20/2012.    COLONOSCOPY      ESOPHAGOGASTRODUODENOSCOPY      description-04/13/2016 gastritis with antral nodule    EYE SURGERY      for relief of intraocular pressure    INCISIONAL BREAST BIOPSY      description 2008    MASTECTOMY Right     SENTINEL LYMPH NODE BIOPSY      STRABISMUS SURGERY      description 1973, 1974    UPPER GASTROINTESTINAL ENDOSCOPY  05/16/2019    Grade C erosive esophagitis.  7 mm nodule in the stomach    US GUIDED THYROID BIOPSY  12/13/2022     Family History   Problem Relation Age of Onset    Osteopenia Mother     Supraventricular tachycardia Mother     Heart attack Father 64        Acute MI    Coronary artery disease Father     Thyroid nodules Father     Osteopenia Maternal Grandmother     Prostate cancer Maternal Grandfather     Leukemia Paternal Grandfather     Heart attack Maternal Uncle 50        acute MI, stent    Cancer Maternal Uncle         adenocarcinoma of oral cavity    Colon cancer Maternal Uncle 57    Hyperlipidemia Maternal Uncle     Pancreatic cancer Paternal Uncle     Prostate cancer Paternal Uncle     Colon cancer Family     Coronary artery disease Family     Thyroid disease Family      Social History     Socioeconomic History    Marital status: Single     Spouse name: Not on file    Number of children: Not on file    Years of education: Not on file    Highest education level: Not on file   Occupational History    Not on file   Tobacco Use    Smoking status: Never    Smokeless tobacco: Never   Vaping Use    Vaping  status: Never Used   Substance and Sexual Activity    Alcohol use: No    Drug use: No    Sexual activity: Not on file   Other Topics Concern    Not on file   Social History Narrative    Person living in a residential institution    Uses safety equipment-seat belts     Social Determinants of Health     Financial Resource Strain: Low Risk  (9/22/2023)    Overall Financial Resource Strain (CARDIA)     Difficulty of Paying Living Expenses: Not hard at all   Food Insecurity: Not on file   Transportation Needs: No Transportation Needs (9/22/2023)    PRAPARE - Transportation     Lack of Transportation (Medical): No     Lack of Transportation (Non-Medical): No   Physical Activity: Not on file   Stress: Not on file   Social Connections: Not on file   Intimate Partner Violence: Not on file   Housing Stability: Not on file       Current Outpatient Medications:     acetaminophen (TYLENOL) 500 mg tablet, Take 500 mg by mouth as needed for mild pain  , Disp: , Rfl:     anastrozole (ARIMIDEX) 1 mg tablet, Take 1 tablet (1 mg total) by mouth daily, Disp: 90 tablet, Rfl: 3    BANOPHEN 25 MG capsule, Take 25 mg by mouth daily at bedtime as needed , Disp: , Rfl:     calamine lotion, Apply topically every 4 (four) hours as needed for itching, Disp: , Rfl:     Calcium Carbonate-Vitamin D (OYSCO 500/D PO), Take by mouth daily, Disp: , Rfl:     carBAMazepine (TEGretol) 200 mg tablet, Take 1 tablet (200 mg total) by mouth 2 (two) times a day, Disp: , Rfl:     Cholecalciferol (VITAMIN D) 2000 units tablet, TAKE TWO TABLETS BY MOUTH (4000IU) EVERY MORNING FOR VITAMIN DIFICIENCY, Disp: 62 tablet, Rfl: 0    Dentifrices (SENSODYNE PRONAMEL DT), Apply to teeth , Disp: , Rfl:     Diclofenac Sodium (VOLTAREN) 1 %, , Disp: , Rfl:     diphenhydrAMINE (BENADRYL) 25 mg tablet, Take 25 mg by mouth daily at bedtime as needed for itching (for insomnia and allergy ** DO NOT TAKE WITH XYZAL **) , Disp: , Rfl:     docusate sodium (COLACE) 100 mg capsule,  Take 100 mg by mouth 3 (three) times a week , Disp: , Rfl:     Emollient (Vaseline Intensive Care) LOTN, , Disp: , Rfl:     guaiFENesin (SILTUSSIN SA PO), Take by mouth Take 2 teaspoonful (10 ml) PRN 6 hrs for cough, Disp: , Rfl:     haloperidol (HALDOL) 2 mg tablet, Take 2 mg by mouth 2 (two) times a day 1 tab am and 2 tabs qhs, Disp: , Rfl:     latanoprost (XALATAN) 0.005 % ophthalmic solution, Administer 1 drop to both eyes, Disp: , Rfl:     levocetirizine (XYZAL) 5 MG tablet, TAKE ONE TABLET BY MOUTH EVERY DAY AS NEEDED FOR ALLERGIES *DO NOT TAKE WITH BENADRYL*, Disp: 31 tablet, Rfl: 0    lithium carbonate 300 mg capsule, Take 600 mg by mouth 2 (two) times a day, Disp: , Rfl:     loperamide (IMODIUM) 2 mg capsule, Take by mouth, Disp: , Rfl:     LORazepam (ATIVAN) 0.5 mg tablet, Take 1 mg by mouth daily at bedtime, Disp: , Rfl:     LORazepam (ATIVAN) 1 mg tablet, TAKE ONE TABLET BY MOUTH 30 MINUTES PRIOR TO PROCEDURE (SUCH AS: GYN, DENTAL, IMAGING, SKIN PROCEDURES, SUCH AS MOLE REMOVAL) FOR ANXIETY, Disp: 5 tablet, Rfl: 0    melatonin 1 mg, Take 5 mg by mouth daily at bedtime , Disp: , Rfl:     memantine (NAMENDA) 10 mg tablet, Take 1 tablet (10 mg total) by mouth 2 (two) times a day, Disp: 60 tablet, Rfl: 5    mineral oil-white petrolatum (LUBRIFRESH P.M.) ophthalmic ointment, 0.5 inches, Disp: , Rfl:     NON FORMULARY, ACT FLUORIDE DENTAL RINSE  RINSE WITH 1 TBSP (15ML)2X A DAY, Disp: , Rfl:     NON FORMULARY, TOLNAFTATE SPRAY 1% SPRAY SPRAY BETWEEN TOES, Disp: , Rfl:     ondansetron (ZOFRAN) 4 mg tablet, Take by mouth as needed  , Disp: , Rfl:     OYSCO 500 + D 500-200 MG-UNIT per tablet, , Disp: , Rfl:     pantoprazole (PROTONIX) 40 mg tablet, TAKE ONE TABLET BY MOUTH 2 TIMES A DAY( MORNING/ BEDTIME) (ESOPHAGITIS) (Patient taking differently: 40 mg daily), Disp: 56 tablet, Rfl: 0    polyethylene glycol (GLYCOLAX) 17 GM/SCOOP, MIX 1 CAPFUL (17 GRAMS) IN 4- 8OZ OF LIQUID AND DRINK BY MOUTH ONCE DAILY AS NEEDED  FOR CONSTIPATION, Disp: 510 g, Rfl: 0    sodium chloride (TAMIKA 128) 5 % hypertonic ophthalmic solution, Apply 1 drop to eye 2 (two) times a day  , Disp: , Rfl:     Sunscreens (SUNBLOCK LOTION SPF30 EX), Apply topically daily as needed, Disp: , Rfl:     talc (Zeasorb), Apply topically once as needed for irritation Sprinkle powder on affected area of skin to help with excess moisture up to 2 times a day as needed ., Disp: , Rfl:     Diclofenac Sodium (VOLTAREN) 1 %, , Disp: , Rfl:   Allergies   Allergen Reactions    Bactrim [Sulfamethoxazole-Trimethoprim]     Methylphenidate Hyperactivity     Reaction Date: 13May2011;     Sulfa Antibiotics     Thioridazine     Amphetamines     Chlorpromazine     Fexofenadine Hives     Reaction Date: 13May2011;     Medrogestone     Medroxyprogesterone Rash and Hives     Reaction Date: 13May2011;     Other Hives     Mellaril, thorazine    Trimethoprim      Vitals:    01/11/24 1136   BP: 132/86   Pulse: 81   SpO2: 94%       Physical Exam  Vitals reviewed.   Constitutional:       General: She is not in acute distress.     Appearance: Normal appearance. She is normal weight. She is not ill-appearing or toxic-appearing.   HENT:      Head: Normocephalic and atraumatic.      Nose: Nose normal.      Mouth/Throat:      Mouth: Mucous membranes are moist.   Eyes:      General: No scleral icterus.     Conjunctiva/sclera: Conjunctivae normal.   Cardiovascular:      Rate and Rhythm: Normal rate.   Pulmonary:      Effort: Pulmonary effort is normal.   Chest:   Breasts:     Right: Absent.      Left: Normal. No inverted nipple, mass, skin change or tenderness.          Comments: Right chest wall is smooth and even. There are no dominant masses, nodules, skin changes or tenderness.  I do not appreciate any adenopathy.  Musculoskeletal:         General: No swelling or tenderness. Normal range of motion.      Cervical back: Normal range of motion and neck supple.   Lymphadenopathy:      Cervical: No  cervical adenopathy.      Upper Body:      Right upper body: No supraclavicular, axillary or pectoral adenopathy.      Left upper body: No supraclavicular, axillary or pectoral adenopathy.   Skin:     General: Skin is warm and dry.      Findings: No erythema.   Neurological:      Mental Status: She is alert. Mental status is at baseline.   Psychiatric:         Mood and Affect: Mood normal.         Behavior: Behavior normal.         Thought Content: Thought content normal.         Judgment: Judgment normal.           Imaging  Mammo diagnostic left w 3d & cad  US breast left limited (diagnostic)  12/04/2023  Narrative & Impression   DIAGNOSIS: Abnormal finding on breast imaging      TECHNIQUE:   Digital diagnostic mammography was performed. Computer Aided Detection (CAD) analyzed all applicable images.  Left breast ultrasound was performed.      COMPARISONS: Prior breast imaging dated: 07/14/2023, 01/05/2023, 11/17/2022, 11/16/2021, 08/19/2021, 07/17/2020, 05/28/2019, 05/22/2018, 05/22/2018, 11/21/2017, 11/21/2017, and 11/18/2016     RELEVANT HISTORY:   Family Breast Cancer History: No known family history of breast cancer.  Family Medical History: Family medical history includes colon cancer in 2 relatives (family, maternal uncle).   Personal History: Hormone history includes tamoxifen and other. Surgical history includes breast biopsy and mastectomy. Medical history includes fibrocystic breast and history of chemotherapy.     RISK ASSESSMENT:   5 Year Tyrer-Cuzick: 1.96 %  10 Year Tyrer-Cuzick: 4.31 %  Lifetime Tyrer-Cuzick: 12.32 %     TISSUE DENSITY:   The breasts are heterogeneously dense, which may obscure small masses.     INDICATION: Pat Callahan is a 58 y.o. female presenting for follow-up left breast finding..     FINDINGS:   LEFT  1) MASS [B]: There are a few oil cyst in the left lateral breast on mammography.  This includes the oil cyst at 2:00 11 cm from the nipple on ultrasound measuring 12 x 9 x 13  mm.     Elsewhere in the left breast on mammography, there are no suspicious masses, grouped microcalcifications or areas of unexplained architectural distortion. The skin and nipple areolar complex are unremarkable.      IMPRESSION:  Benign findings.     ASSESSMENT/BI-RADS CATEGORY:  Left: 2 - Benign  Overall: 2 - Benign     RECOMMENDATION:       - Routine screening mammogram in 1 year for the left breast.          I personally reviewed and interpreted the above imaging data.    Discussion/Summary: This is a 59 y/o female who presents today for continued breast cancer surveillance. I will see her again in 1 year following left mammogram.  She is agreeable to the plan, all questions have been answered.

## 2024-01-17 ENCOUNTER — SOCIAL WORK (OUTPATIENT)
Dept: BEHAVIORAL/MENTAL HEALTH CLINIC | Facility: CLINIC | Age: 59
End: 2024-01-17
Payer: MEDICARE

## 2024-01-17 DIAGNOSIS — F70 MILD INTELLECTUAL DISABILITIES: Primary | ICD-10-CM

## 2024-01-17 PROCEDURE — 90832 PSYTX W PT 30 MINUTES: CPT | Performed by: COUNSELOR

## 2024-01-17 NOTE — PSYCH
"Behavioral Health Psychotherapy Progress Note    Psychotherapy Provided: Individual Psychotherapy     1. Mild intellectual disabilities            Goals addressed in session: Goal 1     DATA: 30 minute session today due to Meche's transportation (nurse Catrachita [place]. From [facility] where Meche lives, needed to leave early.  Meche said she is worried about her father who is having  eye surgery tomorrow.  Meche shared her feelings about her worry.  With looking at the support her father will have afterward and with it being routine surgery, Meche was asked to try to also think about this too to offset her concerns.   Catrachita suggested that Meche will talk to her mother later today for reassurance too.  Meche said she still needs to work on better behavior with  not getting loud an cursing.   She said she wants to  work on this with the help of [facility] staff to remind her.    Catrachita provided feel back saying \"when I check on Meche when she's at [facility], she is doing a good job with her work and appears to be doing well in this area.               During this session, this clinician used the following therapeutic modalities: Solution-Focused Therapy    Substance Abuse was not addressed during this session. If the client is diagnosed with a co-occurring substance use disorder, please indicate any changes in the frequency or amount of use: N/A. Stage of change for addressing substance use diagnoses: No substance use/Not applicable    ASSESSMENT:  Meche Callahan presents with a Euthymic/ normal mood.     her affect is Normal range and intensity, which is congruent, with her mood and the content of the session. The client has made progress on their goals.    Meche Callahan presents with a none risk of suicide, none risk of self-harm, and none risk of harm to others.    For any risk assessment that surpasses a \"low\" rating, a safety plan must be developed.    A safety plan was indicated: no  If yes, describe in detail N/A    PLAN: Between " "sessions, Meche Callahan said she wants to \"stop and listen\" to others when they redirect her if she is getting loud in any way. At the next session, the therapist will use Solution-Focused Therapy to address goals/needs/concerns.    Behavioral Health Treatment Plan and Discharge Planning: Meche Callahan is aware of and agrees to continue to work on their treatment plan. They have identified and are working toward their discharge goals. yes    Visit start and stop times:    01/17/24  Start Time: 1200  Stop Time: 1230  Total Visit Time: 30 minutes  "

## 2024-02-12 ENCOUNTER — TELEPHONE (OUTPATIENT)
Dept: OBGYN CLINIC | Facility: CLINIC | Age: 59
End: 2024-02-12

## 2024-02-12 NOTE — TELEPHONE ENCOUNTER
I called the [phone] left a message on that voice mail. I also called sarah the care giver and left message for them to call back.

## 2024-02-21 ENCOUNTER — OFFICE VISIT (OUTPATIENT)
Dept: OBGYN CLINIC | Facility: CLINIC | Age: 59
End: 2024-02-21
Payer: MEDICARE

## 2024-02-21 VITALS
BODY MASS INDEX: 23.51 KG/M2 | WEIGHT: 112 LBS | DIASTOLIC BLOOD PRESSURE: 70 MMHG | HEIGHT: 58 IN | SYSTOLIC BLOOD PRESSURE: 122 MMHG

## 2024-02-21 DIAGNOSIS — M19.012 PRIMARY OSTEOARTHRITIS OF LEFT SHOULDER: Primary | ICD-10-CM

## 2024-02-21 PROBLEM — Z12.12 SCREENING FOR COLORECTAL CANCER: Status: RESOLVED | Noted: 2019-06-20 | Resolved: 2024-02-21

## 2024-02-21 PROBLEM — Z12.11 SCREENING FOR COLORECTAL CANCER: Status: RESOLVED | Noted: 2019-06-20 | Resolved: 2024-02-21

## 2024-02-21 PROCEDURE — 20610 DRAIN/INJ JOINT/BURSA W/O US: CPT | Performed by: ORTHOPAEDIC SURGERY

## 2024-02-21 PROCEDURE — 99213 OFFICE O/P EST LOW 20 MIN: CPT | Performed by: ORTHOPAEDIC SURGERY

## 2024-02-21 RX ORDER — BETAMETHASONE SODIUM PHOSPHATE AND BETAMETHASONE ACETATE 3; 3 MG/ML; MG/ML
6 INJECTION, SUSPENSION INTRA-ARTICULAR; INTRALESIONAL; INTRAMUSCULAR; SOFT TISSUE
Status: COMPLETED | OUTPATIENT
Start: 2024-02-21 | End: 2024-02-21

## 2024-02-21 RX ORDER — LIDOCAINE HYDROCHLORIDE 10 MG/ML
5 INJECTION, SOLUTION EPIDURAL; INFILTRATION; INTRACAUDAL; PERINEURAL
Status: COMPLETED | OUTPATIENT
Start: 2024-02-21 | End: 2024-02-21

## 2024-02-21 RX ADMIN — LIDOCAINE HYDROCHLORIDE 5 ML: 10 INJECTION, SOLUTION EPIDURAL; INFILTRATION; INTRACAUDAL; PERINEURAL at 14:00

## 2024-02-21 RX ADMIN — BETAMETHASONE SODIUM PHOSPHATE AND BETAMETHASONE ACETATE 6 MG: 3; 3 INJECTION, SUSPENSION INTRA-ARTICULAR; INTRALESIONAL; INTRAMUSCULAR; SOFT TISSUE at 14:00

## 2024-02-21 NOTE — ASSESSMENT & PLAN NOTE
Findings today are consistent with severe left glenohumeral osteoarthritis. Findings and treatment options were discussed with the patient and her family. Repeat cortisone injection was given to the left glenohumeral joint today. She tolerated the procedure well. Advised to apply cold compress today. Follow-up as needed if symptoms return. Advised patient as soon as she can have another cortisone injection is in 3-4 months. All patient's questions were answered to their satisfaction. This note is created using dictation transcription. It may contain typographical errors, grammatical errors, improperly dictated words, background noise and other errors.

## 2024-02-21 NOTE — PROGRESS NOTES
Assessment:     1. Primary osteoarthritis of left shoulder          Plan:     Problem List Items Addressed This Visit          Musculoskeletal and Integument    Primary osteoarthritis of left shoulder - Primary     Findings today are consistent with severe left glenohumeral osteoarthritis. Findings and treatment options were discussed with the patient and her family. Repeat cortisone injection was given to the left glenohumeral joint today. She tolerated the procedure well. Advised to apply cold compress today. Follow-up as needed if symptoms return. Advised patient as soon as she can have another cortisone injection is in 3-4 months. All patient's questions were answered to their satisfaction. This note is created using dictation transcription. It may contain typographical errors, grammatical errors, improperly dictated words, background noise and other errors.          Relevant Medications    lidocaine (PF) (XYLOCAINE-MPF) 1 % injection 5 mL (Completed)    betamethasone acetate-betamethasone sodium phosphate (CELESTONE) injection 6 mg (Completed)    Other Relevant Orders    Large joint arthrocentesis: L glenohumeral (Completed)        Subjective:     Patient ID: Pat Callahan is a 58 y.o. female.  Chief Complaint:  58 year old female patient presents today for a follow up left shoulder pain-severe glenohumeral osteoarthritis. She is resident at [facility], she is with one care giver and her mother. Patient was last seen on 10/12/23 and was given a CSI to the glenohumeral joint.  The injection lasted until about a month ago.  She denies any new injury.  She would like a repeat cortisone injection today.       Allergy:  Allergies   Allergen Reactions    Bactrim [Sulfamethoxazole-Trimethoprim]     Methylphenidate Hyperactivity     Reaction Date: 13May2011;     Sulfa Antibiotics     Thioridazine     Amphetamines     Chlorpromazine     Fexofenadine Hives     Reaction Date: 13May2011;     Medrogestone     Medroxyprogesterone  Rash and Hives     Reaction Date: 13May2011;     Other Hives     Mellaril, thorazine    Trimethoprim      Medications:  all current active meds have been reviewed  Past Medical History:  Past Medical History:   Diagnosis Date    Breast cyst, left     last assessed 12/30/2013    Cerebral palsy (HCC)     Chronic GERD     Fibrocystic breast disease     last assessed 06/29/2012    Gastrointestinal hemorrhage     Glaucoma     History of chemotherapy     Hydrocephalus (HCC)      SHUNT IN PLACE    Scoliosis      Past Surgical History:  Past Surgical History:   Procedure Laterality Date    BREAST BIOPSY Right     CATARACT EXTRACTION  06/14/2021    COLONOSCOPY      Description 04/13/2016-5 yrs.  Description 4 yr f/u d/t polyp and family history, onset 07/20/2012.    COLONOSCOPY      ESOPHAGOGASTRODUODENOSCOPY      description-04/13/2016 gastritis with antral nodule    EYE SURGERY      for relief of intraocular pressure    INCISIONAL BREAST BIOPSY      description 2008    MASTECTOMY Right     SENTINEL LYMPH NODE BIOPSY      STRABISMUS SURGERY      description 1973, 1974    UPPER GASTROINTESTINAL ENDOSCOPY  05/16/2019    Grade C erosive esophagitis.  7 mm nodule in the stomach    US GUIDED THYROID BIOPSY  12/13/2022     Family History:  Family History   Problem Relation Age of Onset    Osteopenia Mother     Supraventricular tachycardia Mother     Heart attack Father 64        Acute MI    Coronary artery disease Father     Thyroid nodules Father     Osteopenia Maternal Grandmother     Prostate cancer Maternal Grandfather     Leukemia Paternal Grandfather     Heart attack Maternal Uncle 50        acute MI, stent    Cancer Maternal Uncle         adenocarcinoma of oral cavity    Colon cancer Maternal Uncle 57    Hyperlipidemia Maternal Uncle     Pancreatic cancer Paternal Uncle     Prostate cancer Paternal Uncle     Colon cancer Family     Coronary artery disease Family     Thyroid disease Family      Social History:  Social  "History     Substance and Sexual Activity   Alcohol Use No     Social History     Substance and Sexual Activity   Drug Use No     Social History     Tobacco Use   Smoking Status Never   Smokeless Tobacco Never     Review of Systems   Constitutional:  Negative for chills and fever.   HENT:  Negative for ear pain and sore throat.    Eyes:  Negative for pain and visual disturbance.   Respiratory:  Negative for cough and shortness of breath.    Cardiovascular:  Negative for chest pain and palpitations.   Gastrointestinal:  Negative for abdominal pain and vomiting.   Genitourinary:  Negative for dysuria and hematuria.   Musculoskeletal:  Positive for arthralgias (left shoulder). Negative for back pain and joint swelling.   Skin:  Negative for color change and rash.   Neurological:  Negative for seizures and syncope.   Psychiatric/Behavioral: Negative.     All other systems reviewed and are negative.        Objective:  BP Readings from Last 1 Encounters:   02/21/24 122/70      Wt Readings from Last 1 Encounters:   02/21/24 50.8 kg (112 lb)      BMI:   Estimated body mass index is 23.82 kg/m² as calculated from the following:    Height as of this encounter: 4' 9.5\" (1.461 m).    Weight as of this encounter: 50.8 kg (112 lb).  BSA:   Estimated body surface area is 1.41 meters squared as calculated from the following:    Height as of this encounter: 4' 9.5\" (1.461 m).    Weight as of this encounter: 50.8 kg (112 lb).   Physical Exam  Vitals and nursing note reviewed.   Constitutional:       Appearance: Normal appearance. She is well-developed.   HENT:      Head: Normocephalic and atraumatic.      Right Ear: External ear normal.      Left Ear: External ear normal.   Eyes:      Extraocular Movements: Extraocular movements intact.      Conjunctiva/sclera: Conjunctivae normal.   Pulmonary:      Effort: Pulmonary effort is normal.   Musculoskeletal:      Cervical back: Neck supple.   Skin:     General: Skin is warm and dry. "   Neurological:      Mental Status: She is alert and oriented to person, place, and time.      Deep Tendon Reflexes: Reflexes are normal and symmetric.   Psychiatric:         Mood and Affect: Mood normal.         Behavior: Behavior normal.       Left Shoulder Exam     Tenderness   The patient is experiencing no tenderness.     Range of Motion   Active abduction:  90 (Pain)   Left shoulder passive abduction: pain.   Forward flexion:  90 (Pain)   Internal rotation 0 degrees:  Sacrum     Muscle Strength   Abduction: 4/5   Internal rotation: 5/5   External rotation: 4/5   Biceps: 5/5     Tests   Cross arm: negative  Drop arm: negative    Other   Erythema: absent  Scars: absent  Sensation: normal  Pulse: present     Comments:  Crepitation with shoulder range of motion            Large joint arthrocentesis: L glenohumeral  Universal Protocol:  Consent: Verbal consent obtained.  Risks and benefits: risks, benefits and alternatives were discussed  Consent given by: patient  Patient understanding: patient states understanding of the procedure being performed  Site marked: the operative site was marked  Supporting Documentation  Indications: pain   Procedure Details  Location: shoulder - L glenohumeral  Preparation: Patient was prepped and draped in the usual sterile fashion  Needle size: 22 G  Ultrasound guidance: no  Approach: posterior  Medications administered: 5 mL lidocaine (PF) 1 %; 6 mg betamethasone acetate-betamethasone sodium phosphate 6 (3-3) mg/mL    Patient tolerance: patient tolerated the procedure well with no immediate complications  Dressing:  Sterile dressing applied          No new imaging was obtained today

## 2024-02-26 ENCOUNTER — SOCIAL WORK (OUTPATIENT)
Dept: BEHAVIORAL/MENTAL HEALTH CLINIC | Facility: CLINIC | Age: 59
End: 2024-02-26
Payer: MEDICARE

## 2024-02-26 DIAGNOSIS — F70 MILD INTELLECTUAL DISABILITIES: Primary | ICD-10-CM

## 2024-02-26 PROCEDURE — 90834 PSYTX W PT 45 MINUTES: CPT | Performed by: COUNSELOR

## 2024-02-26 NOTE — BH CRISIS PLAN
Client Name: Meche Callahan       Client YOB: 1965    BasilioBraulio Safety Plan      Creation Date: 2/26/24 Update Date: 2/26/25   Created By: Ev Catherine LCSW Last Updated By: Ev Catherine LCSW      Step 1: Warning Signs:   Warning Signs   I yell, scream and shake my fist at people   I say things that hurt other people   I say bad words.  I do a hissing cat sound/angry meows at other people            Step 2: Internal Coping Strategies:   Internal Coping Strategies   I do my deep breathing exercises and take off my shoes to get more comfortable.   I engage with cat 'Mohave'            Step 3: People and social settings that provide distraction:   Name Contact Information   Mother and Father Has contact information   Nicola - the behavioral specialist at [facility] at [facility] staff     Places   Going out to a place to eat something   Go home and be with my family           Step 4: People whom I can ask for help during a crisis:      Name Contact Information    [facility] staff Lives at [facility]      Step 5: Professionals or agencies I can contact during a crisis:      Clinican/Agency Name Phone Emergency Contact    [facility] staff would take to local ER      911        Local Emergency Department Emergency Department Phone Emergency Department Address    [facility] or [facility] staff would guide and advise         Crisis Phone Numbers:   Suicide Prevention Lifeline: Call or Text  880 Crisis Text Line: Text HOME to 259-016   Please note: Some [facility] do not have a separate number for Child/Adolescent specific crisis. If your county is not listed under Child/Adolescent, please call the adult number for your county      Adult Crisis Numbers: Child/Adolescent Crisis Numbers   [facility]: [phone] [facility]: [phone]   [facility]: [phone] [facility]: [phone]   [facility]: [phone] [place]: [phone]   [facility]: [phone] [place]  [facility]: [phone]   [place]: [phone]   [facility]: [phone]   [facility]: [phone]   [facility]: [phone] (daytime) [phone] (after hours, weekends, holidays)      Step 6: Making the environment safer (plan for lethal means safety):   Patient did not identify any lethal methods: Yes     Optional: What is most important to me and worth living for?   My family and my cat.     Jorge Safety Plan. Fatimah Richmond and Dejuan Ramsey. Used with permission of the authors.

## 2024-02-26 NOTE — PSYCH
"Behavioral Health Psychotherapy Progress Note    Psychotherapy Provided: Individual Psychotherapy     1. Mild intellectual disabilities            Goals addressed in session: Goal 1     DATA:  Meche was accompanied to today's appointment by Catrachita SNIDER, [facility] Nurse.  Meche lives at the [facility] residence.  Today we completed the SB Safety Plan and Meche said that she did not want a copy but one was given to Catrachita as Meche requested.  Meche was able to identify warning signs when she is not doing well emotionally and what she can do by herself to self-soothe and also who she would go to to talk about what she's feeling. Meche shared that when she is in ATF, she hears someone being loud and saying \"bad things\" and nurse Catrachita said she would look into this and Meche said she thought that was a good idea.  Meche said she still needs to work on her behavior with \"yelling, cursing and screaming\" but Catrachita reported that she feels Meche is doing well at [facility].  During this session, this clinician used the following therapeutic modalities: Solution-Focused Therapy    Substance Abuse was not addressed during this session. If the client is diagnosed with a co-occurring substance use disorder, please indicate any changes in the frequency or amount of use: N/A. Stage of change for addressing substance use diagnoses: No substance use/Not applicable    ASSESSMENT:  Meche Callahan presents with a Euthymic/ normal mood.     her affect is Normal range and intensity, which is congruent, with her mood and the content of the session. The client has made progress on their goals.    Meche Callahan presents with a none risk of suicide, none risk of self-harm, and none risk of harm to others.    For any risk assessment that surpasses a \"low\" rating, a safety plan must be developed.    A safety plan was indicated: no  If yes, describe in detail N/A    PLAN: Between sessions, Meche Callahan will work on having better behaviors and try not to act " out but rather share what she is feeling with [facility] staff. At the next session, the therapist will use Solution-Focused Therapy to address goals/needs/concerns.    Behavioral Health Treatment Plan and Discharge Planning: Meche Callahan is aware of and agrees to continue to work on their treatment plan. They have identified and are working toward their discharge goals. yes    Visit start and stop times:    02/26/24  Start Time: 1200  Stop Time: 1250  Total Visit Time: 50 minutes

## 2024-03-08 ENCOUNTER — HOSPITAL ENCOUNTER (OUTPATIENT)
Dept: ULTRASOUND IMAGING | Facility: HOSPITAL | Age: 59
End: 2024-03-08
Payer: MEDICARE

## 2024-03-08 DIAGNOSIS — R93.89 ABNORMAL FINDINGS ON IMAGING TEST: ICD-10-CM

## 2024-03-08 DIAGNOSIS — C50.919 MALIGNANT NEOPLASM OF FEMALE BREAST, UNSPECIFIED ESTROGEN RECEPTOR STATUS, UNSPECIFIED LATERALITY, UNSPECIFIED SITE OF BREAST (HCC): ICD-10-CM

## 2024-03-08 PROCEDURE — 76856 US EXAM PELVIC COMPLETE: CPT

## 2024-03-25 ENCOUNTER — OFFICE VISIT (OUTPATIENT)
Dept: FAMILY MEDICINE CLINIC | Facility: HOSPITAL | Age: 59
End: 2024-03-25
Payer: MEDICARE

## 2024-03-25 VITALS
HEIGHT: 58 IN | DIASTOLIC BLOOD PRESSURE: 70 MMHG | OXYGEN SATURATION: 96 % | WEIGHT: 108 LBS | BODY MASS INDEX: 22.67 KG/M2 | SYSTOLIC BLOOD PRESSURE: 124 MMHG | HEART RATE: 72 BPM

## 2024-03-25 DIAGNOSIS — C50.811 MALIGNANT NEOPLASM OF OVERLAPPING SITES OF RIGHT BREAST IN FEMALE, ESTROGEN RECEPTOR POSITIVE (HCC): ICD-10-CM

## 2024-03-25 DIAGNOSIS — F33.9 DEPRESSION, RECURRENT (HCC): ICD-10-CM

## 2024-03-25 DIAGNOSIS — R93.89 THICKENED ENDOMETRIUM: ICD-10-CM

## 2024-03-25 DIAGNOSIS — M19.012 PRIMARY OSTEOARTHRITIS OF LEFT SHOULDER: ICD-10-CM

## 2024-03-25 DIAGNOSIS — Q03.1 DANDY-WALKER SYNDROME (HCC): ICD-10-CM

## 2024-03-25 DIAGNOSIS — K21.9 CHRONIC GERD: Primary | ICD-10-CM

## 2024-03-25 DIAGNOSIS — Z17.0 MALIGNANT NEOPLASM OF OVERLAPPING SITES OF RIGHT BREAST IN FEMALE, ESTROGEN RECEPTOR POSITIVE (HCC): ICD-10-CM

## 2024-03-25 DIAGNOSIS — G80.9 CEREBRAL PALSY, UNSPECIFIED TYPE (HCC): ICD-10-CM

## 2024-03-25 DIAGNOSIS — E04.2 MULTIPLE THYROID NODULES: ICD-10-CM

## 2024-03-25 PROCEDURE — G2211 COMPLEX E/M VISIT ADD ON: HCPCS | Performed by: INTERNAL MEDICINE

## 2024-03-25 PROCEDURE — 99214 OFFICE O/P EST MOD 30 MIN: CPT | Performed by: INTERNAL MEDICINE

## 2024-03-25 RX ORDER — TOLNAFTATE POWDER SPRAY - TALC FREE 1.3 G/130G
AEROSOL, SPRAY TOPICAL AS NEEDED
COMMUNITY
Start: 2024-02-07

## 2024-03-25 RX ORDER — HYDROCORTISONE 25 MG/G
CREAM TOPICAL
COMMUNITY
Start: 2024-01-31

## 2024-03-25 RX ORDER — TIMOLOL MALEATE 5 MG/ML
1 SOLUTION/ DROPS OPHTHALMIC 2 TIMES DAILY
COMMUNITY
Start: 2024-03-15

## 2024-03-25 NOTE — PROGRESS NOTES
Name: Pat Callahan      : 1965      MRN: [id number]  Encounter Provider: Wendy Thomas DO  Encounter Date: 3/25/2024   Encounter department: [facility] CARE [address]     Assessment & Plan     1. Chronic GERD  Assessment & Plan:  Symptoms well controlled with current PPI, following with GI annually, call with new/worse symptoms      2. Malignant neoplasm of overlapping sites of right breast in female, estrogen receptor positive   Assessment & Plan:  Following with Heme/Onc and Surg Onc - UTD on mammo, on Arimidex, call with breast concerns      3. Multiple thyroid nodules  Assessment & Plan:  Following with Endo has TFT's regularly and thyroid US now every 2 yrs, con't imaging/labs and f/u as per Endo      4. Thickened endometrium  Comments:  Following w/Dr. Blount, con't imaging/tx and f/u as per GYN    5. Primary osteoarthritis of left shoulder  Assessment & Plan:  Following with Ortho and noting benefit with cortisone injections, con't tx and f/u as per Ortho      6. 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HCC)  Assessment & Plan:  No current changes in health, call if they occur      7. Cerebral palsy, unspecified type (HCC)  Assessment & Plan:  Stable, call with changes in health/falls      8. Depression, recurrent (HCC)  Assessment & Plan:  Following with mental health, no changes to mental health meds noted, con't tx and f/u as per mental health         Colonoscopy 10/20 - 5 yrs    Mammo     Dexa 10/22 - osteopenia - she is on Prolia    PAP     BW     [facility] forms filled out and copy made for chart        Subjective      HPI Pt here for follow up appt with [facility] staff    Pt saw GI (Dr Llamas) in Dec for f/u GERD - OV note reviewed.  She was told to con't her current Protonix 40 mg daily and was told to f/u in 1  yr.    Pt saw Heme/Onc (Brionna Soto) in Dec and Surgical Onc NP (Suri Najera) in  for f/u d/t her h/o breast CA - OV note reviewed.  She is on Arimidex (for 10 yrs)  and is UTD on her mammo.     Pt saw Endo (Dr Barragan) In Jan for f/u thyroid nodules - OV note reviewed.  She con't to have BW checked and thyroid US q 2 yrs.     Pt has been seeing GYN (Dr. Blount) for her regular pelvic exams.  Endometrial lining was noted to be a bit thickened.  D&C was reviewed but has been postponed for now.  She has had no PMB.      Pt has been following with Ortho for her shoulder pain. She has been getting cortisone injections w/some benefit.     NO recent issues with there CP/Dandy Walker.  NO recent falls reported    Follows with psychiatry.  No changes in mood meds noted.  NO behavioral issues reported today. Pt states sleep is not always the best.  She is on melatonin.         Review of Systems   Unable to perform ROS: Other (intellectual disability)   Constitutional:  Negative for fever.   Respiratory:  Positive for cough.    Gastrointestinal:  Negative for constipation and diarrhea.   Musculoskeletal:  Negative for gait problem.   Psychiatric/Behavioral:  Negative for behavioral problems.        Current Outpatient Medications on File Prior to Visit   Medication Sig    Athletes Foot Powder Spray 1 % spray Apply topically as needed for rash    Proctosol HC 2.5 % rectal cream Apply topically As needed    timolol (TIMOPTIC) 0.5 % ophthalmic solution Administer 1 drop into the left eye 2 (two) times a day    acetaminophen (TYLENOL) 500 mg tablet Take 500 mg by mouth as needed for mild pain      anastrozole (ARIMIDEX) 1 mg tablet Take 1 tablet (1 mg total) by mouth daily    BANOPHEN 25 MG capsule Take 25 mg by mouth daily at bedtime as needed     calamine lotion Apply topically every 4 (four) hours as needed for itching    Calcium Carbonate-Vitamin D (OYSCO 500/D PO) Take by mouth daily    carBAMazepine (TEGretol) 200 mg tablet Take 1 tablet (200 mg total) by mouth 2 (two) times a day    Cholecalciferol (VITAMIN D) 2000 units tablet TAKE TWO TABLETS BY MOUTH (4000IU) EVERY MORNING FOR  VITAMIN DIFICIENCY    Dentifrices (SENSODYNE PRONAMEL DT) Apply to teeth     Diclofenac Sodium (VOLTAREN) 1 %     Diclofenac Sodium (VOLTAREN) 1 %  (Patient not taking: Reported on 1/10/2024)    diphenhydrAMINE (BENADRYL) 25 mg tablet Take 25 mg by mouth daily at bedtime as needed for itching (for insomnia and allergy ** DO NOT TAKE WITH XYZAL **)     docusate sodium (COLACE) 100 mg capsule Take 100 mg by mouth 3 (three) times a week     Emollient (Vaseline Intensive Care) LOTN     guaiFENesin (SILTUSSIN SA PO) Take by mouth Take 2 teaspoonful (10 ml) PRN 6 hrs for cough    haloperidol (HALDOL) 2 mg tablet Take 2 mg by mouth 2 (two) times a day 1 tab am and 2 tabs qhs    latanoprost (XALATAN) 0.005 % ophthalmic solution Administer 1 drop to both eyes    levocetirizine (XYZAL) 5 MG tablet TAKE ONE TABLET BY MOUTH EVERY DAY AS NEEDED FOR ALLERGIES *DO NOT TAKE WITH BENADRYL*    lithium carbonate 300 mg capsule Take 600 mg by mouth 2 (two) times a day    loperamide (IMODIUM) 2 mg capsule Take by mouth    LORazepam (ATIVAN) 0.5 mg tablet Take 1 mg by mouth daily at bedtime    LORazepam (ATIVAN) 1 mg tablet TAKE ONE TABLET BY MOUTH 30 MINUTES PRIOR TO PROCEDURE (SUCH AS: GYN, DENTAL, IMAGING, SKIN PROCEDURES, SUCH AS MOLE REMOVAL) FOR ANXIETY    melatonin 1 mg Take 5 mg by mouth daily at bedtime     memantine (NAMENDA) 10 mg tablet Take 1 tablet (10 mg total) by mouth 2 (two) times a day    mineral oil-white petrolatum (LUBRIFRESH P.M.) ophthalmic ointment 0.5 inches    NON FORMULARY ACT FLUORIDE DENTAL RINSE   RINSE WITH 1 TBSP (15ML)2X A DAY    ondansetron (ZOFRAN) 4 mg tablet Take by mouth as needed      OYSCO 500 + D 500-200 MG-UNIT per tablet     pantoprazole (PROTONIX) 40 mg tablet TAKE ONE TABLET BY MOUTH 2 TIMES A DAY( MORNING/ BEDTIME) (ESOPHAGITIS) (Patient taking differently: 40 mg daily)    polyethylene glycol (GLYCOLAX) 17 GM/SCOOP MIX 1 CAPFUL (17 GRAMS) IN 4- 8OZ OF LIQUID AND DRINK BY MOUTH ONCE DAILY AS  "NEEDED FOR CONSTIPATION    sodium chloride (TAMIKA 128) 5 % hypertonic ophthalmic solution Apply 1 drop to eye 2 (two) times a day      Sunscreens (SUNBLOCK LOTION SPF30 EX) Apply topically daily as needed    talc (Zeasorb) Apply topically once as needed for irritation Sprinkle powder on affected area of skin to help with excess moisture up to 2 times a day as needed .    [DISCONTINUED] NON FORMULARY TOLNAFTATE SPRAY 1% SPRAY  SPRAY BETWEEN TOES       Objective     /70   Pulse 72   Ht 4' 9.5\" (1.461 m)   Wt 49 kg (108 lb)   SpO2 96%   BMI 22.97 kg/m²     Physical Exam  Vitals and nursing note reviewed.   Constitutional:       General: She is not in acute distress.     Appearance: She is well-developed. She is not ill-appearing.   HENT:      Head: Normocephalic and atraumatic.   Eyes:      General:         Right eye: No discharge.         Left eye: No discharge.      Conjunctiva/sclera: Conjunctivae normal.   Neck:      Trachea: No tracheal deviation.   Cardiovascular:      Rate and Rhythm: Normal rate and regular rhythm.      Heart sounds: Normal heart sounds. No murmur heard.     No friction rub.   Pulmonary:      Effort: Pulmonary effort is normal. No respiratory distress.      Breath sounds: Normal breath sounds. No wheezing, rhonchi or rales.   Abdominal:      General: There is no distension.      Palpations: Abdomen is soft.      Tenderness: There is no abdominal tenderness. There is no guarding or rebound.   Musculoskeletal:         General: No signs of injury.      Cervical back: Neck supple.      Comments: Increased thoracic kyphosis   Skin:     General: Skin is warm.      Coloration: Skin is not pale.      Findings: No rash.   Neurological:      General: No focal deficit present.      Mental Status: She is alert. Mental status is at baseline.      Motor: No abnormal muscle tone.      Gait: Gait normal.   Psychiatric:         Mood and Affect: Mood normal.         Thought Content: Thought content " normal.       Wendy Thomas, DO

## 2024-03-25 NOTE — ASSESSMENT & PLAN NOTE
Symptoms well controlled with current PPI, following with GI annually, call with new/worse symptoms

## 2024-03-25 NOTE — ASSESSMENT & PLAN NOTE
Following with Endo has TFT's regularly and thyroid US now every 2 yrs, con't imaging/labs and f/u as per Endo

## 2024-03-25 NOTE — ASSESSMENT & PLAN NOTE
Following with mental health, no changes to mental health meds noted, con't tx and f/u as per mental health

## 2024-04-03 ENCOUNTER — SOCIAL WORK (OUTPATIENT)
Dept: BEHAVIORAL/MENTAL HEALTH CLINIC | Facility: CLINIC | Age: 59
End: 2024-04-03
Payer: MEDICARE

## 2024-04-03 DIAGNOSIS — F84.0 AUTISTIC DISORDER: Primary | ICD-10-CM

## 2024-04-03 DIAGNOSIS — F63.9 IMPULSE CONTROL DISORDER: ICD-10-CM

## 2024-04-03 DIAGNOSIS — F33.9 DEPRESSION, RECURRENT (HCC): ICD-10-CM

## 2024-04-03 PROCEDURE — 90834 PSYTX W PT 45 MINUTES: CPT | Performed by: COUNSELOR

## 2024-04-03 NOTE — PSYCH
"Behavioral Health Psychotherapy Progress Note    Psychotherapy Provided: Individual Psychotherapy     1. Autistic disorder        2. Depression, recurrent (HCC)        3. Impulse control disorder            Goals addressed in session: Goal 1     DATA: Meche was accompanied to today's appointment by 'Catrachita' [facility] nurse.  Catrachita asked  Meche if it was okay to talk about a recent issue that happened with her and one of the [facility] staff.  Meche said \"yes.\"  Catrachita said Meche took out some of her frustration yesterday but \"yelling and cursing\" at one of the staff.  Meche agreed that she did do this.  We discussed what the underlying reason may have been but it's unclear.  We discussed her being more respectful of staff and Meche said she would like to work on this.  Meche said she would like to apologize to this particular staff person when they get back home.   We  talked about using \"I feel\" statements as we've done in the past.   During this session, this clinician used the following therapeutic modalities: Solution-Focused Therapy    Substance Abuse was not addressed during this session. If the client is diagnosed with a co-occurring substance use disorder, please indicate any changes in the frequency or amount of use: N/A. Stage of change for addressing substance use diagnoses: No substance use/Not applicable    ASSESSMENT:  Meche Callahan presents with a tired and sleepy disposition.  It was an 8am appt which is not typical and Meche reported she was \"tired.\"  Mood normal.     her affect is Normal range and intensity, which is congruent, with her mood and the content of the session. The client has made progress on their goals but sometimes regresses with negative behaviors.    Meche Callahan presents with a none risk of suicide, none risk of self-harm, and none risk of harm to others.    For any risk assessment that surpasses a \"low\" rating, a safety plan must be developed.    A safety plan was indicated: no  If " yes, describe in detail N/A    PLAN: Between sessions, Meche Callahan will try to work on having better behavior with [facility] staff. At the next session, the therapist will use Solution-Focused Therapy to address goals/needs/concerns.    Behavioral Health Treatment Plan and Discharge Planning: Meche Callahan is aware of and agrees to continue to work on their treatment plan. They have identified and are working toward their discharge goals. yes    Visit start and stop times:    04/03/24  Start Time: 0800  Stop Time: 0845  Total Visit Time: 45 minutes

## 2024-05-08 ENCOUNTER — SOCIAL WORK (OUTPATIENT)
Dept: BEHAVIORAL/MENTAL HEALTH CLINIC | Facility: CLINIC | Age: 59
End: 2024-05-08
Payer: MEDICARE

## 2024-05-08 DIAGNOSIS — F84.0 AUTISTIC DISORDER: ICD-10-CM

## 2024-05-08 DIAGNOSIS — F33.9 DEPRESSION, RECURRENT (HCC): ICD-10-CM

## 2024-05-08 DIAGNOSIS — F41.9 ANXIETY: ICD-10-CM

## 2024-05-08 DIAGNOSIS — F63.9 IMPULSE CONTROL DISORDER: Primary | ICD-10-CM

## 2024-05-08 PROCEDURE — 90834 PSYTX W PT 45 MINUTES: CPT | Performed by: COUNSELOR

## 2024-05-08 NOTE — BH TREATMENT PLAN
"Outpatient Behavioral Health Psychotherapy Treatment Plan    Meche Callahan  1965     Date of Initial Psychotherapy Assessment: Many years ago   Date of Current Treatment Plan: 05/08/24  Treatment Plan Target Date: TBD  Treatment Plan Expiration Date: 5-8-24    Diagnosis:   1. Impulse control disorder        2. Autistic disorder        3. Depression, recurrent (HCC)        4. Anxiety            Area(s) of Need:   Behavioral issues at times/Mild intellectual disabilities     Long Term Goal 1 (in the client's own words): To be more respectful to other.  As of 5-8-24, client said she is still working on this goal saying \"I need to think before I speak\" and think about how I want to be treated and then treat others the same.  [facility] staff agreed that this is still a work in progress.     Stage of Change: Action     Target Date for completion: TBD             Anticipated therapeutic modalities: Behavioral/social             People identified to complete this goal: Client and [facility] staff                    Objective 1: (identify the means of measuring success in meeting the objective): I will not demand things of others and say what I need/want in a  nicer and kinder way                    Objective 2: (identify the means of measuring success in meeting the objective): I will  use \"I feel\" statements when I want to share my feelings/needs/concerns       Long Term Goal 2 (in the client's own words): To eat more of a variety vegetables and be open to trying more things.  Stop \"behavioral vomiting.\"  [facility] staff reported Meche is trying to be more open minded saying they see her trying.     Stage of Change: Preparation     Target Date for completion: TBD             Anticipated therapeutic modalities: Motivational Interviewing             People identified to complete this goal: Client and [facility] staff                    Objective 1: (identify the means of measuring success in meeting the objective): I will be open to trying " new things and not be so negative with other's suggestions to try other foods.                    Objective 2: (identify the means of measuring success in meeting the objective): N/A       Long Term Goal 3 (in the client's own words): To be more positive in general.  As of 5-8-24, Meche said she feels she is not making progress.  [facility] staff said they see Meche trying if they sit with her and talk to her saying Meche can identify positive things.        Stage of Change: Preparation     Target Date for completion: TBD             Anticipated therapeutic modalities: Motivational Interviewing             People identified to complete this goal: Client and [facility] staff                    Objective 1: (identify the means of measuring success in meeting the objective): I will think about nice things that are positive.                     Objective 2: (identify the means of measuring success in meeting the objective): I will not be so nasty to others.       I am currently under the care of a [facility] psychiatric provider: no,  sees a psychiatrist outside of [facility] - Dr. Bret Soto.    My [facility] psychiatric provider is: N/A    I am currently taking psychiatric medications: Yes, as prescribed    I feel that I will be ready for discharge from mental health care when I reach the following (measurable goal/objective): N/A    For children and adults who have a legal guardian:   Has there been any change to custody orders and/or guardianship status? NA. If yes, attach updated documentation.    I have created my Crisis Plan and have been offered a copy of this plan    Behavioral Health Treatment Plan [facility]: Diagnosis and Treatment Plan explained to Meche Callahan acknowledges an understanding of their diagnosis. Meche Callahan agrees to this treatment plan.    I have been offered a copy of this Treatment Plan. yes

## 2024-05-08 NOTE — PSYCH
"Behavioral Health Psychotherapy Progress Note    Psychotherapy Provided: Individual Psychotherapy     1. Impulse control disorder        2. Autistic disorder        3. Depression, recurrent (HCC)        4. Anxiety            Goals addressed in session: All Goals    DATA: Updated treatment goals today with Meche.  [facility] staff 'Brenda' was also present for today's session and was given a copy of Meche's goals.  Meche acknowledges she needs to be more respectful of staff and Brenda in particular as she will lash out at Brenda with yelling and cursing.  Brenda shared that when Meche is capable of doing something but doesn't want to do it, Meche will get needed and get upset but she will be encouraged that she can do the task at hand.  We talked about the importance of Meche being as independent as she can be and that it may appear other residents are getting more attention at times, but that's due to them needing more help from staff.  We continue to work on Meche using \"I feel\" statements to express frustrations and other feelings.  We discussed things that Meche is doing well such as  when she is at the [facility] and how she can be respectful of others and is capable and encouraged that she do more of that.   Meche said she will try.  During this session, this clinician used the following therapeutic modalities: Supportive Psychotherapy    Substance Abuse was not addressed during this session. If the client is diagnosed with a co-occurring substance use disorder, please indicate any changes in the frequency or amount of use: N/A. Stage of change for addressing substance use diagnoses: No substance use/Not applicable    ASSESSMENT:  Meche Callahan presents with a Euthymic/ normal mood.     her affect is Normal range and intensity, which is congruent, with her mood and the content of the session. The client has made progress on their goals.    Meceh Callahan presents with a none risk of suicide, none risk of self-harm, and " "none risk of harm to others.    For any risk assessment that surpasses a \"low\" rating, a safety plan must be developed.    A safety plan was indicated: no  If yes, describe in detail N/A    PLAN: Between sessions, Meche Callahan will try to work on having better behaviors. At the next session, the therapist will use Supportive Psychotherapy to address goals/needs/concerns.    Behavioral Health Treatment Plan and Discharge Planning: Mcehe Callahan is aware of and agrees to continue to work on their treatment plan. They have identified and are working toward their discharge goals. yes    Visit start and stop times:    05/08/24  Start Time: 1200  Stop Time: 1250  Total Visit Time: 50 minutes  "

## 2024-06-05 ENCOUNTER — OFFICE VISIT (OUTPATIENT)
Dept: OBGYN CLINIC | Facility: CLINIC | Age: 59
End: 2024-06-05
Payer: MEDICARE

## 2024-06-05 VITALS — HEIGHT: 57 IN | DIASTOLIC BLOOD PRESSURE: 82 MMHG | SYSTOLIC BLOOD PRESSURE: 142 MMHG | BODY MASS INDEX: 23.37 KG/M2

## 2024-06-05 DIAGNOSIS — M19.012 PRIMARY OSTEOARTHRITIS OF LEFT SHOULDER: Primary | ICD-10-CM

## 2024-06-05 PROCEDURE — 20610 DRAIN/INJ JOINT/BURSA W/O US: CPT | Performed by: PHYSICIAN ASSISTANT

## 2024-06-05 PROCEDURE — 99213 OFFICE O/P EST LOW 20 MIN: CPT | Performed by: ORTHOPAEDIC SURGERY

## 2024-06-05 RX ORDER — BETAMETHASONE SODIUM PHOSPHATE AND BETAMETHASONE ACETATE 3; 3 MG/ML; MG/ML
6 INJECTION, SUSPENSION INTRA-ARTICULAR; INTRALESIONAL; INTRAMUSCULAR; SOFT TISSUE
Status: COMPLETED | OUTPATIENT
Start: 2024-06-05 | End: 2024-06-05

## 2024-06-05 RX ORDER — LIDOCAINE HYDROCHLORIDE 10 MG/ML
5 INJECTION, SOLUTION EPIDURAL; INFILTRATION; INTRACAUDAL; PERINEURAL
Status: COMPLETED | OUTPATIENT
Start: 2024-06-05 | End: 2024-06-05

## 2024-06-05 RX ADMIN — LIDOCAINE HYDROCHLORIDE 5 ML: 10 INJECTION, SOLUTION EPIDURAL; INFILTRATION; INTRACAUDAL; PERINEURAL at 13:15

## 2024-06-05 RX ADMIN — BETAMETHASONE SODIUM PHOSPHATE AND BETAMETHASONE ACETATE 6 MG: 3; 3 INJECTION, SUSPENSION INTRA-ARTICULAR; INTRALESIONAL; INTRAMUSCULAR; SOFT TISSUE at 13:15

## 2024-06-05 NOTE — PROGRESS NOTES
Assessment:     1. Primary osteoarthritis of left shoulder            Plan:     Problem List Items Addressed This Visit          Musculoskeletal and Integument    Primary osteoarthritis of left shoulder - Primary     Findings today are consistent with severe left glenohumeral osteoarthritis. Findings and treatment options were discussed with the patient and her family. Repeat cortisone injection was given to the left glenohumeral joint today. She tolerated the procedure well. Advised to apply cold compress today. Follow-up as needed if symptoms return. Advised patient as soon as she can have another cortisone injection is in 3-4 months. All patient's questions were answered to their satisfaction. This note is created using dictation transcription. It may contain typographical errors, grammatical errors, improperly dictated words, background noise and other errors.          Relevant Medications    lidocaine (PF) (XYLOCAINE-MPF) 1 % injection 5 mL (Completed)    betamethasone acetate-betamethasone sodium phosphate (CELESTONE) injection 6 mg (Completed)    Other Relevant Orders    Large joint arthrocentesis: L glenohumeral (Completed)          Subjective:     Patient ID: Pat Callahan is a 59 y.o. female.  Chief Complaint:  59 year old female patient presents today for a follow up left shoulder pain-severe glenohumeral osteoarthritis. She is resident at [facility], she is with one care giver. Patient was last seen on 2/21/24 and was given a CSI to the glenohumeral joint.  The injection lasted until about a month ago until a few days ago.  She would like a repeat cortisone injection today.       Allergy:  Allergies   Allergen Reactions    Bactrim [Sulfamethoxazole-Trimethoprim]     Methylphenidate Hyperactivity     Reaction Date: 13May2011;     Sulfa Antibiotics     Thioridazine     Amphetamines     Chlorpromazine     Fexofenadine Hives     Reaction Date: 13May2011;     Medrogestone     Medroxyprogesterone Rash and Hives      Reaction Date: 13May2011;     Other Hives     Mellaril, thorazine    Trimethoprim      Medications:  all current active meds have been reviewed  Past Medical History:  Past Medical History:   Diagnosis Date    Breast cyst, left     last assessed 12/30/2013    Cerebral palsy (HCC)     Chronic GERD     Fibrocystic breast disease     last assessed 06/29/2012    Gastrointestinal hemorrhage     Glaucoma     History of chemotherapy     Hydrocephalus (HCC)      SHUNT IN PLACE    Scoliosis      Past Surgical History:  Past Surgical History:   Procedure Laterality Date    BREAST BIOPSY Right     CATARACT EXTRACTION  06/14/2021    COLONOSCOPY      Description 04/13/2016-5 yrs.  Description 4 yr f/u d/t polyp and family history, onset 07/20/2012.    COLONOSCOPY      ESOPHAGOGASTRODUODENOSCOPY      description-04/13/2016 gastritis with antral nodule    EYE SURGERY      for relief of intraocular pressure    INCISIONAL BREAST BIOPSY      description 2008    MASTECTOMY Right     SENTINEL LYMPH NODE BIOPSY      STRABISMUS SURGERY      description 1973, 1974    UPPER GASTROINTESTINAL ENDOSCOPY  05/16/2019    Grade C erosive esophagitis.  7 mm nodule in the stomach    US GUIDED THYROID BIOPSY  12/13/2022     Family History:  Family History   Problem Relation Age of Onset    Osteopenia Mother     Supraventricular tachycardia Mother     Heart attack Father 64        Acute MI    Coronary artery disease Father     Thyroid nodules Father     Osteopenia Maternal Grandmother     Prostate cancer Maternal Grandfather     Leukemia Paternal Grandfather     Heart attack Maternal Uncle 50        acute MI, stent    Cancer Maternal Uncle         adenocarcinoma of oral cavity    Colon cancer Maternal Uncle 57    Hyperlipidemia Maternal Uncle     Pancreatic cancer Paternal Uncle     Prostate cancer Paternal Uncle     Colon cancer Family     Coronary artery disease Family     Thyroid disease Family      Social History:  Social History     Substance  "and Sexual Activity   Alcohol Use No     Social History     Substance and Sexual Activity   Drug Use No     Social History     Tobacco Use   Smoking Status Never   Smokeless Tobacco Never     Review of Systems   Constitutional:  Negative for chills and fever.   HENT:  Negative for ear pain and sore throat.    Eyes:  Negative for pain and visual disturbance.   Respiratory:  Negative for cough and shortness of breath.    Cardiovascular:  Negative for chest pain and palpitations.   Gastrointestinal:  Negative for abdominal pain and vomiting.   Genitourinary:  Negative for dysuria and hematuria.   Musculoskeletal:  Positive for arthralgias (left shoulder). Negative for back pain and joint swelling.   Skin:  Negative for color change and rash.   Neurological:  Negative for seizures and syncope.   Psychiatric/Behavioral: Negative.     All other systems reviewed and are negative.        Objective:  BP Readings from Last 1 Encounters:   06/05/24 142/82      Wt Readings from Last 1 Encounters:   03/25/24 49 kg (108 lb)      BMI:   Estimated body mass index is 23.37 kg/m² as calculated from the following:    Height as of this encounter: 4' 9\" (1.448 m).    Weight as of 3/25/24: 49 kg (108 lb).  BSA:   Estimated body surface area is 1.38 meters squared as calculated from the following:    Height as of this encounter: 4' 9\" (1.448 m).    Weight as of 3/25/24: 49 kg (108 lb).   Physical Exam  Vitals and nursing note reviewed.   Constitutional:       Appearance: Normal appearance. She is well-developed.   HENT:      Head: Normocephalic and atraumatic.      Right Ear: External ear normal.      Left Ear: External ear normal.   Eyes:      Extraocular Movements: Extraocular movements intact.      Conjunctiva/sclera: Conjunctivae normal.   Pulmonary:      Effort: Pulmonary effort is normal.   Musculoskeletal:      Cervical back: Neck supple.   Skin:     General: Skin is warm and dry.   Neurological:      Mental Status: She is alert " and oriented to person, place, and time.      Deep Tendon Reflexes: Reflexes are normal and symmetric.   Psychiatric:         Mood and Affect: Mood normal.         Behavior: Behavior normal.       Left Shoulder Exam     Tenderness   The patient is experiencing no tenderness.     Range of Motion   Active abduction:  90 (Pain)   Left shoulder passive abduction: pain.   Forward flexion:  90 (Pain)   Internal rotation 0 degrees:  Sacrum     Muscle Strength   Abduction: 4/5   Internal rotation: 5/5   External rotation: 4/5   Biceps: 5/5     Tests   Cross arm: negative  Drop arm: negative    Other   Erythema: absent  Scars: absent  Sensation: normal  Pulse: present     Comments:  Crepitation with shoulder range of motion            Large joint arthrocentesis: L glenohumeral  Universal Protocol:  Consent: Verbal consent obtained.  Risks and benefits: risks, benefits and alternatives were discussed  Consent given by: patient  Patient understanding: patient states understanding of the procedure being performed  Site marked: the operative site was marked  Supporting Documentation  Indications: pain   Procedure Details  Location: shoulder - L glenohumeral  Preparation: Patient was prepped and draped in the usual sterile fashion  Needle size: 22 G  Ultrasound guidance: no  Approach: posterior  Medications administered: 5 mL lidocaine (PF) 1 %; 6 mg betamethasone acetate-betamethasone sodium phosphate 6 (3-3) mg/mL    Patient tolerance: patient tolerated the procedure well with no immediate complications  Dressing:  Sterile dressing applied          No new imaging was obtained today

## 2024-06-07 NOTE — PROGRESS NOTES
HEMATOLOGY / ONCOLOGY CLINIC FOLLOW UP NOTE    Primary Care Provider: Wendy Thomas DO  Referring Provider:    MRN: [id number]  : 1965    Reason for Encounter: Follow-up breast cancer       Oncology History   Malignant neoplasm of overlapping sites of right breast in female, estrogen receptor positive (HCC)    Surgery    Right breast mastectomy   Invasive breast carcinoma  Grade 2  2 foci:    MI 60/90  HER2 0/3+  Lymph nodes negative  Stage IA  Dr. Nazario     2013 -  Cancer Staged    Staging form: Breast, AJCC 7th Edition  - Pathologic stage from 2013: Stage IA (T1c(m), N0, cM0) - Signed by JESSE Light on 2023  Stage prefix: Initial diagnosis  Laterality: Right  Multiple tumors: Yes  Histologic grade (G): G2  Estrogen receptor status: Positive  Progesterone receptor status: Positive       2014 - 2015 Chemotherapy    TCH x 6 cycles  1 year of Herceptin  Dr. Guy     2015 -  Hormone Therapy    Tamoxifen daily until   Arimidex 1 mg daily since   Dr. Guy     2022 Genetic Testing    A total of 36 genes were evaluated, including: MARIJA, BRCA1, BRCA2, CDH1, CHEK2, PALB2, PTEN, STK11, TP53  Negative result. No pathogenic sequence variants identified  Ambry         Interval History: Patient returns for 6-month follow-up visit along with her mother and personal care nurse.  She continues on daily anastrozole.  Blood work remains within normal range.  She is  S/P D&C at [facility] on 24 for endometrial thickening that was noted on pelvic ultrasound.  I do not have pathology reports but and told everything was benign.  She will be due for her annual mammogram in December      REVIEW OF SYSTEMS:  Please note that a 14-point review of systems was performed to include Constitutional, HEENT, Respiratory, CVS, GI, , Musculoskeletal, Integumentary, Neurologic, Rheumatologic, Endocrinologic, Psychiatric, Lymphatic, and Hematologic/Oncologic systems were reviewed and are  negative unless otherwise stated in HPI. Positive and negative findings pertinent to this evaluation are incorporated into the history of present illness.      ECOG PS: 0    PROBLEM LIST:  Patient Active Problem List   Diagnosis    Vitamin D deficiency    Scoliosis    Retinal detachment    Obsessive compulsive disorder    Multiple thyroid nodules    Malignant neoplasm of overlapping sites of right breast in female, estrogen receptor positive (HCC)    Legal blindness [place]    Irregular menstrual cycle    Internal hemorrhoids    Impulse control disorder    Glaucoma    Dyslipidemia    Depression, recurrent (HCC)    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HCC)    Chronic gingivitis    Cerebral palsy (HCC)    Benign neoplasm of large intestine    Autistic disorder    Anxiety    Other long term (current) drug therapy    Hyperglycemia    Chronic GERD    Use of anastrozole (Arimidex)    Family history of colonic polyps    Osteoporosis    Ambulatory dysfunction    Primary osteoarthritis of left shoulder    Other specified anemias    Mild intellectual disabilities       Assessment / Plan:  1. Malignant neoplasm of overlapping sites of right breast in female, estrogen receptor positive (HCC)        The patient is a 60 yo autistic female with a history of cerebral palsy and stage IA ER/KS positive, HER2 positive breast cancer s/p right mastectomy in 2013. She was treated adjuvant chemotherapy with TCH followed by Herceptin for one year, completed in 2/2015. She was then started on Tamoxifen, this was discontinued in 2018 when she was noted to be post-menopause. She was started on Arimidex and is currently maintained on 1 mg daily with goal to complete total of 10 years of therapy (combination of tamoxifen and Arimidex).  She will complete recommended 10 years of adjuvant AI in February 2025  She receives Prolia every 6 months for osteoporosis prevention    Overall, Polly is doing well.  Clinically no concerns on exam and her blood work remains  within normal range.  She will continue on her current regimen without change.  She will receive a dose of Prolia today  She is already scheduled for mammogram in December.  I will see her back in 6 months with repeat blood work.  Patient, her mother and caretaker all in agreement with this plan of care.  They know to reach out with any questions or concerns at any time    I will see her back in 6 months with repeat blood work.  We will coordinate her Prolia injection on day of visit.    I spent 30 minutes on chart review, face to face counseling time, coordination of care and documentation.    Past Medical History:   has a past medical history of Breast cyst, left, Cerebral palsy (HCC), Chronic GERD, Fibrocystic breast disease, Gastrointestinal hemorrhage, Glaucoma, History of chemotherapy, Hydrocephalus (HCC), and Scoliosis.    PAST SURGICAL HISTORY:   has a past surgical history that includes Esophagogastroduodenoscopy; Incisional breast biopsy; [place] lymph node biopsy; Strabismus surgery; Eye surgery; Breast biopsy (Right); Colonoscopy; Upper gastrointestinal endoscopy (05/16/2019); Colonoscopy; Cataract extraction (06/14/2021); Mastectomy (Right); and US guided thyroid biopsy (12/13/2022).    CURRENT MEDICATIONS  Current Outpatient Medications   Medication Sig Dispense Refill    acetaminophen (TYLENOL) 500 mg tablet Take 500 mg by mouth as needed for mild pain        anastrozole (ARIMIDEX) 1 mg tablet Take 1 tablet (1 mg total) by mouth daily 90 tablet 3    Athletes Foot Powder Spray 1 % spray Apply topically as needed for rash      BANOPHEN 25 MG capsule Take 25 mg by mouth daily at bedtime as needed       calamine lotion Apply topically every 4 (four) hours as needed for itching      Calcium Carbonate-Vitamin D (OYSCO 500/D PO) Take by mouth daily      carBAMazepine (TEGretol) 200 mg tablet Take 1 tablet (200 mg total) by mouth 2 (two) times a day      Cholecalciferol (VITAMIN D) 2000 units tablet TAKE TWO  TABLETS BY MOUTH (4000IU) EVERY MORNING FOR VITAMIN DIFICIENCY 62 tablet 0    Dentifrices (SENSODYNE PRONAMEL DT) Apply to teeth       Diclofenac Sodium (VOLTAREN) 1 %       diphenhydrAMINE (BENADRYL) 25 mg tablet Take 25 mg by mouth daily at bedtime as needed for itching (for insomnia and allergy ** DO NOT TAKE WITH XYZAL **)       docusate sodium (COLACE) 100 mg capsule Take 100 mg by mouth 3 (three) times a week       Emollient (Vaseline Intensive Care) LOTN       guaiFENesin (SILTUSSIN SA PO) Take by mouth Take 2 teaspoonful (10 ml) PRN 6 hrs for cough      haloperidol (HALDOL) 2 mg tablet Take 2 mg by mouth 2 (two) times a day 1 tab am and 2 tabs qhs      latanoprost (XALATAN) 0.005 % ophthalmic solution Administer 1 drop to both eyes      levocetirizine (XYZAL) 5 MG tablet TAKE ONE TABLET BY MOUTH EVERY DAY AS NEEDED FOR ALLERGIES *DO NOT TAKE WITH BENADRYL* 31 tablet 0    lithium carbonate 300 mg capsule Take 600 mg by mouth 2 (two) times a day      loperamide (IMODIUM) 2 mg capsule Take by mouth      melatonin 1 mg Take 5 mg by mouth daily at bedtime       memantine (NAMENDA) 10 mg tablet Take 1 tablet (10 mg total) by mouth 2 (two) times a day 60 tablet 5    mineral oil-white petrolatum (LUBRIFRESH P.M.) ophthalmic ointment 0.5 inches      NON FORMULARY ACT FLUORIDE DENTAL RINSE   RINSE WITH 1 TBSP (15ML)2X A DAY      ondansetron (ZOFRAN) 4 mg tablet Take by mouth as needed        OYSCO 500 + D 500-200 MG-UNIT per tablet       polyethylene glycol (GLYCOLAX) 17 GM/SCOOP MIX 1 CAPFUL (17 GRAMS) IN 4- 8OZ OF LIQUID AND DRINK BY MOUTH ONCE DAILY AS NEEDED FOR CONSTIPATION 510 g 0    Proctosol HC 2.5 % rectal cream Apply topically As needed      sodium chloride (TAMIKA 128) 5 % hypertonic ophthalmic solution Apply 1 drop to eye 2 (two) times a day        Diclofenac Sodium (VOLTAREN) 1 %  (Patient not taking: Reported on 1/10/2024)      LORazepam (ATIVAN) 0.5 mg tablet Take 1 mg by mouth daily at bedtime (Patient  not taking: Reported on 6/10/2024)      LORazepam (ATIVAN) 1 mg tablet TAKE ONE TABLET BY MOUTH 30 MINUTES PRIOR TO PROCEDURE (SUCH AS: GYN, DENTAL, IMAGING, SKIN PROCEDURES, SUCH AS MOLE REMOVAL) FOR ANXIETY (Patient not taking: Reported on 6/10/2024) 5 tablet 0    pantoprazole (PROTONIX) 40 mg tablet TAKE ONE TABLET BY MOUTH 2 TIMES A DAY( MORNING/ BEDTIME) (ESOPHAGITIS) (Patient not taking: Reported on 6/10/2024) 56 tablet 0    Sunscreens (SUNBLOCK LOTION SPF30 EX) Apply topically daily as needed      talc (Zeasorb) Apply topically once as needed for irritation Sprinkle powder on affected area of skin to help with excess moisture up to 2 times a day as needed .      timolol (TIMOPTIC) 0.5 % ophthalmic solution Administer 1 drop into the left eye 2 (two) times a day       No current facility-administered medications for this visit.     [unfilled]    SOCIAL HISTORY:   reports that she has never smoked. She has never used smokeless tobacco. She reports that she does not drink alcohol and does not use drugs.     FAMILY HISTORY:  family history includes Cancer in her maternal uncle; Colon cancer in her family; Colon cancer (age of onset: 57) in her maternal uncle; Coronary artery disease in her family and father; Heart attack (age of onset: 50) in her maternal uncle; Heart attack (age of onset: 64) in her father; Hyperlipidemia in her maternal uncle; Leukemia in her paternal grandfather; Osteopenia in her maternal grandmother and mother; Pancreatic cancer in her paternal uncle; Prostate cancer in her maternal grandfather and paternal uncle; Supraventricular tachycardia in her mother; Thyroid disease in her family; Thyroid nodules in her father.     ALLERGIES:  is allergic to bactrim [sulfamethoxazole-trimethoprim], methylphenidate, sulfa antibiotics, thioridazine, amphetamines, chlorpromazine, fexofenadine, medrogestone, medroxyprogesterone, other, and trimethoprim.      Physical Exam:  Vital Signs:   Visit Vitals  BP  "140/92 (BP Location: Left arm, Patient Position: Sitting, Cuff Size: Standard)   Pulse 77   Temp 98.3 °F (36.8 °C) (Temporal)   Ht 4' 9\" (1.448 m)   Wt 48.4 kg (106 lb 12.8 oz)   SpO2 97%   BMI 23.11 kg/m²   OB Status Postmenopausal   Smoking Status Never   BSA 1.38 m²     Body mass index is 23.11 kg/m².  Body surface area is 1.38 meters squared.    Physical Exam  Constitutional:       General: She is not in acute distress.  HENT:      Head: Normocephalic and atraumatic.   Eyes:      General: Scleral icterus present.   Cardiovascular:      Rate and Rhythm: Normal rate and regular rhythm.   Pulmonary:      Effort: Pulmonary effort is normal.      Breath sounds: Normal breath sounds.   Lymphadenopathy:      Cervical: No cervical adenopathy.      Upper Body:      Right upper body: No supraclavicular or axillary adenopathy.      Left upper body: No supraclavicular or axillary adenopathy.   Skin:     General: Skin is warm and dry.   Neurological:      Mental Status: She is alert. Mental status is at baseline.   Psychiatric:         Mood and Affect: Mood normal.         Behavior: Behavior normal.         Labs:  Lab Results   Component Value Date    WBC 9.56 01/20/2022    HGB 12.9 01/20/2022    HCT 45.2 01/20/2022    MCV 97 01/20/2022     01/20/2022     Lab Results   Component Value Date     (L) 12/18/2013    SODIUM 139 11/16/2021    K 3.8 11/16/2021     11/16/2021    CO2 28 11/16/2021    ANIONGAP 5 12/18/2013    AGAP 8 11/16/2021    BUN 9 11/16/2021    CREATININE 0.62 11/16/2021    GLUC 100 10/09/2020    GLUF 97 11/16/2021    CALCIUM 8.8 11/16/2021    AST 10 11/16/2021    ALT 18 11/16/2021    ALKPHOS 75 11/16/2021    TP 6.7 11/16/2021    TBILI 0.10 (L) 11/16/2021    EGFR 102 11/16/2021     "

## 2024-06-10 ENCOUNTER — OFFICE VISIT (OUTPATIENT)
Age: 59
End: 2024-06-10
Payer: MEDICARE

## 2024-06-10 ENCOUNTER — HOSPITAL ENCOUNTER (OUTPATIENT)
Dept: INFUSION CENTER | Facility: HOSPITAL | Age: 59
Discharge: HOME | End: 2024-06-10
Attending: INTERNAL MEDICINE
Payer: MEDICARE

## 2024-06-10 VITALS
OXYGEN SATURATION: 97 % | DIASTOLIC BLOOD PRESSURE: 92 MMHG | HEIGHT: 57 IN | WEIGHT: 106.8 LBS | HEART RATE: 77 BPM | TEMPERATURE: 98.3 F | SYSTOLIC BLOOD PRESSURE: 140 MMHG | BODY MASS INDEX: 23.04 KG/M2

## 2024-06-10 VITALS
TEMPERATURE: 97.3 F | OXYGEN SATURATION: 97 % | WEIGHT: 106.48 LBS | RESPIRATION RATE: 16 BRPM | SYSTOLIC BLOOD PRESSURE: 134 MMHG | HEART RATE: 70 BPM | HEIGHT: 57 IN | DIASTOLIC BLOOD PRESSURE: 88 MMHG | BODY MASS INDEX: 22.97 KG/M2

## 2024-06-10 DIAGNOSIS — Z17.0 MALIGNANT NEOPLASM OF OVERLAPPING SITES OF RIGHT BREAST IN FEMALE, ESTROGEN RECEPTOR POSITIVE (HCC): Primary | ICD-10-CM

## 2024-06-10 DIAGNOSIS — Z79.811 USE OF ANASTROZOLE (ARIMIDEX): ICD-10-CM

## 2024-06-10 DIAGNOSIS — Z17.0 MALIGNANT NEOPLASM OF OVERLAPPING SITES OF RIGHT BREAST IN FEMALE, ESTROGEN RECEPTOR POSITIVE (HCC): ICD-10-CM

## 2024-06-10 DIAGNOSIS — C50.811 MALIGNANT NEOPLASM OF OVERLAPPING SITES OF RIGHT BREAST IN FEMALE, ESTROGEN RECEPTOR POSITIVE (HCC): Primary | ICD-10-CM

## 2024-06-10 DIAGNOSIS — M81.0 OSTEOPOROSIS, UNSPECIFIED OSTEOPOROSIS TYPE, UNSPECIFIED PATHOLOGICAL FRACTURE PRESENCE: Primary | ICD-10-CM

## 2024-06-10 DIAGNOSIS — C50.811 MALIGNANT NEOPLASM OF OVERLAPPING SITES OF RIGHT BREAST IN FEMALE, ESTROGEN RECEPTOR POSITIVE (HCC): ICD-10-CM

## 2024-06-10 PROCEDURE — 99214 OFFICE O/P EST MOD 30 MIN: CPT | Performed by: NURSE PRACTITIONER

## 2024-06-10 RX ADMIN — DENOSUMAB 60 MG: 60 INJECTION SUBCUTANEOUS at 12:51

## 2024-06-10 NOTE — PROGRESS NOTES
Pat Callahan  tolerated treatment well with no complications.      Pat Callahan is aware of future appt on 12/11 at 1130.     AVS printed and given to Pat Callahan:    Yes

## 2024-07-15 ENCOUNTER — SOCIAL WORK (OUTPATIENT)
Dept: BEHAVIORAL/MENTAL HEALTH CLINIC | Facility: CLINIC | Age: 59
End: 2024-07-15
Payer: MEDICARE

## 2024-07-15 DIAGNOSIS — F84.0 AUTISTIC DISORDER: ICD-10-CM

## 2024-07-15 DIAGNOSIS — F63.9 IMPULSE CONTROL DISORDER: Primary | ICD-10-CM

## 2024-07-15 DIAGNOSIS — F33.9 DEPRESSION, RECURRENT (HCC): ICD-10-CM

## 2024-07-15 PROCEDURE — 90834 PSYTX W PT 45 MINUTES: CPT | Performed by: COUNSELOR

## 2024-07-15 NOTE — PSYCH
"Behavioral Health Psychotherapy Progress Note    Psychotherapy Provided: Individual Psychotherapy     1. Impulse control disorder        2. Autistic disorder        3. Depression, recurrent (HCC)            Goals addressed in session: Goal 1     DATA: Meche was present today with nurse 'Catrachita' from [facility] where Meche lives.  We continue to talk about good behavior and treating others like Meche would want others to treat her.  Catrachita's reports it that she feels Meche is doing somewhat better with not yelling and cursing.  Meche said \"I need to improve on that.\"  We discussed the \"5 C's\" that Meche knows which deals with treating others kindly.    During this session, this clinician used the following therapeutic modalities: Supportive Psychotherapy    Substance Abuse was not addressed during this session. If the client is diagnosed with a co-occurring substance use disorder, please indicate any changes in the frequency or amount of use: N/A. Stage of change for addressing substance use diagnoses: No substance use/Not applicable    ASSESSMENT:  Meche Callahan presents with a Euthymic/ normal mood.     her affect is Normal range and intensity, which is congruent, with her mood and the content of the session. The client has made progress on their goals.    Meche Callahan presents with a none risk of suicide, none risk of self-harm, and none risk of harm to others.    For any risk assessment that surpasses a \"low\" rating, a safety plan must be developed.    A safety plan was indicated: no  If yes, describe in detail N/A    PLAN: Between sessions, Meche Callahan will try to keep improving on her behaviors. At the next session, the therapist will use Solution-Focused Therapy and Supportive Psychotherapy to address goals/needs/concerns.    Behavioral Health Treatment Plan and Discharge Planning: Meche Callahan is aware of and agrees to continue to work on their treatment plan. They have identified and are working toward " their discharge goals. yes    Visit start and stop times:    07/15/24  Start Time: 1500  Stop Time: 1546  Total Visit Time: 46 minutes

## 2024-08-05 NOTE — PROGRESS NOTES
Assessment/Plan:    Malignant neoplasm of overlapping sites of right breast in female, estrogen receptor positive ([facility] [address] )  Con't to have regular f/u with Dr Farrukh Lora, she is UTD on mammo and has it ordered for 2020 already, was told to con't Arimidex and f/u with Onc surgery annualy    Multiple thyroid nodules  Saw Endo this fall, has order for annual TFT's and thyroid US, off all thyroid meds at this time    Iron deficiency anemia, unspecified  Finished Veofer infusions, has order for BW and f/u with Heme/Onc in 3 mos, con't labs and iron supplements as per Heme/Onc    Obsessive compulsive disorder  Having increase in behavioral issues and having some auditory and visual hallucinations, is having meds adjusted by Dr Bala Lezama, is monitoring increased movements with Neuro as well       Diagnoses and all orders for this visit:    Malignant neoplasm of overlapping sites of right breast in female, estrogen receptor positive ([facility] [address] )    Multiple thyroid nodules    Iron deficiency anemia, unspecified iron deficiency anemia type    Obsessive-compulsive disorder, unspecified type    Other orders  -     haloperidol (HALDOL) 1 mg tablet; Take 1 mg by mouth daily      Colonoscopy 4/16 - 5 yrs    Mammo 5/19    PAP 1/19    BW 8/19    [facility] forms filled out and copy made for chart      Subjective:      Patient ID: Kathy Aragon is a 49 y o  female  HPI Pt here for follow up appt with [facility] staff  HPI and ROS obtained from [facility] staff and pt  Grady Dayne saw Dr Farrukh Lora for f/u breast cancer earlier this month  No sign of reoccurrence was noted  She was told to con't her annual mammograms as well as her Arimidex and to f/u in 1 yr  Pt saw Endo in mid Oct for f/u thyroid nodules  She was given order for thyroid US and TFT's and told to f/u in 1 yr  Pt saw Heme/Onc in Sept for f/u and was noted to have iron def and was started on Venofer infusions  She finished her Venofer infusions in Sept and was told to do BW and f/u in 3 mos  She is currently not on an oral iron supplement  Pt saw Dr Floyd Segal today as she has been having some behavioral issues and some issues with auditory and visual hallucinations  She is off Geodon and her Haldol has been decreased  Her lorazepam is still 3 x's a day as well as Tegretol  She has appt with Dr Bharati Marsh coming up  Colonoscopy 4/16 - 5 yrs    Mammo 5/19    PAP 1/19    BW 8/19    Review of Systems   Unable to perform ROS: Psychiatric disorder   Constitutional: Negative for chills and fever  HENT: Negative for congestion and sore throat  Eyes: Negative for visual disturbance  Pressures doing well but going to need cataract removed soon   Respiratory: Negative for cough  Cardiovascular: Negative for leg swelling  Gastrointestinal: Negative for constipation, diarrhea, nausea and vomiting  Genitourinary: Negative for difficulty urinating and dysuria  Skin: Negative for rash and wound  Neurological: Negative for seizures  Hematological: Negative for adenopathy  Psychiatric/Behavioral: Positive for behavioral problems and hallucinations  Objective:    /68 (BP Location: Right arm, Patient Position: Sitting, Cuff Size: Standard)   Pulse 67   Temp 98 °F (36 7 °C)   Ht 4' 9 5" (1 461 m)   Wt 49 kg (108 lb)   BMI 22 97 kg/m²      Physical Exam   Constitutional: She appears well-developed and well-nourished  No distress  HENT:   Head: Normocephalic and atraumatic  Eyes: Conjunctivae are normal  Right eye exhibits no discharge  Left eye exhibits no discharge  Neck: Neck supple  No tracheal deviation present  Cardiovascular: Normal rate, regular rhythm and normal heart sounds  Exam reveals no friction rub  No murmur heard  Pulmonary/Chest: Effort normal and breath sounds normal  No respiratory distress  She has no wheezes  She has no rales  Abdominal: Soft  She exhibits no distension  There is no tenderness  There is no rebound and no guarding  Musculoskeletal: She exhibits no edema  Lymphadenopathy:     She has no cervical adenopathy  Neurological: She is alert  She exhibits normal muscle tone  Skin: Skin is warm and dry  No rash noted  Psychiatric:   fidgety and anxious at times   Nursing note and vitals reviewed  [FreeTextEntry1] : EKG: sinus bradycardia @ 55, no ST-T abnormalities, normal axis and intervals

## 2024-08-19 ENCOUNTER — TELEPHONE (OUTPATIENT)
Dept: PSYCHIATRY | Facility: CLINIC | Age: 59
End: 2024-08-19

## 2024-08-19 NOTE — TELEPHONE ENCOUNTER
Left a message with Meche's living facility letting them know that due to provider illness, her therapy appointment on 08/19/2024 was canceled.  Access number was left in case they would like to reschedule before her next appointment.

## 2024-09-05 ENCOUNTER — OFFICE VISIT (OUTPATIENT)
Dept: OBGYN CLINIC | Facility: CLINIC | Age: 59
End: 2024-09-05
Payer: MEDICARE

## 2024-09-05 VITALS
DIASTOLIC BLOOD PRESSURE: 82 MMHG | SYSTOLIC BLOOD PRESSURE: 124 MMHG | HEIGHT: 57 IN | WEIGHT: 108 LBS | BODY MASS INDEX: 23.3 KG/M2

## 2024-09-05 DIAGNOSIS — M19.012 PRIMARY OSTEOARTHRITIS OF LEFT SHOULDER: Primary | ICD-10-CM

## 2024-09-05 PROCEDURE — 99213 OFFICE O/P EST LOW 20 MIN: CPT | Performed by: ORTHOPAEDIC SURGERY

## 2024-09-05 PROCEDURE — 20610 DRAIN/INJ JOINT/BURSA W/O US: CPT | Performed by: ORTHOPAEDIC SURGERY

## 2024-09-05 RX ORDER — BETAMETHASONE SODIUM PHOSPHATE AND BETAMETHASONE ACETATE 3; 3 MG/ML; MG/ML
6 INJECTION, SUSPENSION INTRA-ARTICULAR; INTRALESIONAL; INTRAMUSCULAR; SOFT TISSUE
Status: COMPLETED | OUTPATIENT
Start: 2024-09-05 | End: 2024-09-05

## 2024-09-05 RX ORDER — LIDOCAINE HYDROCHLORIDE 10 MG/ML
5 INJECTION, SOLUTION EPIDURAL; INFILTRATION; INTRACAUDAL; PERINEURAL
Status: COMPLETED | OUTPATIENT
Start: 2024-09-05 | End: 2024-09-05

## 2024-09-05 RX ADMIN — LIDOCAINE HYDROCHLORIDE 5 ML: 10 INJECTION, SOLUTION EPIDURAL; INFILTRATION; INTRACAUDAL; PERINEURAL at 11:15

## 2024-09-05 RX ADMIN — BETAMETHASONE SODIUM PHOSPHATE AND BETAMETHASONE ACETATE 6 MG: 3; 3 INJECTION, SUSPENSION INTRA-ARTICULAR; INTRALESIONAL; INTRAMUSCULAR; SOFT TISSUE at 11:15

## 2024-09-05 NOTE — PROGRESS NOTES
Assessment:     1. Primary osteoarthritis of left shoulder        Plan:     Problem List Items Addressed This Visit          Musculoskeletal and Integument    Primary osteoarthritis of left shoulder - Primary     Findings are consistent with severe left glenohumeral osteoarthritis. Findings and treatment options discussed with patient. She continues to get good symptomatic relief for her cortisone injections. A repeat cortisone injection was performed at today's visit. Patient tolerated procedure well. Advised to apply cold compress today. Follow up in 3-4 months as needed if symptoms return.  All patient's questions were answered to her satisfaction.  This note is created using dictation transcription.  It may contain typographical errors, grammatical errors, improperly dictated words, background noise and other errors.         Relevant Medications    betamethasone acetate-betamethasone sodium phosphate (CELESTONE) injection 6 mg (Completed)    lidocaine (PF) (XYLOCAINE-MPF) 1 % injection 5 mL (Completed)    Other Relevant Orders    Large joint arthrocentesis: L glenohumeral (Completed)      Subjective:     Patient ID: Pat Callahan is a 59 y.o. female.  Chief Complaint:  59 y.o. female presents for follow up for left glenohumeral osteoarthritis.She states she is doing well overall at this time. She is a resident at [facility], she is here with two caregivers. She is getting a couple months of symptomatic relief from this injection. She is here today for a repeat cortisone injection for the left shoulder.    Allergy:  Allergies   Allergen Reactions    Bactrim [Sulfamethoxazole-Trimethoprim]     Methylphenidate Hyperactivity     Reaction Date: 13May2011;     Sulfa Antibiotics     Thioridazine     Amphetamines     Chlorpromazine     Fexofenadine Hives     Reaction Date: 13May2011;     Medrogestone     Medroxyprogesterone Rash and Hives     Reaction Date: 13May2011;     Other Hives     Mellaril, thorazine    Trimethoprim       Medications:  all current active meds have been reviewed  Past Medical History:  Past Medical History:   Diagnosis Date    Breast cyst, left     last assessed 12/30/2013    Cerebral palsy (HCC)     Chronic GERD     Fibrocystic breast disease     last assessed 06/29/2012    Gastrointestinal hemorrhage     Glaucoma     History of chemotherapy     Hydrocephalus (HCC)      SHUNT IN PLACE    Scoliosis      Past Surgical History:  Past Surgical History:   Procedure Laterality Date    BREAST BIOPSY Right     CATARACT EXTRACTION  06/14/2021    COLONOSCOPY      Description 04/13/2016-5 yrs.  Description 4 yr f/u d/t polyp and family history, onset 07/20/2012.    COLONOSCOPY      ESOPHAGOGASTRODUODENOSCOPY      description-04/13/2016 gastritis with antral nodule    EYE SURGERY      for relief of intraocular pressure    INCISIONAL BREAST BIOPSY      description 2008    MASTECTOMY Right     SENTINEL LYMPH NODE BIOPSY      STRABISMUS SURGERY      description 1973, 1974    UPPER GASTROINTESTINAL ENDOSCOPY  05/16/2019    Grade C erosive esophagitis.  7 mm nodule in the stomach    US GUIDED THYROID BIOPSY  12/13/2022     Family History:  Family History   Problem Relation Age of Onset    Osteopenia Mother     Supraventricular tachycardia Mother     Heart attack Father 64        Acute MI    Coronary artery disease Father     Thyroid nodules Father     Osteopenia Maternal Grandmother     Prostate cancer Maternal Grandfather     Leukemia Paternal Grandfather     Heart attack Maternal Uncle 50        acute MI, stent    Cancer Maternal Uncle         adenocarcinoma of oral cavity    Colon cancer Maternal Uncle 57    Hyperlipidemia Maternal Uncle     Pancreatic cancer Paternal Uncle     Prostate cancer Paternal Uncle     Colon cancer Family     Coronary artery disease Family     Thyroid disease Family      Social History:  Social History     Substance and Sexual Activity   Alcohol Use No     Social History     Substance and Sexual  "Activity   Drug Use No     Social History     Tobacco Use   Smoking Status Never   Smokeless Tobacco Never     Review of Systems   Constitutional:  Negative for chills and fever.   HENT:  Negative for ear pain and sore throat.    Eyes:  Negative for pain and visual disturbance.   Respiratory:  Negative for cough and shortness of breath.    Cardiovascular:  Negative for chest pain and palpitations.   Gastrointestinal:  Negative for abdominal pain and vomiting.   Genitourinary:  Negative for dysuria and hematuria.   Musculoskeletal:  Negative for arthralgias and back pain.   Skin:  Negative for color change and rash.   Neurological:  Negative for seizures and syncope.   All other systems reviewed and are negative.        Objective:  BP Readings from Last 1 Encounters:   09/05/24 124/82      Wt Readings from Last 1 Encounters:   09/05/24 49 kg (108 lb)      BMI:   Estimated body mass index is 23.37 kg/m² as calculated from the following:    Height as of this encounter: 4' 9\" (1.448 m).    Weight as of this encounter: 49 kg (108 lb).  BSA:   Estimated body surface area is 1.38 meters squared as calculated from the following:    Height as of this encounter: 4' 9\" (1.448 m).    Weight as of this encounter: 49 kg (108 lb).   Physical Exam  Vitals and nursing note reviewed.   Constitutional:       Appearance: Normal appearance. She is well-developed.   HENT:      Head: Normocephalic and atraumatic.      Right Ear: External ear normal.      Left Ear: External ear normal.   Eyes:      General: No scleral icterus.     Extraocular Movements: Extraocular movements intact.      Conjunctiva/sclera: Conjunctivae normal.   Cardiovascular:      Rate and Rhythm: Normal rate.   Pulmonary:      Effort: Pulmonary effort is normal. No respiratory distress.   Musculoskeletal:      Cervical back: Normal range of motion and neck supple.      Comments: See Ortho exam   Skin:     General: Skin is warm and dry.   Neurological:      General: No " focal deficit present.      Mental Status: She is alert and oriented to person, place, and time.   Psychiatric:         Behavior: Behavior normal.       Left Shoulder Exam     Tenderness   The patient is experiencing no tenderness.     Range of Motion   Active abduction:  90 (with pain)   Forward flexion:  90 (with pain)   Left shoulder internal rotation 0 degrees: sacrum.     Muscle Strength   Abduction: 4/5   Internal rotation: 5/5   External rotation: 4/5   Biceps: 5/5     Tests   Cross arm: negative  Drop arm: negative    Other   Erythema: absent  Scars: absent  Sensation: normal  Pulse: present     Comments:  Crepitation with shoulder range of motion              Large joint arthrocentesis: L glenohumeral  Universal Protocol:  Consent: Verbal consent obtained.  Risks and benefits: risks, benefits and alternatives were discussed  Consent given by: patient  Patient understanding: patient states understanding of the procedure being performed  Site marked: the operative site was marked  Patient identity confirmed: verbally with patient  Supporting Documentation  Indications: pain   Procedure Details  Location: shoulder - L glenohumeral  Needle size: 22 G  Ultrasound guidance: no  Approach: posterior  Medications administered: 6 mg betamethasone acetate-betamethasone sodium phosphate 6 (3-3) mg/mL; 5 mL lidocaine (PF) 1 %    Patient tolerance: patient tolerated the procedure well with no immediate complications  Dressing:  Sterile dressing applied           No new images at today's visit.    Scribe Attestation      I,:  Kodi Ansari am acting as a scribe while in the presence of the attending physician.:       I,:  Jonnie Westbrook MD personally performed the services described in this documentation    as scribed in my presence.:

## 2024-09-05 NOTE — ASSESSMENT & PLAN NOTE
Findings are consistent with severe left glenohumeral osteoarthritis. Findings and treatment options discussed with patient. She continues to get good symptomatic relief for her cortisone injections. A repeat cortisone injection was performed at today's visit. Patient tolerated procedure well. Advised to apply cold compress today. Follow up in 3-4 months as needed if symptoms return.  All patient's questions were answered to her satisfaction.  This note is created using dictation transcription.  It may contain typographical errors, grammatical errors, improperly dictated words, background noise and other errors.

## 2024-09-13 ENCOUNTER — TELEPHONE (OUTPATIENT)
Dept: PSYCHIATRY | Facility: CLINIC | Age: 59
End: 2024-09-13

## 2024-09-17 ENCOUNTER — SOCIAL WORK (OUTPATIENT)
Dept: BEHAVIORAL/MENTAL HEALTH CLINIC | Facility: CLINIC | Age: 59
End: 2024-09-17
Payer: MEDICARE

## 2024-09-17 DIAGNOSIS — F84.0 AUTISTIC DISORDER: Primary | ICD-10-CM

## 2024-09-17 DIAGNOSIS — F63.9 IMPULSE CONTROL DISORDER: ICD-10-CM

## 2024-09-17 DIAGNOSIS — F33.9 DEPRESSION, RECURRENT (HCC): ICD-10-CM

## 2024-09-17 PROCEDURE — 90834 PSYTX W PT 45 MINUTES: CPT | Performed by: COUNSELOR

## 2024-09-17 NOTE — PSYCH
"Behavioral Health Psychotherapy Progress Note    Psychotherapy Provided: Individual Psychotherapy     1. Autistic disorder        2. Depression, recurrent (HCC)        3. Impulse control disorder            Goals addressed in session: All goals    DATA: Meche was accompanied to today's appointment by \"Bernda\" who is a staff member at [facility] where Meche lives.  This clinician shared that I will be retiring at the end of this year and Meche said she would still like to continue [facility].  Brenda agreed that \"Meche seems to benefit from talking.\"  Today, as we usually do, this clinician asked Meche if she could shard some positive behaviors that she feels good about.  Meche was able to identify a couple of things today.  Regarding what Meche can improve on, she said she still \"cruses\" when she's upset or angry.  We continue to discuss using \"I feel\" statements to let others know how she is feeling.  Meche calls herself \"ego-centric\" sometimes saying she would like to think of how others feel to help her change negative behaviors.    During this session, this clinician used the following therapeutic modalities: Supportive Psychotherapy    Substance Abuse was not addressed during this session. If the client is diagnosed with a co-occurring substance use disorder, please indicate any changes in the frequency or amount of use: N/A. Stage of change for addressing substance use diagnoses: No substance use/Not applicable    ASSESSMENT:  Meche Callahan presents with a Euthymic/ normal mood.     her affect is Normal range and intensity, which is congruent, with her mood and the content of the session. The client has made progress on their goals.    Meche Callahan presents with a none risk of suicide, none risk of self-harm, and none risk of harm to others.    For any risk assessment that surpasses a \"low\" rating, a safety plan must be developed.    A safety plan was indicated: no  If " "yes, describe in detail N/A    PLAN: Between sessions, Meche Callahan will try to think of other people's feelings and use \"I feel\" statement to express any negative emotions rather than cursing. At the next session, the therapist will use Supportive Psychotherapy to address goals/needs/concerns.    Behavioral Health Treatment Plan and Discharge Planning: Meche Callahan is aware of and agrees to continue to work on their treatment plan. They have identified and are working toward their discharge goals. yes    Visit start and stop times:    09/17/24  Start Time: 1200  Stop Time: 1245  Total Visit Time: 45 minutes  "

## 2024-09-27 ENCOUNTER — TELEPHONE (OUTPATIENT)
Age: 59
End: 2024-09-27

## 2024-09-27 DIAGNOSIS — C50.811 MALIGNANT NEOPLASM OF OVERLAPPING SITES OF RIGHT BREAST IN FEMALE, ESTROGEN RECEPTOR POSITIVE (HCC): Primary | ICD-10-CM

## 2024-09-27 DIAGNOSIS — M81.0 OSTEOPOROSIS, UNSPECIFIED OSTEOPOROSIS TYPE, UNSPECIFIED PATHOLOGICAL FRACTURE PRESENCE: ICD-10-CM

## 2024-09-27 DIAGNOSIS — Z79.811 USE OF ANASTROZOLE (ARIMIDEX): ICD-10-CM

## 2024-09-27 DIAGNOSIS — Z17.0 MALIGNANT NEOPLASM OF OVERLAPPING SITES OF RIGHT BREAST IN FEMALE, ESTROGEN RECEPTOR POSITIVE (HCC): Primary | ICD-10-CM

## 2024-09-27 NOTE — TELEPHONE ENCOUNTER
"Pt's RN/KATHARINE  \"Catrachita\" @ [phone], .    Is asking for a dexa scan.      Can this be faxed to [phone].    Thank you,  GLO Hyde      "

## 2024-10-01 ENCOUNTER — RA CDI HCC (OUTPATIENT)
Dept: OTHER | Facility: HOSPITAL | Age: 59
End: 2024-10-01

## 2024-10-08 ENCOUNTER — OFFICE VISIT (OUTPATIENT)
Dept: FAMILY MEDICINE CLINIC | Facility: HOSPITAL | Age: 59
End: 2024-10-08
Payer: MEDICARE

## 2024-10-08 VITALS
OXYGEN SATURATION: 95 % | HEIGHT: 57 IN | HEART RATE: 75 BPM | SYSTOLIC BLOOD PRESSURE: 120 MMHG | TEMPERATURE: 99.2 F | DIASTOLIC BLOOD PRESSURE: 84 MMHG | BODY MASS INDEX: 24.34 KG/M2 | WEIGHT: 112.8 LBS

## 2024-10-08 DIAGNOSIS — C50.811 MALIGNANT NEOPLASM OF OVERLAPPING SITES OF RIGHT BREAST IN FEMALE, ESTROGEN RECEPTOR POSITIVE (HCC): ICD-10-CM

## 2024-10-08 DIAGNOSIS — Z17.0 MALIGNANT NEOPLASM OF OVERLAPPING SITES OF RIGHT BREAST IN FEMALE, ESTROGEN RECEPTOR POSITIVE (HCC): ICD-10-CM

## 2024-10-08 DIAGNOSIS — Z23 ENCOUNTER FOR IMMUNIZATION: ICD-10-CM

## 2024-10-08 DIAGNOSIS — R93.89 ENDOMETRIAL THICKENING ON ULTRASOUND: Primary | ICD-10-CM

## 2024-10-08 DIAGNOSIS — Z12.31 SCREENING MAMMOGRAM FOR BREAST CANCER: Primary | ICD-10-CM

## 2024-10-08 DIAGNOSIS — Z13.31 POSITIVE DEPRESSION SCREENING: ICD-10-CM

## 2024-10-08 DIAGNOSIS — Z00.00 MEDICARE ANNUAL WELLNESS VISIT, SUBSEQUENT: ICD-10-CM

## 2024-10-08 DIAGNOSIS — F33.9 DEPRESSION, RECURRENT (HCC): ICD-10-CM

## 2024-10-08 DIAGNOSIS — K21.9 GASTROESOPHAGEAL REFLUX DISEASE: ICD-10-CM

## 2024-10-08 DIAGNOSIS — F42.9 OBSESSIVE-COMPULSIVE DISORDER, UNSPECIFIED TYPE: ICD-10-CM

## 2024-10-08 PROCEDURE — G0439 PPPS, SUBSEQ VISIT: HCPCS | Performed by: INTERNAL MEDICINE

## 2024-10-08 PROCEDURE — G0008 ADMIN INFLUENZA VIRUS VAC: HCPCS

## 2024-10-08 PROCEDURE — 90673 RIV3 VACCINE NO PRESERV IM: CPT

## 2024-10-08 PROCEDURE — 99214 OFFICE O/P EST MOD 30 MIN: CPT | Performed by: INTERNAL MEDICINE

## 2024-10-08 RX ORDER — KETOCONAZOLE 20 MG/ML
SHAMPOO TOPICAL
COMMUNITY
Start: 2024-09-12

## 2024-10-08 RX ORDER — PANTOPRAZOLE SODIUM 40 MG/1
40 TABLET, DELAYED RELEASE ORAL DAILY
Start: 2024-10-08

## 2024-10-08 NOTE — PATIENT INSTRUCTIONS
Medicare Preventive Visit Patient Instructions  Thank you for completing your Welcome to Medicare Visit or Medicare Annual Wellness Visit today. Your next wellness visit will be due in one year (10/9/2025).  The screening/preventive services that you may require over the next 5-10 years are detailed below. Some tests may not apply to you based off risk factors and/or age. Screening tests ordered at today's visit but not completed yet may show as past due. Also, please note that scanned in results may not display below.  Preventive Screenings:  Service Recommendations Previous Testing/Comments   Colorectal Cancer Screening  * Colonoscopy    * Fecal Occult Blood Test (FOBT)/Fecal Immunochemical Test (FIT)  * Fecal DNA/Cologuard Test  * Flexible Sigmoidoscopy Age: 45-75 years old   Colonoscopy: every 10 years (may be performed more frequently if at higher risk)  OR  FOBT/FIT: every 1 year  OR  Cologuard: every 3 years  OR  Sigmoidoscopy: every 5 years  Screening may be recommended earlier than age 45 if at higher risk for colorectal cancer. Also, an individualized decision between you and your healthcare provider will decide whether screening between the ages of 76-85 would be appropriate. Colonoscopy: 10/13/2020  FOBT/FIT: Not on file  Cologuard: Not on file  Sigmoidoscopy: Not on file    Screening Current     Breast Cancer Screening Age: 40+ years old  Frequency: every 1-2 years  Not required if history of left and right mastectomy Mammogram: 12/04/2023    History Breast Cancer   Cervical Cancer Screening Between the ages of 21-29, pap smear recommended once every 3 years.   Between the ages of 30-65, can perform pap smear with HPV co-testing every 5 years.   Recommendations may differ for women with a history of total hysterectomy, cervical cancer, or abnormal pap smears in past. Pap Smear: 05/17/2024    Screening Current   Hepatitis C Screening Once for adults born between 1945 and 1965  More frequently in patients  at high risk for Hepatitis C Hep C Antibody: Not on file        Diabetes Screening 1-2 times per year if you're at risk for diabetes or have pre-diabetes Fasting glucose: 97 mg/dL (11/16/2021)  A1C: 5.0 (9/27/2023)      Cholesterol Screening Once every 5 years if you don't have a lipid disorder. May order more often based on risk factors. Lipid panel: Not on file          Other Preventive Screenings Covered by Medicare:  Abdominal Aortic Aneurysm (AAA) Screening: covered once if your at risk. You're considered to be at risk if you have a family history of AAA.  Lung Cancer Screening: covers low dose CT scan once per year if you meet all of the following conditions: (1) Age 55-77; (2) No signs or symptoms of lung cancer; (3) Current smoker or have quit smoking within the last 15 years; (4) You have a tobacco smoking history of at least 20 pack years (packs per day multiplied by number of years you smoked); (5) You get a written order from a healthcare provider.  Glaucoma Screening: covered annually if you're considered high risk: (1) You have diabetes OR (2) Family history of glaucoma OR (3)  aged 50 and older OR (4)  American aged 65 and older  Osteoporosis Screening: covered every 2 years if you meet one of the following conditions: (1) You're estrogen deficient and at risk for osteoporosis based off medical history and other findings; (2) Have a vertebral abnormality; (3) On glucocorticoid therapy for more than 3 months; (4) Have primary hyperparathyroidism; (5) On osteoporosis medications and need to assess response to drug therapy.   Last bone density test (DXA Scan): 10/17/2022.  HIV Screening: covered annually if you're between the age of 15-65. Also covered annually if you are younger than 15 and older than 65 with risk factors for HIV infection. For pregnant patients, it is covered up to 3 times per pregnancy.    Immunizations:  Immunization Recommendations   Influenza Vaccine Annual  influenza vaccination during flu season is recommended for all persons aged >= 6 months who do not have contraindications   Pneumococcal Vaccine   * Pneumococcal conjugate vaccine = PCV13 (Prevnar 13), PCV15 (Vaxneuvance), PCV20 (Prevnar 20)  * Pneumococcal polysaccharide vaccine = PPSV23 (Pneumovax) Adults 19-65 yo with certain risk factors or if 65+ yo  If never received any pneumonia vaccine: recommend Prevnar 20 (PCV20)  Give PCV20 if previously received 1 dose of PCV13 or PPSV23   Hepatitis B Vaccine 3 dose series if at intermediate or high risk (ex: diabetes, end stage renal disease, liver disease)   Respiratory syncytial virus (RSV) Vaccine - COVERED BY MEDICARE PART D  * RSVPreF3 (Arexvy) CDC recommends that adults 60 years of age and older may receive a single dose of RSV vaccine using shared clinical decision-making (SCDM)   Tetanus (Td) Vaccine - COST NOT COVERED BY MEDICARE PART B Following completion of primary series, a booster dose should be given every 10 years to maintain immunity against tetanus. Td may also be given as tetanus wound prophylaxis.   Tdap Vaccine - COST NOT COVERED BY MEDICARE PART B Recommended at least once for all adults. For pregnant patients, recommended with each pregnancy.   Shingles Vaccine (Shingrix) - COST NOT COVERED BY MEDICARE PART B  2 shot series recommended in those 19 years and older who have or will have weakened immune systems or those 50 years and older     Health Maintenance Due:      Topic Date Due   • Hepatitis C Screening  Never done   • HIV Screening  Never done   • Breast Cancer Screening: Mammogram  12/04/2024   • Colorectal Cancer Screening  10/13/2025   • Cervical Cancer Screening  05/17/2029     Immunizations Due:      Topic Date Due   • IPV Vaccine (4 of 4 - 4-dose series) 04/02/1969   • Influenza Vaccine (1) 09/01/2024     Advance Directives   What are advance directives?  Advance directives are legal documents that state your wishes and plans for  medical care. These plans are made ahead of time in case you lose your ability to make decisions for yourself. Advance directives can apply to any medical decision, such as the treatments you want, and if you want to donate organs.   What are the types of advance directives?  There are many types of advance directives, and each state has rules about how to use them. You may choose a combination of any of the following:  Living will:  This is a written record of the treatment you want. You can also choose which treatments you do not want, which to limit, and which to stop at a certain time. This includes surgery, medicine, IV fluid, and tube feedings.   Durable power of  for healthcare (DPAHC):  This is a written record that states who you want to make healthcare choices for you when you are unable to make them for yourself. This person, called a proxy, is usually a family member or a friend. You may choose more than 1 proxy.  Do not resuscitate (DNR) order:  A DNR order is used in case your heart stops beating or you stop breathing. It is a request not to have certain forms of treatment, such as CPR. A DNR order may be included in other types of advance directives.  Medical directive:  This covers the care that you want if you are in a coma, near death, or unable to make decisions for yourself. You can list the treatments you want for each condition. Treatment may include pain medicine, surgery, blood transfusions, dialysis, IV or tube feedings, and a ventilator (breathing machine).  Values history:  This document has questions about your views, beliefs, and how you feel and think about life. This information can help others choose the care that you would choose.  Why are advance directives important?  An advance directive helps you control your care. Although spoken wishes may be used, it is better to have your wishes written down. Spoken wishes can be misunderstood, or not followed. Treatments may be given  "even if you do not want them. An advance directive may make it easier for your family to make difficult choices about your care.       © Copyright [employer] 2018 Information is for End User's use only and may not be sold, redistributed or otherwise used for commercial purposes. All illustrations and images included in CareNotes® are the copyrighted property of [employer]. or [employer]      Patient Education     Depression in adults   The Basics   Written by the doctors and editors at [facility]   What is depression? -- Depression is a disorder that makes you sad, but it is different from normal sadness. Depression can make it hard for you to work, study, or do everyday tasks.  What causes depression? -- Depression is caused by problems with chemicals in the brain called \"neurotransmitters.\" Some people might be more likely to have depression if it runs in their family. Other things might also play a role, including hormones, certain health problems, medicines, stress, being mistreated as a child, family problems, and problems with friends or at school or work.  How do I know if I am depressed? -- People with depression feel down most of the time for at least 2 weeks. They also have at least 1 of these 2 symptoms:   They no longer enjoy or care about doing the things that they used to like to do.   They feel sad, down, hopeless, or cranky most of the day, almost every day.  People with depression can also have other symptoms. Examples include:   Changes in your appetite or weight. You might eat too little or too much, or gain or lose weight without trying.   Sleeping too much or too little   Feeling tired or like you have no energy   Feeling guilty, helpless, or like you are worth nothing   Trouble with concentration or memory   Acting restless or have trouble staying still, or moving or speaking more slowly than normal   Repeated thoughts of death or killing yourself  If you think that you might be " "depressed, see your doctor or nurse. Only someone trained in mental health can tell for sure if you are depressed.  How is depression diagnosed? -- Your doctor or nurse will do a physical exam, ask you questions, and might order tests. Depression can have a big impact on your life. Luckily, depression can be treated, and the sooner treatment is started, the better it works.  Get help right away if you are thinking of hurting or killing yourself! -- If you ever feel like you might hurt yourself or someone else, help is available:   In the [place], contact the [facility] [employer]:   To speak to someone, call or text [facility].   To talk to someone online, go to [email or web address].org/chat.   Call your doctor or nurse, and tell them it is urgent.   Call for an ambulance (in the [place] and [place], call 9-1-1).   Go to the emergency department at the nearest hospital.  What are the treatments for depression? -- Your doctor or nurse will work with you to make a treatment plan. Treatment can include:   Helping you learn more about depression   Counseling (with a psychiatrist, psychologist, nurse, or )   Medicines that relieve depression   Creating a plan to limit access to items that you might use to harm yourself   Other treatments that pass magnetic waves or electricity into the brain  In addition to treatment, getting regular physical activity can also help you feel better.  People with depression that is not too severe can get better by taking medicines or talking with a counselor. People with severe depression usually need medicines to get better, and might also need to see a counselor.  Another treatment involves placing a device against the scalp to pass magnetic waves into the brain. This is called \"transcranial magnetic stimulation\" (\"TMS\"). Doctors might suggest TMS if medicines and counseling have not helped.  Some people with severe depression might need a treatment called \"electroconvulsive therapy\" " "(\"ECT\"). During ECT, doctors pass an electric current through a person's brain in a safe way.  When will I feel better? -- Most treatment options take a little while to start working.   Many people who take medicines start to feel better within 2 weeks, but it might be 4 to 8 weeks before the medicine has its full effect.   Many people who see a counselor start to feel better within a few weeks, but it might take 8 to 10 weeks to get the greatest benefit.  If the first treatment you try does not help you, tell your doctor or nurse, but do not give up. Some people need to try different treatments or combinations of treatments before they find an approach that works. Your doctor, nurse, or counselor can work with you to find the treatment that is right for you. They can also help you figure out how to cope while you search for the right treatment or are waiting for your treatment to start working.  How do I decide which treatment to have? -- You and your doctor or nurse will need to work together to choose a treatment for you. Medicines might work a little faster than counseling. But medicines can also cause side effects. Plus, some people do not like the idea of taking medicine.  Seeing a counselor involves talking about your feelings. That is also hard for some people.  What if I take medicine for depression and I want to have a baby? -- Some depression medicines can cause problems for an unborn baby. But having untreated depression during pregnancy can also cause problems. If you want to get pregnant, tell your doctor but do not stop taking your medicines. Together, you can plan the safest way for you to have your baby.  It's also important to talk with your doctor if you want to breastfeed after your baby is born. Breastfeeding has lots of benefits for both mother and baby. Some depression medicines are safer than others to use while breastfeeding. But having untreated depression after giving birth can also cause " "problems, so do not stop taking your medicines. Your doctor can work with you to plan the safest way for you to feed your baby.  All topics are updated as new evidence becomes available and our peer review process is complete.  This topic retrieved from [employer] on: Mar 06, 2024.  Topic 49024 Version 22.0  Release: 32.2.4 - C32.64  © 2024 UpToDate, Inc. and/or its affiliates. All rights reserved.  figure 1: Mood disorders caused by problems in the brain     Mooddisorders, such as depression and bipolar disorder, are caused by problems with\"neurotransmitters.\" These are chemicals in the brain that can affect your emotions.Treatments for mood disorders seem to work by changing the levels of certainneurotransmitters.  Graphic 35279 Version 4.0  Consumer Information Use and Disclaimer   Disclaimer: This generalized information is a limited summary of diagnosis, treatment, and/or medication information. It is not meant to be comprehensive and should be used as a tool to help the user understand and/or assess potential diagnostic and treatment options. It does NOT include all information about conditions, treatments, medications, side effects, or risks that may apply to a specific patient. It is not intended to be medical advice or a substitute for the medical advice, diagnosis, or treatment of a health care provider based on the health care provider's examination and assessment of a patient's specific and unique circumstances. Patients must speak with a health care provider for complete information about their health, medical questions, and treatment options, including any risks or benefits regarding use of medications. This information does not endorse any treatments or medications as safe, effective, or approved for treating a specific patient. UpToDate, Inc. and its affiliates disclaim any warranty or liability relating to this information or the use thereof.The use of this information is governed by the Terms of Use, " available at https://www.[employer].com/en/know/clinical-effectiveness-terms. 2024© [employer] and its affiliates and/or licensors. All rights reserved.  Copyright   © 2024 [employer] and/or its affiliates. All rights reserved.

## 2024-10-08 NOTE — ASSESSMENT & PLAN NOTE
Depression Screening Follow-up Plan: Patient's depression screening was positive with a PHQ-9 score of 6.   Staff and Mother report no behavioral concerns, follows with psych, con't meds and f/u as per mental health

## 2024-10-08 NOTE — PROGRESS NOTES
Ambulatory Visit  Name: Pat Callahan      : 1965      MRN: [id number]  Encounter Provider: Wendy Thomas DO  Encounter Date: 10/8/2024   Encounter department: [facility] [address]     Assessment & Plan  Endometrial thickening on ultrasound  S/p D & C and PAP  under sedation, path benign, no reported bleeding/pain, con't f/u as per GYN       Malignant neoplasm of overlapping sites of right breast in female, estrogen receptor positive (HCC)  Following with Surg Onc, finishing Arimidex in , on Prolia, has mammo scheduled for , call with breast concerns       Depression, recurrent (HCC)  Depression Screening Follow-up Plan: Patient's depression screening was positive with a PHQ-9 score of 6.   Staff and Mother report no behavioral concerns, follows with psych, con't meds and f/u as per mental health       Obsessive-compulsive disorder, unspecified type  Staff and Mother report no behavioral concerns, follows with psych, con't meds and f/u as per mental health       Gastroesophageal reflux disease  Only taking PPI q day - med list updated, call with GI symptoms  Orders:    pantoprazole (PROTONIX) 40 mg tablet; Take 1 tablet (40 mg total) by mouth daily    Medicare annual wellness visit, subsequent         Positive depression screening  No clinical s/sx of depression, follows with psych, con't meds and f/u as per mental health, call with acute behavioral issues         Depression Screening and Follow-up Plan: Patient's depression screening was positive with a PHQ-9 score of 6. Patient declines further evaluation by mental health professional and/or medications. Brief counseling provided. Will re-evaluate at next office visit.       Preventive health issues were discussed with patient, and age appropriate screening tests were ordered as noted in patient's After Visit Summary. Personalized health advice and appropriate referrals for health education or preventive services  given if needed, as noted in patient's After Visit Summary.    Colonoscopy 10/20 - 5 yrs    Mammo 12/23 - scheduled for Dec 2024    Dexa 10/22 - osteopenia, on Prolia, scheduled for Oct 2024    PAP 5/24 with Dr. Blount    BW 6/24    Thyroid US 12/23 - 2 yrs    She is agreeable to flu vaccine today      History of Present Illness     HPI  Pt here with [facility] staff for f/u appt and AWV - most of HPI via [facility] staff    Pt has seen GYN (Dr Blount) for f/u endometrial thickening - OV notes reviewed. She had a D&C and PAP under anesthesia 6/5/24.  Path was reportedly benign. She saw NP in July.  Staff and pts mom reports no vaginal pain/bleeding.     Pt saw Heme/Onc (Brionna Soto) in June for f/u breast CA history - OV notes reviewed. She will be finishing he 10 yrs on Arimidex in Feb 25.  She is on Prolia q 6 mos.  She is UTD on mammo and her routine f/u mammo in Dec scheduled.          Patient Care Team:  Wendy Thomas DO as PCP - General  Cony Barragan MD as PCP - Endocrinology (Endocrinology)  Dell Nazario MD as Surgeon (Surgical Oncology)  Ev Chaidez (Obstetrics and Gynecology)  Ricarda Simmons MD (Neurology)  Jimenez Rutherford MD (Ophthalmology)  DUSTIN Polanco DPM (Podiatry)  Sheikh Scott Guy MD as Medical Oncologist (Oncology)  JESSE Reynolds (Hematology and Oncology)    Review of Systems   Constitutional:  Negative for chills and fever.   Eyes:  Positive for visual disturbance.   Respiratory:  Negative for cough and shortness of breath.    Cardiovascular:  Negative for leg swelling.   Gastrointestinal:  Negative for abdominal pain, diarrhea and nausea.   Genitourinary:  Negative for vaginal bleeding and vaginal pain.   Musculoskeletal:  Negative for gait problem.   Skin:  Negative for rash and wound.   Neurological:  Negative for dizziness, seizures, light-headedness and headaches.   Hematological:  Does not bruise/bleed easily.   Psychiatric/Behavioral:  Negative for behavioral  problems.      Medical History Reviewed by provider this encounter:  Tobacco  Allergies  Meds  Problems  Med Hx  Surg Hx  Fam Hx       Annual Wellness Visit Questionnaire   Pat is here for her Subsequent Wellness visit.     Health Risk Assessment:   Patient rates overall health as very good. Patient feels that their physical health rating is same. Patient is satisfied with their life. Eyesight was rated as same. Hearing was rated as same. Patient feels that their emotional and mental health rating is same. Patients states they are often angry. Patient states they are sometimes unusually tired/fatigued. Pain experienced in the last 7 days has been some. Patient's pain rating has been 2/10. Patient states that she has experienced no weight loss or gain in last 6 months. Arm pain from arthritis.     Depression Screening:   PHQ-9 Score: 6      Fall Risk Screening:   In the past year, patient has experienced: history of falling in past year    Number of falls: 1  Injured during fall?: Yes    Feels unsteady when standing or walking?: No    Worried about falling?: No      Urinary Incontinence Screening:   Patient has not leaked urine accidently in the last six months.     Home Safety:  Patient does not have trouble with stairs inside or outside of their home. Patient has working smoke alarms and has working carbon monoxide detector. Home safety hazards include: none.     Nutrition:   Current diet is Regular.     Medications:   Patient is not currently taking any over-the-counter supplements. Patient is not able to manage medications. Staff assist with medications.     Activities of Daily Living (ADLs)/Instrumental Activities of Daily Living (IADLs):   Walk and transfer into and out of bed and chair?: Yes  Dress and groom yourself?: Yes    Bathe or shower yourself?: No    Feed yourself? Yes  Do your laundry/housekeeping?: No  Manage your money, pay your bills and track your expenses?: No  Make your own meals?: No     Do your own shopping?: No    ADL comments: Staff assists with showering, meals prep, cleaning.     Previous Hospitalizations:   Any hospitalizations or ED visits within the last 12 months?: Yes    How many hospitalizations have you had in the last year?: 1-2    Hospitalization Comments: GI procedure, outpatient procedure.     Advance Care Planning:   Living will: No    Durable POA for healthcare: No    Advanced directive: No      PREVENTIVE SCREENINGS        Colorectal Cancer Screening:     General: Screening Current      Breast Cancer Screening:     General: History Breast Cancer      Cervical Cancer Screening:    General: Screening Current      Osteoporosis Screening:    General: Screening Not Indicated and History Osteoporosis      Lung Cancer Screening:     General: Screening Not Indicated    Screening, Brief Intervention, and Referral to Treatment (SBIRT)    Screening  Typical number of drinks in a day: 0  Typical number of drinks in a week: 0  Interpretation: Low risk drinking behavior.    Single Item Drug Screening:  How often have you used an illegal drug (including marijuana) or a prescription medication for non-medical reasons in the past year? never    Single Item Drug Screen Score: 0  Interpretation: Negative screen for possible drug use disorder    Social Determinants of Health     Financial Resource Strain: Low Risk  (9/22/2023)    Overall Financial Resource Strain (CARDIA)     Difficulty of Paying Living Expenses: Not hard at all   Food Insecurity: No Food Insecurity (10/8/2024)    Hunger Vital Sign     Worried About Running Out of Food in the Last Year: Never true     Ran Out of Food in the Last Year: Never true   Transportation Needs: No Transportation Needs (10/8/2024)    PRAPARE - Transportation     Lack of Transportation (Medical): No     Lack of Transportation (Non-Medical): No   Housing Stability: Low Risk  (10/8/2024)    Housing Stability Vital Sign     Unable to Pay for Housing in the Last  "Year: No     Number of Times Moved in the Last Year: 0     Homeless in the Last Year: No   Utilities: Not At Risk (10/8/2024)    [facility] Utilities     Threatened with loss of utilities: No     No results found.    Objective     /84   Pulse 75   Temp 99.2 °F (37.3 °C) (Tympanic)   Ht 4' 9\" (1.448 m)   Wt 51.2 kg (112 lb 12.8 oz)   SpO2 95%   BMI 24.41 kg/m²     Physical Exam  Vitals and nursing note reviewed.   Constitutional:       General: She is not in acute distress.     Appearance: She is well-developed. She is not ill-appearing.   HENT:      Head: Normocephalic and atraumatic.      Right Ear: Tympanic membrane and external ear normal. There is no impacted cerumen.      Left Ear: Tympanic membrane and external ear normal. There is no impacted cerumen.      Mouth/Throat:      Mouth: Mucous membranes are moist.      Pharynx: Oropharynx is clear. No oropharyngeal exudate.   Eyes:      General:         Right eye: No discharge.         Left eye: No discharge.      Conjunctiva/sclera: Conjunctivae normal.   Neck:      Thyroid: No thyromegaly.      Trachea: No tracheal deviation.   Cardiovascular:      Rate and Rhythm: Normal rate and regular rhythm.      Heart sounds: Normal heart sounds. No murmur heard.  Pulmonary:      Effort: Pulmonary effort is normal. No respiratory distress.      Breath sounds: Normal breath sounds. No wheezing, rhonchi or rales.   Abdominal:      General: There is no distension.      Palpations: Abdomen is soft.      Tenderness: There is no abdominal tenderness. There is no guarding or rebound.   Musculoskeletal:         General: No deformity or signs of injury.      Cervical back: Neck supple.   Lymphadenopathy:      Cervical: No cervical adenopathy.   Skin:     General: Skin is warm and dry.      Coloration: Skin is not pale.      Findings: No rash.   Neurological:      General: No focal deficit present.      Mental Status: She is alert. Mental status is at baseline.      Motor: No " abnormal muscle tone.      Gait: Gait normal.   Psychiatric:         Mood and Affect: Mood normal.       Depression Screening Follow-up Plan: Patient's depression screening was positive with a PHQ-2 score of . Their PHQ-9 score was 6. Patient declines further evaluation by mental health professional and/or medications. They have no active suicidal ideations. Brief counseling provided and recommend additional follow-up/re-evaluation at next office visit.

## 2024-10-08 NOTE — ASSESSMENT & PLAN NOTE
Staff and Mother report no behavioral concerns, follows with psych, con't meds and f/u as per mental health

## 2024-10-23 ENCOUNTER — SOCIAL WORK (OUTPATIENT)
Dept: BEHAVIORAL/MENTAL HEALTH CLINIC | Facility: CLINIC | Age: 59
End: 2024-10-23
Payer: MEDICARE

## 2024-10-23 DIAGNOSIS — F84.0 AUTISTIC DISORDER: ICD-10-CM

## 2024-10-23 DIAGNOSIS — F63.9 IMPULSE CONTROL DISORDER: Primary | ICD-10-CM

## 2024-10-23 PROCEDURE — 90834 PSYTX W PT 45 MINUTES: CPT | Performed by: COUNSELOR

## 2024-10-23 NOTE — PSYCH
"Behavioral Health Psychotherapy Progress Note    Psychotherapy Provided: Individual Psychotherapy     1. Impulse control disorder        2. Autistic disorder            Goals addressed in session:  All goals    DATA: Updated treatment goals today with Meche.  Nurse Catrachita from [facility] accompanied Meche to the appointment today and joined the session as [facility] staff usually does.  Meche said she is trying to be more positive and have better behaviors.  We continue to talk about how our actions affect others and how we want to treat others the way we would like to be treated.  Catrachita commented that she sees gets reports from [facility] staff that Meche is making an effort toward more positive behaviors.    During this session, this clinician used the following therapeutic modalities: Supportive Psychotherapy    Substance Abuse was not addressed during this session. If the client is diagnosed with a co-occurring substance use disorder, please indicate any changes in the frequency or amount of use: N/A. Stage of change for addressing substance use diagnoses: No substance use/Not applicable    ASSESSMENT:  Meche Callahan presents with a Euthymic/ normal mood.     her affect is Normal range and intensity, which is congruent, with her mood and the content of the session. The client has made progress on their goals.    Meche Callahan presents with a none risk of suicide, none risk of self-harm, and none risk of harm to others.    For any risk assessment that surpasses a \"low\" rating, a safety plan must be developed.    A safety plan was indicated: no  If yes, describe in detail N/A    PLAN: Between sessions, Meche Callahan will continue to work on her treatment goals (with the help too of [facility] staff). At the next session, the therapist will use Supportive Psychotherapy to address goals/needs/concerns.    Behavioral Health Treatment Plan and Discharge Planning: Meche Callahan is aware of and agrees to continue to work on their treatment " plan. They have identified and are working toward their discharge goals. yes    Visit start and stop times:    10/23/24  Start Time: 1203  Stop Time: 1248  Total Visit Time: 45 minutes

## 2024-10-23 NOTE — BH TREATMENT PLAN
"Outpatient Behavioral Health Psychotherapy Treatment Plan    Meche Callahan  1965     Date of Initial Psychotherapy Assessment: Many years ago   Date of Current Treatment Plan: 10/23/24  Treatment Plan Target Date: TBD  Treatment Plan Expiration Date: 4-23-25    Diagnosis:   1. Impulse control disorder        2. Autistic disorder            Area(s) of Need: Behavioral issues at times/Mild intellectual disabilities        Long Term Goal 1 (in the client's own words): To be more respectful to other.  As 10-23-24, client said she is still working on this goal saying \"I need to think before I speak\" and think about how I want to be treated and then treat others the same.  [facility] staff agreed that this is still a work in progress.     Stage of Change: Action     Target Date for completion: TBD             Anticipated therapeutic modalities: Behavioral/social             People identified to complete this goal: Client and [facility] staff and clinician                     Objective 1: (identify the means of measuring success in meeting the objective): I will not demand things of others and say what I need/want in a  nicer and kinder way                    Objective 2: (identify the means of measuring success in meeting the objective): I will  use \"I feel\" statements when I want to share my feelings/needs/concerns    Long Term Goal 2 (in the client's own words): To eat more of a variety vegetables and be open to trying more things.  Stop \"behavioral vomiting.\"  [facility] staff reported Meche is trying to be more open minded saying they see her trying.     Stage of Change: Preparation     Target Date for completion: TBD             Anticipated therapeutic modalities: Motivational Interviewing             People identified to complete this goal: Client and [facility] staff and clinician                     Objective 1: (identify the means of measuring success in meeting the objective): I will be open to trying new things and not be so " negative with other's suggestions to try other foods.                    Objective 2: (identify the means of measuring success in meeting the objective): N/A       Long Term Goal 3 (in the client's own words): To be more positive in general.  Meche said she feels she is not making progress.  [facility] staff said they see Meche trying if they sit with her and talk to her saying Meche can identify positive things.        Stage of Change: Action      Target Date for completion: TBD             Anticipated therapeutic modalities: Motivational Interviewing             People identified to complete this goal: Client and [facility] staff and clinician                     Objective 1: (identify the means of measuring success in meeting the objective): I will think about nice things that are positive.                     Objective 2: (identify the means of measuring success in meeting the objective): I will not be so nasty to others.    I am currently under the care of a [facility] psychiatric provider: no    My [facility] psychiatric provider is: N/A    I am currently taking psychiatric medications: Yes, as prescribed by MONAE Zuleta, with [facility]    I feel that I will be ready for discharge from mental health care when I reach the following (measurable goal/objective): Meche appreciates on-going care indefinitely.      For children and adults who have a legal guardian:   Has there been any change to custody orders and/or guardianship status? NA. If yes, attach updated documentation.    I have created my Crisis Plan and have been offered a copy of this plan    Behavioral Health Treatment Plan [facility]: Diagnosis and Treatment Plan explained to Meche Callahan acknowledges an understanding of their diagnosis. Meche Callahan agrees to this treatment plan.    I have been offered a copy of this Treatment Plan. yes

## 2024-10-29 ENCOUNTER — HOSPITAL ENCOUNTER (OUTPATIENT)
Dept: BONE DENSITY | Facility: CLINIC | Age: 59
Discharge: HOME | End: 2024-10-29
Payer: MEDICARE

## 2024-10-29 VITALS — WEIGHT: 106 LBS | BODY MASS INDEX: 24.53 KG/M2 | HEIGHT: 55 IN

## 2024-10-29 DIAGNOSIS — M81.0 OSTEOPOROSIS, UNSPECIFIED OSTEOPOROSIS TYPE, UNSPECIFIED PATHOLOGICAL FRACTURE PRESENCE: ICD-10-CM

## 2024-10-29 DIAGNOSIS — Z17.0 MALIGNANT NEOPLASM OF OVERLAPPING SITES OF RIGHT BREAST IN FEMALE, ESTROGEN RECEPTOR POSITIVE (HCC): ICD-10-CM

## 2024-10-29 DIAGNOSIS — Z79.811 USE OF ANASTROZOLE (ARIMIDEX): ICD-10-CM

## 2024-10-29 DIAGNOSIS — C50.811 MALIGNANT NEOPLASM OF OVERLAPPING SITES OF RIGHT BREAST IN FEMALE, ESTROGEN RECEPTOR POSITIVE (HCC): ICD-10-CM

## 2024-10-29 PROCEDURE — 77080 DXA BONE DENSITY AXIAL: CPT

## 2024-11-15 ENCOUNTER — TELEPHONE (OUTPATIENT)
Dept: PSYCHIATRY | Facility: CLINIC | Age: 59
End: 2024-11-15

## 2024-11-15 NOTE — TELEPHONE ENCOUNTER
LVM regarding scheduling a [facility] apt.  Left message for staff to call back regarding setting her up with a new counselor.

## 2024-12-04 ENCOUNTER — TELEPHONE (OUTPATIENT)
Age: 59
End: 2024-12-04

## 2024-12-04 NOTE — TELEPHONE ENCOUNTER
Catrachita from [facility] called requesting if a copy of the patients dexa scan results could be faxed over to them because they are going to see a hematologist oncologist next week and they would like to have the results printed. This could be faxed over to catrachita at [phone]. She instructed that that is the nurse's fax number and they only are the ones that receive that information if she has any further questions she can reach out to her that [facility] [phone]    thank you

## 2024-12-05 ENCOUNTER — HOSPITAL ENCOUNTER (OUTPATIENT)
Dept: MAMMOGRAPHY | Facility: IMAGING CENTER | Age: 59
Discharge: HOME | End: 2024-12-05
Payer: MEDICARE

## 2024-12-05 VITALS — HEIGHT: 56 IN | WEIGHT: 110 LBS | BODY MASS INDEX: 24.75 KG/M2

## 2024-12-05 DIAGNOSIS — Z12.31 VISIT FOR SCREENING MAMMOGRAM: ICD-10-CM

## 2024-12-05 DIAGNOSIS — R73.9 HYPERGLYCEMIA: Primary | ICD-10-CM

## 2024-12-05 PROCEDURE — 77063 BREAST TOMOSYNTHESIS BI: CPT

## 2024-12-05 PROCEDURE — 77067 SCR MAMMO BI INCL CAD: CPT

## 2024-12-09 DIAGNOSIS — Z17.0 MALIGNANT NEOPLASM OF OVERLAPPING SITES OF RIGHT BREAST IN FEMALE, ESTROGEN RECEPTOR POSITIVE (HCC): ICD-10-CM

## 2024-12-09 DIAGNOSIS — M81.0 OSTEOPOROSIS, UNSPECIFIED OSTEOPOROSIS TYPE, UNSPECIFIED PATHOLOGICAL FRACTURE PRESENCE: ICD-10-CM

## 2024-12-09 DIAGNOSIS — Z79.811 USE OF ANASTROZOLE (ARIMIDEX): Primary | ICD-10-CM

## 2024-12-09 DIAGNOSIS — C50.811 MALIGNANT NEOPLASM OF OVERLAPPING SITES OF RIGHT BREAST IN FEMALE, ESTROGEN RECEPTOR POSITIVE (HCC): ICD-10-CM

## 2024-12-10 ENCOUNTER — DOCUMENTATION (OUTPATIENT)
Dept: BEHAVIORAL/MENTAL HEALTH CLINIC | Facility: CLINIC | Age: 59
End: 2024-12-10

## 2024-12-10 ENCOUNTER — SOCIAL WORK (OUTPATIENT)
Dept: BEHAVIORAL/MENTAL HEALTH CLINIC | Facility: CLINIC | Age: 59
End: 2024-12-10
Payer: MEDICARE

## 2024-12-10 DIAGNOSIS — F42.9 OBSESSIVE-COMPULSIVE DISORDER, UNSPECIFIED TYPE: ICD-10-CM

## 2024-12-10 DIAGNOSIS — F33.9 DEPRESSION, RECURRENT (HCC): Primary | ICD-10-CM

## 2024-12-10 DIAGNOSIS — C50.911 MALIGNANT NEOPLASM OF RIGHT FEMALE BREAST, UNSPECIFIED ESTROGEN RECEPTOR STATUS, UNSPECIFIED SITE OF BREAST (HCC): ICD-10-CM

## 2024-12-10 DIAGNOSIS — F63.9 IMPULSE CONTROL DISORDER: ICD-10-CM

## 2024-12-10 PROCEDURE — 90834 PSYTX W PT 45 MINUTES: CPT | Performed by: COUNSELOR

## 2024-12-10 NOTE — PROGRESS NOTES
TRANSFER SUMMARY    [facility]    Patient Name Pat Callahan     Date of Birth: 59 y.o. 1965      MRN: [id number]    Admission Date: many years ago    Date of Transfer: December 10, 2024    Admission Diagnosis:     Depression, Autism spectrum    Current Diagnosis:     No diagnosis found.    Reason for Admission: Pat presented for treatment due to behavior issues.     Progress in Treatment: Pat was seen for Individual Couseling.     Episodes of Higher Level of Care: No    Transfer request Initiated by: Psychiatrist: N/A  Therapist: Ev Catherine LCSW    Reason for Transfer Request: clinician leaving practice    Does this individual need a clinician with specialized training/expertise?: No    Is this client working with any other [facility] Providers/Therapists? Psychiatrist: None Therapist: None    Other pertinent issues:  Meche lives at [facility] in a group home.  Staff bring her to her appointments and usual attend the sessions to report on behavioral issues/updates and so on.    Are there any specific individuals who would be a “best fit” or who have already agreed to accept this transfer request?      Psychiatrist: N/A  Therapist: None  Rationale: Not Applicable    Attempts to maintain the current therapeutic relationship: Not Applicable    Transfer request routed to Clinical Supervisor for input and/or approval.     Comments from other involved providers and/or clinical coordinator : None    Ev Catherine LCSW12/10/24

## 2024-12-10 NOTE — ASSESSMENT & PLAN NOTE
Patient is a 59-year-old autistic female with early-stage ER/HI positive, HER2 positive breast cancer status post right mastectomy in 2013 and adjuvant chemotherapy with TCH followed by Herceptin for 1 year.  She has been on adjuvant hormonal therapy and will complete recommended 10 years of treatment with anastrozole in 2/2025.    She has been receiving Prolia every 6 months for osteoporosis prevention.    Recent mammogram resulted with benign findings.  DEXA scan shows improvement in overall bone density and results are consistent with osteopenia.  She will proceed with Prolia today.  This will be her last dose.  I will discontinue Prolia after today since she will no longer continue on anastrozole therapy.  She will continue on calcium and vitamin D replacement.    Polly will follow-up with medical oncology on a yearly basis going forward.  She will be due for annual mammogram prior to her next follow-up visit in December 2025.  DEXA scan due in 10/2026    I will see her back in 1 year with repeat blood work and mammogram.  Patient and her mother are in agreement with this plan of care.  These recommendations were also written down for her personal care facility.  Orders:    CBC and differential; Future    Comprehensive metabolic panel; Future    Mammo screening bilateral w 3d and cad; Future

## 2024-12-10 NOTE — PROGRESS NOTES
Name: Pat Callahan      : 1965      MRN: [id number]  Encounter Provider: JESSE Reynolds  Encounter Date: 2024   Encounter department: [facility]  :  Assessment & Plan  Malignant neoplasm of overlapping sites of right breast in female, estrogen receptor positive (HCC)  Patient is a 59-year-old autistic female with early-stage ER/AR positive, HER2 positive breast cancer status post right mastectomy in  and adjuvant chemotherapy with TCH followed by Herceptin for 1 year.  She has been on adjuvant hormonal therapy and will complete recommended 10 years of treatment with anastrozole in 2025.    She has been receiving Prolia every 6 months for osteoporosis prevention.    Recent mammogram resulted with benign findings.  DEXA scan shows improvement in overall bone density and results are consistent with osteopenia.  She will proceed with Prolia today.  This will be her last dose.  I will discontinue Prolia after today since she will no longer continue on anastrozole therapy.  She will continue on calcium and vitamin D replacement.    Polly will follow-up with medical oncology on a yearly basis going forward.  She will be due for annual mammogram prior to her next follow-up visit in 2025.  DEXA scan due in 10/2026    I will see her back in 1 year with repeat blood work and mammogram.  Patient and her mother are in agreement with this plan of care.  These recommendations were also written down for her personal care facility.  Orders:    CBC and differential; Future    Comprehensive metabolic panel; Future    Mammo screening bilateral w 3d and cad; Future    Use of anastrozole (Arimidex)  Patient will complete 10 years of anastrozole in 2025.    She is recommended to complete current prescription and then may stop taking.       Encounter for screening mammogram for malignant neoplasm of breast    Orders:    Mammo screening bilateral w  3d and cad; Future        History of Present Illness   Chief Complaint   Patient presents with    Follow-up   Pat Callahan is a 59 y.o. female with a history of cerebral palsy and stage IA ER/TX positive, HER2 positive breast cancer s/p right mastectomy in 2013. She was treated adjuvant chemotherapy with TCH followed by Herceptin for one year, completed in 2/2015. She was then started on Tamoxifen, this was discontinued in 2018 when she was noted to be post-menopause. She was started on Arimidex and is currently maintained on 1 mg daily with goal to complete total of 10 years of therapy (combination of tamoxifen and Arimidex).   She will complete recommended 10 years of adjuvant AI in February 2025  She receives Prolia every 6 months for osteoporosis prevention    Polly presents today for routine 6-month follow-up along with her mother and personal care nurse.  She continues on daily anastrozole without any reported side effects.  Blood work done prior to today's visit without concern.  Surveillance mammogram completed on 12/5/2024 was benign.  DEXA scan done on 10/29/2024 shows findings consistent with osteopenia      Pertinent Medical History   Oncology History   Oncology History   Malignant neoplasm of overlapping sites of right breast in female, estrogen receptor positive (HCC)   2013 Surgery    Right breast mastectomy   Invasive breast carcinoma  Grade 2  2 foci:    TX 60/90  HER2 0/3+  Lymph nodes negative  Stage IA  Dr. Nazario     12/17/2013 -  Cancer Staged    Staging form: Breast, AJCC 7th Edition  - Pathologic stage from 12/17/2013: Stage IA (T1c(m), N0, cM0) - Signed by JESSE Light on 12/13/2023  Stage prefix: Initial diagnosis  Laterality: Right  Multiple tumors: Yes  Histologic grade (G): G2  Estrogen receptor status: Positive  Progesterone receptor status: Positive       2/2014 - 2/2015 Chemotherapy    TCH x 6 cycles  1 year of Herceptin  Dr. Guy     2/2015 -  Hormone Therapy     "Tamoxifen daily until 2018  Arimidex 1 mg daily since 2018  Dr. Guy     4/2022 Genetic Testing    A total of 36 genes were evaluated, including: MARIJA, BRCA1, BRCA2, CDH1, CHEK2, PALB2, PTEN, STK11, TP53  Negative result. No pathogenic sequence variants identified  Ambry        Review of Systems        Objective   /80 (BP Location: Left arm, Patient Position: Sitting, Cuff Size: Standard)   Pulse 81   Temp 97.9 °F (36.6 °C) (Temporal)   Ht 4' 6.5\" (1.384 m)   Wt 50.3 kg (111 lb)   SpO2 96%   BMI 26.27 kg/m²     ECOG   2  Physical Exam  Constitutional:       General: She is not in acute distress.     Appearance: Normal appearance.   HENT:      Head: Normocephalic and atraumatic.   Eyes:      General: No scleral icterus.        Right eye: No discharge.         Left eye: No discharge.      Conjunctiva/sclera: Conjunctivae normal.   Cardiovascular:      Rate and Rhythm: Normal rate and regular rhythm.   Pulmonary:      Effort: Pulmonary effort is normal. No respiratory distress.      Breath sounds: Normal breath sounds.   Abdominal:      General: Bowel sounds are normal. There is no distension.      Palpations: Abdomen is soft. There is no mass.      Tenderness: There is no abdominal tenderness.   Musculoskeletal:         General: Normal range of motion.   Lymphadenopathy:      Cervical: No cervical adenopathy.      Upper Body:      Right upper body: No supraclavicular, axillary or pectoral adenopathy.      Left upper body: No supraclavicular, axillary or pectoral adenopathy.   Skin:     General: Skin is warm and dry.   Neurological:      General: No focal deficit present.      Mental Status: She is alert and oriented to person, place, and time. Mental status is at baseline.   Psychiatric:         Mood and Affect: Mood normal.         Behavior: Behavior normal.         Labs: I have reviewed the following labs completed on 11/25/2024: CBC, CMP, iron panel, ferritin, MMA, TSH  Results are scanned      Radiology " Results Review: I have reviewed radiology reports from 12/5/2024 including: Screening mammogram.  IMPRESSION:  No mammographic evidence of malignancy.     ASSESSMENT/BI-RADS CATEGORY:  Left: 2 - Benign  Overall: 2 - Benign     RECOMMENDATION:       - Routine screening mammogram in 1 year for the left breast.    10/29/24 DEXA scan  MPRESSION:   1. Low bone mass (osteopenia).     2.  Since a DXA study from 10/17/2022, there has been:  A  STATISTICALLY SIGNIFICANT INCREASE in bone mineral density of 0.107 g/cm2 (13.7%) in the lumbar spine.    Administrative Statements   I have spent a total time of 30 minutes in caring for this patient on the day of the visit/encounter including Instructions for management, Patient and family education, Documenting in the medical record, Reviewing / ordering tests, medicine, procedures  , and Obtaining or reviewing history  .

## 2024-12-10 NOTE — ASSESSMENT & PLAN NOTE
Patient will complete 10 years of anastrozole in 2/2025.    She is recommended to complete current prescription and then may stop taking.

## 2024-12-10 NOTE — PSYCH
"Behavioral Health Psychotherapy Progress Note    Psychotherapy Provided: Individual Psychotherapy     1. Depression, recurrent (HCC)        2. Impulse control disorder        3. Obsessive-compulsive disorder, unspecified type            Goals addressed in session: All goals    DATA: Meche was present today with [facility] staff 'Brenda'.  We talked more about this clinician retiring and Meche transferring to another [facility] clinician for continued talk therapy support.  I gave  Brenda Henry's phone number to call to help with transferring.  We talked about Meche's behavior and how she is doing.  Meche said she is making an effort to be nice to others and Brenda confirmed that she sees Meche trying.  There were a few  incidents where Meche said she knows she wasn't nice to other people and said she will continue to work on this.    During this session, this clinician used the following therapeutic modalities: Supportive Psychotherapy    Substance Abuse was not addressed during this session. If the client is diagnosed with a co-occurring substance use disorder, please indicate any changes in the frequency or amount of use: N/A. Stage of change for addressing substance use diagnoses: No substance use/Not applicable    ASSESSMENT:  Meche Callahan presents with a Euthymic/ normal mood.     her affect is Normal range and intensity, which is congruent, with her mood and the content of the session. The client has made progress on their goals.    Meche Callahan presents with a none risk of suicide, none risk of self-harm, and none risk of harm to others.    For any risk assessment that surpasses a \"low\" rating, a safety plan must be developed.    A safety plan was indicated: no  If yes, describe in detail N/A    PLAN: Between sessions, Meche Callahan will work on having good behaviors and listening to [facility] staff when they are trying to help her. At the next session, the therapist will use Supportive Psychotherapy to address " goals/needs/concerns.    Behavioral Health Treatment Plan and Discharge Planning: Meche Callahan is aware of and agrees to continue to work on their treatment plan. They have identified and are working toward their discharge goals. yes    Depression Follow-up Plan Completed: Not applicable    Visit start and stop times:    12/10/24  Start Time: 1200  Stop Time: 1244  Total Visit Time: 44 minutes

## 2024-12-11 ENCOUNTER — OFFICE VISIT (OUTPATIENT)
Age: 59
End: 2024-12-11
Payer: MEDICARE

## 2024-12-11 ENCOUNTER — HOSPITAL ENCOUNTER (OUTPATIENT)
Dept: INFUSION CENTER | Facility: HOSPITAL | Age: 59
Discharge: HOME | End: 2024-12-11
Attending: INTERNAL MEDICINE
Payer: MEDICARE

## 2024-12-11 VITALS
SYSTOLIC BLOOD PRESSURE: 117 MMHG | HEART RATE: 81 BPM | HEIGHT: 55 IN | WEIGHT: 111 LBS | DIASTOLIC BLOOD PRESSURE: 80 MMHG | OXYGEN SATURATION: 96 % | BODY MASS INDEX: 25.69 KG/M2 | TEMPERATURE: 97.9 F

## 2024-12-11 VITALS
OXYGEN SATURATION: 96 % | DIASTOLIC BLOOD PRESSURE: 94 MMHG | TEMPERATURE: 97.1 F | SYSTOLIC BLOOD PRESSURE: 137 MMHG | HEART RATE: 75 BPM | RESPIRATION RATE: 18 BRPM

## 2024-12-11 DIAGNOSIS — C50.811 MALIGNANT NEOPLASM OF OVERLAPPING SITES OF RIGHT BREAST IN FEMALE, ESTROGEN RECEPTOR POSITIVE (HCC): Primary | ICD-10-CM

## 2024-12-11 DIAGNOSIS — M81.0 OSTEOPOROSIS, UNSPECIFIED OSTEOPOROSIS TYPE, UNSPECIFIED PATHOLOGICAL FRACTURE PRESENCE: ICD-10-CM

## 2024-12-11 DIAGNOSIS — Z79.811 USE OF ANASTROZOLE (ARIMIDEX): ICD-10-CM

## 2024-12-11 DIAGNOSIS — Z17.0 MALIGNANT NEOPLASM OF OVERLAPPING SITES OF RIGHT BREAST IN FEMALE, ESTROGEN RECEPTOR POSITIVE (HCC): Primary | ICD-10-CM

## 2024-12-11 DIAGNOSIS — Z12.31 ENCOUNTER FOR SCREENING MAMMOGRAM FOR MALIGNANT NEOPLASM OF BREAST: ICD-10-CM

## 2024-12-11 PROCEDURE — 96372 THER/PROPH/DIAG INJ SC/IM: CPT

## 2024-12-11 PROCEDURE — 99214 OFFICE O/P EST MOD 30 MIN: CPT | Performed by: NURSE PRACTITIONER

## 2024-12-11 RX ORDER — ANASTROZOLE 1 MG/1
TABLET ORAL
Qty: 31 TABLET | Refills: 0 | Status: SHIPPED | OUTPATIENT
Start: 2024-12-11

## 2024-12-11 RX ADMIN — DENOSUMAB 60 MG: 60 INJECTION SUBCUTANEOUS at 11:33

## 2024-12-11 NOTE — PROGRESS NOTES
Pat Callahan  tolerated treatment well with no complications.      Calcium 10.7, CrCl 56.3, no dental concerns.     Pat Callahan does not have any future infusion appointments--this was final prolia.     AVS printed and given to Pat Callahan:    No (Declined by Pat Callahan)

## 2024-12-18 ENCOUNTER — OFFICE VISIT (OUTPATIENT)
Dept: GASTROENTEROLOGY | Facility: CLINIC | Age: 59
End: 2024-12-18
Payer: MEDICARE

## 2024-12-18 VITALS
DIASTOLIC BLOOD PRESSURE: 85 MMHG | HEIGHT: 55 IN | SYSTOLIC BLOOD PRESSURE: 140 MMHG | BODY MASS INDEX: 24.76 KG/M2 | WEIGHT: 107 LBS

## 2024-12-18 DIAGNOSIS — K44.9 HIATAL HERNIA: ICD-10-CM

## 2024-12-18 DIAGNOSIS — Z86.0100 HISTORY OF COLON POLYPS: Primary | ICD-10-CM

## 2024-12-18 DIAGNOSIS — K21.9 GASTROESOPHAGEAL REFLUX DISEASE: ICD-10-CM

## 2024-12-18 PROCEDURE — 99214 OFFICE O/P EST MOD 30 MIN: CPT | Performed by: INTERNAL MEDICINE

## 2024-12-18 RX ORDER — PANTOPRAZOLE SODIUM 40 MG/1
40 TABLET, DELAYED RELEASE ORAL DAILY
Qty: 90 TABLET | Refills: 5 | Status: SHIPPED | OUTPATIENT
Start: 2024-12-18

## 2024-12-18 NOTE — PSYCH
Psychotherapy Provided: Individual Psychotherapy 50 minutes     Length of time in session: 50 minutes, follow up in 1 month  Encounter Diagnosis     ICD-10-CM    1  Mild intellectual disabilities  F70           Goals addressed in session: Goal 1     Pain:      none    0    Current suicide risk : Low   D:  Poli Brown was accompanied to her appointment today by [facility] [place] Staff "Pavel Rivera also sat in on the session to give input and feedback  Poli Brown said she is "egocentric" and knows that she should consider other people's feelings and needs but focuses a lot only on her own  Brenda and oPli Brown gave examples of when this happens and how the outcome of these situations turn out  A:  Full affect  No SI/HI/SA  Poli Brown was able to take ownership of her negative behaviors and expresses a desire to change them but has difficulty with follow-through  P:  Poli Brown said she will try to implement the "5 C's   caring, consideration, compassion    when she is with others  P:  Justice Young said she will try to have Poli Brown take some deep breaths when she's reacting negatively to "reset" and try again for a different outcome  Behavioral Health Treatment Plan [facility]: Diagnosis and Treatment Plan explained to Ysabel Klein relates understanding diagnosis and is agreeable to Treatment Plan   Yes     Visit start and stop times:    11/29/22  Start Time: 1000  Stop Time: 1050  Total Visit Time: 50 minutes
Moderate to severe acute illness with or without fever. Delay administration of vaccination until patient has been afebrile or illness resolved for 24hrs

## 2024-12-18 NOTE — PROGRESS NOTES
[facility] - Outpatient Follow-up Note  Pat Callahan 59 y.o. female MRN: [id number]  Encounter: [id number]    ASSESSMENT AND PLAN:      1. Gastroesophageal reflux disease  Asymptomatic.  On pantoprazole which I would continue.  I am not sure if what she is describing is true dysphagia.  She will be due for colonoscopy in the fall 2025 I would recommend an upper endoscopy at that time just to be sure there is no stricture.  Of note her iron deficiency anemia has resolved  - pantoprazole (PROTONIX) 40 mg tablet; Take 1 tablet (40 mg total) by mouth daily  Dispense: 90 tablet; Refill: 5    2. History of colon polyps (Primary)  History of this last colonoscopy November 2000 25-year recall    3. Hiatal hernia  History of this probably responsible for her previous iron deficiency.      Followup Appointment: 11 months  ______________________________________________________________________    Chief Complaint   Patient presents with    Follow-up     HPI: Patient is a very pleasant 59-year-old female with a history of cerebral palsy we follow with iron deficiency anemia presumably on the basis of a hiatal hernia diagnosed in endoscopy in 2020.  She is seen in the office accompanied by the staff they reports she is eating normally her weight is stabilized it actually has not changed much in a year.  She the patient occasionally complains of dysphagia but the staff does not notice anything.  Bowel movements are normal for her daily semiformed.  I did review the stool chart as well as her labs and medical occasion list.    Historical Information   Past Medical History:   Diagnosis Date    Breast cancer (HCC)     Breast cyst, left     last assessed 12/30/2013    Cerebral palsy (HCC)     Chronic GERD     Fibrocystic breast disease     last assessed 06/29/2012    Gastrointestinal hemorrhage     Glaucoma     History of chemotherapy     Hydrocephalus (HCC)      SHUNT IN PLACE    Scoliosis       Past Surgical History:   Procedure Laterality Date    BREAST BIOPSY Right     CATARACT EXTRACTION  06/14/2021    COLONOSCOPY      Description 04/13/2016-5 yrs.  Description 4 yr f/u d/t polyp and family history, onset 07/20/2012.    COLONOSCOPY      ESOPHAGOGASTRODUODENOSCOPY      description-04/13/2016 gastritis with antral nodule    EYE SURGERY      for relief of intraocular pressure    INCISIONAL BREAST BIOPSY      description 2008    MASTECTOMY Right     SENTINEL LYMPH NODE BIOPSY      STRABISMUS SURGERY      description 1973, 1974    UPPER GASTROINTESTINAL ENDOSCOPY  05/16/2019    Grade C erosive esophagitis.  7 mm nodule in the stomach    US GUIDED THYROID BIOPSY  12/13/2022     Social History     Substance and Sexual Activity   Alcohol Use No     Social History     Substance and Sexual Activity   Drug Use No     Social History     Tobacco Use   Smoking Status Never   Smokeless Tobacco Never     Family History   Problem Relation Age of Onset    Osteopenia Mother     Supraventricular tachycardia Mother     Heart attack Father 64        Acute MI    Coronary artery disease Father     Thyroid nodules Father     BRCA1 Negative Sister     Osteopenia Maternal Grandmother     Prostate cancer Maternal Grandfather     Leukemia Paternal Grandfather     Heart attack Maternal Uncle 50        acute MI, stent    Cancer Maternal Uncle         adenocarcinoma of oral cavity    Colon cancer Maternal Uncle 57    Hyperlipidemia Maternal Uncle     Prostate cancer Maternal Uncle     Pancreatic cancer Paternal Uncle     Prostate cancer Paternal Uncle     Coronary artery disease Family     Thyroid disease Family     BRCA1 Negative Cousin     Breast cancer Cousin          Current Outpatient Medications:     acetaminophen (TYLENOL) 500 mg tablet    anastrozole (ARIMIDEX) 1 mg tablet    Athletes Foot Powder Spray 1 % spray    BANOPHEN 25 MG capsule    calamine lotion    Calcium Carbonate-Vitamin D (OYSCO 500/D PO)    carBAMazepine  "(TEGretol) 200 mg tablet    Cholecalciferol (VITAMIN D) 2000 units tablet    Dentifrices (SENSODYNE PRONAMEL DT)    Diclofenac Sodium (VOLTAREN) 1 %    diphenhydrAMINE (BENADRYL) 25 mg tablet    docusate sodium (COLACE) 100 mg capsule    Emollient (Vaseline Intensive Care) LOTN    guaiFENesin (SILTUSSIN SA PO)    haloperidol (HALDOL) 2 mg tablet    ketoconazole (NIZORAL) 2 % shampoo    latanoprost (XALATAN) 0.005 % ophthalmic solution    levocetirizine (XYZAL) 5 MG tablet    lithium carbonate 300 mg capsule    loperamide (IMODIUM) 2 mg capsule    melatonin 1 mg    memantine (NAMENDA) 10 mg tablet    mineral oil-white petrolatum (LUBRIFRESH P.M.) ophthalmic ointment    NON FORMULARY    ondansetron (ZOFRAN) 4 mg tablet    OYSCO 500 + D 500-200 MG-UNIT per tablet    pantoprazole (PROTONIX) 40 mg tablet    polyethylene glycol (GLYCOLAX) 17 GM/SCOOP    Proctosol HC 2.5 % rectal cream    sodium chloride (TAMIKA 128) 5 % hypertonic ophthalmic solution    Sunscreens (SUNBLOCK LOTION SPF30 EX)    talc (Zeasorb)    timolol (TIMOPTIC) 0.5 % ophthalmic solution    Diclofenac Sodium (VOLTAREN) 1 %    LORazepam (ATIVAN) 0.5 mg tablet    LORazepam (ATIVAN) 1 mg tablet  Allergies   Allergen Reactions    Bactrim [Sulfamethoxazole-Trimethoprim]     Methylphenidate Hyperactivity     Reaction Date: 13May2011;     Sulfa Antibiotics     Thioridazine     Amphetamines     Chlorpromazine     Fexofenadine Hives     Reaction Date: 13May2011;     Medrogestone     Medroxyprogesterone Rash and Hives     Reaction Date: 13May2011;     Other Hives     Mellaril, thorazine    Trimethoprim      Reviewed medications and allergies and updated as indicated    PHYSICAL EXAM:    Blood pressure 140/85, height 4' 6.5\" (1.384 m), weight 48.5 kg (107 lb), not currently breastfeeding. Body mass index is 25.33 kg/m².  General Appearance: NAD, cooperative, alert  Eyes: Anicteric, conjunctiva pink  ENT:  Normocephalic, atraumatic, normal mucosa.    Neck:  Supple, " symmetrical, trachea midline  Resp:  Clear to auscultation bilaterally; no rales, rhonchi or wheezing; respirations unlabored   CV:  S1 S2, Regular rate and rhythm; no murmur, rub, or gallop.  GI:  Soft, non-tender, non-distended; normal bowel sounds; no masses, no organomegaly   Rectal: Deferred  Musculoskeletal: No cyanosis, clubbing or edema. Normal ROM.  Skin:  No jaundice, rashes, or lesions   Heme/Lymph: No palpable cervical lymphadenopathy  Psych: Normal affect, good eye contact  Neuro: No gross deficits, AAOx3    Lab Results:   Lab Results   Component Value Date    WBC 9.56 01/20/2022    HGB 12.9 01/20/2022    HCT 45.2 01/20/2022    MCV 97 01/20/2022     01/20/2022     Lab Results   Component Value Date     (L) 12/18/2013    K 3.8 11/16/2021     11/16/2021    CO2 28 11/16/2021    ANIONGAP 5 12/18/2013    BUN 9 11/16/2021    CREATININE 0.62 11/16/2021    GLUCOSE 127 12/18/2013    GLUF 97 11/16/2021    CALCIUM 8.8 11/16/2021    CORRECTEDCA 9.5 11/16/2021    AST 10 11/16/2021    ALT 18 11/16/2021    ALKPHOS 75 11/16/2021    EGFR 102 11/16/2021       Radiology Results:   Mammo screening left w 3d & cad  Result Date: 12/7/2024  Narrative: DIAGNOSIS: Visit for screening mammogram TECHNIQUE: Digital screening mammography was performed. Computer Aided Detection (CAD) analyzed all applicable images. COMPARISONS: Prior breast imaging dated: 12/04/2023, 12/04/2023, 07/14/2023, 01/05/2023, 11/17/2022, 11/16/2021, 08/19/2021, 07/17/2020, 05/28/2019, 05/22/2018, 05/22/2018, 11/21/2017, 11/21/2017, 11/18/2016, 11/16/2015, 04/08/2014, 11/06/2013, 10/28/2013, 10/17/2012, 10/13/2011, 10/04/2010, 02/17/2010, 07/24/2009, 01/14/2008, 01/09/2008, 10/30/2007, 10/23/2007, and 04/23/2007 RELEVANT HISTORY: Family Breast Cancer History: History of breast cancer in Cousin. Family Medical History: Family medical history includes BRCA1 Negative in 2 relatives (cousin, sister), breast cancer in cousin, and colon cancer  in maternal uncle. Personal History: Hormone history includes tamoxifen and other. Surgical history includes breast biopsy and mastectomy. Medical history includes fibrocystic breast, breast cancer, and history of chemotherapy. The patient is scheduled in a reminder system for screening mammography. 8-10% of cancers will be missed on mammography. Management of a palpable abnormality must be based on clinical grounds.  Patients will be notified of their results via letter from our facility. Accredited by [employer] and FDA. RISK ASSESSMENT: 5 Year Tyrer-Cuzick: 1.73% 10 Year Tyrer-Cuzick: 3.8% Lifetime Tyrer-Cuzick: 10.33% TISSUE DENSITY: The breasts are heterogeneously dense, which may obscure small masses. INDICATION: Pat Callahan is a 59 y.o. female presenting for screening mammography. FINDINGS: There are no suspicious masses, grouped microcalcifications or areas of unexplained architectural distortion. The skin and nipple areolar complex are unremarkable.  Benign calcifications noted.     Impression: No mammographic evidence of malignancy. ASSESSMENT/BI-RADS CATEGORY: Left: 2 - Benign Overall: 2 - Benign RECOMMENDATION:      - Routine screening mammogram in 1 year for the left breast. Workstation ID: [id number] Signed by:  Yuridia Lindquist MD

## 2024-12-19 ENCOUNTER — RESULTS FOLLOW-UP (OUTPATIENT)
Dept: FAMILY MEDICINE CLINIC | Facility: HOSPITAL | Age: 59
End: 2024-12-19

## 2024-12-19 DIAGNOSIS — E83.52 HYPERCALCEMIA: Primary | ICD-10-CM

## 2024-12-24 ENCOUNTER — TELEPHONE (OUTPATIENT)
Dept: PSYCHIATRY | Facility: CLINIC | Age: 59
End: 2024-12-24

## 2025-01-03 ENCOUNTER — APPOINTMENT (OUTPATIENT)
Dept: RADIOLOGY | Facility: HOSPITAL | Age: 60
End: 2025-01-03
Payer: MEDICARE

## 2025-01-03 ENCOUNTER — HOSPITAL ENCOUNTER (EMERGENCY)
Facility: HOSPITAL | Age: 60
Discharge: HOME | End: 2025-01-03
Attending: EMERGENCY MEDICINE
Payer: MEDICARE

## 2025-01-03 VITALS
DIASTOLIC BLOOD PRESSURE: 81 MMHG | OXYGEN SATURATION: 94 % | TEMPERATURE: 97.6 F | RESPIRATION RATE: 18 BRPM | SYSTOLIC BLOOD PRESSURE: 175 MMHG | HEART RATE: 79 BPM

## 2025-01-03 DIAGNOSIS — J18.9 PNEUMONIA: Primary | ICD-10-CM

## 2025-01-03 LAB
ALBUMIN SERPL BCG-MCNC: 4 G/DL (ref 3.5–5)
ALP SERPL-CCNC: 58 U/L (ref 34–104)
ALT SERPL W P-5'-P-CCNC: 13 U/L (ref 7–52)
ANION GAP SERPL CALCULATED.3IONS-SCNC: 6 MMOL/L (ref 4–13)
AST SERPL W P-5'-P-CCNC: 11 U/L (ref 13–39)
ATRIAL RATE: 75 BPM
BASOPHILS # BLD AUTO: 0.07 THOUSANDS/ΜL (ref 0–0.1)
BASOPHILS NFR BLD AUTO: 1 % (ref 0–1)
BILIRUB SERPL-MCNC: 0.25 MG/DL (ref 0.2–1)
BUN SERPL-MCNC: 8 MG/DL (ref 5–25)
CALCIUM SERPL-MCNC: 9.1 MG/DL (ref 8.4–10.2)
CARDIAC TROPONIN I PNL SERPL HS: 3 NG/L (ref ?–50)
CHLORIDE SERPL-SCNC: 102 MMOL/L (ref 96–108)
CO2 SERPL-SCNC: 32 MMOL/L (ref 21–32)
CREAT SERPL-MCNC: 0.58 MG/DL (ref 0.6–1.3)
EOSINOPHIL # BLD AUTO: 0.13 THOUSAND/ΜL (ref 0–0.61)
EOSINOPHIL NFR BLD AUTO: 1 % (ref 0–6)
ERYTHROCYTE [DISTWIDTH] IN BLOOD BY AUTOMATED COUNT: 12.7 % (ref 11.6–15.1)
GFR SERPL CREATININE-BSD FRML MDRD: 101 ML/MIN/1.73SQ M
GLUCOSE SERPL-MCNC: 103 MG/DL (ref 65–140)
HCT VFR BLD AUTO: 42.9 % (ref 34.8–46.1)
HGB BLD-MCNC: 13.3 G/DL (ref 11.5–15.4)
IMM GRANULOCYTES # BLD AUTO: 0.03 THOUSAND/UL (ref 0–0.2)
IMM GRANULOCYTES NFR BLD AUTO: 0 % (ref 0–2)
LYMPHOCYTES # BLD AUTO: 2.01 THOUSANDS/ΜL (ref 0.6–4.47)
LYMPHOCYTES NFR BLD AUTO: 18 % (ref 14–44)
MCH RBC QN AUTO: 30.7 PG (ref 26.8–34.3)
MCHC RBC AUTO-ENTMCNC: 31 G/DL (ref 31.4–37.4)
MCV RBC AUTO: 99 FL (ref 82–98)
MONOCYTES # BLD AUTO: 0.97 THOUSAND/ΜL (ref 0.17–1.22)
MONOCYTES NFR BLD AUTO: 8 % (ref 4–12)
NEUTROPHILS # BLD AUTO: 8.28 THOUSANDS/ΜL (ref 1.85–7.62)
NEUTS SEG NFR BLD AUTO: 72 % (ref 43–75)
NRBC BLD AUTO-RTO: 0 /100 WBCS
P AXIS: 43 DEGREES
PLATELET # BLD AUTO: 445 THOUSANDS/UL (ref 149–390)
PMV BLD AUTO: 10 FL (ref 8.9–12.7)
POTASSIUM SERPL-SCNC: 3.7 MMOL/L (ref 3.5–5.3)
PR INTERVAL: 130 MS
PROT SERPL-MCNC: 6.8 G/DL (ref 6.4–8.4)
QRS AXIS: 16 DEGREES
QRSD INTERVAL: 80 MS
QT INTERVAL: 400 MS
QTC INTERVAL: 447 MS
RBC # BLD AUTO: 4.33 MILLION/UL (ref 3.81–5.12)
SODIUM SERPL-SCNC: 140 MMOL/L (ref 135–147)
T WAVE AXIS: 69 DEGREES
VENTRICULAR RATE: 75 BPM
WBC # BLD AUTO: 11.49 THOUSAND/UL (ref 4.31–10.16)

## 2025-01-03 PROCEDURE — 84484 ASSAY OF TROPONIN QUANT: CPT | Performed by: EMERGENCY MEDICINE

## 2025-01-03 PROCEDURE — 93010 ELECTROCARDIOGRAM REPORT: CPT | Performed by: INTERNAL MEDICINE

## 2025-01-03 PROCEDURE — 85025 COMPLETE CBC W/AUTO DIFF WBC: CPT | Performed by: EMERGENCY MEDICINE

## 2025-01-03 PROCEDURE — 93005 ELECTROCARDIOGRAM TRACING: CPT

## 2025-01-03 PROCEDURE — 80053 COMPREHEN METABOLIC PANEL: CPT | Performed by: EMERGENCY MEDICINE

## 2025-01-03 PROCEDURE — 36415 COLL VENOUS BLD VENIPUNCTURE: CPT

## 2025-01-03 PROCEDURE — 99284 EMERGENCY DEPT VISIT MOD MDM: CPT | Performed by: EMERGENCY MEDICINE

## 2025-01-03 PROCEDURE — 71045 X-RAY EXAM CHEST 1 VIEW: CPT

## 2025-01-03 PROCEDURE — 99285 EMERGENCY DEPT VISIT HI MDM: CPT

## 2025-01-03 RX ORDER — AZITHROMYCIN 500 MG/1
500 TABLET, FILM COATED ORAL ONCE
Status: COMPLETED | OUTPATIENT
Start: 2025-01-03 | End: 2025-01-03

## 2025-01-03 RX ORDER — ACETAMINOPHEN 325 MG/1
975 TABLET ORAL ONCE
Status: COMPLETED | OUTPATIENT
Start: 2025-01-03 | End: 2025-01-03

## 2025-01-03 RX ORDER — AZITHROMYCIN 250 MG/1
TABLET, FILM COATED ORAL
Qty: 4 TABLET | Refills: 0 | Status: SHIPPED | OUTPATIENT
Start: 2025-01-03 | End: 2025-01-07

## 2025-01-03 RX ORDER — IBUPROFEN 600 MG/1
600 TABLET, FILM COATED ORAL ONCE
Status: COMPLETED | OUTPATIENT
Start: 2025-01-03 | End: 2025-01-03

## 2025-01-03 RX ADMIN — IBUPROFEN 600 MG: 600 TABLET, FILM COATED ORAL at 17:39

## 2025-01-03 RX ADMIN — ACETAMINOPHEN 975 MG: 325 TABLET, FILM COATED ORAL at 17:38

## 2025-01-03 RX ADMIN — AZITHROMYCIN DIHYDRATE 500 MG: 500 TABLET ORAL at 17:39

## 2025-01-03 NOTE — ED NOTES
ED provider instructed this RN that 2 hr Troponin does not need to be collected      Maricel Rodriguez, RN  01/03/25 1754

## 2025-01-04 NOTE — ED PROVIDER NOTES
HPI: Patient is a 59 y.o. female who presents with 1 days of cough and headache which the patient describes at mild The patient has had contact with people with similar symptoms.  The patient has not taken any medication.    Allergies   Allergen Reactions    Bactrim [Sulfamethoxazole-Trimethoprim]     Methylphenidate Hyperactivity     Reaction Date: 13May2011;     Sulfa Antibiotics     Thioridazine     Amphetamines     Chlorpromazine     Fexofenadine Hives     Reaction Date: 13May2011;     Medrogestone     Medroxyprogesterone Rash and Hives     Reaction Date: 13May2011;     Other Hives     Mellaril, thorazine    Trimethoprim        Past Medical History:   Diagnosis Date    Breast cancer (HCC)     Breast cyst, left     last assessed 12/30/2013    Cerebral palsy (HCC)     Chronic GERD     Fibrocystic breast disease     last assessed 06/29/2012    Gastrointestinal hemorrhage     Glaucoma     History of chemotherapy     Hydrocephalus (HCC)      SHUNT IN PLACE    Scoliosis       Past Surgical History:   Procedure Laterality Date    BREAST BIOPSY Right     CATARACT EXTRACTION  06/14/2021    COLONOSCOPY      Description 04/13/2016-5 yrs.  Description 4 yr f/u d/t polyp and family history, onset 07/20/2012.    COLONOSCOPY      ESOPHAGOGASTRODUODENOSCOPY      description-04/13/2016 gastritis with antral nodule    EYE SURGERY      for relief of intraocular pressure    INCISIONAL BREAST BIOPSY      description 2008    MASTECTOMY Right     SENTINEL LYMPH NODE BIOPSY      STRABISMUS SURGERY      description 1973, 1974    UPPER GASTROINTESTINAL ENDOSCOPY  05/16/2019    Grade C erosive esophagitis.  7 mm nodule in the stomach    US GUIDED THYROID BIOPSY  12/13/2022     Social History     Tobacco Use    Smoking status: Never    Smokeless tobacco: Never   Vaping Use    Vaping status: Never Used   Substance Use Topics    Alcohol use: No    Drug use: No       Nursing notes reviewed  Physical Exam:  ED Triage Vitals   Temperature  Pulse Respirations Blood Pressure SpO2   01/03/25 1344 01/03/25 1344 01/03/25 1344 01/03/25 1346 01/03/25 1344   97.6 °F (36.4 °C) 76 18 144/95 98 %      Temp Source Heart Rate Source Patient Position - Orthostatic VS BP Location FiO2 (%)   01/03/25 1344 01/03/25 1344 01/03/25 1744 01/03/25 1744 --   Temporal Monitor Lying Right arm       Pain Score       01/03/25 1738       5           ROS: Positive for headache, the remainder of a 10 organ system ROS was otherwise unremarkable.  General: awake, alert, no acute distress    Head: normocephalic, atraumatic    Eyes: no scleral icterus  Ears: external ears normal, hearing grossly intact  Nose: external exam grossly normal, negative nasal discharge  Neck: symmetric, No JVD noted, trachea midline  Pulmonary: no respiratory distress, no tachypnea noted  Cardiovascular: appears well perfused  Abdomen: no distention noted  Musculoskeletal: no deformities noted, tone normal  Neuro: grossly non-focal  Psych: mood and affect appropriate    The patient is stable and has a history and physical exam consistent with a viral illness. COVID19 testing has been performed.  I considered the patient's other medical conditions as applicable/noted above in my medical decision making.  The patient is stable upon discharge. The plan is for supportive care at home.    The patient (and any family present) verbalized understanding of the discharge instructions and warnings that would necessitate return to the Emergency Department.  All questions were answered prior to discharge.    Medications   ibuprofen (MOTRIN) tablet 600 mg (600 mg Oral Given 1/3/25 1739)   acetaminophen (TYLENOL) tablet 975 mg (975 mg Oral Given 1/3/25 1738)   azithromycin (ZITHROMAX) tablet 500 mg (500 mg Oral Given 1/3/25 1739)     Final diagnoses:   Pneumonia     Time reflects when diagnosis was documented in both MDM as applicable and the Disposition within this note       Time User Action Codes Description Comment     1/3/2025  5:45 PM Calvin Norris [J18.9] Pneumonia           ED Disposition       ED Disposition   Discharge    Condition   Stable    Date/Time   Fri Omer 3, 2025  5:45 PM    Comment   Pat Callahan discharge to home/self care.                   Follow-up Information       Follow up With Specialties Details Why Contact Info Additional Information    Wendy Thomas DO Internal Medicine, Family Medicine   [facility] [address] [address] [phone]        [facility] Emergency Medicine  If symptoms worsen [address] [facility] [address] [phone] [facility], [address] [place] [id number]          Discharge Medication List as of 1/3/2025  5:50 PM        START taking these medications    Details   azithromycin (ZITHROMAX) 250 mg tablet Take 1 tablet daily x 4 days starting 1/4/2025. First dose administered in emergency department., Normal           CONTINUE these medications which have NOT CHANGED    Details   acetaminophen (TYLENOL) 500 mg tablet Take 500 mg by mouth as needed for mild pain  , Historical Med      anastrozole (ARIMIDEX) 1 mg tablet TAKE 1 TABLET BY MOUTH AT BEDTIME DX:BREAST CANCER PREVENTION (WEAR GLOVES WHEN ADMINISTERING), Normal      Athletes Foot Powder Spray 1 % spray Apply topically as needed for rash, Starting Wed 2/7/2024, Historical Med      BANOPHEN 25 MG capsule Take 25 mg by mouth daily at bedtime as needed , Starting Mon 5/11/2020, Historical Med      calamine lotion Apply topically every 4 (four) hours as needed for itching, Historical Med      Calcium Carbonate-Vitamin D (OYSCO 500/D PO) Take by mouth daily, Historical Med      carBAMazepine (TEGretol) 200 mg tablet Take 1 tablet (200 mg total) by mouth 2 (two) times a day, Starting Fri 4/30/2021, No Print      Cholecalciferol (VITAMIN D) 2000 units tablet TAKE TWO TABLETS  BY MOUTH (4000IU) EVERY MORNING FOR VITAMIN DIFICIENCY, Normal      Dentifrices (SENSODYNE PRONAMEL DT) Apply to teeth , Historical Med      !! Diclofenac Sodium (VOLTAREN) 1 % Starting Tue 7/13/2021, Historical Med      !! Diclofenac Sodium (VOLTAREN) 1 % Historical Med      diphenhydrAMINE (BENADRYL) 25 mg tablet Take 25 mg by mouth daily at bedtime as needed for itching (for insomnia and allergy ** DO NOT TAKE WITH XYZAL **) , Historical Med      docusate sodium (COLACE) 100 mg capsule Take 100 mg by mouth 3 (three) times a week , Historical Med      Emollient (Vaseline Intensive Care) LOTN Historical Med      guaiFENesin (SILTUSSIN SA PO) Take by mouth Take 2 teaspoonful (10 ml) PRN 6 hrs for cough, Historical Med      haloperidol (HALDOL) 2 mg tablet Take 2 mg by mouth 2 (two) times a day 1 tab am and 2 tabs qhs, Historical Med      ketoconazole (NIZORAL) 2 % shampoo Historical Med      latanoprost (XALATAN) 0.005 % ophthalmic solution Administer 1 drop to both eyes, Historical Med      levocetirizine (XYZAL) 5 MG tablet TAKE ONE TABLET BY MOUTH EVERY DAY AS NEEDED FOR ALLERGIES *DO NOT TAKE WITH BENADRYL*, Normal      lithium carbonate 300 mg capsule Take 600 mg by mouth 2 (two) times a day, Starting Thu 10/22/2020, Historical Med      loperamide (IMODIUM) 2 mg capsule Take by mouth, Starting Thu 6/2/2011, Historical Med      !! LORazepam (ATIVAN) 0.5 mg tablet Take 1 mg by mouth daily at bedtime, Historical Med      !! LORazepam (ATIVAN) 1 mg tablet TAKE ONE TABLET BY MOUTH 30 MINUTES PRIOR TO PROCEDURE (SUCH AS: GYN, DENTAL, IMAGING, SKIN PROCEDURES, SUCH AS MOLE REMOVAL) FOR ANXIETY, Normal      melatonin 1 mg Take 5 mg by mouth daily at bedtime , Historical Med      memantine (NAMENDA) 10 mg tablet Take 1 tablet (10 mg total) by mouth 2 (two) times a day, Starting Wed 9/23/2020, Normal      mineral oil-white petrolatum (LUBRIFRESH P.M.) ophthalmic ointment 0.5 inches, Historical Med      NON FORMULARY ACT  FLUORIDE DENTAL RINSE   RINSE WITH 1 TBSP (15ML)2X A DAY, Historical Med      ondansetron (ZOFRAN) 4 mg tablet Take by mouth as needed  , Historical Med      OYSCO 500 + D 500-200 MG-UNIT per tablet Starting Wed 9/21/2022, Historical Med      pantoprazole (PROTONIX) 40 mg tablet Take 1 tablet (40 mg total) by mouth daily, Starting Wed 12/18/2024, Normal      polyethylene glycol (GLYCOLAX) 17 GM/SCOOP MIX 1 CAPFUL (17 GRAMS) IN 4- 8OZ OF LIQUID AND DRINK BY MOUTH ONCE DAILY AS NEEDED FOR CONSTIPATION, Normal      Proctosol HC 2.5 % rectal cream Apply topically As needed, Starting Wed 1/31/2024, Historical Med      sodium chloride (TAMIKA 128) 5 % hypertonic ophthalmic solution Apply 1 drop to eye 2 (two) times a day  , Historical Med      Sunscreens (SUNBLOCK LOTION SPF30 EX) Apply topically daily as needed, Historical Med      talc (Zeasorb) Apply topically once as needed for irritation Sprinkle powder on affected area of skin to help with excess moisture up to 2 times a day as needed ., Historical Med      timolol (TIMOPTIC) 0.5 % ophthalmic solution Administer 1 drop into the left eye 2 (two) times a day, Starting Fri 3/15/2024, Historical Med       !! - Potential duplicate medications found. Please discuss with provider.        No discharge procedures on file.    Electronically Signed by       Calvin Norris DO  01/03/25 7601

## 2025-01-08 ENCOUNTER — OFFICE VISIT (OUTPATIENT)
Dept: OBGYN CLINIC | Facility: CLINIC | Age: 60
End: 2025-01-08
Payer: MEDICARE

## 2025-01-08 VITALS — HEIGHT: 55 IN | BODY MASS INDEX: 25.22 KG/M2 | WEIGHT: 109 LBS

## 2025-01-08 DIAGNOSIS — M19.012 PRIMARY OSTEOARTHRITIS OF LEFT SHOULDER: Primary | ICD-10-CM

## 2025-01-08 PROCEDURE — 20610 DRAIN/INJ JOINT/BURSA W/O US: CPT | Performed by: PHYSICIAN ASSISTANT

## 2025-01-08 PROCEDURE — 99213 OFFICE O/P EST LOW 20 MIN: CPT | Performed by: ORTHOPAEDIC SURGERY

## 2025-01-08 RX ORDER — LIDOCAINE HYDROCHLORIDE 10 MG/ML
5 INJECTION, SOLUTION EPIDURAL; INFILTRATION; INTRACAUDAL; PERINEURAL
Status: COMPLETED | OUTPATIENT
Start: 2025-01-08 | End: 2025-01-08

## 2025-01-08 RX ORDER — BETAMETHASONE SODIUM PHOSPHATE AND BETAMETHASONE ACETATE 3; 3 MG/ML; MG/ML
6 INJECTION, SUSPENSION INTRA-ARTICULAR; INTRALESIONAL; INTRAMUSCULAR; SOFT TISSUE
Status: COMPLETED | OUTPATIENT
Start: 2025-01-08 | End: 2025-01-08

## 2025-01-08 RX ADMIN — LIDOCAINE HYDROCHLORIDE 5 ML: 10 INJECTION, SOLUTION EPIDURAL; INFILTRATION; INTRACAUDAL; PERINEURAL at 09:45

## 2025-01-08 RX ADMIN — BETAMETHASONE SODIUM PHOSPHATE AND BETAMETHASONE ACETATE 6 MG: 3; 3 INJECTION, SUSPENSION INTRA-ARTICULAR; INTRALESIONAL; INTRAMUSCULAR; SOFT TISSUE at 09:45

## 2025-01-08 NOTE — PROGRESS NOTES
Assessment:     1. Primary osteoarthritis of left shoulder          Plan:     Problem List Items Addressed This Visit          Musculoskeletal and Integument    Primary osteoarthritis of left shoulder - Primary    Findings are consistent with severe left glenohumeral osteoarthritis. Findings and treatment options discussed with patient. She continues to get good symptomatic relief for her cortisone injections. A repeat cortisone injection was performed at today's visit. Patient tolerated procedure well. Advised to apply cold compress today. Follow up in 3-4 months as needed if symptoms return.  All patient's questions were answered to her satisfaction.  This note is created using dictation transcription.  It may contain typographical errors, grammatical errors, improperly dictated words, background noise and other errors.         Relevant Medications    lidocaine (PF) (XYLOCAINE-MPF) 1 % injection 5 mL (Completed)    betamethasone acetate-betamethasone sodium phosphate (CELESTONE) injection 6 mg (Completed)    Other Relevant Orders    Large joint arthrocentesis: L glenohumeral (Completed)        Subjective:     Patient ID: Pat Callahan is a 59 y.o. female.  Chief Complaint:  59 y.o. female presents for follow up for left glenohumeral osteoarthritis. She is a resident at [facility], she is here with two caregivers.  She continues to get relief from the glenohumeral cortisone injections.  Last 1 was given 4 months ago.  It has since worn off and she is requesting a repeat cortisone injection today.    Allergy:  Allergies   Allergen Reactions    Bactrim [Sulfamethoxazole-Trimethoprim]     Methylphenidate Hyperactivity     Reaction Date: 13May2011;     Sulfa Antibiotics     Thioridazine     Amphetamines     Chlorpromazine     Fexofenadine Hives     Reaction Date: 13May2011;     Medrogestone     Medroxyprogesterone Rash and Hives     Reaction Date: 13May2011;     Other Hives     Mellaril, thorazine    Trimethoprim       Medications:  all current active meds have been reviewed  Past Medical History:  Past Medical History:   Diagnosis Date    Breast cancer (HCC)     Breast cyst, left     last assessed 12/30/2013    Cerebral palsy (HCC)     Chronic GERD     Fibrocystic breast disease     last assessed 06/29/2012    Gastrointestinal hemorrhage     Glaucoma     History of chemotherapy     Hydrocephalus (HCC)      SHUNT IN PLACE    Scoliosis      Past Surgical History:  Past Surgical History:   Procedure Laterality Date    BREAST BIOPSY Right     CATARACT EXTRACTION  06/14/2021    COLONOSCOPY      Description 04/13/2016-5 yrs.  Description 4 yr f/u d/t polyp and family history, onset 07/20/2012.    COLONOSCOPY      ESOPHAGOGASTRODUODENOSCOPY      description-04/13/2016 gastritis with antral nodule    EYE SURGERY      for relief of intraocular pressure    INCISIONAL BREAST BIOPSY      description 2008    MASTECTOMY Right     SENTINEL LYMPH NODE BIOPSY      STRABISMUS SURGERY      description 1973, 1974    UPPER GASTROINTESTINAL ENDOSCOPY  05/16/2019    Grade C erosive esophagitis.  7 mm nodule in the stomach    US GUIDED THYROID BIOPSY  12/13/2022     Family History:  Family History   Problem Relation Age of Onset    Osteopenia Mother     Supraventricular tachycardia Mother     Heart attack Father 64        Acute MI    Coronary artery disease Father     Thyroid nodules Father     BRCA1 Negative Sister     Osteopenia Maternal Grandmother     Prostate cancer Maternal Grandfather     Leukemia Paternal Grandfather     Heart attack Maternal Uncle 50        acute MI, stent    Cancer Maternal Uncle         adenocarcinoma of oral cavity    Colon cancer Maternal Uncle 57    Hyperlipidemia Maternal Uncle     Prostate cancer Maternal Uncle     Pancreatic cancer Paternal Uncle     Prostate cancer Paternal Uncle     Coronary artery disease Family     Thyroid disease Family     BRCA1 Negative Cousin     Breast cancer Cousin      Social  "History:  Social History     Substance and Sexual Activity   Alcohol Use No     Social History     Substance and Sexual Activity   Drug Use No     Social History     Tobacco Use   Smoking Status Never   Smokeless Tobacco Never     Review of Systems   Constitutional:  Negative for chills and fever.   HENT:  Negative for ear pain and sore throat.    Eyes:  Negative for pain and visual disturbance.   Respiratory:  Negative for cough and shortness of breath.    Cardiovascular:  Negative for chest pain and palpitations.   Gastrointestinal:  Negative for abdominal pain and vomiting.   Genitourinary:  Negative for dysuria and hematuria.   Musculoskeletal:  Positive for arthralgias. Negative for back pain.   Skin:  Negative for color change and rash.   Neurological:  Negative for seizures and syncope.   All other systems reviewed and are negative.        Objective:  BP Readings from Last 1 Encounters:   01/03/25 (!) 175/81      Wt Readings from Last 1 Encounters:   01/08/25 49.4 kg (109 lb)      BMI:   Estimated body mass index is 25.8 kg/m² as calculated from the following:    Height as of this encounter: 4' 6.5\" (1.384 m).    Weight as of this encounter: 49.4 kg (109 lb).  BSA:   Estimated body surface area is 1.34 meters squared as calculated from the following:    Height as of this encounter: 4' 6.5\" (1.384 m).    Weight as of this encounter: 49.4 kg (109 lb).   Physical Exam  Vitals and nursing note reviewed.   Constitutional:       Appearance: Normal appearance. She is well-developed.   HENT:      Head: Normocephalic and atraumatic.      Right Ear: External ear normal.      Left Ear: External ear normal.   Eyes:      General: No scleral icterus.     Extraocular Movements: Extraocular movements intact.      Conjunctiva/sclera: Conjunctivae normal.   Cardiovascular:      Rate and Rhythm: Normal rate.   Pulmonary:      Effort: Pulmonary effort is normal. No respiratory distress.   Musculoskeletal:      Cervical back: " Normal range of motion and neck supple.      Comments: See Ortho exam   Skin:     General: Skin is warm and dry.   Neurological:      General: No focal deficit present.      Mental Status: She is alert and oriented to person, place, and time.   Psychiatric:         Behavior: Behavior normal.       Left Shoulder Exam     Tenderness   The patient is experiencing no tenderness.     Range of Motion   Active abduction:  90 (with pain)   Forward flexion:  90 (with pain)   Left shoulder internal rotation 0 degrees: sacrum.     Muscle Strength   Abduction: 4/5   Internal rotation: 5/5   External rotation: 4/5   Biceps: 5/5     Tests   Cross arm: negative  Drop arm: negative    Other   Erythema: absent  Scars: absent  Sensation: normal  Pulse: present     Comments:  Crepitation with shoulder range of motion              Large joint arthrocentesis: L glenohumeral  Universal Protocol:  Consent: Verbal consent obtained.  Risks and benefits: risks, benefits and alternatives were discussed  Consent given by: patient  Patient understanding: patient states understanding of the procedure being performed  Site marked: the operative site was marked  Patient identity confirmed: verbally with patient  Supporting Documentation  Indications: pain   Procedure Details  Location: shoulder - L glenohumeral  Preparation: Patient was prepped and draped in the usual sterile fashion  Needle size: 22 G  Ultrasound guidance: no  Approach: posterior  Medications administered: 6 mg betamethasone acetate-betamethasone sodium phosphate 6 (3-3) mg/mL; 5 mL lidocaine (PF) 1 %    Patient tolerance: patient tolerated the procedure well with no immediate complications  Dressing:  Sterile dressing applied           No new images at today's visit.

## 2025-01-13 ENCOUNTER — OFFICE VISIT (OUTPATIENT)
Dept: FAMILY MEDICINE CLINIC | Facility: HOSPITAL | Age: 60
End: 2025-01-13
Payer: MEDICARE

## 2025-01-13 VITALS
HEIGHT: 55 IN | HEART RATE: 76 BPM | TEMPERATURE: 98.1 F | WEIGHT: 103.6 LBS | OXYGEN SATURATION: 95 % | DIASTOLIC BLOOD PRESSURE: 76 MMHG | BODY MASS INDEX: 23.97 KG/M2 | SYSTOLIC BLOOD PRESSURE: 118 MMHG

## 2025-01-13 DIAGNOSIS — J18.9 PNEUMONIA DUE TO INFECTIOUS ORGANISM, UNSPECIFIED LATERALITY, UNSPECIFIED PART OF LUNG: Primary | ICD-10-CM

## 2025-01-13 PROCEDURE — 99213 OFFICE O/P EST LOW 20 MIN: CPT

## 2025-01-13 NOTE — PROGRESS NOTES
"Name: Pat Callahan      : 1965      MRN: [id number]  Encounter Provider: Clint Licea MD  Encounter Date: 2025   Encounter department: [facility] [address]  :  Assessment & Plan  Pneumonia due to infectious organism, unspecified laterality, unspecified part of lung  Afebrile, asymptomatic, lungs clear to auscultation, completed azithromycin.  Pneumonia appears to be resolved, no further treatment necessary                History of Present Illness     HPI  Patient presents for follow-up after being treated in the emergency room for pneumonia.  She presents with a staff member from her home.  She reports completing her course of azithromycin.  She reports resolution of her symptoms, denies any shortness of breath, fever or chills.  She has no acute concerns    Review of Systems   Constitutional:  Negative for chills and fever.   Respiratory:  Negative for shortness of breath and wheezing.    Cardiovascular:  Negative for chest pain and palpitations.       Objective   /76   Pulse 76   Temp 98.1 °F (36.7 °C) (Tympanic)   Ht 4' 6.5\" (1.384 m)   Wt 47 kg (103 lb 9.6 oz)   SpO2 95%   BMI 24.52 kg/m²      Physical Exam  Constitutional:       General: She is not in acute distress.  Cardiovascular:      Rate and Rhythm: Normal rate and regular rhythm.      Heart sounds: Normal heart sounds. No murmur heard.  Pulmonary:      Effort: Pulmonary effort is normal. No respiratory distress.      Breath sounds: Normal breath sounds. No stridor. No wheezing, rhonchi or rales.   Skin:     Capillary Refill: Capillary refill takes less than 2 seconds.   Neurological:      Mental Status: She is alert and oriented to person, place, and time. Mental status is at baseline.   Psychiatric:         Mood and Affect: Mood normal.         Behavior: Behavior normal.         "

## 2025-01-15 ENCOUNTER — TELEPHONE (OUTPATIENT)
Dept: SURGICAL ONCOLOGY | Facility: CLINIC | Age: 60
End: 2025-01-15

## 2025-01-15 NOTE — TELEPHONE ENCOUNTER
Left message for patient to call our office to reschedule her missed appointment with Suri Najera on 1/15/2025.

## 2025-01-17 ENCOUNTER — OFFICE VISIT (OUTPATIENT)
Dept: SURGICAL ONCOLOGY | Facility: CLINIC | Age: 60
End: 2025-01-17
Payer: MEDICARE

## 2025-01-17 VITALS
OXYGEN SATURATION: 95 % | BODY MASS INDEX: 23.84 KG/M2 | HEIGHT: 55 IN | HEART RATE: 80 BPM | SYSTOLIC BLOOD PRESSURE: 138 MMHG | TEMPERATURE: 97.3 F | WEIGHT: 103 LBS | DIASTOLIC BLOOD PRESSURE: 76 MMHG

## 2025-01-17 DIAGNOSIS — Z17.0 MALIGNANT NEOPLASM OF OVERLAPPING SITES OF RIGHT BREAST IN FEMALE, ESTROGEN RECEPTOR POSITIVE (HCC): Primary | ICD-10-CM

## 2025-01-17 DIAGNOSIS — C50.811 MALIGNANT NEOPLASM OF OVERLAPPING SITES OF RIGHT BREAST IN FEMALE, ESTROGEN RECEPTOR POSITIVE (HCC): Primary | ICD-10-CM

## 2025-01-17 PROCEDURE — 99213 OFFICE O/P EST LOW 20 MIN: CPT

## 2025-01-17 RX ORDER — IBUPROFEN 400 MG/1
TABLET, FILM COATED ORAL
COMMUNITY
Start: 2025-01-16

## 2025-01-17 RX ORDER — ANASTROZOLE 1 MG/1
1 TABLET ORAL DAILY
COMMUNITY

## 2025-01-17 NOTE — ASSESSMENT & PLAN NOTE
No evidence of disease recurrence at this time.  No further follow-up in my office is necessary.  She will continues to see her gynecologist annually and will see Medical Oncology as well. Recommend continued annual mammograms.

## 2025-01-17 NOTE — PROGRESS NOTES
Name: Pat Callahan      : 1965      MRN: [id number]  Encounter Provider: JESSE Light  Encounter Date: 2025   Encounter department: [facility]  :  Assessment & Plan  Malignant neoplasm of overlapping sites of right breast in female, estrogen receptor positive (HCC)  No evidence of disease recurrence at this time.  No further follow-up in my office is necessary.  She will continues to see her gynecologist annually and will see Medical Oncology as well. Recommend continued annual mammograms.           History of Present Illness   HPI  Pat Callahan is a 59 y.o. female who presents today in follow-up for her history of breast cancer.  She is currently MARKUS at 12 years and doing well.  She has no new complaints today, and has not noticed any changes in her breasts.  Left breast screening mammogram was performed on , BIRADS-2, category c density.      Oncology History   Malignant neoplasm of overlapping sites of right breast in female, estrogen receptor positive (HCC)    Surgery    Right breast mastectomy   Invasive breast carcinoma  Grade 2  2 foci:    IL 60/90  HER2 0/3+  Lymph nodes negative  Stage IA  Dr. Nazario     2013 -  Cancer Staged    Staging form: Breast, AJCC 7th Edition  - Pathologic stage from 2013: Stage IA (T1c(m), N0, cM0) - Signed by JESSE Light on 2023  Stage prefix: Initial diagnosis  Laterality: Right  Multiple tumors: Yes  Histologic grade (G): G2  Estrogen receptor status: Positive  Progesterone receptor status: Positive       2014 - 2015 Chemotherapy    TCH x 6 cycles  1 year of Herceptin  Dr. Guy     2015 -  Hormone Therapy    Tamoxifen daily until   Arimidex 1 mg daily since   Dr. Guy     2022 Genetic Testing    A total of 36 genes were evaluated, including: MARIJA, BRCA1, BRCA2, CDH1, CHEK2, PALB2, PTEN, STK11, TP53  Negative result. No pathogenic sequence variants  "identified  Ambry        Review of Systems   Constitutional:  Negative for activity change, appetite change, fatigue and unexpected weight change.   HENT: Negative.     Gastrointestinal: Negative.    Skin: Negative.  Negative for color change.   Neurological: Negative.    Hematological: Negative.  Negative for adenopathy.   Psychiatric/Behavioral: Negative.            Objective   /76   Pulse 80   Temp (!) 97.3 °F (36.3 °C)   Ht 4' 6.5\" (1.384 m)   Wt 46.7 kg (103 lb)   SpO2 95%   BMI 24.38 kg/m²      Physical Exam  Vitals reviewed.   Constitutional:       General: She is not in acute distress.     Appearance: Normal appearance. She is normal weight. She is not ill-appearing or toxic-appearing.   Eyes:      General: No scleral icterus.  Cardiovascular:      Rate and Rhythm: Normal rate.   Pulmonary:      Effort: Pulmonary effort is normal.   Chest:   Breasts:     Right: Absent.      Left: Normal.          Comments: Left breast is generally smooth. There are no dominant masses, nodules, skin changes or tenderness present bilaterally. I do not appreciate any adenopathy.  Musculoskeletal:         General: Normal range of motion.      Cervical back: Normal range of motion and neck supple.   Lymphadenopathy:      Cervical: No cervical adenopathy.      Upper Body:      Right upper body: No supraclavicular, axillary or pectoral adenopathy.      Left upper body: No supraclavicular, axillary or pectoral adenopathy.   Skin:     General: Skin is warm and dry.   Neurological:      General: No focal deficit present.      Mental Status: She is alert and oriented to person, place, and time.   Psychiatric:         Mood and Affect: Mood normal.         Behavior: Behavior normal.         Thought Content: Thought content normal.         Judgment: Judgment normal.           "

## 2025-01-28 ENCOUNTER — APPOINTMENT (EMERGENCY)
Dept: RADIOLOGY | Facility: HOSPITAL | Age: 60
End: 2025-01-28
Payer: MEDICARE

## 2025-01-28 ENCOUNTER — HOSPITAL ENCOUNTER (EMERGENCY)
Facility: HOSPITAL | Age: 60
Discharge: HOME | End: 2025-01-28
Attending: EMERGENCY MEDICINE
Payer: MEDICARE

## 2025-01-28 ENCOUNTER — APPOINTMENT (EMERGENCY)
Dept: CT IMAGING | Facility: HOSPITAL | Age: 60
End: 2025-01-28
Payer: MEDICARE

## 2025-01-28 VITALS
RESPIRATION RATE: 16 BRPM | OXYGEN SATURATION: 98 % | HEART RATE: 68 BPM | DIASTOLIC BLOOD PRESSURE: 84 MMHG | SYSTOLIC BLOOD PRESSURE: 120 MMHG | TEMPERATURE: 98.4 F

## 2025-01-28 DIAGNOSIS — R42 DIZZINESS: Primary | ICD-10-CM

## 2025-01-28 LAB
ALBUMIN SERPL BCG-MCNC: 4.1 G/DL (ref 3.5–5)
ALP SERPL-CCNC: 52 U/L (ref 34–104)
ALT SERPL W P-5'-P-CCNC: 14 U/L (ref 7–52)
ANION GAP SERPL CALCULATED.3IONS-SCNC: 6 MMOL/L (ref 4–13)
AST SERPL W P-5'-P-CCNC: 18 U/L (ref 13–39)
BACTERIA UR QL AUTO: NORMAL /HPF
BASOPHILS # BLD AUTO: 0.09 THOUSANDS/ΜL (ref 0–0.1)
BASOPHILS NFR BLD AUTO: 1 % (ref 0–1)
BILIRUB SERPL-MCNC: 0.3 MG/DL (ref 0.2–1)
BILIRUB UR QL STRIP: NEGATIVE
BNP SERPL-MCNC: 60 PG/ML (ref 0–100)
BUN SERPL-MCNC: 10 MG/DL (ref 5–25)
CALCIUM SERPL-MCNC: 9.2 MG/DL (ref 8.4–10.2)
CARDIAC TROPONIN I PNL SERPL HS: 4 NG/L (ref ?–50)
CHLORIDE SERPL-SCNC: 97 MMOL/L (ref 96–108)
CLARITY UR: CLEAR
CO2 SERPL-SCNC: 31 MMOL/L (ref 21–32)
COLOR UR: COLORLESS
CREAT SERPL-MCNC: 0.52 MG/DL (ref 0.6–1.3)
EOSINOPHIL # BLD AUTO: 0.15 THOUSAND/ΜL (ref 0–0.61)
EOSINOPHIL NFR BLD AUTO: 1 % (ref 0–6)
ERYTHROCYTE [DISTWIDTH] IN BLOOD BY AUTOMATED COUNT: 12.8 % (ref 11.6–15.1)
GFR SERPL CREATININE-BSD FRML MDRD: 105 ML/MIN/1.73SQ M
GLUCOSE SERPL-MCNC: 98 MG/DL (ref 65–140)
GLUCOSE UR STRIP-MCNC: NEGATIVE MG/DL
HCT VFR BLD AUTO: 41.1 % (ref 34.8–46.1)
HGB BLD-MCNC: 12.9 G/DL (ref 11.5–15.4)
HGB UR QL STRIP.AUTO: NEGATIVE
IMM GRANULOCYTES # BLD AUTO: 0.06 THOUSAND/UL (ref 0–0.2)
IMM GRANULOCYTES NFR BLD AUTO: 1 % (ref 0–2)
KETONES UR STRIP-MCNC: NEGATIVE MG/DL
LEUKOCYTE ESTERASE UR QL STRIP: ABNORMAL
LYMPHOCYTES # BLD AUTO: 2.48 THOUSANDS/ΜL (ref 0.6–4.47)
LYMPHOCYTES NFR BLD AUTO: 21 % (ref 14–44)
MCH RBC QN AUTO: 30.7 PG (ref 26.8–34.3)
MCHC RBC AUTO-ENTMCNC: 31.4 G/DL (ref 31.4–37.4)
MCV RBC AUTO: 98 FL (ref 82–98)
MONOCYTES # BLD AUTO: 1.19 THOUSAND/ΜL (ref 0.17–1.22)
MONOCYTES NFR BLD AUTO: 10 % (ref 4–12)
NEUTROPHILS # BLD AUTO: 7.97 THOUSANDS/ΜL (ref 1.85–7.62)
NEUTS SEG NFR BLD AUTO: 66 % (ref 43–75)
NITRITE UR QL STRIP: NEGATIVE
NON-SQ EPI CELLS URNS QL MICRO: NORMAL /HPF
NRBC BLD AUTO-RTO: 0 /100 WBCS
PH UR STRIP.AUTO: 7 [PH]
PLATELET # BLD AUTO: 379 THOUSANDS/UL (ref 149–390)
PMV BLD AUTO: 10.7 FL (ref 8.9–12.7)
POTASSIUM SERPL-SCNC: 3.5 MMOL/L (ref 3.5–5.3)
PROT SERPL-MCNC: 7 G/DL (ref 6.4–8.4)
PROT UR STRIP-MCNC: NEGATIVE MG/DL
RBC # BLD AUTO: 4.2 MILLION/UL (ref 3.81–5.12)
RBC #/AREA URNS AUTO: NORMAL /HPF
SODIUM SERPL-SCNC: 134 MMOL/L (ref 135–147)
SP GR UR STRIP.AUTO: <1.005 (ref 1–1.03)
UROBILINOGEN UR STRIP-ACNC: <2 MG/DL
WBC # BLD AUTO: 11.94 THOUSAND/UL (ref 4.31–10.16)
WBC #/AREA URNS AUTO: NORMAL /HPF

## 2025-01-28 PROCEDURE — 71045 X-RAY EXAM CHEST 1 VIEW: CPT

## 2025-01-28 PROCEDURE — 81001 URINALYSIS AUTO W/SCOPE: CPT | Performed by: EMERGENCY MEDICINE

## 2025-01-28 PROCEDURE — 93005 ELECTROCARDIOGRAM TRACING: CPT

## 2025-01-28 PROCEDURE — 99285 EMERGENCY DEPT VISIT HI MDM: CPT | Performed by: EMERGENCY MEDICINE

## 2025-01-28 PROCEDURE — 36415 COLL VENOUS BLD VENIPUNCTURE: CPT | Performed by: EMERGENCY MEDICINE

## 2025-01-28 PROCEDURE — 83880 ASSAY OF NATRIURETIC PEPTIDE: CPT | Performed by: EMERGENCY MEDICINE

## 2025-01-28 PROCEDURE — 80053 COMPREHEN METABOLIC PANEL: CPT | Performed by: EMERGENCY MEDICINE

## 2025-01-28 PROCEDURE — 99285 EMERGENCY DEPT VISIT HI MDM: CPT

## 2025-01-28 PROCEDURE — 70450 CT HEAD/BRAIN W/O DYE: CPT

## 2025-01-28 PROCEDURE — 96360 HYDRATION IV INFUSION INIT: CPT

## 2025-01-28 PROCEDURE — 85025 COMPLETE CBC W/AUTO DIFF WBC: CPT | Performed by: EMERGENCY MEDICINE

## 2025-01-28 PROCEDURE — 84484 ASSAY OF TROPONIN QUANT: CPT | Performed by: EMERGENCY MEDICINE

## 2025-01-28 RX ADMIN — SODIUM CHLORIDE 1000 ML: 0.9 INJECTION, SOLUTION INTRAVENOUS at 13:44

## 2025-01-28 NOTE — Clinical Note
Pat Callahan was seen and treated in our emergency department on 1/28/2025.                Diagnosis:     Pat  may return to work on return date.    She may return on this date: 01/29/2025         If you have any questions or concerns, please don't hesitate to call.      Bib Jackson, DO    ______________________________           _______________          _______________  Hospital Representative                              Date                                Time

## 2025-01-29 LAB
ATRIAL RATE: 80 BPM
P AXIS: 51 DEGREES
PR INTERVAL: 130 MS
QRS AXIS: 34 DEGREES
QRSD INTERVAL: 84 MS
QT INTERVAL: 394 MS
QTC INTERVAL: 455 MS
T WAVE AXIS: 74 DEGREES
VENTRICULAR RATE: 80 BPM

## 2025-01-29 PROCEDURE — 93010 ELECTROCARDIOGRAM REPORT: CPT | Performed by: INTERNAL MEDICINE

## 2025-01-29 NOTE — ED PROVIDER NOTES
Time reflects when diagnosis was documented in both MDM as applicable and the Disposition within this note       Time User Action Codes Description Comment    1/28/2025  2:28 PM Bib Jackson Add [R42] Dizziness           ED Disposition       ED Disposition   Discharge    Condition   Stable    Date/Time   Tue Jan 28, 2025  2:28 PM    Comment   Pat Callahan discharge to home/self care.                   Assessment & Plan       Medical Decision Making  59-year-old female presenting for concerns of dizziness, has history of shunt.  CT scan shows no abnormalities concerning for overt drainage or obstruction.  Patient remained at neurologic baseline in the ED.  Ambulated without difficulty.  EKG and other electrolytes were within normal limits.  No obvious source for patient's symptoms.  Already has follow-up with neurology which was they are encouraged to keep.  Will return to ED with any worsening symptoms.    Amount and/or Complexity of Data Reviewed  Labs: ordered. Decision-making details documented in ED Course.  Radiology: ordered.        ED Course as of 01/29/25 1256   Tue Jan 28, 2025   1307 hs TnI 0hr: 4   1339 Procedure Note: EKG  Date/Time: 01/28/25 1:39 PM   Performed by: Louie Jackson  Authorized by: Louie Jackson  ECG interpreted by me, the ED Provider: yes   The EKG demonstrates:  Rate 80  Rhythm sinus  QTc 472  No ST elevations/depressions       1359 EKG and troponin okay, labs grossly unremarkable, awaiting urinalysis will treat for UTI if positive.  Otherwise CT scan without acute abnormalities, shunt in place.  Updated caregiver at bedside.  Feel comfortable taking patient home and already has neurology follow-up within 2 weeks.       Medications   sodium chloride 0.9 % bolus 1,000 mL (0 mL Intravenous Stopped 1/28/25 1449)       ED Risk Strat Scores                          SBIRT 20yo+      Flowsheet Row Most Recent Value   Initial Alcohol Screen: US AUDIT-C     1. How often do you have a drink  containing alcohol? 0 Filed at: 01/28/2025 1138   2. How many drinks containing alcohol do you have on a typical day you are drinking?  0 Filed at: 01/28/2025 1138   3a. Male UNDER 65: How often do you have five or more drinks on one occasion? 0 Filed at: 01/28/2025 1138   3b. FEMALE Any Age, or MALE 65+: How often do you have 4 or more drinks on one occassion? 0 Filed at: 01/28/2025 1138   Audit-C Score 0 Filed at: 01/28/2025 1138   IVETH: How many times in the past year have you...    Used an illegal drug or used a prescription medication for non-medical reasons? Never Filed at: 01/28/2025 1138                            History of Present Illness       Chief Complaint   Patient presents with    Dizziness     Patient presents to ED x1 week, patient reports having chest pain start today. Patient poor historian luis       Past Medical History:   Diagnosis Date    Breast cancer (HCC)     Breast cyst, left     last assessed 12/30/2013    Cerebral palsy (HCC)     Chronic GERD     Fibrocystic breast disease     last assessed 06/29/2012    Gastrointestinal hemorrhage     Glaucoma     History of chemotherapy     Hydrocephalus (HCC)      SHUNT IN PLACE    Scoliosis       Past Surgical History:   Procedure Laterality Date    BREAST BIOPSY Right     CATARACT EXTRACTION  06/14/2021    COLONOSCOPY      Description 04/13/2016-5 yrs.  Description 4 yr f/u d/t polyp and family history, onset 07/20/2012.    COLONOSCOPY      ESOPHAGOGASTRODUODENOSCOPY      description-04/13/2016 gastritis with antral nodule    EYE SURGERY      for relief of intraocular pressure    INCISIONAL BREAST BIOPSY      description 2008    MASTECTOMY Right     SENTINEL LYMPH NODE BIOPSY      STRABISMUS SURGERY      description 1973, 1974    UPPER GASTROINTESTINAL ENDOSCOPY  05/16/2019    Grade C erosive esophagitis.  7 mm nodule in the stomach    US GUIDED THYROID BIOPSY  12/13/2022      Family History   Problem Relation Age of Onset    Osteopenia Mother      Supraventricular tachycardia Mother     Heart attack Father 64        Acute MI    Coronary artery disease Father     Thyroid nodules Father     BRCA1 Negative Sister     Osteopenia Maternal Grandmother     Prostate cancer Maternal Grandfather     Leukemia Paternal Grandfather     Heart attack Maternal Uncle 50        acute MI, stent    Cancer Maternal Uncle         adenocarcinoma of oral cavity    Colon cancer Maternal Uncle 57    Hyperlipidemia Maternal Uncle     Prostate cancer Maternal Uncle     Pancreatic cancer Paternal Uncle     Prostate cancer Paternal Uncle     Coronary artery disease Family     Thyroid disease Family     BRCA1 Negative Cousin     Breast cancer Cousin       Social History     Tobacco Use    Smoking status: Never    Smokeless tobacco: Never   Vaping Use    Vaping status: Never Used   Substance Use Topics    Alcohol use: No    Drug use: No      E-Cigarette/Vaping    E-Cigarette Use Never User       E-Cigarette/Vaping Substances    Nicotine No     THC No     CBD No     Flavoring No     Other No     Unknown No       I have reviewed and agree with the history as documented.     Patient presents with:  Dizziness: Patient presents to ED x1 week, patient reports having chest pain start today. Patient cate smith    59-year-old female here with caregiver for evaluation of dizziness.  Caregiver states that patient had episode where she had facial flushing, appeared to then become somnolent and step 2.  No confusion or postictal state.  Patient has had several similar episodes in the past.  Is already scheduled to see neurology in the patient setting.  Otherwise no acute changes no trauma no fevers no vomiting or diarrhea.  Patient is back at baseline per caregiver at the bedside.      Dizziness      Review of Systems   Neurological:  Positive for dizziness.           Objective       ED Triage Vitals [01/28/25 1137]   Temperature Pulse Blood Pressure Respirations SpO2 Patient Position -  Orthostatic VS   98.4 °F (36.9 °C) 68 120/84 16 98 % Lying      Temp Source Heart Rate Source BP Location FiO2 (%) Pain Score    Oral Monitor Left arm -- No Pain      Vitals      Date and Time Temp Pulse SpO2 Resp BP Pain Score FACES Pain Rating User   01/28/25 1303 -- -- -- -- -- No Pain -- EW   01/28/25 1137 98.4 °F (36.9 °C) 68 98 % 16 120/84 No Pain -- EW            Physical Exam  Vitals and nursing note reviewed.   Constitutional:       General: She is not in acute distress.     Appearance: She is well-developed.   HENT:      Head: Normocephalic and atraumatic.   Eyes:      Conjunctiva/sclera: Conjunctivae normal.      Pupils: Pupils are equal, round, and reactive to light.   Neck:      Trachea: No tracheal deviation.   Cardiovascular:      Rate and Rhythm: Normal rate and regular rhythm.   Pulmonary:      Effort: Pulmonary effort is normal. No respiratory distress.      Breath sounds: Normal breath sounds. No wheezing or rales.   Abdominal:      General: Bowel sounds are normal. There is no distension.      Palpations: Abdomen is soft.      Tenderness: There is no abdominal tenderness. There is no guarding or rebound.   Musculoskeletal:         General: No tenderness or deformity. Normal range of motion.      Cervical back: Normal range of motion and neck supple.   Skin:     General: Skin is warm and dry.      Capillary Refill: Capillary refill takes less than 2 seconds.      Findings: No rash.   Neurological:      Mental Status: She is alert and oriented to person, place, and time. Mental status is at baseline.      Cranial Nerves: Cranial nerves 2-12 are intact.      Sensory: Sensation is intact.      Motor: No weakness or tremor.      Gait: Gait normal.      Comments: Per caregiver patient has very slight asymmetry to mouth, left side lower than right.  This is baseline.   Psychiatric:         Behavior: Behavior normal.         Results Reviewed       Procedure Component Value Units Date/Time    Urine  Microscopic [[id number]]  (Normal) Collected: 01/28/25 1351    Lab Status: Final result Specimen: Urine, Other Updated: 01/28/25 1423     RBC, UA None Seen /hpf      WBC, UA 2-4 /hpf      Epithelial Cells Occasional /hpf      Bacteria, UA Occasional /hpf     UA (URINE) with reflex to Scope [[id number]]  (Abnormal) Collected: 01/28/25 1351    Lab Status: Final result Specimen: Urine, Other Updated: 01/28/25 1408     Color, UA Colorless     Clarity, UA Clear     Specific Gravity, UA <1.005     pH, UA 7.0     Leukocytes, UA Small     Nitrite, UA Negative     Protein, UA Negative mg/dl      Glucose, UA Negative mg/dl      Ketones, UA Negative mg/dl      Urobilinogen, UA <2.0 mg/dl      Bilirubin, UA Negative     Occult Blood, UA Negative    B-Type Natriuretic Peptide(BNP) [[id number]]  (Normal) Collected: 01/28/25 1232    Lab Status: Final result Specimen: Blood from Arm, Left Updated: 01/28/25 1305     BNP 60 pg/mL     HS Troponin 0hr (reflex protocol) [[id number]]  (Normal) Collected: 01/28/25 1232    Lab Status: Final result Specimen: Blood from Arm, Left Updated: 01/28/25 1302     hs TnI 0hr 4 ng/L     Comprehensive metabolic panel [[id number]]  (Abnormal) Collected: 01/28/25 1232    Lab Status: Final result Specimen: Blood from Arm, Left Updated: 01/28/25 1257     Sodium 134 mmol/L      Potassium 3.5 mmol/L      Chloride 97 mmol/L      CO2 31 mmol/L      ANION GAP 6 mmol/L      BUN 10 mg/dL      Creatinine 0.52 mg/dL      Glucose 98 mg/dL      Calcium 9.2 mg/dL      AST 18 U/L      ALT 14 U/L      Alkaline Phosphatase 52 U/L      Total Protein 7.0 g/dL      Albumin 4.1 g/dL      Total Bilirubin 0.30 mg/dL      eGFR 105 ml/min/1.73sq m     Narrative:      [employer] guidelines for Chronic Kidney Disease (CKD):     Stage 1 with normal or high GFR (GFR > 90 mL/min/1.73 square meters)    Stage 2 Mild CKD (GFR = 60-89 mL/min/1.73 square meters)    Stage 3A Moderate CKD (GFR = 45-59 mL/min/1.73 square  meters)    Stage 3B Moderate CKD (GFR = 30-44 mL/min/1.73 square meters)    Stage 4 Severe CKD (GFR = 15-29 mL/min/1.73 square meters)    Stage 5 End Stage CKD (GFR <15 mL/min/1.73 square meters)  Note: GFR calculation is accurate only with a steady state creatinine    CBC and differential [[id number]]  (Abnormal) Collected: 01/28/25 1232    Lab Status: Final result Specimen: Blood from Arm, Left Updated: 01/28/25 1240     WBC 11.94 Thousand/uL      RBC 4.20 Million/uL      Hemoglobin 12.9 g/dL      Hematocrit 41.1 %      MCV 98 fL      MCH 30.7 pg      MCHC 31.4 g/dL      RDW 12.8 %      MPV 10.7 fL      Platelets 379 Thousands/uL      nRBC 0 /100 WBCs      Segmented % 66 %      Immature Grans % 1 %      Lymphocytes % 21 %      Monocytes % 10 %      Eosinophils Relative 1 %      Basophils Relative 1 %      Absolute Neutrophils 7.97 Thousands/µL      Absolute Immature Grans 0.06 Thousand/uL      Absolute Lymphocytes 2.48 Thousands/µL      Absolute Monocytes 1.19 Thousand/µL      Eosinophils Absolute 0.15 Thousand/µL      Basophils Absolute 0.09 Thousands/µL             XR chest 1 view portable   ED Interpretation by Bib Jackson DO (01/28 1428)   No acute cardiopulmonary disease appreciated.      Final Interpretation by Eduardo Powell MD (01/28 1400)   Probable bibasal ectasis without a focal consolidation, pleural effusion, or pneumothorax.            Workstation performed: [id number]         CT head without contrast   Final Interpretation by Roshan Boyer MD (01/28 1332)      No acute intracranial abnormality. Stable areas of encephalomalacia involving both parietal lobes with expected dilatation of the adjacent lateral ventricles.      Ventricular shunt catheters are unchanged in position. No hydrocephalus noted.      Stable chronic scattered parenchymal calcifications involving both cerebral hemispheres at the gray-white matter junction, likely sequela of remote infection or remote insult.                   Workstation performed: [id number]             Procedures    ED Medication and Procedure Management   Prior to Admission Medications   Prescriptions Last Dose Informant Patient Reported? Taking?   Athletes Foot Powder Spray 1 % spray   Yes No   Sig: Apply topically as needed for rash   BANOPHEN 25 MG capsule  Outside Facility (Specify) Yes No   Sig: Take 25 mg by mouth daily at bedtime as needed    Calcium Carbonate-Vitamin D (OYSCO 500/D PO)  Outside Facility (Specify) Yes No   Sig: Take by mouth daily   Cholecalciferol (VITAMIN D) 2000 units tablet  Outside Facility (Specify) No No   Sig: TAKE TWO TABLETS BY MOUTH (4000IU) EVERY MORNING FOR VITAMIN DIFICIENCY   Dentifrices (SENSODYNE PRONAMEL DT)  Outside Facility (Specify) Yes No   Sig: Apply to teeth    Diclofenac Sodium (VOLTAREN) 1 %  Outside Facility (Specify) Yes No   Emollient (Vaseline Intensive Care) LOTN  Outside Facility (Specify) Yes No   LORazepam (ATIVAN) 1 mg tablet  Outside Facility (Specify) No No   Sig: TAKE ONE TABLET BY MOUTH 30 MINUTES PRIOR TO PROCEDURE (SUCH AS: GYN, DENTAL, IMAGING, SKIN PROCEDURES, SUCH AS MOLE REMOVAL) FOR ANXIETY   Patient not taking: No sig reported   NON FORMULARY  Outside Facility (Specify) Yes No   Sig: ACT FLUORIDE DENTAL RINSE   RINSE WITH 1 TBSP (15ML)2X A DAY   Proctosol HC 2.5 % rectal cream   Yes No   Sig: Apply topically As needed   Sunscreens (SUNBLOCK LOTION SPF30 EX)  Outside Facility (Specify) Yes No   Sig: Apply topically daily as needed   acetaminophen (TYLENOL) 500 mg tablet  Outside Facility (Specify) Yes No   Sig: Take 500 mg by mouth as needed for mild pain     anastrozole (ARIMIDEX) 1 mg tablet   Yes No   Sig: Take 1 mg by mouth daily   calamine lotion  Outside Facility (Specify) Yes No   Sig: Apply topically every 4 (four) hours as needed for itching   carBAMazepine (TEGretol) 200 mg tablet  Outside Facility (Specify) No No   Sig: Take 1 tablet (200 mg total) by mouth 2 (two) times a day    diphenhydrAMINE (BENADRYL) 25 mg tablet  Outside Facility (Specify) Yes No   Sig: Take 25 mg by mouth daily at bedtime as needed for itching (for insomnia and allergy ** DO NOT TAKE WITH XYZAL **)    docusate sodium (COLACE) 100 mg capsule  Outside Facility (Specify) Yes No   Sig: Take 100 mg by mouth 3 (three) times a week    Patient taking differently: Take 100 mg by mouth daily   guaiFENesin (SILTUSSIN SA PO)  Outside Facility (Specify) Yes No   Sig: Take by mouth Take 2 teaspoonful (10 ml) PRN 6 hrs for cough   haloperidol (HALDOL) 2 mg tablet  Outside Facility (Specify) Yes No   Sig: Take 2 mg by mouth 2 (two) times a day 1 tab am and 2 tabs qhs   ibuprofen (MOTRIN) 400 mg tablet   Yes No   ketoconazole (NIZORAL) 2 % shampoo   Yes No   latanoprost (XALATAN) 0.005 % ophthalmic solution  Outside Facility (Specify) Yes No   Sig: Administer 1 drop to both eyes   levocetirizine (XYZAL) 5 MG tablet  Outside Facility (Specify) No No   Sig: TAKE ONE TABLET BY MOUTH EVERY DAY AS NEEDED FOR ALLERGIES *DO NOT TAKE WITH BENADRYL*   lithium carbonate 300 mg capsule  Outside Facility (Specify) Yes No   Sig: Take 600 mg by mouth 2 (two) times a day   Patient taking differently: Take 600 mg by mouth 2 (two) times a day Take 300 mg in am and 600 mg at hs   loperamide (IMODIUM) 2 mg capsule  Outside Facility (Specify) Yes No   Sig: Take by mouth   melatonin 1 mg  Outside Facility (Specify) Yes No   Sig: Take 5 mg by mouth daily at bedtime    memantine (NAMENDA) 10 mg tablet  Outside Facility (Specify) No No   Sig: Take 1 tablet (10 mg total) by mouth 2 (two) times a day   mineral oil-white petrolatum (LUBRIFRESH P.M.) ophthalmic ointment  Outside Facility (Specify) Yes No   Si.5 inches   ondansetron (ZOFRAN) 4 mg tablet  Outside Facility (Specify) Yes No   Sig: Take by mouth as needed     pantoprazole (PROTONIX) 40 mg tablet   No No   Sig: Take 1 tablet (40 mg total) by mouth daily   polyethylene glycol (GLYCOLAX) 17  GM/SCOOP  Outside Facility (Specify) No No   Sig: MIX 1 CAPFUL (17 GRAMS) IN 4- 8OZ OF LIQUID AND DRINK BY MOUTH ONCE DAILY AS NEEDED FOR CONSTIPATION   talc (Zeasorb)  Outside Facility (Specify) Yes No   Sig: Apply topically once as needed for irritation Sprinkle powder on affected area of skin to help with excess moisture up to 2 times a day as needed .   timolol (TIMOPTIC) 0.5 % ophthalmic solution   Yes No   Sig: Administer 1 drop into the left eye 2 (two) times a day      Facility-Administered Medications: None     Discharge Medication List as of 1/28/2025  2:30 PM        CONTINUE these medications which have NOT CHANGED    Details   acetaminophen (TYLENOL) 500 mg tablet Take 500 mg by mouth as needed for mild pain  , Historical Med      anastrozole (ARIMIDEX) 1 mg tablet Take 1 mg by mouth daily, Historical Med      Athletes Foot Powder Spray 1 % spray Apply topically as needed for rash, Starting Wed 2/7/2024, Historical Med      BANOPHEN 25 MG capsule Take 25 mg by mouth daily at bedtime as needed , Starting Mon 5/11/2020, Historical Med      calamine lotion Apply topically every 4 (four) hours as needed for itching, Historical Med      Calcium Carbonate-Vitamin D (OYSCO 500/D PO) Take by mouth daily, Historical Med      carBAMazepine (TEGretol) 200 mg tablet Take 1 tablet (200 mg total) by mouth 2 (two) times a day, Starting Fri 4/30/2021, No Print      Cholecalciferol (VITAMIN D) 2000 units tablet TAKE TWO TABLETS BY MOUTH (4000IU) EVERY MORNING FOR VITAMIN DIFICIENCY, Normal      Dentifrices (SENSODYNE PRONAMEL DT) Apply to teeth , Historical Med      Diclofenac Sodium (VOLTAREN) 1 % Starting Tue 7/13/2021, Historical Med      diphenhydrAMINE (BENADRYL) 25 mg tablet Take 25 mg by mouth daily at bedtime as needed for itching (for insomnia and allergy ** DO NOT TAKE WITH XYZAL **) , Historical Med      docusate sodium (COLACE) 100 mg capsule Take 100 mg by mouth 3 (three) times a week , Historical Med       Emollient (Vaseline Intensive Care) LOTN Historical Med      guaiFENesin (SILTUSSIN SA PO) Take by mouth Take 2 teaspoonful (10 ml) PRN 6 hrs for cough, Historical Med      haloperidol (HALDOL) 2 mg tablet Take 2 mg by mouth 2 (two) times a day 1 tab am and 2 tabs qhs, Historical Med      ibuprofen (MOTRIN) 400 mg tablet Historical Med      ketoconazole (NIZORAL) 2 % shampoo Historical Med      latanoprost (XALATAN) 0.005 % ophthalmic solution Administer 1 drop to both eyes, Historical Med      levocetirizine (XYZAL) 5 MG tablet TAKE ONE TABLET BY MOUTH EVERY DAY AS NEEDED FOR ALLERGIES *DO NOT TAKE WITH BENADRYL*, Normal      lithium carbonate 300 mg capsule Take 600 mg by mouth 2 (two) times a day, Starting Th 10/22/2020, Historical Med      loperamide (IMODIUM) 2 mg capsule Take by mouth, Starting Thu 6/2/2011, Historical Med      LORazepam (ATIVAN) 1 mg tablet TAKE ONE TABLET BY MOUTH 30 MINUTES PRIOR TO PROCEDURE (SUCH AS: GYN, DENTAL, IMAGING, SKIN PROCEDURES, SUCH AS MOLE REMOVAL) FOR ANXIETY, Normal      melatonin 1 mg Take 5 mg by mouth daily at bedtime , Historical Med      memantine (NAMENDA) 10 mg tablet Take 1 tablet (10 mg total) by mouth 2 (two) times a day, Starting Wed 9/23/2020, Normal      mineral oil-white petrolatum (LUBRIFRESH P.M.) ophthalmic ointment 0.5 inches, Historical Med      NON FORMULARY ACT FLUORIDE DENTAL RINSE   RINSE WITH 1 TBSP (15ML)2X A DAY, Historical Med      ondansetron (ZOFRAN) 4 mg tablet Take by mouth as needed  , Historical Med      pantoprazole (PROTONIX) 40 mg tablet Take 1 tablet (40 mg total) by mouth daily, Starting Wed 12/18/2024, Normal      polyethylene glycol (GLYCOLAX) 17 GM/SCOOP MIX 1 CAPFUL (17 GRAMS) IN 4- 8OZ OF LIQUID AND DRINK BY MOUTH ONCE DAILY AS NEEDED FOR CONSTIPATION, Normal      Proctosol HC 2.5 % rectal cream Apply topically As needed, Starting Wed 1/31/2024, Historical Med      Sunscreens (SUNBLOCK LOTION SPF30 EX) Apply topically daily as  needed, Historical Med      talc (Zeasorb) Apply topically once as needed for irritation Sprinkle powder on affected area of skin to help with excess moisture up to 2 times a day as needed ., Historical Med      timolol (TIMOPTIC) 0.5 % ophthalmic solution Administer 1 drop into the left eye 2 (two) times a day, Starting Fri 3/15/2024, Historical Med           No discharge procedures on file.  ED SEPSIS DOCUMENTATION   Time reflects when diagnosis was documented in both MDM as applicable and the Disposition within this note       Time User Action Codes Description Comment    1/28/2025  2:28 PM Bib Jackson Add [R42] Dizziness                  Bib Jackson DO  01/29/25 1250

## 2025-02-03 ENCOUNTER — OFFICE VISIT (OUTPATIENT)
Dept: FAMILY MEDICINE CLINIC | Facility: HOSPITAL | Age: 60
End: 2025-02-03
Payer: MEDICARE

## 2025-02-03 VITALS
WEIGHT: 108.3 LBS | SYSTOLIC BLOOD PRESSURE: 138 MMHG | DIASTOLIC BLOOD PRESSURE: 83 MMHG | HEART RATE: 78 BPM | HEIGHT: 55 IN | TEMPERATURE: 97.4 F | BODY MASS INDEX: 25.06 KG/M2 | OXYGEN SATURATION: 96 %

## 2025-02-03 DIAGNOSIS — R42 DIZZINESS: Primary | ICD-10-CM

## 2025-02-03 DIAGNOSIS — F33.9 DEPRESSION, RECURRENT (HCC): ICD-10-CM

## 2025-02-03 DIAGNOSIS — Q03.1 DANDY-WALKER SYNDROME (HCC): ICD-10-CM

## 2025-02-03 DIAGNOSIS — F42.9 OBSESSIVE-COMPULSIVE DISORDER, UNSPECIFIED TYPE: ICD-10-CM

## 2025-02-03 DIAGNOSIS — C50.811 MALIGNANT NEOPLASM OF OVERLAPPING SITES OF RIGHT BREAST IN FEMALE, ESTROGEN RECEPTOR POSITIVE (HCC): ICD-10-CM

## 2025-02-03 DIAGNOSIS — K21.9 CHRONIC GERD: ICD-10-CM

## 2025-02-03 DIAGNOSIS — Z17.0 MALIGNANT NEOPLASM OF OVERLAPPING SITES OF RIGHT BREAST IN FEMALE, ESTROGEN RECEPTOR POSITIVE (HCC): ICD-10-CM

## 2025-02-03 DIAGNOSIS — M19.012 PRIMARY OSTEOARTHRITIS OF LEFT SHOULDER: ICD-10-CM

## 2025-02-03 DIAGNOSIS — G80.9 CEREBRAL PALSY, UNSPECIFIED TYPE (HCC): ICD-10-CM

## 2025-02-03 PROCEDURE — G2211 COMPLEX E/M VISIT ADD ON: HCPCS | Performed by: INTERNAL MEDICINE

## 2025-02-03 PROCEDURE — 99214 OFFICE O/P EST MOD 30 MIN: CPT | Performed by: INTERNAL MEDICINE

## 2025-02-03 NOTE — ASSESSMENT & PLAN NOTE
Follows with Mental Health, has had recent decrease in Lithium, has f/u tomorrow, con't meds and f/u as per Mental Health

## 2025-02-03 NOTE — PROGRESS NOTES
Name: Pat Callahan      : 1965      MRN: [id number]  Encounter Provider: Wendy Thomas DO  Encounter Date: 2/3/2025   Encounter department: [facility] PRIMARY CARE [address]   :  Assessment & Plan  Dizziness  Still with symptoms, difficult to obtain detailed history from pt but seems symptoms with going from sitting to standing and then walking- urged to keep hydrated and change position slowly, BW/UA & UC/CT head/ECG/CXR all reviewed, has f/u with Neuro 25, to ED with any red flag Neuro symptoms       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HCC)  Follows with Neuro, has appt coming up next week       Cerebral palsy, unspecified type (HCC)  Follows with Neuro, has appt coming up next week       Malignant neoplasm of overlapping sites of right breast in female, estrogen receptor positive (HCC)  Has seen Surgical Onc and Medical Onc, finishing up Anastrozole this month, UTD on mammo, call with breast concerns        Primary osteoarthritis of left shoulder  S/p cortisone injection w/benefit, con't tx and f/u as per Ortho       Chronic GERD  Just saw GI, symptoms seemingly controlled with Protonix, con't tx and f/u as per GI       Obsessive-compulsive disorder, unspecified type  Follows with Mental Health, has had recent decrease in Lithium, has f/u tomorrow, con't meds and f/u as per Mental Health       Depression, recurrent (HCC)  Follows with Mental Health, has had recent decrease in Lithium, has f/u tomorrow, con't meds and f/u as per Mental Health              Colonoscopy 10/20 - 5 yrs     Mammo      Dexa 10/24 - osteopenia, on Prolia     PAP  with Dr. Blount        FLP      Thyroid US  - 2 yrs    [facility] forms filled out and copy made for chart      History of Present Illness   HPI Pt here for ED follow up     Pt was seen at [facility] ED on 25 - ED records were reviewed by myself in detail and events are summarized below.    Pt presented to [facility] ED on 24 with c/o dizziness  x 1 wk and CP that started the day of presentation to ED.  In the ED VSS and exam nonfocal.   Work up in ED notable for UA with small leukocytes, Na 134 and WBC 11.94.  Rest of CMP/CBC/Troponin/BNP were wnl.  CXR showed probably bibasal atelectasis w/o focal consolidation, pleural effusion or pneumothorax. CT head showed ventricular shunt catheters in place w/o any hydrocephalus, stable areas of encephalomalacia involving both parietal lobes w/expected dilation of the adjacent lateral ventricles.  Stable chronic scattered parenchymal calcifications involving both cerebral hemispheres at the gray-white matter junction - likely sequela of remote infection or remote insult. ECG showed no acute ischemia. No obvious source was noted for pts dizziness.  She was discharged home from the ED but told to return with new/worse symptoms.  She notes the dizziness is still present and is intermittent.  Pt notes symptoms seem associated with changes in position (sitting to standing) or with walking.     Pt just saw Surg Onc NP (Suri Najera) on 1/17/25 - OV note reviewed.  No evidence of disease recurrence was noted.  She was told to con't with annual mammograms and f/u with Medical Oncology as directed.  She was told to f/u in 1 yr. Meche had seen Medical Onc NP ( Brionna Soto) on 12/11/24 - OV Note reviewed.  She will be finishing 10 yrs of her Anastrozole in 2/2025.  She remains on Prolia.    Pt saw Ortho PA (Maya Burnett) on 1/8/25 with L shoulder pain - OV note reviewed.  Symptoms felt to be d/t severe L glenohumeral OA.  Cortisone injection was given. She was told to use cold compresses and f/u in 3-4 mos if symptoms reoccurred/persisted.     Pt saw GI (Dr. Llamas) in Dec for f/u GERD/hiatal hernia - OV note reviewed.  She was told to con't Protonix and f/u in 11 mos.     Pt con't to follow with psych for her depression/OCD disorder.  Her lithium was decreased by psych recently.        Review of Systems   Constitutional:   "Negative for chills and fever.        Wgt down 4 lbs from last visit   HENT:  Negative for congestion and sore throat.    Eyes:  Positive for visual disturbance. Negative for pain.        Blind L eye   Respiratory:  Negative for cough, shortness of breath and wheezing.    Cardiovascular:  Negative for chest pain, palpitations and leg swelling.   Gastrointestinal:  Negative for abdominal pain, diarrhea and nausea.   Genitourinary:  Negative for difficulty urinating and dysuria.   Musculoskeletal:  Negative for gait problem.   Skin:  Negative for rash and wound.   Neurological:  Positive for dizziness and headaches. Negative for syncope.   Hematological:  Does not bruise/bleed easily.   Psychiatric/Behavioral:  Negative for behavioral problems.        Objective   /83 (BP Location: Left arm, Patient Position: Sitting, Cuff Size: Standard)   Pulse 78   Temp (!) 97.4 °F (36.3 °C) (Tympanic)   Ht 4' 6.5\" (1.384 m)   Wt 49.1 kg (108 lb 4.8 oz)   SpO2 96%   BMI 25.64 kg/m²      Physical Exam  Vitals and nursing note reviewed.   Constitutional:       General: She is not in acute distress.     Appearance: She is well-developed. She is not ill-appearing.   HENT:      Head: Normocephalic and atraumatic.      Right Ear: Tympanic membrane and external ear normal. There is no impacted cerumen.      Left Ear: Tympanic membrane and external ear normal. There is no impacted cerumen.      Mouth/Throat:      Mouth: Mucous membranes are moist.      Pharynx: Oropharynx is clear. No oropharyngeal exudate.   Eyes:      General:         Right eye: No discharge.         Left eye: No discharge.      Conjunctiva/sclera: Conjunctivae normal.      Comments: Legally blind L eye, film present over L eye   Neck:      Thyroid: No thyromegaly.      Trachea: No tracheal deviation.   Cardiovascular:      Rate and Rhythm: Normal rate and regular rhythm.      Heart sounds: Normal heart sounds. No murmur heard.  Pulmonary:      Effort: " Pulmonary effort is normal. No respiratory distress.      Breath sounds: Normal breath sounds. No wheezing, rhonchi or rales.   Abdominal:      General: There is no distension.      Palpations: Abdomen is soft.      Tenderness: There is no abdominal tenderness. There is no guarding or rebound.   Musculoskeletal:         General: No deformity or signs of injury.      Cervical back: Neck supple.   Lymphadenopathy:      Cervical: No cervical adenopathy.   Skin:     General: Skin is warm and dry.      Coloration: Skin is not pale.      Findings: No bruising or rash.   Neurological:      General: No focal deficit present.      Mental Status: She is alert. Mental status is at baseline.      Cranial Nerves: No cranial nerve deficit.      Sensory: No sensory deficit.      Motor: No weakness or abnormal muscle tone.      Gait: Gait normal.      Comments: Slightly unsteady upon standing and walking   Psychiatric:         Behavior: Behavior normal.         Thought Content: Thought content normal.      Comments: Mildly anxious, answers questions appropriately

## 2025-02-03 NOTE — ASSESSMENT & PLAN NOTE
Has seen Surgical Onc and Medical Onc, finishing up Anastrozole this month, UTD on mammo, call with breast concerns

## 2025-02-07 ENCOUNTER — OFFICE VISIT (OUTPATIENT)
Dept: BEHAVIORAL/MENTAL HEALTH CLINIC | Facility: CLINIC | Age: 60
End: 2025-02-07
Payer: MEDICARE

## 2025-02-07 DIAGNOSIS — F42.9 OBSESSIVE-COMPULSIVE DISORDER, UNSPECIFIED TYPE: ICD-10-CM

## 2025-02-07 DIAGNOSIS — F84.0 AUTISTIC DISORDER: ICD-10-CM

## 2025-02-07 DIAGNOSIS — F33.9 DEPRESSION, RECURRENT (HCC): Primary | ICD-10-CM

## 2025-02-07 DIAGNOSIS — F63.9 IMPULSE CONTROL DISORDER: ICD-10-CM

## 2025-02-07 DIAGNOSIS — F41.9 ANXIETY: ICD-10-CM

## 2025-02-07 DIAGNOSIS — F70 MILD INTELLECTUAL DISABILITIES: ICD-10-CM

## 2025-02-07 PROCEDURE — 90837 PSYTX W PT 60 MINUTES: CPT | Performed by: COUNSELOR

## 2025-02-12 NOTE — PSYCH
"Behavioral Health Psychotherapy Progress Note    Psychotherapy Provided: Individual Psychotherapy     1. Depression, recurrent (HCC)        2. Impulse control disorder        3. Obsessive-compulsive disorder, unspecified type        4. Mild intellectual disabilities        5. Autistic disorder        6. Anxiety            Goals addressed in session: Goal 1 and Goal 2     DATA: Focus of the session was on developing rapport and understanding presenting concerns. Ct was accompanied by her nurse aid with ct's consent. Reviewed treatment plan with ct and focus of previous therapy sessions with other provider. Ct shared focus was on interactions with others where she can be very aggressive with communication and history of physical. Ct shared utilizing the \"5 C's\" as a reminder of positive interactions/communication with others. Nursing aide provided insight to ct's living arrangement and daily functioning. Ct identified supports within her lives, and positive activities that helps with mood. Ct shared obsessions with a previous co-worker and anger associated with him.    During this session, this clinician used the following therapeutic modalities: Engagement Strategies and Client-centered Therapy    Substance Abuse was not addressed during this session. If the client is diagnosed with a co-occurring substance use disorder, please indicate any changes in the frequency or amount of use: n/a. Stage of change for addressing substance use diagnoses: No substance use/Not applicable    ASSESSMENT:  Meche Callahan presents with a Euthymic/ normal mood.     her affect is Normal range and intensity, which is congruent, with her mood and the content of the session. The client has made progress on their goals.  Eye contact was limited, speech was interrupted, thought process was tangential/fixated. Ct responded to redirection appropriate .  Meche Callahan presents with a none risk of suicide, none risk of self-harm, and none risk of " "harm to others.    For any risk assessment that surpasses a \"low\" rating, a safety plan must be developed.    A safety plan was indicated: no  If yes, describe in detail n/a    PLAN: Between sessions, Meche Callahan will attend follow up session, discuss interactions. At the next session, the therapist will use Client-centered Therapy, Cognitive Behavioral Therapy, Cognitive Processing Therapy, Mindfulness-based Strategies, Motivational Interviewing, and Supportive Psychotherapy to address communication, conflict with others.    Behavioral Health Treatment Plan and Discharge Planning: Meche Callahan is aware of and agrees to continue to work on their treatment plan. They have identified and are working toward their discharge goals. yes    Depression Follow-up Plan Completed: Not applicable    Visit start and stop times:    02/07/25  Start Time: 1000  Stop Time: 1057  Total Visit Time: 57 minutes  "

## 2025-02-22 DIAGNOSIS — K64.9 HEMORRHOIDS, UNSPECIFIED HEMORRHOID TYPE: Primary | ICD-10-CM

## 2025-02-24 RX ORDER — HYDROCORTISONE 25 MG/G
CREAM TOPICAL
Qty: 28.35 G | Refills: 5 | Status: SHIPPED | OUTPATIENT
Start: 2025-02-24

## 2025-02-28 ENCOUNTER — TELEPHONE (OUTPATIENT)
Dept: FAMILY MEDICINE CLINIC | Facility: HOSPITAL | Age: 60
End: 2025-02-28

## 2025-03-06 ENCOUNTER — TELEPHONE (OUTPATIENT)
Age: 60
End: 2025-03-06

## 2025-03-06 ENCOUNTER — SOCIAL WORK (OUTPATIENT)
Dept: BEHAVIORAL/MENTAL HEALTH CLINIC | Facility: CLINIC | Age: 60
End: 2025-03-06
Payer: MEDICARE

## 2025-03-06 DIAGNOSIS — F42.9 OBSESSIVE-COMPULSIVE DISORDER, UNSPECIFIED TYPE: ICD-10-CM

## 2025-03-06 DIAGNOSIS — F41.9 ANXIETY: ICD-10-CM

## 2025-03-06 DIAGNOSIS — F70 MILD INTELLECTUAL DISABILITIES: ICD-10-CM

## 2025-03-06 DIAGNOSIS — F84.0 AUTISTIC DISORDER: ICD-10-CM

## 2025-03-06 DIAGNOSIS — F33.9 DEPRESSION, RECURRENT (HCC): Primary | ICD-10-CM

## 2025-03-06 DIAGNOSIS — F63.9 IMPULSE CONTROL DISORDER: ICD-10-CM

## 2025-03-06 PROCEDURE — 90834 PSYTX W PT 45 MINUTES: CPT | Performed by: COUNSELOR

## 2025-03-06 NOTE — TELEPHONE ENCOUNTER
received a call from Catrachita with [facility], who requested to schedule the next few monthly sessions with provider.

## 2025-03-06 NOTE — PSYCH
"Behavioral Health Psychotherapy Progress Note    Psychotherapy Provided: Individual Psychotherapy     1. Depression, recurrent (HCC)        2. Impulse control disorder        3. Obsessive-compulsive disorder, unspecified type        4. Mild intellectual disabilities        5. Autistic disorder        6. Anxiety            Goals addressed in session: Goal 1 and Goal 2     DATA: Focus of the session was on interpersonal relationships and communication. Ct was accompanied by nursing caretaker. Ct shared she has been struggling to manage her reactions where she engages in cursing towards others. Demonstrated types of communication styles - passive, aggressive, assertive utilizing turtle, tiger, owl depiction. Discussed and practiced examples of these styles and feelings present. Focused on empathy response and awareness of ct's own anger triggers and internal feelings present before her responses.   During this session, this clinician used the following therapeutic modalities: Client-centered Therapy, Cognitive Behavioral Therapy, Cognitive Processing Therapy, Mindfulness-based Strategies, and Supportive Psychotherapy    Substance Abuse was not addressed during this session. If the client is diagnosed with a co-occurring substance use disorder, please indicate any changes in the frequency or amount of use: n/a. Stage of change for addressing substance use diagnoses: No substance use/Not applicable    ASSESSMENT:  Meche Callahan presents with a Euthymic/ normal mood.     her affect is Normal range and intensity, which is congruent, with her mood and the content of the session. The client has made progress on their goals.    Eye contact, speech, thought process were appropriate. Ct demonstrated understanding of concepts and applied to personal situations.  Meche Callahan presents with a none risk of suicide, none risk of self-harm, and none risk of harm to others.    For any risk assessment that surpasses a \"low\" rating, a " safety plan must be developed.    A safety plan was indicated: no  If yes, describe in detail n/a    PLAN: Between sessions, Meche Callahan will utilize assertive response 1x daily. At the next session, the therapist will use Client-centered Therapy, Cognitive Behavioral Therapy, Cognitive Processing Therapy, Mindfulness-based Strategies, Motivational Interviewing, and Supportive Psychotherapy to address communication/interpersonal realtionships.    Behavioral Health Treatment Plan and Discharge Planning: Meche Callahan is aware of and agrees to continue to work on their treatment plan. They have identified and are working toward their discharge goals. yes    Depression Follow-up Plan Completed: Not applicable    Visit start and stop times:    03/06/25  Start Time: 0810  Stop Time: 0850  Total Visit Time: 40 minutes

## 2025-03-19 ENCOUNTER — HOSPITAL ENCOUNTER (OUTPATIENT)
Dept: NEUROLOGY | Facility: HOSPITAL | Age: 60
Discharge: HOME | End: 2025-03-19
Payer: MEDICARE

## 2025-03-19 DIAGNOSIS — R40.4 STARING EPISODES: ICD-10-CM

## 2025-03-19 PROCEDURE — 95816 EEG AWAKE AND DROWSY: CPT

## 2025-03-19 PROCEDURE — 95816 EEG AWAKE AND DROWSY: CPT | Performed by: STUDENT IN AN ORGANIZED HEALTH CARE EDUCATION/TRAINING PROGRAM

## 2025-04-01 ENCOUNTER — SOCIAL WORK (OUTPATIENT)
Dept: BEHAVIORAL/MENTAL HEALTH CLINIC | Facility: CLINIC | Age: 60
End: 2025-04-01
Payer: MEDICARE

## 2025-04-01 DIAGNOSIS — F42.9 OBSESSIVE-COMPULSIVE DISORDER, UNSPECIFIED TYPE: ICD-10-CM

## 2025-04-01 DIAGNOSIS — F70 MILD INTELLECTUAL DISABILITIES: ICD-10-CM

## 2025-04-01 DIAGNOSIS — F41.9 ANXIETY: ICD-10-CM

## 2025-04-01 DIAGNOSIS — F33.9 DEPRESSION, RECURRENT (HCC): Primary | ICD-10-CM

## 2025-04-01 DIAGNOSIS — F84.0 AUTISTIC DISORDER: ICD-10-CM

## 2025-04-01 DIAGNOSIS — F63.9 IMPULSE CONTROL DISORDER: ICD-10-CM

## 2025-04-01 PROCEDURE — 90834 PSYTX W PT 45 MINUTES: CPT | Performed by: COUNSELOR

## 2025-04-01 NOTE — PSYCH
"Behavioral Health Psychotherapy Progress Note    Psychotherapy Provided: Individual Psychotherapy     1. Depression, recurrent (HCC)        2. Impulse control disorder        3. Mild intellectual disabilities        4. Autistic disorder        5. Anxiety        6. Obsessive-compulsive disorder, unspecified type            Goals addressed in session: Goal 1 and Goal 2     DATA: Focus of the session was on communication and compromise. Ct was supported by her caretaker for session. Processed current stressors present where ct shared difficulty managing her reactions with others, specifically when in the van with others. Ct explains she becomes irritated by others listening to music and compromise that was developed of each van member taking a turn with the radio preference. Caregiver identified other moments of difficulty with compromise and anger reactions that become present. Reviewed communication styles with ct and explored her engagement in assertive communication and aggression communication. Provided ct with visual of \"anger iceberg\" to identify underlying emotions of anger. Ct aligned with \"fear, worry\" present. Discussed process of practicing identification of underlying feelings before reacting from anger.   During this session, this clinician used the following therapeutic modalities: Client-centered Therapy, Cognitive Behavioral Therapy, Cognitive Processing Therapy, Motivational Interviewing, and Supportive Psychotherapy    Substance Abuse was not addressed during this session. If the client is diagnosed with a co-occurring substance use disorder, please indicate any changes in the frequency or amount of use: n/a. Stage of change for addressing substance use diagnoses: No substance use/Not applicable    ASSESSMENT:  Meche Callahan presents with a Euthymic/ normal mood.     her affect is Normal range and intensity, which is congruent, with her mood and the content of the session. The client has made " "progress on their goals.    Eye contact, speech, thought process were appropriate. Ct is attempting to engage in discussed skills, she was able to respond assertively intentionally and continues to implement skills.  Meche Callahan presents with a none risk of suicide, none risk of self-harm, and none risk of harm to others.    For any risk assessment that surpasses a \"low\" rating, a safety plan must be developed.    A safety plan was indicated: no  If yes, describe in detail n/a    PLAN: Between sessions, Meche Callahan will utilize anger iceberg activity on own. At the next session, the therapist will use Client-centered Therapy, Cognitive Behavioral Therapy, Cognitive Processing Therapy, Motivational Interviewing, and Supportive Psychotherapy to address coping with compromises.    Behavioral Health Treatment Plan and Discharge Planning: Meche Callahan is aware of and agrees to continue to work on their treatment plan. They have identified and are working toward their discharge goals. yes    Depression Follow-up Plan Completed: Not applicable    Visit start and stop times:    04/01/25  Start Time: 1000  Stop Time: 1000  Total Visit Time: 0 minutes  "

## 2025-05-01 ENCOUNTER — SOCIAL WORK (OUTPATIENT)
Dept: BEHAVIORAL/MENTAL HEALTH CLINIC | Facility: CLINIC | Age: 60
End: 2025-05-01
Payer: MEDICARE

## 2025-05-01 DIAGNOSIS — F42.9 OBSESSIVE-COMPULSIVE DISORDER, UNSPECIFIED TYPE: ICD-10-CM

## 2025-05-01 DIAGNOSIS — F33.9 DEPRESSION, RECURRENT (HCC): Primary | ICD-10-CM

## 2025-05-01 DIAGNOSIS — F70 MILD INTELLECTUAL DISABILITIES: ICD-10-CM

## 2025-05-01 DIAGNOSIS — F84.0 AUTISTIC DISORDER: ICD-10-CM

## 2025-05-01 DIAGNOSIS — F63.9 IMPULSE CONTROL DISORDER: ICD-10-CM

## 2025-05-01 DIAGNOSIS — F41.9 ANXIETY: ICD-10-CM

## 2025-05-01 PROCEDURE — 90834 PSYTX W PT 45 MINUTES: CPT | Performed by: COUNSELOR

## 2025-05-02 NOTE — PSYCH
"Behavioral Health Psychotherapy Progress Note    Psychotherapy Provided: Individual Psychotherapy     1. Depression, recurrent (HCC)        2. Impulse control disorder        3. Mild intellectual disabilities        4. Autistic disorder        5. Anxiety        6. Obsessive-compulsive disorder, unspecified type            Goals addressed in session: Goal 1 and Goal 2     DATA: Focus of the session was on interpersonal relationships and communication. Ct was aided by her support service during session. Ct shared she recently had an eye procedure to help with eye sight but has been having some side effects with her vision. She expressed feeling frustrated by others often which results in aggressive communication. Ct identified trigger as noises and other's talking and provided several recent examples. Reviewed previous session focusing on underlying feelings of anger and reviewed communication styles. Explored ct's implementation of this where she identified utilizing \"big deal, medium deal, little deal\" to respond to situations. She struggles with emotional reactions of anger and often communicates aggressively with her words/language. Engaged in activity to regulate emotions in the moment when experiencing uncomfortable emotions and stop/think strategies to manage impulse comments/communication. Modeled use of guided imagery to create \"safe space\" to regulate emotions. Ct communicated her view of what this would look like, sounds, things around her, etc. Discussed implementation of this activity when frustration occurs with others to redirect communication and reduce intensity of anger.   During this session, this clinician used the following therapeutic modalities: Client-centered Therapy, Cognitive Behavioral Therapy, Cognitive Processing Therapy, Mindfulness-based Strategies, and Supportive Psychotherapy    Substance Abuse was not addressed during this session. If the client is diagnosed with a co-occurring " "substance use disorder, please indicate any changes in the frequency or amount of use: n/a. Stage of change for addressing substance use diagnoses: No substance use/Not applicable    ASSESSMENT:  Meche Callahan presents with a Euthymic/ normal mood.     her affect is Normal range and intensity, which is congruent, with her mood and the content of the session. The client has made progress on their goals.    Ct eye contact was limited, speech was appropriate, thought process was tangential. Ct was able to be redirected during session and participated in session. She became fixated at times on recent interactions with others that induced anger.  She expressed awareness of engaging in aggressive communication but is motivated to implement strategies discussed to manage this reaction. Meche Callahan presents with a none risk of suicide, none risk of self-harm, and none risk of harm to others.    For any risk assessment that surpasses a \"low\" rating, a safety plan must be developed.    A safety plan was indicated: no  If yes, describe in detail n/a    PLAN: Between sessions, Meche Callahan will utilize guided imagery/mediation exercise. At the next session, the therapist will use Client-centered Therapy, Cognitive Behavioral Therapy, Cognitive Processing Therapy, Mindfulness-based Strategies, Motivational Interviewing, and Supportive Psychotherapy to address emotional regulation.    Behavioral Health Treatment Plan and Discharge Planning: Meche Callahan is aware of and agrees to continue to work on their treatment plan. They have identified and are working toward their discharge goals. yes    Depression Follow-up Plan Completed: Not applicable    Visit start and stop times:    05/01/25  Start Time: 1103  Stop Time: 1150  Total Visit Time: 47 minutes  "

## 2025-05-05 ENCOUNTER — OFFICE VISIT (OUTPATIENT)
Dept: FAMILY MEDICINE CLINIC | Facility: HOSPITAL | Age: 60
End: 2025-05-05
Payer: MEDICARE

## 2025-05-05 VITALS
OXYGEN SATURATION: 94 % | WEIGHT: 112 LBS | BODY MASS INDEX: 25.92 KG/M2 | HEART RATE: 83 BPM | SYSTOLIC BLOOD PRESSURE: 122 MMHG | DIASTOLIC BLOOD PRESSURE: 82 MMHG | HEIGHT: 55 IN | TEMPERATURE: 98.1 F

## 2025-05-05 DIAGNOSIS — F42.9 OBSESSIVE-COMPULSIVE DISORDER, UNSPECIFIED TYPE: ICD-10-CM

## 2025-05-05 DIAGNOSIS — F33.9 DEPRESSION, RECURRENT (HCC): ICD-10-CM

## 2025-05-05 DIAGNOSIS — R73.9 HYPERGLYCEMIA: ICD-10-CM

## 2025-05-05 DIAGNOSIS — F63.9 IMPULSE CONTROL DISORDER: ICD-10-CM

## 2025-05-05 DIAGNOSIS — K21.9 CHRONIC GERD: Primary | ICD-10-CM

## 2025-05-05 PROCEDURE — G2211 COMPLEX E/M VISIT ADD ON: HCPCS | Performed by: INTERNAL MEDICINE

## 2025-05-05 PROCEDURE — 99214 OFFICE O/P EST MOD 30 MIN: CPT | Performed by: INTERNAL MEDICINE

## 2025-05-05 NOTE — PROGRESS NOTES
Name: Pat Callahan      : 1965      MRN: [id number]  Encounter Provider: Wendy Thomas DO  Encounter Date: 2025   Encounter department: [facility] CARE [address]   :  Assessment & Plan  Chronic GERD  Symptoms controlled with daily Protonix, call with new/worse symptoms - con't current regimen, will follow       Depression, recurrent (HCC)  Lithium dose decreased - med list updated today and UTD, con't meds and f/u as per  Mental Health       Impulse control disorder  Lithium dose decreased - med list updated today and UTD, con't meds and f/u as per  Mental Health       Obsessive-compulsive disorder, unspecified type  Lithium dose decreased - med list updated today and UTD, con't meds and f/u as per  Mental Health       Hyperglycemia  Diet/exercise reviewed, recent BW with Glu 91, will follow            Colonoscopy 10/20- 5 yrs    Mammo     Dexa 10/24 - osteopenia on Prolia    PAP  - Dr. Blount    Thyroid US  - 2 yrs    BW     [facility] forms filled out and copy made for chart      History of Present Illness   HPI Pt here with [facility] staff for f/u appt - most of HPI obtained from staff    Pt notes some belching but overall heartburn seems controlled.  She is Protonix daily.  She notes no N/V.  She notes some issues with constipation at times. She is on Colace daily.     Pt is taking her Lithium as directed. Med list updated - no longer 600 mg bid. She feels her mood is doing well.  She states she doesn't sleep well.     Recent BW brought in and reviewed - sugars good at 91. Diet/exercise reviewed.      Review of Systems   Constitutional:  Negative for chills, fever and unexpected weight change.   HENT:  Negative for congestion and sore throat.    Eyes:  Negative for pain and visual disturbance.   Respiratory:  Negative for cough, shortness of breath and wheezing.    Cardiovascular:  Negative for chest pain, palpitations and leg swelling.   Gastrointestinal:   "Negative for abdominal pain, constipation, diarrhea, nausea and vomiting.   Genitourinary:  Negative for difficulty urinating and dysuria.   Musculoskeletal:  Negative for back pain and gait problem.   Skin:  Negative for rash and wound.   Neurological:  Negative for dizziness, seizures and headaches.   Hematological:  Does not bruise/bleed easily.   Psychiatric/Behavioral:  Negative for confusion.        Objective   /82 (BP Location: Left arm, Patient Position: Sitting, Cuff Size: Standard)   Pulse 83   Temp 98.1 °F (36.7 °C)   Ht 4' 6.5\" (1.384 m)   Wt 50.8 kg (112 lb)   SpO2 94%   BMI 26.51 kg/m²      Physical Exam  Vitals and nursing note reviewed.   Constitutional:       General: She is not in acute distress.     Appearance: She is well-developed. She is not ill-appearing.   HENT:      Head: Normocephalic and atraumatic.      Right Ear: External ear normal.      Left Ear: External ear normal.      Mouth/Throat:      Mouth: Mucous membranes are moist.      Pharynx: Oropharynx is clear. No oropharyngeal exudate.   Eyes:      General:         Right eye: No discharge.         Left eye: No discharge.      Conjunctiva/sclera: Conjunctivae normal.   Neck:      Thyroid: No thyromegaly.      Trachea: No tracheal deviation.   Cardiovascular:      Rate and Rhythm: Normal rate and regular rhythm.      Heart sounds: Normal heart sounds. No murmur heard.  Pulmonary:      Effort: Pulmonary effort is normal. No respiratory distress.      Breath sounds: Normal breath sounds. No wheezing, rhonchi or rales.   Abdominal:      General: There is no distension.      Palpations: Abdomen is soft.      Tenderness: There is no abdominal tenderness. There is no guarding or rebound.   Musculoskeletal:         General: No deformity or signs of injury.      Cervical back: Neck supple.   Skin:     General: Skin is warm and dry.      Coloration: Skin is not pale.      Findings: No rash.   Neurological:      General: No focal " deficit present.      Mental Status: She is alert. Mental status is at baseline.      Motor: No abnormal muscle tone.      Gait: Gait normal.   Psychiatric:         Behavior: Behavior normal.

## 2025-05-05 NOTE — ASSESSMENT & PLAN NOTE
Symptoms controlled with daily Protonix, call with new/worse symptoms - con't current regimen, will follow

## 2025-05-05 NOTE — ASSESSMENT & PLAN NOTE
Lithium dose decreased - med list updated today and UTD, con't meds and f/u as per  Mental Health

## 2025-06-09 ENCOUNTER — SOCIAL WORK (OUTPATIENT)
Dept: BEHAVIORAL/MENTAL HEALTH CLINIC | Facility: CLINIC | Age: 60
End: 2025-06-09
Payer: MEDICARE

## 2025-06-09 DIAGNOSIS — F42.9 OBSESSIVE-COMPULSIVE DISORDER, UNSPECIFIED TYPE: ICD-10-CM

## 2025-06-09 DIAGNOSIS — F70 MILD INTELLECTUAL DISABILITIES: ICD-10-CM

## 2025-06-09 DIAGNOSIS — F41.9 ANXIETY: ICD-10-CM

## 2025-06-09 DIAGNOSIS — F84.0 AUTISTIC DISORDER: ICD-10-CM

## 2025-06-09 DIAGNOSIS — F33.9 DEPRESSION, RECURRENT (HCC): Primary | ICD-10-CM

## 2025-06-09 DIAGNOSIS — F63.9 IMPULSE CONTROL DISORDER: ICD-10-CM

## 2025-06-09 PROCEDURE — 90834 PSYTX W PT 45 MINUTES: CPT | Performed by: COUNSELOR

## 2025-06-09 NOTE — PSYCH
Behavioral Health Psychotherapy Progress Note    Psychotherapy Provided: Individual Psychotherapy     1. Depression, recurrent (HCC)        2. Impulse control disorder        3. Mild intellectual disabilities        4. Autistic disorder        5. Anxiety        6. Obsessive-compulsive disorder, unspecified type            Goals addressed in session: Goal 1 and Goal 2     DATA: Focus of the session was on coping skills to manage anger and social skills to manage conflict situations. Ct was assisted by her caregiver during session. 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 Ct expressed frustration with words/language they engage in and how she has been implementing communication styles to help control her initial response. Reviewed previous session with use of guided imagery and previous session with coping with changes/challenges. She shared her implementation of these skills and benefits. She expressed excitement with upcoming father's day activities and plans of writing to her father. Engaged in activity where we identified positive self talk quotes/mantra to say to self to help manage reactions and calm self when escalated.   During this session, this clinician used the following therapeutic modalities: Client-centered Therapy, Cognitive Behavioral Therapy, Cognitive Processing Therapy, Mindfulness-based Strategies, and Supportive Psychotherapy    Substance Abuse was not addressed during this session. If the client is diagnosed with a co-occurring substance use disorder, please indicate any changes in the frequency or amount of use: n/a. Stage of change for addressing substance use diagnoses: No substance use/Not applicable    ASSESSMENT:  Meche Callahan presents with a Euthymic/ normal mood.     her affect is Normal range and intensity, which is congruent, with her mood and the content of the session. The client has made progress on their goals.    Eye contact, speech, thought  "process were appropriate/baseline. Ct was engaged during session and demonstrates implementation of transferred skills. Meche Callahan presents with a none risk of suicide, none risk of self-harm, and none risk of harm to others.    For any risk assessment that surpasses a \"low\" rating, a safety plan must be developed.    A safety plan was indicated: no  If yes, describe in detail n/a    PLAN: Between sessions, Meche Callahan will utilize mantra activity. At the next session, the therapist will use Client-centered Therapy, Cognitive Behavioral Therapy, and Cognitive Processing Therapy to address social interactions, mood regulation/impulse control.    Behavioral Health Treatment Plan and Discharge Planning: Meche Callahan is aware of and agrees to continue to work on their treatment plan. They have identified and are working toward their discharge goals. yes    Depression Follow-up Plan Completed: Not applicable    Visit start and stop times:    06/09/25  Start Time: 1301  Stop Time: 1351  Total Visit Time: 50 minutes  "

## 2025-07-11 NOTE — ASSESSMENT & PLAN NOTE
July 11, 2025       Tiff MAUREEN Claudio  [address]  [facility] [address]    Dear MsAugustina Snyder,    Please visit the [facility] or your local [facility] on 10.13.25 - 2 days prior to your scheduled procedure for a T&S lab draw.    Your procedure is scheduled with Doreen Cotter MD on October 15, 2025, at [facility]. The start time of your procedure is tentatively scheduled for 12:30 PM. You can expect to be contacted 3 days prior to the surgery to confirm arrival and surgery time. Occasionally these times may change.    Please arrive to register for your procedure at 10:30 AM. The address of the facility is:    [address]  [place], [place]  [id number] [phone]    Please enter through the main doors near the Phys[facility]'s [place] and check in at Day Surgery Registration.    The following appointments have been scheduled for you:    Your pre-operative appointment with Dr. Cotter at the [facility], [address] [place] [address], [address], on September 15, 2025, at 2:30pm    Your post-operative appointment with Dr. Cotter at the [facility], [address] [place] [address], [address], on November 17, 2025, at 9:30am    Here are instructions for your surgery:    Please remember to schedule your Medical Clearance appointment with your Primary Care Provider within 30 days of your scheduled procedure.     Do not eat 8 hours prior to your surgery.  Non-Diabetic Patient: Please drink entire contents of the Ensure Pre Surgery Clear nutrition beverage over a 15 minute time period. This needs to be done 1 hour prior to arrival to the hospital (before 9:30am).  Diabetic Patient: Please drink 10 oz of water over a 15 minute time period.This needs to be done 1 hour prior to the arrival at the hospital.    Use a new toothbrush and toothpaste within the week of surgery, 2 or more times per day (in the morning and at night).   Findings today are consistent with severe left glenohumeral osteoarthritis . Findings and treatment options were discussed with the patient and her family. Repeat cortisone injection was given to the left glenohumeral joint today. She tolerated the procedure well. Advised to apply cold compress today. Follow-up as needed if symptoms return. Advised patient as soon as she can have another cortisone injection is in 3 to 4 months. All patient's questions were answered to their satisfaction. This note is created using dictation transcription. It may contain typographical errors, grammatical errors, improperly dictated words, background noise and other errors. This is to help reduce the risk of infection.  Brush your teeth prior to arriving, but do not swallow any water or mouthwash.  Shower both the night before and the morning of your procedure. If instructed to do so, please wash with antibacterial soap. **Use a clean washcloth and towel, wear clean pajamas, and have clean sheets on your bed**.  Do not wear makeup or use lotions, deodorants, oils or hair sprays.  Remove nail polish and artificial nails.  Bring a list of your medications (include the name, dose and how often you take them, and the time the last dose was taken).  If you have scheduled medications to take, please check with your physician before taking them.  If you are told to take medications, take medications with small sips of water.  Do not shave hair or use other methods of hair removal for 48 hours prior to your scheduled procedure. If hair needs to be removed, it will be done at the hospital prior to your procedure.  Leave jewelry (including rings, earrings, etc.), watches, and other valuables at home.  Be sure to bring health insurance cards or forms and a photo ID.  Do not take any anti-inflammatory medications (aspirin, ibuprofen, naproxen, etc) for 5 days before surgery. Acetaminophen (Tylenol) is acceptable.  You will need someone to drive you home and remain with you up to 24 hours after you have been discharged.    Medication Prep:    14 Days Prior to Procedure:  Hold Phentermine.  Contact your Cardiologist or Primary Care Physician to discuss holding your medication prior to surgery if you take any medication to prevent blood clots, the risk of stroke or heart attack. Some examples of these types of medications are Plavix, Clopidogrel, Coumadin, Warfarin, Xarelto, or Pradaxa. (This is not a complete list.)  Diabetics:  Contact your primary care physician for recommendations regarding insulin on the day of your procedure.    7 Days Prior to Procedure:  Hold Plavix or Clopidogrel for 5-7  days prior to the procedure per your Cardiologist’s or Primary Care Physician’s directions.   If you take Semaglutide, Dulaglutide, Exenatide, Liraglutide, Tirzepatide, Zultophy or Soliqua for weight loss please hold 7 days prior to surgery.  If you take any of the above for Diabetes, please contact the prescribing physician for instructions 14 days prior.      5 Days Prior to Procedure:  Do not take any anti-inflammatory medications (aspirin, ibuprofen, naproxen, etc.) for 5 days before surgery.    Acetaminophen (Tylenol) is acceptable.    3 Days Prior to Procedure:  If you take Coumadin, Warfarin, Xarelto or Pradaxa, please follow the directions of your Primary Care Provider/Cardiologist.      GATORADE BOWEL PREP    Three items are needed to be purchased at the pharmacy prior to starting the prep.  1.) Four Ducolax laxative tablets (available over the counter)  2.) Miralax Powder 238-gram bottle (available over the counter)  3.) 64-ounce Gatorade    DAY BEFORE THE PROCEDURE:  Follow a clear liquid diet, which must include Gatorade, all day long. No solid foods or dairy products.  Clear liquids include: water, juices without pulp, broth, coffee or tea (without milk or creamer), carbonated beverages, sports drinks, Rojelio-Aid, Jello or popsicles.  You may take your normal medications. Hold Coumadin as directed.    At approximately 7:00 am, take all four Dulcolax tablets by mouth with a large glass of water.    At 9:00 am mix the entire 238-gram bottle of Miralax with 64oz of Gatorade until dissolved.   *You can mix in a pitcher with ice if you prefer.    Drink an 8-ounce glass of prep every 10-15 minutes until the solution is gone.    You may continue drinking clear liquids but nothing further to drink after midnight.    SHOWERING INSTRUCTIONS:  This procedure requires a special cleansing soap called Hibiclens. The bottle of 4% Chlorhexidine Gluconate Soap (Hibiclens) will be given at your 9.15.25 pre op appointment,  or please stop at [facility] [address] or [facility], [address]. Please follow instructions both the night before and the morning of your procedure.    Wet your body and hair with warm water.  Wash your hair and face as you regularly do. Rinse thoroughly. These steps are done first so that the Chlorhexidine soap isn't washed off by your shampoo or face wash.  Turn the water off.  Apply 4 oz. of the Chlorhexidine soap to your entire body from your chin down using a clean washcloth.  Avoid contact with your eyes, ears, and mouth.  Be sure to include under your armpits, behind your knees, between skin folds, under fingernails, and your groin area (where legs meet the body).  Leave soap on your body for 1 minute.  Rinse thoroughly with warm water. Do not wash with any other soap or cleanser.  Dry your body with a clean, freshly laundered towel.  Put on freshly laundered clothes.      If you have any work related and/or disability forms that need to be completed, please bring these forms to [facility]. It takes approximately 7 to 10 business days to complete these forms.    If you have any questions or need to reschedule, please contact me at the telephone number and extension listed below.     Thank you,    [facility]  [phone]

## 2025-07-15 ENCOUNTER — SOCIAL WORK (OUTPATIENT)
Dept: BEHAVIORAL/MENTAL HEALTH CLINIC | Facility: CLINIC | Age: 60
End: 2025-07-15
Payer: MEDICARE

## 2025-07-15 DIAGNOSIS — F41.9 ANXIETY: ICD-10-CM

## 2025-07-15 DIAGNOSIS — F42.9 OBSESSIVE-COMPULSIVE DISORDER, UNSPECIFIED TYPE: ICD-10-CM

## 2025-07-15 DIAGNOSIS — F84.0 AUTISTIC DISORDER: ICD-10-CM

## 2025-07-15 DIAGNOSIS — F63.9 IMPULSE CONTROL DISORDER: ICD-10-CM

## 2025-07-15 DIAGNOSIS — F33.9 DEPRESSION, RECURRENT (HCC): Primary | ICD-10-CM

## 2025-07-15 DIAGNOSIS — F70 MILD INTELLECTUAL DISABILITIES: ICD-10-CM

## 2025-07-15 PROCEDURE — 90834 PSYTX W PT 45 MINUTES: CPT | Performed by: COUNSELOR

## 2025-07-18 ENCOUNTER — TELEPHONE (OUTPATIENT)
Dept: PSYCHIATRY | Facility: CLINIC | Age: 60
End: 2025-07-18

## 2025-07-18 NOTE — TELEPHONE ENCOUNTER
Left voicemail informing patient and/or parent/guardian of the Psych Encounter form needing to be signed as a requirement from the insurance company for billing purposes. Patient can access form via [email or web address] and sign electronically.     Please make patient aware this form must be signed for each visit as a requirement to continue future visits with provider.    If patient returns the phone call please transfer to the writer [phone]

## 2025-07-22 ENCOUNTER — APPOINTMENT (EMERGENCY)
Dept: RADIOLOGY | Facility: HOSPITAL | Age: 60
End: 2025-07-22
Payer: MEDICARE

## 2025-07-22 ENCOUNTER — APPOINTMENT (EMERGENCY)
Dept: CT IMAGING | Facility: HOSPITAL | Age: 60
End: 2025-07-22
Payer: MEDICARE

## 2025-07-22 ENCOUNTER — HOSPITAL ENCOUNTER (EMERGENCY)
Facility: HOSPITAL | Age: 60
Discharge: HOME | End: 2025-07-22
Attending: EMERGENCY MEDICINE | Admitting: EMERGENCY MEDICINE
Payer: MEDICARE

## 2025-07-22 VITALS
RESPIRATION RATE: 18 BRPM | SYSTOLIC BLOOD PRESSURE: 161 MMHG | OXYGEN SATURATION: 97 % | TEMPERATURE: 98.1 F | DIASTOLIC BLOOD PRESSURE: 77 MMHG | HEART RATE: 73 BPM

## 2025-07-22 DIAGNOSIS — R51.9 HEADACHE: Primary | ICD-10-CM

## 2025-07-22 DIAGNOSIS — K44.9 HIATAL HERNIA: ICD-10-CM

## 2025-07-22 DIAGNOSIS — D72.829 ELEVATED WBC COUNT: ICD-10-CM

## 2025-07-22 DIAGNOSIS — R56.9 SEIZURE-LIKE ACTIVITY (HCC): ICD-10-CM

## 2025-07-22 LAB
ALBUMIN SERPL BCG-MCNC: 4.2 G/DL (ref 3.5–5)
ALP SERPL-CCNC: 88 U/L (ref 34–104)
ALT SERPL W P-5'-P-CCNC: 16 U/L (ref 7–52)
ANION GAP SERPL CALCULATED.3IONS-SCNC: 7 MMOL/L (ref 4–13)
AST SERPL W P-5'-P-CCNC: 15 U/L (ref 13–39)
BASOPHILS # BLD AUTO: 0.06 THOUSANDS/ÂΜL (ref 0–0.1)
BASOPHILS NFR BLD AUTO: 1 % (ref 0–1)
BILIRUB SERPL-MCNC: 0.34 MG/DL (ref 0.2–1)
BILIRUB UR QL STRIP: NEGATIVE
BUN SERPL-MCNC: 7 MG/DL (ref 5–25)
CALCIUM SERPL-MCNC: 10.8 MG/DL (ref 8.4–10.2)
CARBAMAZEPINE SERPL-MCNC: 4.6 UG/ML (ref 4–12)
CHLORIDE SERPL-SCNC: 100 MMOL/L (ref 96–108)
CLARITY UR: CLEAR
CO2 SERPL-SCNC: 32 MMOL/L (ref 21–32)
COLOR UR: COLORLESS
CREAT SERPL-MCNC: 0.62 MG/DL (ref 0.6–1.3)
EOSINOPHIL # BLD AUTO: 0.02 THOUSAND/ÂΜL (ref 0–0.61)
EOSINOPHIL NFR BLD AUTO: 0 % (ref 0–6)
ERYTHROCYTE [DISTWIDTH] IN BLOOD BY AUTOMATED COUNT: 12.9 % (ref 11.6–15.1)
GFR SERPL CREATININE-BSD FRML MDRD: 99 ML/MIN/1.73SQ M
GLUCOSE SERPL-MCNC: 116 MG/DL (ref 65–140)
GLUCOSE UR STRIP-MCNC: NEGATIVE MG/DL
HCT VFR BLD AUTO: 40.7 % (ref 34.8–46.1)
HGB BLD-MCNC: 12.7 G/DL (ref 11.5–15.4)
HGB UR QL STRIP.AUTO: NEGATIVE
IMM GRANULOCYTES # BLD AUTO: 0.07 THOUSAND/UL (ref 0–0.2)
IMM GRANULOCYTES NFR BLD AUTO: 1 % (ref 0–2)
KETONES UR STRIP-MCNC: NEGATIVE MG/DL
LEUKOCYTE ESTERASE UR QL STRIP: NEGATIVE
LEVETIRACETAM SERPL-MCNC: <2 UG/ML (ref 12–46)
LYMPHOCYTES # BLD AUTO: 1.61 THOUSANDS/ÂΜL (ref 0.6–4.47)
LYMPHOCYTES NFR BLD AUTO: 13 % (ref 14–44)
MCH RBC QN AUTO: 30 PG (ref 26.8–34.3)
MCHC RBC AUTO-ENTMCNC: 31.2 G/DL (ref 31.4–37.4)
MCV RBC AUTO: 96 FL (ref 82–98)
MONOCYTES # BLD AUTO: 0.44 THOUSAND/ÂΜL (ref 0.17–1.22)
MONOCYTES NFR BLD AUTO: 4 % (ref 4–12)
NEUTROPHILS # BLD AUTO: 10.46 THOUSANDS/ÂΜL (ref 1.85–7.62)
NEUTS SEG NFR BLD AUTO: 81 % (ref 43–75)
NITRITE UR QL STRIP: NEGATIVE
NRBC BLD AUTO-RTO: 0 /100 WBCS
PH UR STRIP.AUTO: 7.5 [PH]
PLATELET # BLD AUTO: 362 THOUSANDS/UL (ref 149–390)
PMV BLD AUTO: 10.2 FL (ref 8.9–12.7)
POTASSIUM SERPL-SCNC: 4 MMOL/L (ref 3.5–5.3)
PROT SERPL-MCNC: 7 G/DL (ref 6.4–8.4)
PROT UR STRIP-MCNC: NEGATIVE MG/DL
RBC # BLD AUTO: 4.23 MILLION/UL (ref 3.81–5.12)
SODIUM SERPL-SCNC: 139 MMOL/L (ref 135–147)
SP GR UR STRIP.AUTO: <1.005 (ref 1–1.03)
UROBILINOGEN UR STRIP-ACNC: <2 MG/DL
WBC # BLD AUTO: 12.66 THOUSAND/UL (ref 4.31–10.16)

## 2025-07-22 PROCEDURE — 71250 CT THORAX DX C-: CPT

## 2025-07-22 PROCEDURE — 84146 ASSAY OF PROLACTIN: CPT | Performed by: EMERGENCY MEDICINE

## 2025-07-22 PROCEDURE — 70450 CT HEAD/BRAIN W/O DYE: CPT

## 2025-07-22 PROCEDURE — 81003 URINALYSIS AUTO W/O SCOPE: CPT | Performed by: EMERGENCY MEDICINE

## 2025-07-22 PROCEDURE — 99284 EMERGENCY DEPT VISIT MOD MDM: CPT

## 2025-07-22 PROCEDURE — 80053 COMPREHEN METABOLIC PANEL: CPT | Performed by: EMERGENCY MEDICINE

## 2025-07-22 PROCEDURE — 85025 COMPLETE CBC W/AUTO DIFF WBC: CPT | Performed by: EMERGENCY MEDICINE

## 2025-07-22 PROCEDURE — 96361 HYDRATE IV INFUSION ADD-ON: CPT

## 2025-07-22 PROCEDURE — 96375 TX/PRO/DX INJ NEW DRUG ADDON: CPT

## 2025-07-22 PROCEDURE — 36415 COLL VENOUS BLD VENIPUNCTURE: CPT | Performed by: EMERGENCY MEDICINE

## 2025-07-22 PROCEDURE — 74176 CT ABD & PELVIS W/O CONTRAST: CPT

## 2025-07-22 PROCEDURE — 93005 ELECTROCARDIOGRAM TRACING: CPT

## 2025-07-22 PROCEDURE — 80156 ASSAY CARBAMAZEPINE TOTAL: CPT | Performed by: EMERGENCY MEDICINE

## 2025-07-22 PROCEDURE — 99285 EMERGENCY DEPT VISIT HI MDM: CPT | Performed by: EMERGENCY MEDICINE

## 2025-07-22 PROCEDURE — 83520 IMMUNOASSAY QUANT NOS NONAB: CPT | Performed by: EMERGENCY MEDICINE

## 2025-07-22 PROCEDURE — 96374 THER/PROPH/DIAG INJ IV PUSH: CPT

## 2025-07-22 PROCEDURE — 70490 CT SOFT TISSUE NECK W/O DYE: CPT

## 2025-07-22 RX ORDER — METOCLOPRAMIDE HYDROCHLORIDE 5 MG/ML
10 INJECTION INTRAMUSCULAR; INTRAVENOUS ONCE
Status: COMPLETED | OUTPATIENT
Start: 2025-07-22 | End: 2025-07-22

## 2025-07-22 RX ORDER — KETOROLAC TROMETHAMINE 30 MG/ML
15 INJECTION, SOLUTION INTRAMUSCULAR; INTRAVENOUS ONCE
Status: COMPLETED | OUTPATIENT
Start: 2025-07-22 | End: 2025-07-22

## 2025-07-22 RX ADMIN — METOCLOPRAMIDE 10 MG: 5 INJECTION, SOLUTION INTRAMUSCULAR; INTRAVENOUS at 21:04

## 2025-07-22 RX ADMIN — SODIUM CHLORIDE 1000 ML: 0.9 INJECTION, SOLUTION INTRAVENOUS at 21:06

## 2025-07-22 RX ADMIN — KETOROLAC TROMETHAMINE 15 MG: 30 INJECTION, SOLUTION INTRAMUSCULAR; INTRAVENOUS at 21:02

## 2025-07-23 LAB — PROLACTIN SERPL-MCNC: 33.1 NG/ML (ref 2.74–19.64)

## 2025-07-23 NOTE — ED NOTES
"Staff with patient report patient, \"having increased headaches states frontal headache radiates around to back at times\" with \"spacing out moments\" and occasional imbalance. Staff reports patient having these issues a few months ago with no conclusive results following admission/testing.      Blanca Blake RN  07/22/25 5625    "

## 2025-07-23 NOTE — ED PROVIDER NOTES
Time reflects when diagnosis was documented in both MDM as applicable and the Disposition within this note       Time User Action Codes Description Comment    7/22/2025 10:45 PM Clint Lin [R51.9] Headache     7/22/2025 10:55 PM Clint Lin [R56.9] Seizure-like activity (HCC)     7/22/2025 10:55 PM Clint Lin [D72.829] Elevated WBC count     7/22/2025 10:57 PM Clint Lin [K44.9] Hiatal hernia           ED Disposition       ED Disposition   Discharge    Condition   Stable    Date/Time   Tue Jul 22, 2025 10:55 PM    Comment   Pat Callahan discharge to home/self care.                   Assessment & Plan       Medical Decision Making  Differential includes tension headache, migraine, less likely hydrocephalus or complication with  shunt's, less likely infectious process    Leukocytosis non-specific, possibly stress reaction.  No meningeal signs, no rashes.  Less likely acute infectious process.  Feels better with meds in ER.    Amount and/or Complexity of Data Reviewed  Labs: ordered.  Radiology: ordered.    Risk  Prescription drug management.        ED Course as of 07/23/25 0127   Tue Jul 22, 2025   2255 Caretaker states patient not on keppra       Medications   metoclopramide (REGLAN) injection 10 mg (10 mg Intravenous Given 7/22/25 2104)   ketorolac (TORADOL) injection 15 mg (15 mg Intravenous Given 7/22/25 2102)   sodium chloride 0.9 % bolus 1,000 mL (0 mL Intravenous Stopped 7/22/25 2206)       ED Risk Strat Scores                    No data recorded        SBIRT 22yo+      Flowsheet Row Most Recent Value   Initial Alcohol Screen: US AUDIT-C     1. How often do you have a drink containing alcohol? 0 Filed at: 07/22/2025 1701   2. How many drinks containing alcohol do you have on a typical day you are drinking?  0 Filed at: 07/22/2025 1701   3a. Male UNDER 65: How often do you have five or more drinks on one occasion? 0 Filed at: 07/22/2025 1701   3b. FEMALE Any Age, or MALE 65+: How often do  you have 4 or more drinks on one occassion? 0 Filed at: 07/22/2025 1701   Audit-C Score 0 Filed at: 07/22/2025 1701   IVETH: How many times in the past year have you...    Used an illegal drug or used a prescription medication for non-medical reasons? Never Filed at: 07/22/2025 1701                            History of Present Illness       Chief Complaint   Patient presents with    Headache     Pt to er with staff with reports that patient has been having headaches. Has a history of headaches, was seen by family doctor and had blood work done. Was starting to feel better and since then has been getting more headaches again. Staff also reports having shunts, and has not had recent scans to check then        Past Medical History[1]   Past Surgical History[2]   Family History[3]   Social History[4]   E-Cigarette/Vaping    E-Cigarette Use Never User       E-Cigarette/Vaping Substances    Nicotine No     THC No     CBD No     Flavoring No     Other No     Unknown No       I have reviewed and agree with the history as documented.     History from patient and caretakers.  Patient at [facility].  She has had 2 weeks now of intermittent headaches.  Occasional staring and twitching episodes at 1 point she almost fell.  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  She is a poor historian and usually tells you what you want to hear according to her caretakers.  She has had previous EEG that was normal.  She has dizziness, vertigo.  Her blood pressures have been slightly high.  Today she had a episode while she was sitting for a few seconds her left leg twitched and jolted.  Her gait has been unsteady.  She did receive Tylenol today.        Review of Systems   Unable to perform ROS: Psychiatric disorder   Constitutional:  Negative for chills and fever.   HENT:  Negative for rhinorrhea and sore throat.    Respiratory:  Negative for shortness of breath.     Cardiovascular:  Negative for chest pain.   Gastrointestinal:  Negative for constipation, diarrhea, nausea and vomiting.   Genitourinary:  Negative for dysuria and frequency.   Skin:  Negative for rash.   Neurological:  Positive for headaches.   All other systems reviewed and are negative.          Objective       ED Triage Vitals   Temperature Pulse Blood Pressure Respirations SpO2 Patient Position - Orthostatic VS   07/22/25 1700 07/22/25 1700 07/22/25 1700 07/22/25 1700 07/22/25 1700 07/22/25 1700   98.1 °F (36.7 °C) 69 (!) 174/95 18 95 % Sitting      Temp Source Heart Rate Source BP Location FiO2 (%) Pain Score    07/22/25 1700 07/22/25 1700 07/22/25 1700 -- 07/22/25 2102    Temporal Monitor Left arm  10 - Worst Possible Pain      Vitals      Date and Time Temp Pulse SpO2 Resp BP Pain Score FACES Pain Rating User   07/22/25 2102 -- -- -- -- -- 10 - Worst Possible Pain -- AD   07/22/25 2015 -- 73 97 % 18 161/77 -- -- AD   07/22/25 1700 98.1 °F (36.7 °C) 69 95 % 18 174/95 -- -- HR            Physical Exam  Vitals and nursing note reviewed.   Constitutional:       Appearance: She is well-developed.   HENT:      Head: Normocephalic and atraumatic.      Jaw: There is normal jaw occlusion.      Right Ear: Tympanic membrane and external ear normal.      Left Ear: Tympanic membrane and external ear normal.      Nose: Nose normal. No nasal deformity.      Right Sinus: No maxillary sinus tenderness or frontal sinus tenderness.      Left Sinus: No maxillary sinus tenderness or frontal sinus tenderness.      Mouth/Throat:      Mouth: Mucous membranes are moist.      Pharynx: Oropharynx is clear.     Eyes:      Conjunctiva/sclera: Conjunctivae normal.      Pupils: Pupils are equal, round, and reactive to light.       Cardiovascular:      Rate and Rhythm: Normal rate and regular rhythm.      Heart sounds: Normal heart sounds.   Pulmonary:      Effort: Pulmonary effort is normal. No respiratory distress.      Breath sounds:  "Normal breath sounds. No wheezing.   Abdominal:      General: Bowel sounds are normal. There is no distension.      Palpations: Abdomen is soft.      Tenderness: There is no abdominal tenderness.     Musculoskeletal:         General: No deformity. Normal range of motion.      Cervical back: Normal range of motion and neck supple. No spinous process tenderness.     Skin:     General: Skin is warm and dry.      Findings: No rash.     Neurological:      General: No focal deficit present.      Mental Status: She is alert and oriented to person, place, and time.      GCS: GCS eye subscore is 4. GCS verbal subscore is 5. GCS motor subscore is 6.      Cranial Nerves: No cranial nerve deficit.      Sensory: No sensory deficit.      Motor: No weakness.      Coordination: Coordination normal.     Psychiatric:         Mood and Affect: Mood normal.         Results Reviewed       Procedure Component Value Units Date/Time    Levetiracetam level [[id number]]  (Abnormal) Collected: 07/22/25 2100    Lab Status: Final result Specimen: Blood from Arm, Left Updated: 07/22/25 2225     Levetiracetam Lvl <2.0 ug/mL     Narrative:      \"Brivaracetam (Briviact) interferes with measurements of levetiracetam in the ARK Levetiracetam Assay. Patients undergoing a switch in drug therapy involving Keppra and Briviact should not be monitored for levetiracetam using the ARK assay. Serum levels of levetiracetam and or brivaracetam should be confirmed by a valid chromatographic method if there is a possibility these drugs are co-present in circulation.\"    Comprehensive metabolic panel [[id number]]  (Abnormal) Collected: 07/22/25 2100    Lab Status: Final result Specimen: Blood from Arm, Left Updated: 07/22/25 2130     Sodium 139 mmol/L      Potassium 4.0 mmol/L      Chloride 100 mmol/L      CO2 32 mmol/L      ANION GAP 7 mmol/L      BUN 7 mg/dL      Creatinine 0.62 mg/dL      Glucose 116 mg/dL      Calcium 10.8 mg/dL      AST 15 U/L      ALT 16 U/L  "     Alkaline Phosphatase 88 U/L      Total Protein 7.0 g/dL      Albumin 4.2 g/dL      Total Bilirubin 0.34 mg/dL      eGFR 99 ml/min/1.73sq m     Narrative:      National Kid[employer] Disease Foundation guidelines for Chronic Kidney Disease (CKD):     Stage 1 with normal or high GFR (GFR > 90 mL/min/1.73 square meters)    Stage 2 Mild CKD (GFR = 60-89 mL/min/1.73 square meters)    Stage 3A Moderate CKD (GFR = 45-59 mL/min/1.73 square meters)    Stage 3B Moderate CKD (GFR = 30-44 mL/min/1.73 square meters)    Stage 4 Severe CKD (GFR = 15-29 mL/min/1.73 square meters)    Stage 5 End Stage CKD (GFR <15 mL/min/1.73 square meters)  Note: GFR calculation is accurate only with a steady state creatinine    Carbamazepine level, total [[id number]]  (Normal) Collected: 07/22/25 2100    Lab Status: Final result Specimen: Blood from Arm, Left Updated: 07/22/25 2124     Carbamazepine Lvl 4.6 ug/mL     UA w Reflex to Microscopic w Reflex to Culture [[id number]]  (Abnormal) Collected: 07/22/25 2107    Lab Status: Final result Specimen: Urine, Clean Catch Updated: 07/22/25 2116     Color, UA Colorless     Clarity, UA Clear     Specific Gravity, UA <1.005     pH, UA 7.5     Leukocytes, UA Negative     Nitrite, UA Negative     Protein, UA Negative mg/dl      Glucose, UA Negative mg/dl      Ketones, UA Negative mg/dl      Urobilinogen, UA <2.0 mg/dl      Bilirubin, UA Negative     Occult Blood, UA Negative    CBC and differential [[id number]]  (Abnormal) Collected: 07/22/25 2100    Lab Status: Final result Specimen: Blood from Arm, Left Updated: 07/22/25 2107     WBC 12.66 Thousand/uL      RBC 4.23 Million/uL      Hemoglobin 12.7 g/dL      Hematocrit 40.7 %      MCV 96 fL      MCH 30.0 pg      MCHC 31.2 g/dL      RDW 12.9 %      MPV 10.2 fL      Platelets 362 Thousands/uL      nRBC 0 /100 WBCs      Segmented % 81 %      Immature Grans % 1 %      Lymphocytes % 13 %      Monocytes % 4 %      Eosinophils Relative 0 %      Basophils Relative 1 %       Absolute Neutrophils 10.46 Thousands/µL      Absolute Immature Grans 0.07 Thousand/uL      Absolute Lymphocytes 1.61 Thousands/µL      Absolute Monocytes 0.44 Thousand/µL      Eosinophils Absolute 0.02 Thousand/µL      Basophils Absolute 0.06 Thousands/µL     Prolactin [[id number]] Collected: 07/22/25 2100    Lab Status: In process Specimen: Blood from Arm, Left Updated: 07/22/25 2105            CT head wo contrast   Final Interpretation by Ruddy Awad MD (07/22 2217)      No acute intracranial abnormality.  Various stable findings and postsurgical changes again noted as detailed above.                  Workstation performed: [id number]         CT chest abdomen pelvis wo contrast   Final Interpretation by Ty Aragon MD (07/22 2158)      No acute findings in the chest, abdomen or pelvis.      Large hiatal hernia containing a majority of the stomach.      Enlarged multinodular thyroid gland. Correlation with thyroid ultrasound is recommended.            Computerized Assisted Algorithm (CAA) may have aided analysis of applicable images.         Workstation performed: [id number]         CT soft tissue neck wo contrast   Final Interpretation by Max Dunn MD (07/22 2119)         1. Stable discontinuity of right neck  shunt catheter. Intact left neck  shunt catheter.   2. No evidence of mass or lymphadenopathy in the neck.               Workstation performed: [id number]             ECG 12 Lead Documentation Only    Date/Time: 7/23/2025 12:49 AM    Performed by: Clint Lin DO  Authorized by: Clint Lin DO    Indications / Diagnosis:  Headache, seizure-like activity  ECG reviewed by me, the ED Provider: yes    Patient location:  ED  Previous ECG:     Previous ECG:  Compared to current    Similarity:  No change  Interpretation:     Interpretation: normal    Rate:     ECG rate assessment: normal    Ectopy:     Ectopy: none    QRS:     QRS axis:  Normal    QRS intervals:  Normal  Conduction:      Conduction: normal    ST segments:     ST segments:  Normal  T waves:     T waves: normal    Comments:      This EKG was interpreted by me.      ED Medication and Procedure Management   Prior to Admission Medications   Prescriptions Last Dose Informant Patient Reported? Taking?   Athletes Foot Powder Spray 1 % spray   Yes No   Sig: Apply topically as needed for rash   BANOPHEN 25 MG capsule  Outside Facility (Specify) Yes No   Sig: Take 25 mg by mouth daily at bedtime as needed    Calcium Carbonate-Vitamin D (OYSCO 500/D PO)  Outside Facility (Specify) Yes No   Sig: Take 1 tablet by mouth 2 (two) times a day   Cholecalciferol (VITAMIN D) 2000 units tablet  Outside Facility (Specify) No No   Sig: TAKE TWO TABLETS BY MOUTH (4000IU) EVERY MORNING FOR VITAMIN DIFICIENCY   Dentifrices (SENSODYNE PRONAMEL DT)  Outside Facility (Specify) Yes No   Sig: Apply to teeth    Diclofenac Sodium (VOLTAREN) 1 %  Outside Facility (Specify) Yes No   Emollient (Vaseline Intensive Care) LOTN  Outside Facility (Specify) Yes No   LORazepam (ATIVAN) 1 mg tablet  Outside Facility (Specify) No No   Sig: TAKE ONE TABLET BY MOUTH 30 MINUTES PRIOR TO PROCEDURE (SUCH AS: GYN, DENTAL, IMAGING, SKIN PROCEDURES, SUCH AS MOLE REMOVAL) FOR ANXIETY   Melatonin 5 MG TABS   Yes No   Sig: Take 5 mg by mouth daily at bedtime   NON FORMULARY  Outside Facility (Specify) Yes No   Sig: ACT FLUORIDE DENTAL RINSE   RINSE WITH 1 TBSP (15ML)2X A DAY   Sunscreens (SUNBLOCK LOTION SPF30 EX)  Outside Facility (Specify) Yes No   Sig: Apply topically daily as needed   acetaminophen (TYLENOL) 500 mg tablet  Outside Facility (Specify) Yes No   Sig: Take 500 mg by mouth as needed for mild pain     anastrozole (ARIMIDEX) 1 mg tablet   Yes No   Sig: Take 1 mg by mouth daily   calamine lotion  Outside Facility (Specify) Yes No   Sig: Apply topically every 4 (four) hours as needed for itching   carBAMazepine (TEGretol) 200 mg tablet  Outside Facility (Specify) No No   Sig:  Take 1 tablet (200 mg total) by mouth 2 (two) times a day   diphenhydrAMINE (BENADRYL) 25 mg tablet  Outside Facility (Specify) Yes No   Sig: Take 25 mg by mouth daily at bedtime as needed for itching (for insomnia and allergy ** DO NOT TAKE WITH XYZAL **)    docusate sodium (COLACE) 100 mg capsule  Outside Facility (Specify) Yes No   Sig: Take 100 mg by mouth 3 (three) times a week    Patient taking differently: Take 100 mg by mouth daily   guaiFENesin (SILTUSSIN SA PO)  Outside Facility (Specify) Yes No   Sig: Take by mouth Take 2 teaspoonful (10 ml) PRN 6 hrs for cough   haloperidol (HALDOL) 2 mg tablet  Outside Facility (Specify) Yes No   Sig: Take 2 mg by mouth 2 (two) times a day 1 tab am and 2 tabs qhs   hydrocortisone (Proctosol HC) 2.5 % rectal cream   No No   Sig: APPLY RECTALLY EVERY DAY AS NEEDED FOR HEMORRHOIDS   ibuprofen (MOTRIN) 400 mg tablet   Yes No   ketoconazole (NIZORAL) 2 % shampoo   Yes No   latanoprost (XALATAN) 0.005 % ophthalmic solution  Outside Facility (Specify) Yes No   Sig: Administer 1 drop to both eyes   levocetirizine (XYZAL) 5 MG tablet  Outside Facility (Specify) No No   Sig: TAKE ONE TABLET BY MOUTH EVERY DAY AS NEEDED FOR ALLERGIES *DO NOT TAKE WITH BENADRYL*   lithium carbonate 300 mg capsule  Outside Facility (Specify) Yes No   Sig: Take 600 mg by mouth 2 (two) times a day   Patient taking differently: 300 mg PO q am and 600 mg PO q pm   loperamide (IMODIUM) 2 mg capsule  Outside Facility (Specify) Yes No   Sig: Take by mouth   memantine (NAMENDA) 10 mg tablet  Outside Facility (Specify) No No   Sig: Take 1 tablet (10 mg total) by mouth 2 (two) times a day   mineral oil-white petrolatum (LUBRIFRESH P.M.) ophthalmic ointment  Outside Facility (Specify) Yes No   Si.5 inches   ondansetron (ZOFRAN) 4 mg tablet  Outside Facility (Specify) Yes No   Sig: Take by mouth as needed     pantoprazole (PROTONIX) 40 mg tablet   No No   Sig: Take 1 tablet (40 mg total) by mouth daily    polyethylene glycol (GLYCOLAX) 17 GM/SCOOP  Outside Facility (Specify) No No   Sig: MIX 1 CAPFUL (17 GRAMS) IN 4- 8OZ OF LIQUID AND DRINK BY MOUTH ONCE DAILY AS NEEDED FOR CONSTIPATION   talc (Zeasorb)  Outside Facility (Specify) Yes No   Sig: Apply topically once as needed for irritation Sprinkle powder on affected area of skin to help with excess moisture up to 2 times a day as needed .   timolol (TIMOPTIC) 0.5 % ophthalmic solution   Yes No   Sig: Administer 1 drop into the left eye 2 (two) times a day      Facility-Administered Medications: None     Discharge Medication List as of 7/22/2025 10:58 PM        CONTINUE these medications which have NOT CHANGED    Details   acetaminophen (TYLENOL) 500 mg tablet Take 500 mg by mouth as needed for mild pain  , Historical Med      anastrozole (ARIMIDEX) 1 mg tablet Take 1 mg by mouth daily, Historical Med      Athletes Foot Powder Spray 1 % spray Apply topically as needed for rash, Starting Wed 2/7/2024, Historical Med      BANOPHEN 25 MG capsule Take 25 mg by mouth daily at bedtime as needed , Starting Mon 5/11/2020, Historical Med      calamine lotion Apply topically every 4 (four) hours as needed for itching, Historical Med      Calcium Carbonate-Vitamin D (OYSCO 500/D PO) Take 1 tablet by mouth 2 (two) times a day, Historical Med      carBAMazepine (TEGretol) 200 mg tablet Take 1 tablet (200 mg total) by mouth 2 (two) times a day, Starting Fri 4/30/2021, No Print      Cholecalciferol (VITAMIN D) 2000 units tablet TAKE TWO TABLETS BY MOUTH (4000IU) EVERY MORNING FOR VITAMIN DIFICIENCY, Normal      Dentifrices (SENSODYNE PRONAMEL DT) Apply to teeth , Historical Med      Diclofenac Sodium (VOLTAREN) 1 % Starting Tue 7/13/2021, Historical Med      diphenhydrAMINE (BENADRYL) 25 mg tablet Take 25 mg by mouth daily at bedtime as needed for itching (for insomnia and allergy ** DO NOT TAKE WITH XYZAL **) , Historical Med      docusate sodium (COLACE) 100 mg capsule Take 100  mg by mouth 3 (three) times a week , Historical Med      Emollient (Vaseline Intensive Care) LOTN Historical Med      guaiFENesin (SILTUSSIN SA PO) Take by mouth Take 2 teaspoonful (10 ml) PRN 6 hrs for cough, Historical Med      haloperidol (HALDOL) 2 mg tablet Take 2 mg by mouth 2 (two) times a day 1 tab am and 2 tabs qhs, Historical Med      hydrocortisone (Proctosol HC) 2.5 % rectal cream APPLY RECTALLY EVERY DAY AS NEEDED FOR HEMORRHOIDS, Normal      ibuprofen (MOTRIN) 400 mg tablet Historical Med      ketoconazole (NIZORAL) 2 % shampoo Historical Med      latanoprost (XALATAN) 0.005 % ophthalmic solution Administer 1 drop to both eyes, Historical Med      levocetirizine (XYZAL) 5 MG tablet TAKE ONE TABLET BY MOUTH EVERY DAY AS NEEDED FOR ALLERGIES *DO NOT TAKE WITH BENADRYL*, Normal      lithium carbonate 300 mg capsule Take 600 mg by mouth 2 (two) times a day, Starting Thu 10/22/2020, Historical Med      loperamide (IMODIUM) 2 mg capsule Take by mouth, Starting Thu 6/2/2011, Historical Med      LORazepam (ATIVAN) 1 mg tablet TAKE ONE TABLET BY MOUTH 30 MINUTES PRIOR TO PROCEDURE (SUCH AS: GYN, DENTAL, IMAGING, SKIN PROCEDURES, SUCH AS MOLE REMOVAL) FOR ANXIETY, Normal      Melatonin 5 MG TABS Take 5 mg by mouth daily at bedtime, Starting Mon 4/21/2025, Historical Med      memantine (NAMENDA) 10 mg tablet Take 1 tablet (10 mg total) by mouth 2 (two) times a day, Starting Wed 9/23/2020, Normal      mineral oil-white petrolatum (LUBRIFRESH P.M.) ophthalmic ointment 0.5 inches, Historical Med      NON FORMULARY ACT FLUORIDE DENTAL RINSE   RINSE WITH 1 TBSP (15ML)2X A DAY, Historical Med      ondansetron (ZOFRAN) 4 mg tablet Take by mouth as needed  , Historical Med      pantoprazole (PROTONIX) 40 mg tablet Take 1 tablet (40 mg total) by mouth daily, Starting Wed 12/18/2024, Normal      polyethylene glycol (GLYCOLAX) 17 GM/SCOOP MIX 1 CAPFUL (17 GRAMS) IN 4- 8OZ OF LIQUID AND DRINK BY MOUTH ONCE DAILY AS NEEDED  FOR CONSTIPATION, Normal      Sunscreens (SUNBLOCK LOTION SPF30 EX) Apply topically daily as needed, Historical Med      talc (Zeasorb) Apply topically once as needed for irritation Sprinkle powder on affected area of skin to help with excess moisture up to 2 times a day as needed ., Historical Med      timolol (TIMOPTIC) 0.5 % ophthalmic solution Administer 1 drop into the left eye 2 (two) times a day, Starting Fri 3/15/2024, Historical Med             ED SEPSIS DOCUMENTATION   Time reflects when diagnosis was documented in both MDM as applicable and the Disposition within this note       Time User Action Codes Description Comment    7/22/2025 10:45 PM Clint Lin [R51.9] Headache     7/22/2025 10:55 PM Clint Lin [R56.9] Seizure-like activity (HCC)     7/22/2025 10:55 PM Clint Lin [D72.829] Elevated WBC count     7/22/2025 10:57 PM Clint Lin [K44.9] Hiatal hernia                      [1]   Past Medical History:  Diagnosis Date    Breast cancer (HCC)     Breast cyst, left     last assessed 12/30/2013    Cerebral palsy (HCC)     Chronic GERD     Fibrocystic breast disease     last assessed 06/29/2012    Gastrointestinal hemorrhage     Glaucoma     Hiatal hernia     History of chemotherapy     Hydrocephalus (HCC)      SHUNT IN PLACE    Scoliosis    [2]   Past Surgical History:  Procedure Laterality Date    BREAST BIOPSY Right     CATARACT EXTRACTION  06/14/2021    COLONOSCOPY      Description 04/13/2016-5 yrs.  Description 4 yr f/u d/t polyp and family history, onset 07/20/2012.    COLONOSCOPY      ESOPHAGOGASTRODUODENOSCOPY      description-04/13/2016 gastritis with antral nodule    EYE SURGERY      for relief of intraocular pressure    INCISIONAL BREAST BIOPSY      description 2008    MASTECTOMY Right     SENTINEL LYMPH NODE BIOPSY      STRABISMUS SURGERY      description 1973, 1974    UPPER GASTROINTESTINAL ENDOSCOPY  05/16/2019    Grade C erosive esophagitis.  7 mm nodule in the stomach     US GUIDED THYROID BIOPSY  12/13/2022   [3]   Family History  Problem Relation Name Age of Onset    Osteopenia Mother      Supraventricular tachycardia Mother      Heart attack Father  64        Acute MI    Coronary artery disease Father      Thyroid nodules Father      BRCA1 Negative Sister      Osteopenia Maternal Grandmother      Prostate cancer Maternal Grandfather      Leukemia Paternal Grandfather      Heart attack Maternal Uncle  50        acute MI, stent    Cancer Maternal Uncle          adenocarcinoma of oral cavity    Colon cancer Maternal Uncle  57    Hyperlipidemia Maternal Uncle      Prostate cancer Maternal Uncle      Pancreatic cancer Paternal Uncle      Prostate cancer Paternal Uncle      Coronary artery disease Family      Thyroid disease Family      BRCA1 Negative Cousin      Breast cancer Cousin     [4]   Social History  Tobacco Use    Smoking status: Never    Smokeless tobacco: Never   Vaping Use    Vaping status: Never Used   Substance Use Topics    Alcohol use: No    Drug use: No        Clint Lin DO  07/23/25 0127

## 2025-07-25 LAB
ATRIAL RATE: 84 BPM
P AXIS: 37 DEGREES
PR INTERVAL: 116 MS
QRS AXIS: 21 DEGREES
QRSD INTERVAL: 82 MS
QT INTERVAL: 386 MS
QTC INTERVAL: 456 MS
T WAVE AXIS: 52 DEGREES
VENTRICULAR RATE: 84 BPM

## 2025-07-25 PROCEDURE — 93010 ELECTROCARDIOGRAM REPORT: CPT | Performed by: INTERNAL MEDICINE

## 2025-07-28 ENCOUNTER — TELEPHONE (OUTPATIENT)
Dept: BEHAVIORAL/MENTAL HEALTH CLINIC | Facility: CLINIC | Age: 60
End: 2025-07-28

## 2025-08-01 ENCOUNTER — OFFICE VISIT (OUTPATIENT)
Dept: FAMILY MEDICINE CLINIC | Facility: HOSPITAL | Age: 60
End: 2025-08-01
Payer: MEDICARE

## 2025-08-01 VITALS
DIASTOLIC BLOOD PRESSURE: 82 MMHG | HEART RATE: 73 BPM | TEMPERATURE: 98.2 F | OXYGEN SATURATION: 97 % | WEIGHT: 107.8 LBS | SYSTOLIC BLOOD PRESSURE: 122 MMHG | BODY MASS INDEX: 25.52 KG/M2

## 2025-08-01 DIAGNOSIS — Z12.31 ENCOUNTER FOR SCREENING MAMMOGRAM FOR BREAST CANCER: ICD-10-CM

## 2025-08-01 DIAGNOSIS — Z51.81 THERAPEUTIC DRUG MONITORING: ICD-10-CM

## 2025-08-01 DIAGNOSIS — Z12.11 SCREEN FOR COLON CANCER: ICD-10-CM

## 2025-08-01 DIAGNOSIS — R53.83 FATIGUE, UNSPECIFIED TYPE: ICD-10-CM

## 2025-08-01 DIAGNOSIS — K21.9 CHRONIC GERD: ICD-10-CM

## 2025-08-01 DIAGNOSIS — E04.2 MULTIPLE THYROID NODULES: ICD-10-CM

## 2025-08-01 DIAGNOSIS — R51.9 ACUTE NONINTRACTABLE HEADACHE, UNSPECIFIED HEADACHE TYPE: Primary | ICD-10-CM

## 2025-08-01 DIAGNOSIS — D72.829 LEUKOCYTOSIS, UNSPECIFIED TYPE: ICD-10-CM

## 2025-08-01 DIAGNOSIS — R26.81 UNSTEADY GAIT: ICD-10-CM

## 2025-08-01 DIAGNOSIS — R40.4 EPISODES OF STARING: ICD-10-CM

## 2025-08-01 DIAGNOSIS — E61.1 IRON DEFICIENCY: ICD-10-CM

## 2025-08-01 DIAGNOSIS — K59.00 CONSTIPATION, UNSPECIFIED CONSTIPATION TYPE: ICD-10-CM

## 2025-08-01 DIAGNOSIS — E83.52 HYPERCALCEMIA: ICD-10-CM

## 2025-08-01 PROCEDURE — 99214 OFFICE O/P EST MOD 30 MIN: CPT | Performed by: INTERNAL MEDICINE

## 2025-08-01 PROCEDURE — G2211 COMPLEX E/M VISIT ADD ON: HCPCS | Performed by: INTERNAL MEDICINE

## 2025-08-01 RX ORDER — POLYETHYLENE GLYCOL 3350 17 G/17G
17 POWDER ORAL DAILY
Qty: 510 G | Refills: 2 | Status: SHIPPED | OUTPATIENT
Start: 2025-08-01

## 2025-08-01 RX ORDER — POLYETHYLENE GLYCOL 3350 17 G/17G
17 POWDER ORAL DAILY
Qty: 510 G | Refills: 2 | Status: SHIPPED | OUTPATIENT
Start: 2025-08-01 | End: 2025-08-01 | Stop reason: SDUPTHER

## 2025-08-07 ENCOUNTER — RESULTS FOLLOW-UP (OUTPATIENT)
Dept: FAMILY MEDICINE CLINIC | Facility: HOSPITAL | Age: 60
End: 2025-08-07

## 2025-08-07 DIAGNOSIS — E61.1 IRON DEFICIENCY: Primary | ICD-10-CM

## 2025-08-07 LAB
BASOPHILS # BLD AUTO: 81 CELLS/UL (ref 0–200)
BASOPHILS NFR BLD AUTO: 1.1 %
CA-I SERPL-MCNC: 6.1 MG/DL (ref 4.7–5.5)
EOSINOPHIL # BLD AUTO: 118 CELLS/UL (ref 15–500)
EOSINOPHIL NFR BLD AUTO: 1.6 %
ERYTHROCYTE [DISTWIDTH] IN BLOOD BY AUTOMATED COUNT: 12.5 % (ref 11–15)
FERRITIN SERPL-MCNC: 6 NG/ML (ref 16–232)
HCT VFR BLD AUTO: 40.7 % (ref 35–45)
HGB BLD-MCNC: 12.7 G/DL (ref 11.7–15.5)
IRON SATN MFR SERPL: 20 % (CALC) (ref 16–45)
IRON SERPL-MCNC: 97 MCG/DL (ref 45–160)
LITHIUM SERPL-SCNC: 1 MMOL/L (ref 0.6–1.2)
LYMPHOCYTES # BLD AUTO: 1302 CELLS/UL (ref 850–3900)
LYMPHOCYTES NFR BLD AUTO: 17.6 %
MCH RBC QN AUTO: 30 PG (ref 27–33)
MCHC RBC AUTO-ENTMCNC: 31.2 G/DL (ref 32–36)
MCV RBC AUTO: 96 FL (ref 80–100)
MONOCYTES # BLD AUTO: 548 CELLS/UL (ref 200–950)
MONOCYTES NFR BLD AUTO: 7.4 %
NEUTROPHILS # BLD AUTO: 5350 CELLS/UL (ref 1500–7800)
NEUTROPHILS NFR BLD AUTO: 72.3 %
PLATELET # BLD AUTO: 346 THOUSAND/UL (ref 140–400)
PMV BLD REES-ECKER: 11 FL (ref 7.5–12.5)
PTH-INTACT SERPL-MCNC: 29 PG/ML (ref 16–77)
RBC # BLD AUTO: 4.24 MILLION/UL (ref 3.8–5.1)
TIBC SERPL-MCNC: 493 MCG/DL (CALC) (ref 250–450)
WBC # BLD AUTO: 7.4 THOUSAND/UL (ref 3.8–10.8)

## 2025-08-07 RX ORDER — FERROUS SULFATE 324(65)MG
324 TABLET, DELAYED RELEASE (ENTERIC COATED) ORAL DAILY
Qty: 30 TABLET | Refills: 5 | Status: SHIPPED | OUTPATIENT
Start: 2025-08-07

## 2025-08-08 ENCOUNTER — TELEPHONE (OUTPATIENT)
Dept: BEHAVIORAL/MENTAL HEALTH CLINIC | Facility: CLINIC | Age: 60
End: 2025-08-08

## 2025-08-12 ENCOUNTER — TELEPHONE (OUTPATIENT)
Age: 60
End: 2025-08-12

## 2025-08-20 ENCOUNTER — SOCIAL WORK (OUTPATIENT)
Dept: BEHAVIORAL/MENTAL HEALTH CLINIC | Facility: CLINIC | Age: 60
End: 2025-08-20
Payer: MEDICARE

## 2025-08-20 DIAGNOSIS — F33.9 DEPRESSION, RECURRENT (HCC): Primary | ICD-10-CM

## 2025-08-20 DIAGNOSIS — F41.9 ANXIETY: ICD-10-CM

## 2025-08-20 DIAGNOSIS — F70 MILD INTELLECTUAL DISABILITIES: ICD-10-CM

## 2025-08-20 DIAGNOSIS — F63.9 IMPULSE CONTROL DISORDER: ICD-10-CM

## 2025-08-20 DIAGNOSIS — F42.9 OBSESSIVE-COMPULSIVE DISORDER, UNSPECIFIED TYPE: ICD-10-CM

## 2025-08-20 DIAGNOSIS — F84.0 AUTISTIC DISORDER: ICD-10-CM

## 2025-08-20 PROCEDURE — 90837 PSYTX W PT 60 MINUTES: CPT | Performed by: COUNSELOR

## 2025-08-26 PROBLEM — R56.9 SEIZURES (HCC): Status: ACTIVE | Noted: 2025-08-26
